# Patient Record
Sex: FEMALE | Race: WHITE | Employment: FULL TIME
[De-identification: names, ages, dates, MRNs, and addresses within clinical notes are randomized per-mention and may not be internally consistent; named-entity substitution may affect disease eponyms.]

---

## 2017-08-27 ENCOUNTER — HEALTH MAINTENANCE LETTER (OUTPATIENT)
Age: 37
End: 2017-08-27

## 2018-07-02 NOTE — PROGRESS NOTES
SUBJECTIVE:   Daniella Sexton is a 37 year old female who presents to clinic today for the following health issues:      Back Pain       Duration: Chronic for many years        Specific cause: Degenerative disk, bulging disks     Description:   Location of pain: low back bilateral  Character of pain: sharp  Pain radiation:radiates into the right buttocks and radiates into the left buttocks  New numbness or weakness in legs, not attributed to pain:  no     Intensity: Currently 7/10, At its worst 10/10    History:   Pain interferes with job: Sometimes   History of back problems: Patient states yes, she did have back surgery about 11 years ago   Any previous MRI or X-rays: Yes--at [facility] .  Date Last year   Sees a specialist for back pain:  No  Therapies tried without relief: None    Alleviating factors:   Improved by: cold and heat, lidocaine rubs, muscle relaxer's     Precipitating factors:  Worsened by: Bending and Standing    Functional and Psychosocial Screen (Ambrocio STarT Back):      Not performed today          Accompanying Signs & Symptoms:  Risk of Fracture:  None  Risk of Cauda Equina:  None  Risk of Infection:  None  Risk of Cancer:  None  Risk of Ankylosing Spondylitis:  Onset at age <35, male, AND morning back stiffness. no       History of microdiscectomy in 2005 and had x-ray done again last year showing DDD.  Previously received chiropractic care with improvement.    Problem list and histories reviewed & adjusted, as indicated.  Additional history: as documented    Patient Active Problem List   Diagnosis     Anxiety     Depressive disorder     Duodenal ulcer     History of alcohol abuse     History of methamphetamine abuse     Migraines     Screening for malignant neoplasm of cervix     Seasonal allergic rhinitis     Sleep disturbance     Tobacco abuse     Past Surgical History:   Procedure Laterality Date     BACK SURGERY  2005    L 3-4, L 4-5     LITHOTRIPSY  2016     RELEASE CARPAL TUNNEL  "Left      TONSILLECTOMY       TUBAL LIGATION         Social History   Substance Use Topics     Smoking status: Current Every Day Smoker     Packs/day: 0.50     Types: Cigarettes     Smokeless tobacco: Never Used      Comment: 5 cigarettes/day     Alcohol use Yes      Comment: 3-4 beers a night      Family History   Problem Relation Age of Onset     HEART DISEASE No family hx of      Diabetes No family hx of      Cancer No family hx of            Reviewed and updated as needed this visit by clinical staff       Reviewed and updated as needed this visit by Provider         ROS:  Constitutional, MS, neuro systems are negative, except as otherwise noted.    OBJECTIVE:     /88  Pulse 111  Temp 98.2  F (36.8  C) (Oral)  Resp 16  Ht 5' 6.53\" (1.69 m)  Wt 232 lb 6.4 oz (105.4 kg)  SpO2 97%  BMI 36.91 kg/m2  Body mass index is 36.91 kg/(m^2).  GENERAL: healthy, alert and no distress  Comprehensive back pain exam:  Tenderness of L4-S1, paralumbar muscles bilaterally, Range of motion not limited by pain, Lower extremity strength functional and equal on both sides, Lower extremity reflexes within normal limits bilaterally, Lower extremity sensation normal and equal on both sides and Straight leg raise negative bilaterally    Diagnostic Test Results:  No results found for this or any previous visit (from the past 24 hour(s)).    ASSESSMENT/PLAN:     1. Chronic bilateral low back pain without sciatica  Start Physical Therapy.  Continue ice/heat, stretching.   Flexeril at HS.  Patient requests IM of Toradol today. Avoid over the counter NSAIDs for the rest of today.   Would also like to try Toradol PO- do not mix with ibuprofen/advil.  Flexeril at HS- do not drive after taking.     - [facility] PT, HAND, AND CHIROPRACTIC REFERRAL  - cyclobenzaprine (FLEXERIL) 10 MG tablet; Take 1 tablet (10 mg) by mouth 3 times daily as needed for muscle spasms  Dispense: 42 tablet; Refill: 3  - ketorolac (TORADOL) 10 MG tablet; Take 1 " tablet (10 mg) by mouth every 6 hours as needed for moderate pain  Dispense: 20 tablet; Refill: 2  - ketorolac (TORADOL) 30 MG/ML injection; Inject 1 mL (30 mg) into the muscle once for 1 dose  Dispense: 1 mL; Refill: 0    2. Need for vaccination    - TDAP VACCINE (ADACEL) [90517.002]    3. Tobacco abuse  Set quit date, start Wellbutrin 2 weeks prior.    - buPROPion (WELLBUTRIN SR) 150 MG 12 hr tablet; Take 1 tablet once daily and increase to 1 tablet twice daily after 4 to 7 days  Dispense: 60 tablet; Refill: 2    4. CARDIOVASCULAR SCREENING; LDL GOAL LESS THAN 160  Return for fasting labs  - Lipid panel reflex to direct LDL Fasting; Future    Follow up 6 weeks as needed    JENAE [facility]

## 2018-07-05 ENCOUNTER — OFFICE VISIT (OUTPATIENT)
Dept: FAMILY MEDICINE | Facility: CLINIC | Age: 38
End: 2018-07-05
Payer: COMMERCIAL

## 2018-07-05 VITALS
HEIGHT: 67 IN | SYSTOLIC BLOOD PRESSURE: 122 MMHG | WEIGHT: 232.4 LBS | TEMPERATURE: 98.2 F | HEART RATE: 111 BPM | RESPIRATION RATE: 16 BRPM | OXYGEN SATURATION: 97 % | DIASTOLIC BLOOD PRESSURE: 88 MMHG | BODY MASS INDEX: 36.47 KG/M2

## 2018-07-05 DIAGNOSIS — Z23 NEED FOR VACCINATION: ICD-10-CM

## 2018-07-05 DIAGNOSIS — M54.50 CHRONIC BILATERAL LOW BACK PAIN WITHOUT SCIATICA: Primary | ICD-10-CM

## 2018-07-05 DIAGNOSIS — Z72.0 TOBACCO ABUSE: ICD-10-CM

## 2018-07-05 DIAGNOSIS — G89.29 CHRONIC BILATERAL LOW BACK PAIN WITHOUT SCIATICA: Primary | ICD-10-CM

## 2018-07-05 DIAGNOSIS — Z13.6 CARDIOVASCULAR SCREENING; LDL GOAL LESS THAN 160: ICD-10-CM

## 2018-07-05 PROCEDURE — 90715 TDAP VACCINE 7 YRS/> IM: CPT | Performed by: NURSE PRACTITIONER

## 2018-07-05 PROCEDURE — 99203 OFFICE O/P NEW LOW 30 MIN: CPT | Mod: 25 | Performed by: NURSE PRACTITIONER

## 2018-07-05 PROCEDURE — 90471 IMMUNIZATION ADMIN: CPT | Performed by: NURSE PRACTITIONER

## 2018-07-05 PROCEDURE — 96372 THER/PROPH/DIAG INJ SC/IM: CPT | Performed by: NURSE PRACTITIONER

## 2018-07-05 RX ORDER — BUPROPION HYDROCHLORIDE 150 MG/1
TABLET, EXTENDED RELEASE ORAL
Qty: 60 TABLET | Refills: 2 | Status: SHIPPED | OUTPATIENT
Start: 2018-07-05 | End: 2019-06-12

## 2018-07-05 RX ORDER — KETOROLAC TROMETHAMINE 10 MG/1
10 TABLET, FILM COATED ORAL EVERY 6 HOURS PRN
Qty: 20 TABLET | Refills: 2 | Status: SHIPPED | OUTPATIENT
Start: 2018-07-05 | End: 2019-11-07

## 2018-07-05 RX ORDER — RIZATRIPTAN BENZOATE 10 MG/1
10 TABLET ORAL
COMMUNITY
Start: 2014-08-26 | End: 2019-04-03

## 2018-07-05 RX ORDER — ACETAMINOPHEN 325 MG/1
325-650 TABLET ORAL
COMMUNITY
End: 2020-05-05

## 2018-07-05 RX ORDER — CYCLOBENZAPRINE HCL 10 MG
10 TABLET ORAL
COMMUNITY
Start: 2017-10-13 | End: 2018-07-05

## 2018-07-05 RX ORDER — KETOROLAC TROMETHAMINE 30 MG/ML
30 INJECTION, SOLUTION INTRAMUSCULAR; INTRAVENOUS ONCE
Qty: 1 ML | Refills: 0 | OUTPATIENT
Start: 2018-07-05 | End: 2018-07-05

## 2018-07-05 RX ORDER — ALBUTEROL SULFATE 90 UG/1
AEROSOL, METERED RESPIRATORY (INHALATION)
Refills: 1 | COMMUNITY
Start: 2018-02-09 | End: 2019-06-12

## 2018-07-05 RX ORDER — LORATADINE 10 MG/1
TABLET ORAL
COMMUNITY
Start: 2017-06-26 | End: 2019-01-02

## 2018-07-05 RX ORDER — FLUTICASONE PROPIONATE 50 MCG
2 SPRAY, SUSPENSION (ML) NASAL
COMMUNITY
End: 2019-11-07

## 2018-07-05 RX ORDER — CYCLOBENZAPRINE HCL 10 MG
10 TABLET ORAL 3 TIMES DAILY PRN
Qty: 42 TABLET | Refills: 3 | Status: SHIPPED | OUTPATIENT
Start: 2018-07-05 | End: 2019-01-02

## 2018-07-05 NOTE — MR AVS SNAPSHOT
After Visit Summary   7/5/2018    Daniella Sexton    MRN: [id number]           Patient Information     Date Of Birth          1980        Visit Information        Provider Department      7/5/2018 3:20 PM Renetta Morrow APRN CNP [facility]        Today's Diagnoses     Chronic bilateral low back pain without sciatica    -  1    Need for vaccination        Tobacco abuse        CARDIOVASCULAR SCREENING; LDL GOAL LESS THAN 160          Care Instructions    [facility]    If you have any questions regarding to your visit please contact your care team:       Team Red:   Clinic Hours Telephone Number   Dr. Tosha Morrow, NP   7am-7pm  Monday - Thursday   7am-5pm  Fridays  [phone]  (Appointment scheduling available 24/7)    Questions about your recent visit?   Team Line  [phone]   Urgent Care - [place] and [facility] - 11am-9pm Monday-Friday Saturday-Sunday- 9am-5pm   [place] - 5pm-9pm Monday-Friday Saturday-Sunday- 9am-5pm  [phone] - [place]  [phone] - [place]       What options do I have for a visit other than an office visit? We offer electronic visits (e-visits) and telephone visits, when medically appropriate.  Please check with your medical insurance to see if these types of visits are covered, as you will be responsible for any charges that are not paid by your insurance.      You can use [employer] (secure electronic communication) to access to your chart, send your primary care provider a message, or make an appointment. Ask a team member how to get started.     For a price quote for your services, please call our Consumer Price Line at [phone] or our Imaging Cost estimation line at [phone] (for imaging tests).              Follow-ups after your visit        Additional Services     [facility] PT, HAND, AND CHIROPRACTIC REFERRAL       === This order will print in the  [facility] Scheduling  Office ===    Physical therapy, hand therapy and chiropractic care are available through:    [facility] [facility]  [facility], [facility]    Call one easy number to schedule at any of the above locations:  [phone].    Your provider has referred you to Physical Therapy at [facility] or [facility]    Indication/Reason for Referral: Low Back Pain  Onset of Illness:  chronic  Therapy Orders:  Evaluate and Treat  Special Programs:  None  Special Request:  None    Ambrocio Albrecht      Additional Comments for the therapist or chiropractor:      Please be aware that coverage of these services is subject to the terms and limitations of your health insurance plan.  Call member services at your health plan with any benefit or coverage questions.      Please bring the following to your appointment:    >>   Your personal calendar for scheduling future appointments  >>   Comfortable clothing                  Future tests that were ordered for you today     Open Future Orders        Priority Expected Expires Ordered    Lipid panel reflex to direct LDL Fasting Routine 7/5/2018 7/5/2019 7/5/2018            Who to contact     If you have questions or need follow up information about today's clinic visit or your schedule please contact [facility] directly at [phone].  Normal or non-critical lab and imaging results will be communicated to you by MyChart, letter or phone within 4 business days after the clinic has received the results. If you do not hear from us within 7 days, please contact the clinic through MyChart or phone. If you have a critical or abnormal lab result, we will notify you by phone as soon as possible.  Submit refill requests through [employer] or call your pharmacy and they will forward the refill request to us. Please allow 3 business days for your refill to be completed.          Additional Information About Your Visit        MyChart Information   "   FigCard lets you send messages to your doctor, view your test results, renew your prescriptions, schedule appointments and more. To sign up, go to [email or web address] . Click on \"Log in\" on the left side of the screen, which will take you to the Welcome page. Then click on \"Sign up Now\" on the right side of the page.     You will be asked to enter the access code listed below, as well as some personal information. Please follow the directions to create your username and password.     Your access code is: [email or web address]  Expires: 10/3/2018  3:17 PM     Your access code will  in 90 days. If you need help or a new code, please call your [place] clinic or [phone].        Care EveryWhere ID     This is your Care EveryWhere ID. This could be used by other organizations to access your [place] medical records  [id number]        Your Vitals Were     Pulse Temperature Respirations Height Pulse Oximetry BMI (Body Mass Index)    111 98.2  F (36.8  C) (Oral) 16 5' 6.53\" (1.69 m) 97% 36.91 kg/m2       Blood Pressure from Last 3 Encounters:   18 122/88    Weight from Last 3 Encounters:   18 232 lb 6.4 oz (105.4 kg)              We Performed the Following     [facility] PT, HAND, AND CHIROPRACTIC REFERRAL     TDAP VACCINE (ADACEL) [51337.002]          Today's Medication Changes          These changes are accurate as of 18  4:13 PM.  If you have any questions, ask your nurse or doctor.               Start taking these medicines.        Dose/Directions    buPROPion 150 MG 12 hr tablet   Commonly known as:  WELLBUTRIN SR   Used for:  Tobacco abuse   Started by:  Renetta Morrow APRN CNP        Take 1 tablet once daily and increase to 1 tablet twice daily after 4 to 7 days   Quantity:  60 tablet   Refills:  2       * ketorolac 10 MG tablet   Commonly known as:  TORADOL   Used for:  Chronic bilateral low back pain without sciatica   Started by:  Renetta Morrow APRN CNP        Dose:  10 mg "   Take 1 tablet (10 mg) by mouth every 6 hours as needed for moderate pain   Quantity:  20 tablet   Refills:  2       * ketorolac 30 MG/ML injection   Commonly known as:  TORADOL   Used for:  Chronic bilateral low back pain without sciatica   Started by:  Renetta Morrow APRN CNP        Dose:  30 mg   Inject 1 mL (30 mg) into the muscle once for 1 dose   Quantity:  1 mL   Refills:  0       * Notice:  This list has 2 medication(s) that are the same as other medications prescribed for you. Read the directions carefully, and ask your doctor or other care provider to review them with you.      These medicines have changed or have updated prescriptions.        Dose/Directions    cyclobenzaprine 10 MG tablet   Commonly known as:  FLEXERIL   This may have changed:    - when to take this  - reasons to take this   Used for:  Chronic bilateral low back pain without sciatica   Changed by:  Renetta Morrow APRN CNP        Dose:  10 mg   Take 1 tablet (10 mg) by mouth 3 times daily as needed for muscle spasms   Quantity:  42 tablet   Refills:  3         Stop taking these medicines if you haven't already. Please contact your care team if you have questions.     diclofenac 1 % Gel topical gel   Commonly known as:  VOLTAREN   Stopped by:  Renetta Morrow APRN CNP           ROBAXIN PO   Stopped by:  Renetta Morrow APRN CNP                Where to get your medicines      These medications were sent to [facility] - [address]  [address], [place] [address]     Phone:  [phone]     buPROPion 150 MG 12 hr tablet    cyclobenzaprine 10 MG tablet    ketorolac 10 MG tablet         Some of these will need a paper prescription and others can be bought over the counter.  Ask your nurse if you have questions.     You don't need a prescription for these medications     ketorolac 30 MG/ML injection                Primary Care Provider Office Phone #  Fax #    Cindi Stanford [phone] [address]  [facility] [place] [address]        Equal Access to Services     ALEYDA SMART : Hadii aad ku hadbriannestevie Francisco, wabeckida desmondcoltonha, qatimta karoopada florencio, ottoniel baker johnnygermán gates laRenettajessica moeller. So [facility] [place] [phone].    ATENCIÓN: Si habla español, tiene a morgan disposición servicios gratuitos de asistencia lingüística. Llame al [phone].    We comply with applicable federal civil rights laws and [place] laws. We do not discriminate on the basis of race, color, national origin, age, disability, sex, sexual orientation, or gender identity.            Thank you!     Thank you for choosing [facility]  for your care. Our goal is always to provide you with excellent care. Hearing back from our patients is one way we can continue to improve our services. Please take a few minutes to complete the written survey that you may receive in the mail after your visit with us. Thank you!             Your Updated Medication List - Protect others around you: Learn how to safely use, store and throw away your medicines at www.disposemymeds.org.          This list is accurate as of 7/5/18  4:13 PM.  Always use your most recent med list.                   Brand Name Dispense Instructions for use Diagnosis    acetaminophen 325 MG tablet    TYLENOL     Take 325-650 mg by mouth        buPROPion 150 MG 12 hr tablet    WELLBUTRIN SR    60 tablet    Take 1 tablet once daily and increase to 1 tablet twice daily after 4 to 7 days    Tobacco abuse       cyclobenzaprine 10 MG tablet    FLEXERIL    42 tablet    Take 1 tablet (10 mg) by mouth 3 times daily as needed for muscle spasms    Chronic bilateral low back pain without sciatica       fluticasone 50 MCG/ACT spray    FLONASE     2 sprays        * ketorolac 10 MG tablet    TORADOL    20 tablet    Take 1 tablet (10 mg) by mouth every 6 hours as needed for moderate pain    Chronic bilateral low back pain  without sciatica       * ketorolac 30 MG/ML injection    TORADOL    1 mL    Inject 1 mL (30 mg) into the muscle once for 1 dose    Chronic bilateral low back pain without sciatica       loratadine 10 MG tablet    CLARITIN          rizatriptan 10 MG tablet    MAXALT     Take 10 mg by mouth        VENTOLIN  (90 Base) MCG/ACT Inhaler   Generic drug:  albuterol           * Notice:  This list has 2 medication(s) that are the same as other medications prescribed for you. Read the directions carefully, and ask your doctor or other care provider to review them with you.

## 2018-07-05 NOTE — NURSING NOTE
The following medication was given:       MEDICATION: Ketorolac Tromethamine 60MG/2ML (30 mg/mL) (Toradol)  ROUTE: IM  SITE: Ventrogluteal - Right  DOSE: 30 mg-1mL  LOT #: [id number]  :  [employer]  EXPIRATION DATE:  10/2019  NDC#: 63095-460-62     Give at: 4:15PM   Loree Odell

## 2018-07-05 NOTE — Clinical Note
Please abstract the following data from this visit with this patient into the appropriate field in Epic:  Pap smear done on this date: 4/14/15 (approximately), by this group: [facility], results were NIL, negative HPV.

## 2018-07-05 NOTE — PATIENT INSTRUCTIONS
[facility]    If you have any questions regarding to your visit please contact your care team:       Team Red:   Clinic Hours Telephone Number   Dr. Tosha Morrow, NP   7am-7pm  Monday - Thursday   7am-5pm  Fridays  [phone]  (Appointment scheduling available 24/7)    Questions about your recent visit?   Team Line  [phone]   Urgent Care - [place] and [facility] - 11am-9pm Monday-Friday Saturday-Sunday- 9am-5pm   [place] - 5pm-9pm Monday-Friday Saturday-Sunday- 9am-5pm  [phone] - [place]  [phone] - [place]       What options do I have for a visit other than an office visit? We offer electronic visits (e-visits) and telephone visits, when medically appropriate.  Please check with your medical insurance to see if these types of visits are covered, as you will be responsible for any charges that are not paid by your insurance.      You can use [employer] (secure electronic communication) to access to your chart, send your primary care provider a message, or make an appointment. Ask a team member how to get started.     For a price quote for your services, please call our Consumer Price Line at [phone] or our Imaging Cost estimation line at [phone] (for imaging tests).

## 2018-07-09 ENCOUNTER — HEALTH MAINTENANCE LETTER (OUTPATIENT)
Age: 38
End: 2018-07-09

## 2018-07-13 ENCOUNTER — THERAPY VISIT (OUTPATIENT)
Dept: PHYSICAL THERAPY | Facility: CLINIC | Age: 38
End: 2018-07-13
Attending: NURSE PRACTITIONER
Payer: COMMERCIAL

## 2018-07-13 DIAGNOSIS — G89.29 CHRONIC LOW BACK PAIN: Primary | ICD-10-CM

## 2018-07-13 DIAGNOSIS — M54.50 CHRONIC LOW BACK PAIN: Primary | ICD-10-CM

## 2018-07-13 PROCEDURE — 97110 THERAPEUTIC EXERCISES: CPT | Mod: GP | Performed by: PHYSICAL THERAPIST

## 2018-07-13 PROCEDURE — 97161 PT EVAL LOW COMPLEX 20 MIN: CPT | Mod: GP | Performed by: PHYSICAL THERAPIST

## 2018-07-13 PROCEDURE — 97012 MECHANICAL TRACTION THERAPY: CPT | Mod: GP | Performed by: PHYSICAL THERAPIST

## 2018-07-13 NOTE — PROGRESS NOTES
[facility] Initial Evaluation  Subjective:  Patient is a 37 year old female presenting with rehab back hpi.   Daniella Sexton is a 37 year old female with a lumbar condition.  Condition occurred with:  Repetition/overuse and degenerative joint disease.  Condition occurred: in the community.  This is a chronic condition  Patient reports onset of low back pain 1 year ago after experiencing a change in her job responsibilities. Patient generally sits throughout the day, but was asked to help work in a department that requires her to stand and lean forward for long periods of time over a table. Patient also reports having multiple level discectomies 12 years ago.    Patient reports pain:  Lumbar spine right and lumbar spine left.  Radiates to: occasional bilateral gluts.  Pain is described as aching, burning, sharp and shooting and is constant and reported as 5/10.  Associated symptoms:  Loss of strength and loss of motion/stiffness. Pain is worse during the day.  Symptoms are exacerbated by bending and standing and relieved by nothing.  Since onset symptoms are unchanged.  Special testing: none.      General health as reported by patient is good.                      Red flags:  None as reported by the patient.                        Objective:  Standing Alignment:    Cervical/Thoracic:  Forward head  Shoulder/UE:  Rounded shoulders                             Lumbar/SI Evaluation  ROM:    AROM Lumbar:   Flexion:            Hand to ankles, painful  Ext:                    Significant loss, painful   Side Bend:        Left:  Hand to knee, painful    Right:  Hand to knee, painful  Rotation:           Left:  WNL    Right:  WNL  Side Glide:        Left:     Right:           Lumbar Myotomes:  Lumbar myotomes: grossly 4+/5 bilaterally.                Lumbar Dermtomes:  normal                Neural Tension/Mobility:    Left side:  SLR positive.   Right side:   SLR positive.  Lumbar Palpation:    Tenderness  present at Left:    Piriformis; Gluteus Medius and Vertebral  Tenderness present at Right: Piriformis; Gluteus Medius and Vertebral    Lumbar Provocation:    Left positive with:  Mobility  Left negative with:  PROM hip  Right positive with: Mobility  Right negative with:  PROM hip  Spinal Segmental Conclusions:     Level: Hypo noted at L5, L4, L3, L2 and L1      SI joint/Sacrum:    unremarkable                                                         [facility] Lumbar Evaluation        Test Movements:  FIS: During: produces  After: worse  Mechanical Response: no effect  Repeat FIS: During: produces  After: worse  Mechanical Response: no effect  EIS: During: produces  After: no effect  Mechanical Response: no effect  Repeat EIS: During: decreases  After: better  Mechanical Response: IncROM                                                     ROS    Assessment/Plan:    Patient is a 37 year old female with lumbar complaints.    Patient has the following significant findings with corresponding treatment plan.                Diagnosis 1:  Low back pain  Pain -  hot/cold therapy, mechanical traction, manual therapy, self management, education, directional preference exercise and home program    Therapy Evaluation Codes:   1) History comprised of:   Personal factors that impact the plan of care:      Overall behavior pattern, Profession, Social history/culture and Time since onset of symptoms.    Comorbidity factors that impact the plan of care are:      None.     Medications impacting care: None.  2) Examination of Body Systems comprised of:   Body structures and functions that impact the plan of care:      Lumbar spine.   Activity limitations that impact the plan of care are:      Bathing, Bending, Dressing, Lifting, Standing, Walking and Working.  3) Clinical presentation characteristics are:   Stable/Uncomplicated.  4) Decision-Making    Low complexity using standardized patient assessment instrument and/or measureable  assessment of functional outcome.  Cumulative Therapy Evaluation is: Low complexity.    Previous and current functional limitations:  (See Goal Flow Sheet for this information)    Short term and Long term goals: (See Goal Flow Sheet for this information)     Communication ability:  Patient appears to be able to clearly communicate and understand verbal and written communication and follow directions correctly.  Treatment Explanation - The following has been discussed with the patient:   RX ordered/plan of care  Anticipated outcomes  Possible risks and side effects  This patient would benefit from PT intervention to resume normal activities.   Rehab potential is good.    Frequency:  1 X week, once daily  Duration:  for 6 weeks  Discharge Plan:  Achieve all LTG.  Independent in home treatment program.  Reach maximal therapeutic benefit.    Please refer to the daily flowsheet for treatment today, total treatment time and time spent performing 1:1 timed codes.

## 2018-07-16 NOTE — PROGRESS NOTES
[facility] Initial Evaluation  Subjective:  Patient is a 37 year old female presenting with rehab left ankle/foot hpi.                                      Pertinent medical history includes:  Depression, overweight, smoking and sleep disorder/apnea.  Medical allergies: no.  Other surgeries include:  None reported.  Current medications:  Anti-inflammatory and muscle relaxants.                                      Objective:  System    Physical Exam    General     ROS    Assessment/Plan:

## 2018-07-18 ENCOUNTER — THERAPY VISIT (OUTPATIENT)
Dept: PHYSICAL THERAPY | Facility: CLINIC | Age: 38
End: 2018-07-18
Payer: COMMERCIAL

## 2018-07-18 DIAGNOSIS — M54.50 CHRONIC LOW BACK PAIN: ICD-10-CM

## 2018-07-18 DIAGNOSIS — G89.29 CHRONIC LOW BACK PAIN: ICD-10-CM

## 2018-07-18 PROCEDURE — 97012 MECHANICAL TRACTION THERAPY: CPT | Mod: GP

## 2018-07-18 PROCEDURE — 97110 THERAPEUTIC EXERCISES: CPT | Mod: GP

## 2018-07-30 ENCOUNTER — THERAPY VISIT (OUTPATIENT)
Dept: PHYSICAL THERAPY | Facility: CLINIC | Age: 38
End: 2018-07-30
Payer: COMMERCIAL

## 2018-07-30 DIAGNOSIS — M54.50 CHRONIC LOW BACK PAIN: ICD-10-CM

## 2018-07-30 DIAGNOSIS — G89.29 CHRONIC LOW BACK PAIN: ICD-10-CM

## 2018-07-30 PROCEDURE — 97110 THERAPEUTIC EXERCISES: CPT | Mod: GP | Performed by: PHYSICAL THERAPIST

## 2018-07-30 PROCEDURE — 97012 MECHANICAL TRACTION THERAPY: CPT | Mod: GP | Performed by: PHYSICAL THERAPIST

## 2018-08-07 ENCOUNTER — THERAPY VISIT (OUTPATIENT)
Dept: PHYSICAL THERAPY | Facility: CLINIC | Age: 38
End: 2018-08-07
Payer: COMMERCIAL

## 2018-08-07 DIAGNOSIS — M54.50 CHRONIC LOW BACK PAIN: ICD-10-CM

## 2018-08-07 DIAGNOSIS — G89.29 CHRONIC LOW BACK PAIN: ICD-10-CM

## 2018-08-07 PROCEDURE — 97110 THERAPEUTIC EXERCISES: CPT | Mod: GP | Performed by: PHYSICAL THERAPIST

## 2018-08-07 PROCEDURE — 97012 MECHANICAL TRACTION THERAPY: CPT | Mod: GP | Performed by: PHYSICAL THERAPIST

## 2018-08-27 DIAGNOSIS — Z13.6 CARDIOVASCULAR SCREENING; LDL GOAL LESS THAN 160: ICD-10-CM

## 2018-08-27 PROBLEM — E78.00 PURE HYPERCHOLESTEROLEMIA: Status: ACTIVE | Noted: 2018-08-27

## 2018-08-27 PROBLEM — E78.2 MIXED HYPERLIPIDEMIA: Status: ACTIVE | Noted: 2018-08-27

## 2018-08-27 LAB
CHOLEST SERPL-MCNC: 221 MG/DL
HDLC SERPL-MCNC: 59 MG/DL
LDLC SERPL CALC-MCNC: 131 MG/DL
NONHDLC SERPL-MCNC: 162 MG/DL
TRIGL SERPL-MCNC: 156 MG/DL

## 2018-08-27 PROCEDURE — 36415 COLL VENOUS BLD VENIPUNCTURE: CPT | Performed by: NURSE PRACTITIONER

## 2018-08-27 PROCEDURE — 80061 LIPID PANEL: CPT | Performed by: NURSE PRACTITIONER

## 2018-08-27 NOTE — LETTER
[facility]  [address] [place] [address]    August 28, 2018    Daniella Sexton  [address] [id number]          Dear Daniella,    Your cholesterol is mildly elevated. Regular exercise and a diet low in simple carbs and saturated fats can improve this. We should recheck this in 1 year    Enclosed is a copy of your results.     Results for orders placed or performed in visit on 08/27/18   Lipid panel reflex to direct LDL Fasting   Result Value Ref Range    Cholesterol 221 (H) <200 mg/dL    Triglycerides 156 (H) <150 mg/dL    HDL Cholesterol 59 >49 mg/dL    LDL Cholesterol Calculated 131 (H) <100 mg/dL    Non HDL Cholesterol 162 (H) <130 mg/dL       If you have any questions or concerns, please call myself or my nurse at [phone].      Sincerely,        Renetta Morrow, JENAE CNP /carbajal

## 2018-08-30 ENCOUNTER — THERAPY VISIT (OUTPATIENT)
Dept: PHYSICAL THERAPY | Facility: CLINIC | Age: 38
End: 2018-08-30
Payer: COMMERCIAL

## 2018-08-30 DIAGNOSIS — G89.29 CHRONIC LOW BACK PAIN: ICD-10-CM

## 2018-08-30 DIAGNOSIS — M54.50 CHRONIC LOW BACK PAIN: ICD-10-CM

## 2018-08-30 PROCEDURE — 97012 MECHANICAL TRACTION THERAPY: CPT | Mod: GP

## 2018-11-16 ENCOUNTER — OFFICE VISIT (OUTPATIENT)
Dept: FAMILY MEDICINE | Facility: CLINIC | Age: 38
End: 2018-11-16
Payer: COMMERCIAL

## 2018-11-16 VITALS
RESPIRATION RATE: 18 BRPM | TEMPERATURE: 97.1 F | SYSTOLIC BLOOD PRESSURE: 124 MMHG | HEART RATE: 93 BPM | DIASTOLIC BLOOD PRESSURE: 72 MMHG | OXYGEN SATURATION: 97 %

## 2018-11-16 DIAGNOSIS — J01.90 ACUTE BACTERIAL SINUSITIS: Primary | ICD-10-CM

## 2018-11-16 DIAGNOSIS — B96.89 ACUTE BACTERIAL SINUSITIS: Primary | ICD-10-CM

## 2018-11-16 PROCEDURE — 99214 OFFICE O/P EST MOD 30 MIN: CPT | Performed by: FAMILY MEDICINE

## 2018-11-16 RX ORDER — CEFDINIR 300 MG/1
300 CAPSULE ORAL 2 TIMES DAILY
Qty: 20 CAPSULE | Refills: 0 | Status: CANCELLED | OUTPATIENT
Start: 2018-11-16

## 2018-11-16 RX ORDER — PREDNISONE 20 MG/1
20 TABLET ORAL DAILY
Qty: 5 TABLET | Refills: 0 | Status: SHIPPED | OUTPATIENT
Start: 2018-11-16 | End: 2018-11-16

## 2018-11-16 RX ORDER — CLINDAMYCIN HCL 300 MG
300 CAPSULE ORAL 4 TIMES DAILY
Qty: 40 CAPSULE | Refills: 0 | Status: SHIPPED | OUTPATIENT
Start: 2018-11-16 | End: 2018-11-16

## 2018-11-16 RX ORDER — LEVOFLOXACIN 500 MG/1
500 TABLET, FILM COATED ORAL DAILY
Qty: 10 TABLET | Refills: 0 | Status: SHIPPED | OUTPATIENT
Start: 2018-11-16 | End: 2018-11-16

## 2018-11-16 RX ORDER — LEVOFLOXACIN 500 MG/1
500 TABLET, FILM COATED ORAL DAILY
Qty: 10 TABLET | Refills: 0 | Status: SHIPPED | OUTPATIENT
Start: 2018-11-16 | End: 2019-01-02

## 2018-11-16 RX ORDER — PREDNISONE 20 MG/1
20 TABLET ORAL DAILY
Qty: 5 TABLET | Refills: 0 | Status: SHIPPED | OUTPATIENT
Start: 2018-11-16 | End: 2019-01-02

## 2018-11-16 RX ORDER — CLINDAMYCIN HCL 300 MG
300 CAPSULE ORAL 4 TIMES DAILY
Qty: 40 CAPSULE | Refills: 0 | Status: SHIPPED | OUTPATIENT
Start: 2018-11-16 | End: 2019-01-02

## 2018-11-16 NOTE — PROGRESS NOTES
SUBJECTIVE:   Daniella Sexton is a 38 year old female who presents to clinic today for the following health issues:    Chief Complaint   Patient presents with     Sinus Problem     Health Maintenance     PHQ9     Referral     ENT       ENT Symptoms             Symptoms: cc Present Absent Comment   Fever/Chills   x    Fatigue  x     Muscle Aches  x     Eye Irritation   x    Sneezing  x     Nasal Joey/Drg  x     Sinus Pressure/Pain  x     Loss of smell  x     Dental pain   x    Sore Throat  x     Swollen Glands  x     Ear Pain/Fullness  x     Cough  x     Wheeze  x     Chest Pain  x     Shortness of breath  x     Rash   x    Other         Symptom duration:  2 months   Symptom severity:  moderate/severe   Treatments tried:  Levaquin, Doxycycline (2) and methylprednisolone     Contacts:  unknown     Autumn presents with URI symptoms.  Patient has had a productive cough for 14 days, along with sore throat, nasal congestion, fatigue, ear pain/fullness, sinus pressure.  Patient denies shortness of breath, wheezing, muscle aches, headache, fever.  Patient has been taking OTC cold medications and is trying to stay hydrated.  She has been treated recently with doxy, then another 10 days of doxy w/ methylpred after symptoms didn't improve, then 5 days of levaquin.    Patient gets sinus infection very frequently.  Would like referral to ENT to assess this.    Problem list and histories reviewed & adjusted, as indicated.  Additional history: as documented    BP Readings from Last 3 Encounters:   11/16/18 124/72   07/05/18 122/88    Wt Readings from Last 3 Encounters:   07/05/18 232 lb 6.4 oz (105.4 kg)        Reviewed and updated as needed this visit by clinical staff  Tobacco  Allergies  Meds  Problems       Reviewed and updated as needed this visit by Provider  Allergies  Meds  Problems         ROS:  Constitutional, HEENT, cardiovascular, pulmonary, gi and gu systems are negative, except as otherwise  noted.    OBJECTIVE:     /72  Pulse 93  Temp 97.1  F (36.2  C) (Oral)  Resp 18  SpO2 97%  There is no height or weight on file to calculate BMI.  GENERAL: healthy, alert and no distress  EYES: Eyes grossly normal to inspection, PERRL and conjunctivae and sclerae normal  HENT: left maxillary sinus tenderns, ear canals and TM's normal, nose and mouth without ulcers or lesions  NECK: no adenopathy, no asymmetry, masses, or scars and thyroid normal to palpation  RESP: lungs clear to auscultation - no rales, rhonchi or wheezes  CV: regular rate and rhythm, normal S1 S2, no S3 or S4, no murmur, click or rub, no peripheral edema and peripheral pulses strong  SKIN: no suspicious lesions or rashes  PSYCH: mentation appears normal, affect normal/bright    ASSESSMENT/PLAN:     1. Acute bacterial sinusitis  Will refer to ENT, given abx, prednisone  - OTOLARYNGOLOGY REFERRAL  - clindamycin (CLEOCIN) 300 MG capsule; Take 1 capsule (300 mg) by mouth 4 times daily  Dispense: 40 capsule; Refill: 0  - predniSONE (DELTASONE) 20 MG tablet; Take 1 tablet (20 mg) by mouth daily  Dispense: 5 tablet; Refill: 0  - levofloxacin (LEVAQUIN) 500 MG tablet; Take 1 tablet (500 mg) by mouth daily  Dispense: 10 tablet; Refill: 0    Follow up if symptoms worsen or fail to improve.     Roni Mobley MD  [facility]

## 2018-11-16 NOTE — MR AVS SNAPSHOT
After Visit Summary   11/16/2018    Daniella Sexton    MRN: [id number]           Patient Information     Date Of Birth          1980        Visit Information        Provider Department      11/16/2018 3:20 PM Roni Baltazar MD [facility]        Today's Diagnoses     Acute bacterial sinusitis    -  1       Follow-ups after your visit        Additional Services     OTOLARYNGOLOGY REFERRAL       Your provider has referred you to: FM[facility]: [facility] [phone]   [email or web address]    Please be aware that coverage of these services is subject to the terms and limitations of your health insurance plan.  Call member services at your health plan with any benefit or coverage questions.      Please bring the following with you to your appointment:    (1) Any X-Rays, CTs or MRIs which have been performed.  Contact the facility where they were done to arrange for  prior to your scheduled appointment.   (2) List of current medications  (3) This referral request   (4) Any documents/labs given to you for this referral                  Who to contact     If you have questions or need follow up information about today's clinic visit or your schedule please contact [facility] directly at [phone].  Normal or non-critical lab and imaging results will be communicated to you by MyChart, letter or phone within 4 business days after the clinic has received the results. If you do not hear from us within 7 days, please contact the clinic through MyChart or phone. If you have a critical or abnormal lab result, we will notify you by phone as soon as possible.  Submit refill requests through Qu[employer]ey or call your pharmacy and they will forward the refill request to us. Please allow 3 business days for your refill to be completed.          Additional Information About Your Visit        Care EveryWhere ID     This is your Care  EveryWhere ID. This could be used by other organizations to access your [place] medical records  [id number]        Your Vitals Were     Pulse Temperature Respirations Pulse Oximetry          93 97.1  F (36.2  C) (Oral) 18 97%         Blood Pressure from Last 3 Encounters:   11/16/18 124/72   07/05/18 122/88    Weight from Last 3 Encounters:   07/05/18 232 lb 6.4 oz (105.4 kg)              We Performed the Following     OTOLARYNGOLOGY REFERRAL          Today's Medication Changes          These changes are accurate as of 11/16/18  4:19 PM.  If you have any questions, ask your nurse or doctor.               Start taking these medicines.        Dose/Directions    clindamycin 300 MG capsule   Commonly known as:  CLEOCIN   Used for:  Acute bacterial sinusitis   Started by:  Roni Baltazar MD        Dose:  300 mg   Take 1 capsule (300 mg) by mouth 4 times daily   Quantity:  40 capsule   Refills:  0       levofloxacin 500 MG tablet   Commonly known as:  LEVAQUIN   Used for:  Acute bacterial sinusitis   Started by:  Roni Baltazar MD        Dose:  500 mg   Take 1 tablet (500 mg) by mouth daily   Quantity:  10 tablet   Refills:  0       predniSONE 20 MG tablet   Commonly known as:  DELTASONE   Used for:  Acute bacterial sinusitis   Started by:  Roni Baltazar MD        Dose:  20 mg   Take 1 tablet (20 mg) by mouth daily   Quantity:  5 tablet   Refills:  0            Where to get your medicines      These medications were sent to [facility]  [address]  [address], [place] [address]     Phone:  [phone]     clindamycin 300 MG capsule    levofloxacin 500 MG tablet    predniSONE 20 MG tablet                Primary Care Provider Office Phone # Fax #    Renetta Morrow, JENAE VAUGHAN [phone] [address]  [place] [address]        Equal Access to Services     ALEYDA SMART  AH: Redd davisonbriannestevie Nolan, waaxda luqadaha, qaybta kaalmadelyn matias, ottoniel chase raghukarma delvallerogervince moeller. So [facility] [phone].    ATENCIÓN: Si habla josiah, tiene a morgan disposición servicios gratuitos de asistencia lingüística. Llame al [phone].    We comply with applicable federal civil rights laws and [place] laws. We do not discriminate on the basis of race, color, national origin, age, disability, sex, sexual orientation, or gender identity.            Thank you!     Thank you for choosing [facility]  for your care. Our goal is always to provide you with excellent care. Hearing back from our patients is one way we can continue to improve our services. Please take a few minutes to complete the written survey that you may receive in the mail after your visit with us. Thank you!             Your Updated Medication List - Protect others around you: Learn how to safely use, store and throw away your medicines at www.disposemymeds.org.          This list is accurate as of 11/16/18  4:19 PM.  Always use your most recent med list.                   Brand Name Dispense Instructions for use Diagnosis    acetaminophen 325 MG tablet    TYLENOL     Take 325-650 mg by mouth        buPROPion 150 MG 12 hr tablet    WELLBUTRIN SR    60 tablet    Take 1 tablet once daily and increase to 1 tablet twice daily after 4 to 7 days    Tobacco abuse       clindamycin 300 MG capsule    CLEOCIN    40 capsule    Take 1 capsule (300 mg) by mouth 4 times daily    Acute bacterial sinusitis       cyclobenzaprine 10 MG tablet    FLEXERIL    42 tablet    Take 1 tablet (10 mg) by mouth 3 times daily as needed for muscle spasms    Chronic bilateral low back pain without sciatica       fluticasone 50 MCG/ACT spray    FLONASE     2 sprays        ketorolac 10 MG tablet    TORADOL    20 tablet    Take 1 tablet (10 mg) by mouth every 6 hours as needed for moderate pain    Chronic bilateral low back pain without  sciatica       levofloxacin 500 MG tablet    LEVAQUIN    10 tablet    Take 1 tablet (500 mg) by mouth daily    Acute bacterial sinusitis       loratadine 10 MG tablet    CLARITIN          predniSONE 20 MG tablet    DELTASONE    5 tablet    Take 1 tablet (20 mg) by mouth daily    Acute bacterial sinusitis       rizatriptan 10 MG tablet    MAXALT     Take 10 mg by mouth        VENTOLIN  (90 Base) MCG/ACT inhaler   Generic drug:  albuterol

## 2018-11-27 ENCOUNTER — TELEPHONE (OUTPATIENT)
Dept: FAMILY MEDICINE | Facility: CLINIC | Age: 38
End: 2018-11-27

## 2018-11-27 NOTE — TELEPHONE ENCOUNTER
Reason for call:  Patient reporting a symptom    Symptom or request: Sinus infection, head pounding and ears hurt    Duration (how long have symptoms been present): 2 months    Have you been treated for this before? Yes    Additional comments: Patient finished antibiotic yesterday and she is asking for medication for the pain. Please call.    Phone Number patient can be reached at:  Home number on file [phone] (home)    Best Time:  anytime    Can we leave a detailed message on this number:  YES    Call taken on 11/27/2018 at 2:59 PM by Ana Riojas

## 2018-12-03 ENCOUNTER — OFFICE VISIT (OUTPATIENT)
Dept: OTOLARYNGOLOGY | Facility: CLINIC | Age: 38
End: 2018-12-03
Payer: COMMERCIAL

## 2018-12-03 ENCOUNTER — OFFICE VISIT (OUTPATIENT)
Dept: AUDIOLOGY | Facility: CLINIC | Age: 38
End: 2018-12-03
Payer: COMMERCIAL

## 2018-12-03 VITALS
DIASTOLIC BLOOD PRESSURE: 83 MMHG | HEART RATE: 101 BPM | BODY MASS INDEX: 38.59 KG/M2 | WEIGHT: 243 LBS | OXYGEN SATURATION: 100 % | SYSTOLIC BLOOD PRESSURE: 126 MMHG

## 2018-12-03 DIAGNOSIS — J01.41 ACUTE RECURRENT PANSINUSITIS: Primary | ICD-10-CM

## 2018-12-03 DIAGNOSIS — H69.93 DISORDER OF BOTH EUSTACHIAN TUBES: Primary | ICD-10-CM

## 2018-12-03 DIAGNOSIS — H93.8X3 EAR FULLNESS, BILATERAL: ICD-10-CM

## 2018-12-03 PROCEDURE — 99207 ZZC NO CHARGE LOS: CPT | Performed by: AUDIOLOGIST

## 2018-12-03 PROCEDURE — 92567 TYMPANOMETRY: CPT | Performed by: AUDIOLOGIST

## 2018-12-03 PROCEDURE — 92557 COMPREHENSIVE HEARING TEST: CPT | Performed by: AUDIOLOGIST

## 2018-12-03 PROCEDURE — 99244 OFF/OP CNSLTJ NEW/EST MOD 40: CPT | Performed by: OTOLARYNGOLOGY

## 2018-12-03 RX ORDER — CYCLOBENZAPRINE HCL 10 MG
10 TABLET ORAL 3 TIMES DAILY PRN
Qty: 60 TABLET | Refills: 1 | Status: SHIPPED | OUTPATIENT
Start: 2018-12-03 | End: 2019-04-03

## 2018-12-03 NOTE — MR AVS SNAPSHOT
After Visit Summary   12/3/2018    Daniella Sexton    MRN: [id number]           Patient Information     Date Of Birth          1980        Visit Information        Provider Department      12/3/2018 4:30 PM Maikol Migule MD [facility]        Today's Diagnoses     Acute recurrent pansinusitis    -  1    Ear fullness, bilateral           Follow-ups after your visit        Additional Services     AUDIOLOGY ADULT REFERRAL       Your provider has referred you to: FM[facility]: [facility] [phone]   [email or web address]    Treatment:  Evaluation & Treatment  Specialty Testing:  Audiogram w/Tymps and Reflexes    Please be aware that coverage of these services is subject to the terms and limitations of your health insurance plan.  Call member services at your health plan with any benefit or coverage questions.      Please bring the following to your appointment:  >>   Any x-rays, CTs or MRIs which have been performed.  Contact the facility where they were done to arrange for  prior to your scheduled appointment.   >>   List of current medications  >>   This referral request   >>   Any documents/labs given to you for this referral                  Future tests that were ordered for you today     Open Future Orders        Priority Expected Expires Ordered    CT Sinus w/o Contrast Routine  12/3/2019 12/3/2018            Who to contact     If you have questions or need follow up information about today's clinic visit or your schedule please contact [facility] directly at [phone].  Normal or non-critical lab and imaging results will be communicated to you by MyChart, letter or phone within 4 business days after the clinic has received the results. If you do not hear from us within 7 days, please contact the clinic through MyChart or phone. If you have a critical or abnormal lab result, we will notify you by phone as soon as  possible.  Submit refill requests through [employer] or call your pharmacy and they will forward the refill request to us. Please allow 3 business days for your refill to be completed.          Additional Information About Your Visit        Care EveryWhere ID     This is your Care EveryWhere ID. This could be used by other organizations to access your [place] medical records  [id number]        Your Vitals Were     Pulse Pulse Oximetry BMI (Body Mass Index)             101 100% 38.59 kg/m2          Blood Pressure from Last 3 Encounters:   12/03/18 126/83   11/16/18 124/72   07/05/18 122/88    Weight from Last 3 Encounters:   12/03/18 110.2 kg (243 lb)   07/05/18 105.4 kg (232 lb 6.4 oz)              We Performed the Following     AUDIOLOGY ADULT REFERRAL          Today's Medication Changes          These changes are accurate as of 12/3/18  4:57 PM.  If you have any questions, ask your nurse or doctor.               These medicines have changed or have updated prescriptions.        Dose/Directions    * cyclobenzaprine 10 MG tablet   Commonly known as:  FLEXERIL   This may have changed:  Another medication with the same name was added. Make sure you understand how and when to take each.   Used for:  Chronic bilateral low back pain without sciatica   Changed by:  Maikol Miguel MD        Dose:  10 mg   Take 1 tablet (10 mg) by mouth 3 times daily as needed for muscle spasms   Quantity:  42 tablet   Refills:  3       * cyclobenzaprine 10 MG tablet   Commonly known as:  FLEXERIL   This may have changed:  You were already taking a medication with the same name, and this prescription was added. Make sure you understand how and when to take each.   Used for:  Ear fullness, bilateral   Changed by:  Maikol Miguel MD        Dose:  10 mg   Take 1 tablet (10 mg) by mouth 3 times daily as needed for muscle spasms   Quantity:  60 tablet   Refills:  1       * Notice:  This list has 2 medication(s) that are the same as  other medications prescribed for you. Read the directions carefully, and ask your doctor or other care provider to review them with you.         Where to get your medicines      These medications were sent to [facility] [address] - [address] [facility] [address]     Phone:  [phone]     cyclobenzaprine 10 MG tablet                Primary Care Provider Office Phone # Fax #    Renetta Brielle Morrow, APRN [phone]       [address] [address]        Equal Access to Services     [facility]: Hadii aad ku hadasho Soomaali, waaxda luqadaha, qaybta kaalmada adeegyada, ottoniel stone haymedardon summer christie . So [facility] [phone].    ATENCIÓN: Si habla español, tiene a morgan disposición servicios gratuitos de asistencia lingüística. [facility] [phone].    We comply with applicable federal civil rights laws and [place] laws. We do not discriminate on the basis of race, color, national origin, age, disability, sex, sexual orientation, or gender identity.            Thank you!     Thank you for choosing [facility]  for your care. Our goal is always to provide you with excellent care. Hearing back from our patients is one way we can continue to improve our services. Please take a few minutes to complete the written survey that you may receive in the mail after your visit with us. Thank you!             Your Updated Medication List - Protect others around you: Learn how to safely use, store and throw away your medicines at www.disposemymeds.org.          This list is accurate as of 12/3/18  4:57 PM.  Always use your most recent med list.                   Brand Name Dispense Instructions for use Diagnosis    acetaminophen 325 MG tablet    TYLENOL     Take 325-650 mg by mouth        buPROPion 150 MG 12 hr tablet    WELLBUTRIN SR    60 tablet    Take 1 tablet once daily and increase to 1  tablet twice daily after 4 to 7 days    Tobacco abuse       clindamycin 300 MG capsule    CLEOCIN    40 capsule    Take 1 capsule (300 mg) by mouth 4 times daily    Acute bacterial sinusitis       * cyclobenzaprine 10 MG tablet    FLEXERIL    42 tablet    Take 1 tablet (10 mg) by mouth 3 times daily as needed for muscle spasms    Chronic bilateral low back pain without sciatica       * cyclobenzaprine 10 MG tablet    FLEXERIL    60 tablet    Take 1 tablet (10 mg) by mouth 3 times daily as needed for muscle spasms    Ear fullness, bilateral       fluticasone 50 MCG/ACT nasal spray    FLONASE     2 sprays        ketorolac 10 MG tablet    TORADOL    20 tablet    Take 1 tablet (10 mg) by mouth every 6 hours as needed for moderate pain    Chronic bilateral low back pain without sciatica       levofloxacin 500 MG tablet    LEVAQUIN    10 tablet    Take 1 tablet (500 mg) by mouth daily    Acute bacterial sinusitis       loratadine 10 MG tablet    CLARITIN          predniSONE 20 MG tablet    DELTASONE    5 tablet    Take 1 tablet (20 mg) by mouth daily    Acute bacterial sinusitis       rizatriptan 10 MG tablet    MAXALT     Take 10 mg by mouth        VENTOLIN  (90 Base) MCG/ACT inhaler   Generic drug:  albuterol           * Notice:  This list has 2 medication(s) that are the same as other medications prescribed for you. Read the directions carefully, and ask your doctor or other care provider to review them with you.

## 2018-12-03 NOTE — PROGRESS NOTES
History of Present Illness - Autumn NETTA Sexton is a 38 year old female here to see me for the first time for sinus and nasal issues at the consultation of Pedro Baltazar.    She has basically had a sinus infection and ear infection since September of this year. And she has been on three rounds of antibiotics, and still no resolution.  She still has facial pressure, post nasal drainage,and malaise.    She has a longstanding history of sinus disease, and her entire adult life has had at least 3-4 sinus infections per year.  She is on long term flonase, daily saline irrigations.  She has not been allergy tested since childhood.    Past Medical History -   Patient Active Problem List   Diagnosis     Anxiety     Depressive disorder     Duodenal ulcer     History of alcohol abuse     History of methamphetamine abuse     Migraines     Screening for malignant neoplasm of cervix     Seasonal allergic rhinitis     Sleep disturbance     Tobacco abuse     CARDIOVASCULAR SCREENING; LDL GOAL LESS THAN 160     Chronic low back pain     Pure hypercholesterolemia       Current Medications -   Current Outpatient Prescriptions:      acetaminophen (TYLENOL) 325 MG tablet, Take 325-650 mg by mouth, Disp: , Rfl:      buPROPion (WELLBUTRIN SR) 150 MG 12 hr tablet, Take 1 tablet once daily and increase to 1 tablet twice daily after 4 to 7 days (Patient not taking: Reported on 11/16/2018), Disp: 60 tablet, Rfl: 2     clindamycin (CLEOCIN) 300 MG capsule, Take 1 capsule (300 mg) by mouth 4 times daily, Disp: 40 capsule, Rfl: 0     cyclobenzaprine (FLEXERIL) 10 MG tablet, Take 1 tablet (10 mg) by mouth 3 times daily as needed for muscle spasms (Patient not taking: Reported on 11/16/2018), Disp: 42 tablet, Rfl: 3     fluticasone (FLONASE) 50 MCG/ACT spray, 2 sprays, Disp: , Rfl:      ketorolac (TORADOL) 10 MG tablet, Take 1 tablet (10 mg) by mouth every 6 hours as needed for moderate pain (Patient not taking: Reported on 11/16/2018), Disp: 20  tablet, Rfl: 2     levofloxacin (LEVAQUIN) 500 MG tablet, Take 1 tablet (500 mg) by mouth daily, Disp: 10 tablet, Rfl: 0     loratadine (CLARITIN) 10 MG tablet, , Disp: , Rfl:      predniSONE (DELTASONE) 20 MG tablet, Take 1 tablet (20 mg) by mouth daily, Disp: 5 tablet, Rfl: 0     rizatriptan (MAXALT) 10 MG tablet, Take 10 mg by mouth, Disp: , Rfl:      VENTOLIN  (90 Base) MCG/ACT Inhaler, , Disp: , Rfl: 1    Allergies -   Allergies   Allergen Reactions     Amoxicillin Anaphylaxis     Morphine Other (See Comments) and Nausea and Vomiting     Penicillins Unknown       Social History -   Social History     Social History     Marital status:      Spouse name: N/A     Number of children: N/A     Years of education: N/A     Social History Main Topics     Smoking status: Current Every Day Smoker     Packs/day: 0.50     Types: Cigarettes     Smokeless tobacco: Never Used      Comment: 5 cigarettes/day     Alcohol use Yes      Comment: 3-4 beers a night      Drug use: No     Sexual activity: Yes     Other Topics Concern     Not on file     Social History Narrative       Family History -   Family History   Problem Relation Age of Onset     Heart Disease No family hx of      Diabetes No family hx of      Cancer No family hx of        Review of Systems - As per HPI and PMHx, otherwise 10+ system review of the head and neck, and general constitution is negative.    Physical Exam  /83  Pulse 101  Wt 110.2 kg (243 lb)  SpO2 100%  BMI 38.59 kg/m2    General - The patient is well nourished and well developed, and appears to have good nutritional status.  Alert and oriented to person and place, answers questions and cooperates with examination appropriately.   Head and Face - Normocephalic and atraumatic, with no gross asymmetry noted of the contour of the facial features.  The facial nerve is intact, with strong symmetric movements.  Voice and Breathing - The patient was breathing comfortably without the  use of accessory muscles. There was no wheezing, stridor, or stertor.  The patients voice was clear and strong, and had appropriate pitch and quality.  Ears - The tympanic membranes are normal in appearance, bony landmarks are intact.  No retraction, perforation, or masses.  No fluid or purulence was seen in the external canal or the middle ear. No evidence of infection of the middle ear or external canal, cerumen was normal in appearance.  Eyes - Extraocular movements intact, and the pupils were reactive to light.  Sclera were not icteric or injected, conjunctiva were pink and moist.  Mouth - Examination of the oral cavity showed pink, healthy oral mucosa. No lesions or ulcerations noted.  The tongue was mobile and midline, and the dentition were in good condition.    Throat - The walls of the oropharynx were smooth, pink, moist, symmetric, and had no lesions or ulcerations.  The tonsillar pillars and soft palate were symmetric.  The uvula was midline on elevation.    Neck - Normal midline excursion of the laryngotracheal complex during swallowing.  Full range of motion on passive movement.  Palpation of the occipital, submental, submandibular, internal jugular chain, and supraclavicular nodes did not demonstrate any abnormal lymph nodes or masses.  The carotid pulse was palpable bilaterally.  Palpation of the thyroid was soft and smooth, with no nodules or goiter appreciated.  The trachea was mobile and midline.  Nose - External contour is symmetric, no gross deflection or scars.  Nasal mucosa is pink and moist with no abnormal mucus.  The septum was midline and non-obstructive, turbinates of normal size and position.  No polyps, masses, or purulence noted on examination.    Audiologic Studies - An audiogram and tympanogram were performed today as part of the evaluation and personally reviewed. The tympanogram shows a normal Type A curve, with normal canal volume and middle ear pressure.  There is no sign of  eustachian tube dysfunction or middle ear effusion.  The audiogram was also normal.  The sensorineural hearing was age-appropriate, with no evidence of conductive hearing loss or significant asymmetry.      A/P - Daniella Sexton is a 38 year old female  (J01.41) Acute recurrent pansinusitis  (primary encounter diagnosis)  (H93.8X3) Ear fullness, bilateral    This situation does not seem totally straightforward to me.  On the one hand, she reports sinus symptoms of headache, facial pressure, and ear ache.  But the audiology testing is totally normal and the nasal exam is fairly benign.     I think we need definitive imaging to see if there is sinus disease or issues with the nasal anatomy.  I will get a sinus CT scan.  If clear, I will refer to [facility].  If there is sinus mucosal disease we will manage it appropriately.    I did send in an Rx for Flexeril for her today.

## 2018-12-03 NOTE — MR AVS SNAPSHOT
After Visit Summary   12/3/2018    Daniella Sexton    MRN: [id number]           Patient Information     Date Of Birth          1980        Visit Information        Provider Department      12/3/2018 4:00 PM Ganga Ham AuD [facility]        Today's Diagnoses     Disorder of both eustachian tubes    -  1       Follow-ups after your visit        Your next 10 appointments already scheduled     Dec 03, 2018  4:30 PM CST   New Visit with Maikol Miguel MD   [facility] ([facility])    [address] [place] [id number] [phone]              Who to contact     If you have questions or need follow up information about today's clinic visit or your schedule please contact [facility] directly at [phone].  Normal or non-critical lab and imaging results will be communicated to you by MyChart, letter or phone within 4 business days after the clinic has received the results. If you do not hear from us within 7 days, please contact the clinic through MyChart or phone. If you have a critical or abnormal lab result, we will notify you by phone as soon as possible.  Submit refill requests through [email or web address] or call your pharmacy and they will forward the refill request to us. Please allow 3 business days for your refill to be completed.          Additional Information About Your Visit        Care EveryWhere ID     This is your Care EveryWhere ID. This could be used by other organizations to access your [facility] medical records  [id number]         Blood Pressure from Last 3 Encounters:   11/16/18 124/72   07/05/18 122/88    Weight from Last 3 Encounters:   07/05/18 232 lb 6.4 oz (105.4 kg)              We Performed the Following     AUDIOGRAM/TYMPANOGRAM - INTERFACE     COMPREHENSIVE HEARING TEST     TYMPANOMETRY        Primary Care Provider Office Phone # Fax #    JENAE Sibley [phone]        [address] [facility] [address]        Equal Access to Services     ROMMELSJ BING : Hadii vahid ku hadbriannestevie Soceferinoali, waaxda luqadaha, qaybta kaalmada summerramirezelizabeth, waxay idiin haymedardokarma delvallerogervince moeller. So [facility] [place] [phone].    ATENCIÓN: Si habla español, tiene a morgan disposición servicios gratuitos de asistencia lingüística. [facility] [phone].    We comply with applicable federal civil rights laws and [place] laws. We do not discriminate on the basis of race, color, national origin, age, disability, sex, sexual orientation, or gender identity.            Thank you!     Thank you for choosing [facility]  for your care. Our goal is always to provide you with excellent care. Hearing back from our patients is one way we can continue to improve our services. Please take a few minutes to complete the written survey that you may receive in the mail after your visit with us. Thank you!             Your Updated Medication List - Protect others around you: Learn how to safely use, store and throw away your medicines at www.disposemymeds.org.          This list is accurate as of 12/3/18  4:17 PM.  Always use your most recent med list.                   Brand Name Dispense Instructions for use Diagnosis    acetaminophen 325 MG tablet    TYLENOL     Take 325-650 mg by mouth        buPROPion 150 MG 12 hr tablet    WELLBUTRIN SR    60 tablet    Take 1 tablet once daily and increase to 1 tablet twice daily after 4 to 7 days    Tobacco abuse       clindamycin 300 MG capsule    CLEOCIN    40 capsule    Take 1 capsule (300 mg) by mouth 4 times daily    Acute bacterial sinusitis       cyclobenzaprine 10 MG tablet    FLEXERIL    42 tablet    Take 1 tablet (10 mg) by mouth 3 times daily as needed for muscle spasms    Chronic bilateral low back pain without sciatica       fluticasone 50 MCG/ACT nasal spray    FLONASE     2 sprays        ketorolac 10 MG tablet    TORADOL    20 tablet    Take 1 tablet (10 mg) by  mouth every 6 hours as needed for moderate pain    Chronic bilateral low back pain without sciatica       levofloxacin 500 MG tablet    LEVAQUIN    10 tablet    Take 1 tablet (500 mg) by mouth daily    Acute bacterial sinusitis       loratadine 10 MG tablet    CLARITIN          predniSONE 20 MG tablet    DELTASONE    5 tablet    Take 1 tablet (20 mg) by mouth daily    Acute bacterial sinusitis       rizatriptan 10 MG tablet    MAXALT     Take 10 mg by mouth        VENTOLIN  (90 Base) MCG/ACT inhaler   Generic drug:  albuterol

## 2018-12-03 NOTE — PROGRESS NOTES
AUDIOLOGY REPORT:    Patient was referred to Audiology from ENT by Dr. Miguel for a hearing examination. Patient reports a sinus infection for the past 2 months that 2 weeks ago moved into her ears. She has been experiencing significant bilateral otalgia and aural pressure. They were accompanied today by their self.    Testing:    Otoscopy:   Otoscopic exam indicates ears are clear of cerumen bilaterally     Tympanograms:    RIGHT: normal eardrum mobility     LEFT:   normal eardrum mobility    Thresholds:   Pure Tone Thresholds assessed using conventional audiometry with good  reliability from 250-8000 Hz bilaterally using insert earphones     RIGHT:  normal hearing sensitivity for all frequencies tested.     LEFT:    normal hearing sensitivity for all frequencies tested.     Speech Reception Threshold:    RIGHT: 20 dB HL    LEFT:   20 dB HL    Word Recognition Score:     RIGHT: 100% at 55 dB HL using NU-6 recorded word list.    LEFT:   100% at 55 dB HL using NU-6 recorded word list.    Discussed results with the patient.     Patient was returned to ENT for follow up.     Ganga Perez CCC-A  Licensed Audiologist  12/3/2018

## 2018-12-03 NOTE — LETTER
12/3/2018         RE: Daniella Sexton  [address]  [facility] [address]        Dear Colleague,    Thank you for referring your patient, Daniella Sexton, to the [facility]. Please see a copy of my visit note below.    History of Present Illness - Daniella Sexton is a 38 year old female here to see me for the first time for sinus and nasal issues at the consultation of Pedro Baltazar.    She has basically had a sinus infection and ear infection since September of this year. And she has been on three rounds of antibiotics, and still no resolution.  She still has facial pressure, post nasal drainage,and malaise.    She has a longstanding history of sinus disease, and her entire adult life has had at least 3-4 sinus infections per year.  She is on long term flonase, daily saline irrigations.  She has not been allergy tested since childhood.    Past Medical History -   Patient Active Problem List   Diagnosis     Anxiety     Depressive disorder     Duodenal ulcer     History of alcohol abuse     History of methamphetamine abuse     Migraines     Screening for malignant neoplasm of cervix     Seasonal allergic rhinitis     Sleep disturbance     Tobacco abuse     CARDIOVASCULAR SCREENING; LDL GOAL LESS THAN 160     Chronic low back pain     Pure hypercholesterolemia       Current Medications -   Current Outpatient Prescriptions:      acetaminophen (TYLENOL) 325 MG tablet, Take 325-650 mg by mouth, Disp: , Rfl:      buPROPion (WELLBUTRIN SR) 150 MG 12 hr tablet, Take 1 tablet once daily and increase to 1 tablet twice daily after 4 to 7 days (Patient not taking: Reported on 11/16/2018), Disp: 60 tablet, Rfl: 2     clindamycin (CLEOCIN) 300 MG capsule, Take 1 capsule (300 mg) by mouth 4 times daily, Disp: 40 capsule, Rfl: 0     cyclobenzaprine (FLEXERIL) 10 MG tablet, Take 1 tablet (10 mg) by mouth 3 times daily as needed for muscle spasms (Patient not taking: Reported on 11/16/2018), Disp: 42 tablet,  Rfl: 3     fluticasone (FLONASE) 50 MCG/ACT spray, 2 sprays, Disp: , Rfl:      ketorolac (TORADOL) 10 MG tablet, Take 1 tablet (10 mg) by mouth every 6 hours as needed for moderate pain (Patient not taking: Reported on 11/16/2018), Disp: 20 tablet, Rfl: 2     levofloxacin (LEVAQUIN) 500 MG tablet, Take 1 tablet (500 mg) by mouth daily, Disp: 10 tablet, Rfl: 0     loratadine (CLARITIN) 10 MG tablet, , Disp: , Rfl:      predniSONE (DELTASONE) 20 MG tablet, Take 1 tablet (20 mg) by mouth daily, Disp: 5 tablet, Rfl: 0     rizatriptan (MAXALT) 10 MG tablet, Take 10 mg by mouth, Disp: , Rfl:      VENTOLIN  (90 Base) MCG/ACT Inhaler, , Disp: , Rfl: 1    Allergies -   Allergies   Allergen Reactions     Amoxicillin Anaphylaxis     Morphine Other (See Comments) and Nausea and Vomiting     Penicillins Unknown       Social History -   Social History     Social History     Marital status:      Spouse name: N/A     Number of children: N/A     Years of education: N/A     Social History Main Topics     Smoking status: Current Every Day Smoker     Packs/day: 0.50     Types: Cigarettes     Smokeless tobacco: Never Used      Comment: 5 cigarettes/day     Alcohol use Yes      Comment: 3-4 beers a night      Drug use: No     Sexual activity: Yes     Other Topics Concern     Not on file     Social History Narrative       Family History -   Family History   Problem Relation Age of Onset     Heart Disease No family hx of      Diabetes No family hx of      Cancer No family hx of        Review of Systems - As per HPI and PMHx, otherwise 10+ system review of the head and neck, and general constitution is negative.    Physical Exam  /83  Pulse 101  Wt 110.2 kg (243 lb)  SpO2 100%  BMI 38.59 kg/m2    General - The patient is well nourished and well developed, and appears to have good nutritional status.  Alert and oriented to person and place, answers questions and cooperates with examination appropriately.   Head and  Face - Normocephalic and atraumatic, with no gross asymmetry noted of the contour of the facial features.  The facial nerve is intact, with strong symmetric movements.  Voice and Breathing - The patient was breathing comfortably without the use of accessory muscles. There was no wheezing, stridor, or stertor.  The patients voice was clear and strong, and had appropriate pitch and quality.  Ears - The tympanic membranes are normal in appearance, bony landmarks are intact.  No retraction, perforation, or masses.  No fluid or purulence was seen in the external canal or the middle ear. No evidence of infection of the middle ear or external canal, cerumen was normal in appearance.  Eyes - Extraocular movements intact, and the pupils were reactive to light.  Sclera were not icteric or injected, conjunctiva were pink and moist.  Mouth - Examination of the oral cavity showed pink, healthy oral mucosa. No lesions or ulcerations noted.  The tongue was mobile and midline, and the dentition were in good condition.    Throat - The walls of the oropharynx were smooth, pink, moist, symmetric, and had no lesions or ulcerations.  The tonsillar pillars and soft palate were symmetric.  The uvula was midline on elevation.    Neck - Normal midline excursion of the laryngotracheal complex during swallowing.  Full range of motion on passive movement.  Palpation of the occipital, submental, submandibular, internal jugular chain, and supraclavicular nodes did not demonstrate any abnormal lymph nodes or masses.  The carotid pulse was palpable bilaterally.  Palpation of the thyroid was soft and smooth, with no nodules or goiter appreciated.  The trachea was mobile and midline.  Nose - External contour is symmetric, no gross deflection or scars.  Nasal mucosa is pink and moist with no abnormal mucus.  The septum was midline and non-obstructive, turbinates of normal size and position.  No polyps, masses, or purulence noted on  examination.    Audiologic Studies - An audiogram and tympanogram were performed today as part of the evaluation and personally reviewed. The tympanogram shows a normal Type A curve, with normal canal volume and middle ear pressure.  There is no sign of eustachian tube dysfunction or middle ear effusion.  The audiogram was also normal.  The sensorineural hearing was age-appropriate, with no evidence of conductive hearing loss or significant asymmetry.      A/P - Daniella NETTA Sexton is a 38 year old female  (J01.41) Acute recurrent pansinusitis  (primary encounter diagnosis)  (H93.8X3) Ear fullness, bilateral    This situation does not seem totally straightforward to me.  On the one hand, she reports sinus symptoms of headache, facial pressure, and ear ache.  But the audiology testing is totally normal and the nasal exam is fairly benign.     I think we need definitive imaging to see if there is sinus disease or issues with the nasal anatomy.  I will get a sinus CT scan.  If clear, I will refer to [facility].  If there is sinus mucosal disease we will manage it appropriately.    I did send in an Rx for Flexeril for her today.      Again, thank you for allowing me to participate in the care of your patient.        Sincerely,        Maikol Miguel MD

## 2018-12-04 ENCOUNTER — RADIANT APPOINTMENT (OUTPATIENT)
Dept: CT IMAGING | Facility: CLINIC | Age: 38
End: 2018-12-04
Attending: OTOLARYNGOLOGY
Payer: COMMERCIAL

## 2018-12-04 DIAGNOSIS — H93.8X3 EAR FULLNESS, BILATERAL: ICD-10-CM

## 2018-12-04 DIAGNOSIS — J01.41 ACUTE RECURRENT PANSINUSITIS: ICD-10-CM

## 2018-12-04 PROCEDURE — 70486 CT MAXILLOFACIAL W/O DYE: CPT | Mod: TC

## 2018-12-10 DIAGNOSIS — J33.9 NASAL POLYPOSIS: ICD-10-CM

## 2018-12-10 DIAGNOSIS — J32.4 CHRONIC PANSINUSITIS: Primary | ICD-10-CM

## 2018-12-10 NOTE — PROGRESS NOTES
Called and spoke with patient.  Based on CT scan showing chronic infection and polyps, my recommendation is for functional endoscopic sinus surgery.  Orders placed

## 2018-12-11 ENCOUNTER — TELEPHONE (OUTPATIENT)
Dept: OTOLARYNGOLOGY | Facility: CLINIC | Age: 38
End: 2018-12-11

## 2018-12-17 NOTE — TELEPHONE ENCOUNTER
Type of surgery: bilateral image guided endoscopic sinus surgery  CPT 04061  Chronic pansinusitis [J32.4]  - Primary       Nasal polyposis [J33.9]         Location of surgery: [facility]  Date and time of surgery: 1-10-19  TBD  Surgeon: Dr Miguel  Pre-Op Appt Date: 1-2-19  Post-Op Appt Date: 1-17-19 & 1-31-19   Packet sent out: Yes  Pre-cert/Authorization completed:  No pre cert needed  Date: 12/17/2018

## 2018-12-17 NOTE — TELEPHONE ENCOUNTER
Type of surgery: bilateral image guided endoscopic sinus surgery  Location of surgery: [facility]  Date and time of surgery: 1-10-19  TBD  Surgeon: Dr Miguel  Pre-Op Appt Date: 1-2-19  Post-Op Appt Date: 1-17-19 & 1-31-19   Packet sent out: Yes  Pre-cert/Authorization completed:    Date:

## 2018-12-27 NOTE — PROGRESS NOTES
[facility]  [address] [place] [id number]  [phone]  Dept: [phone]    PRE-OP EVALUATION:  Today's date: 2019    Daniella Sexton (: 1980) presents for pre-operative evaluation assessment as requested by Dr. Miguel.  She requires evaluation and anesthesia risk assessment prior to undergoing surgery/procedure for treatment of Chronic pansinusitis and Nasal polyposis.    Fax number for surgical facility: [facility]  Primary Physician: Renetta Morrow  Type of Anesthesia Anticipated: to be determined    Patient has a Health Care Directive or Living Will:  NO    Preop Questions 2019   Who is doing your surgery? Dr Miguel   What are you having done? sinus surgery   Date of Surgery/Procedure: 1/10/19   1.  Do you have a history of Heart attack, stroke, stent, coronary bypass surgery, or other heart surgery? No   2.  Do you ever have any pain or discomfort in your chest? No   3.  Do you have a history of  Heart Failure? No   4.   Are you troubled by shortness of breath when:  walking on a level surface, or up a slight hill, or at night? No   5.  Do you currently have a cold, bronchitis or other respiratory infection? No   6.  Do you have a cough, shortness of breath, or wheezing? YES - mild cough with current sinusitis, trying to stop smoking also- restart Wellbutrin yesterday   7.  Do you sometimes get pains in the calves of your legs when you walk? No   8. Do you or anyone in your family have previous history of blood clots? No   9.  Do you or does anyone in your family have a serious bleeding problem such as prolonged bleeding following surgeries or cuts? No   10. Have you ever had problems with anemia or been told to take iron pills? No   11. Have you had any abnormal blood loss such as black, tarry or bloody stools, or abnormal vaginal bleeding? No   12. Have you ever had a blood transfusion? No   13. Have you or any of your relatives ever had problems with  anesthesia? No   14. Do you have sleep apnea, excessive snoring or daytime drowsiness? No   15. Do you have any prosthetic heart valves? No   16. Do you have prosthetic joints? No   17. Is there any chance that you may be pregnant? No         HPI:     HPI related to upcoming procedure: Has had recurrent sinusitis for years worsening in the last 3 months. Has had 5 course of antibiotics and 2 courses of steroids in the past 3 months. CT confirmed sinusitis and polyps.      See problem list for active medical problems.  Problems all longstanding and stable, except as noted/documented.  See ROS for pertinent symptoms related to these conditions.                                                                                                                                                          .    MEDICAL HISTORY:     Patient Active Problem List    Diagnosis Date Noted     Pure hypercholesterolemia 08/27/2018     Priority: Medium     Chronic low back pain 07/18/2018     Priority: Medium     CARDIOVASCULAR SCREENING; LDL GOAL LESS THAN 160 07/05/2018     Priority: Medium     Screening for malignant neoplasm of cervix 04/27/2015     Priority: Medium     Overview:   2015 NILM, neg HPV  Plan:  Cotesting 4/2020  Epic        Sleep disturbance 03/31/2015     Priority: Medium     Anxiety 04/18/2014     Priority: Medium     History of alcohol abuse 04/18/2014     Priority: Medium     History of methamphetamine abuse 04/18/2014     Priority: Medium     Migraines 04/18/2014     Priority: Medium     Seasonal allergic rhinitis 04/18/2014     Priority: Medium     Tobacco abuse 04/18/2014     Priority: Medium     Depressive disorder 07/06/2005     Priority: Medium     Overview:   Depressive disorder, not elsewhere classified ([facility])       Duodenal ulcer 03/01/1995     Priority: Medium      No past medical history on file.  Past Surgical History:   Procedure Laterality Date     BACK SURGERY  2005    L 3-4, L 4-5     LITHOTRIPSY  2016  "    RELEASE CARPAL TUNNEL Left      TONSILLECTOMY       TUBAL LIGATION       Current Outpatient Medications   Medication Sig Dispense Refill     acetaminophen (TYLENOL) 325 MG tablet Take 325-650 mg by mouth       buPROPion (WELLBUTRIN SR) 150 MG 12 hr tablet Take 1 tablet once daily and increase to 1 tablet twice daily after 4 to 7 days 60 tablet 2     cyclobenzaprine (FLEXERIL) 10 MG tablet Take 1 tablet (10 mg) by mouth 3 times daily as needed for muscle spasms 60 tablet 1     fluticasone (FLONASE) 50 MCG/ACT spray 2 sprays       ketorolac (TORADOL) 10 MG tablet Take 1 tablet (10 mg) by mouth every 6 hours as needed for moderate pain 20 tablet 2     predniSONE (DELTASONE) 10 MG tablet Take 10 mg by mouth 2 times daily for 5 days. 10 tablet 0     predniSONE (DELTASONE) 20 MG tablet Take 1 tablet (20 mg) by mouth daily 5 tablet 0     rizatriptan (MAXALT) 10 MG tablet Take 10 mg by mouth       VENTOLIN  (90 Base) MCG/ACT Inhaler   1     OTC products: tylenol, Ibuprofen    Allergies   Allergen Reactions     Amoxicillin Anaphylaxis     Morphine Other (See Comments) and Nausea and Vomiting     Penicillins Unknown      Latex Allergy: NO    Social History     Tobacco Use     Smoking status: Current Every Day Smoker     Packs/day: 0.50     Types: Cigarettes     Smokeless tobacco: Never Used     Tobacco comment: 5 cigarettes/day   Substance Use Topics     Alcohol use: Yes     Comment: 3-4 beers a night      History   Drug Use No       REVIEW OF SYSTEMS:   Constitutional, neuro, ENT, endocrine, pulmonary, cardiac, gastrointestinal, genitourinary, musculoskeletal, integument and psychiatric systems are negative, except as otherwise noted.    EXAM:   /70 (BP Location: Right arm, Patient Position: Chair, Cuff Size: Adult Large)   Pulse 102   Temp 98.2  F (36.8  C) (Oral)   Ht 1.69 m (5' 6.54\")   Wt 111.6 kg (246 lb)   SpO2 99%   BMI 39.07 kg/m      GENERAL APPEARANCE: healthy, alert and no distress     EYES: " EOMI, PERRL     HENT: ear canals and TM's normal, nose and mouth without ulcers or lesions and maxillary sinus tenderness bilateral     NECK: no adenopathy, no asymmetry, masses, or scars and thyroid normal to palpation     RESP: lungs clear to auscultation - no rales, rhonchi or wheezes     CV: regular rates and rhythm, normal S1 S2, no S3 or S4 and no murmur, click or rub     ABDOMEN:  soft, nontender, no HSM or masses and bowel sounds normal     MS: extremities normal- no gross deformities noted, no evidence of inflammation in joints, FROM in all extremities.     SKIN: no suspicious lesions or rashes     NEURO: Normal strength and tone, sensory exam grossly normal, mentation intact and speech normal     PSYCH: mentation appears normal. and affect normal/bright     LYMPHATICS: No cervical adenopathy    DIAGNOSTICS:   EKG: appears normal, NSR, normal axis, normal intervals, no acute ST/T changes c/w ischemia, no LVH by voltage criteria, there are no prior tracings available    No results for input(s): HGB, PLT, INR, NA, POTASSIUM, CR, A1C in the last 14809 hours.     IMPRESSION:   Reason for surgery/procedure: Chronic pansinusitis  Diagnosis/reason for consult: Evaluate perioperative risk    The proposed surgical procedure is considered INTERMEDIATE risk.    REVISED CARDIAC RISK INDEX  The patient has the following serious cardiovascular risks for perioperative complications such as (MI, PE, VFib and 3  AV Block):  No serious cardiac risks  INTERPRETATION: 0 risks: Class I (very low risk - 0.4% complication rate)    The patient has the following additional risks for perioperative complications:  Tobacco abuse and subsequent risk for poor healing      ICD-10-CM    1. Preop general physical exam Z01.818 EKG 12-lead complete w/read - Clinics   2. Chronic pansinusitis J32.4    3. Tobacco abuse Z72.0        RECOMMENDATIONS:         --Patient is to take all scheduled medications on the day of surgery EXCEPT for  modifications listed below.    Anticoagulant or Antiplatelet Medication Use  NSAIDS: avoid for 7 days before surgery    Smoking cessation encouraged- patient using Wellbutrin and nicotine patches        APPROVAL GIVEN to proceed with proposed procedure, without further diagnostic evaluation       Signed Electronically by: JENAE Sibley CNP    Copy of this evaluation report is provided to requesting physician.    [facility] Preop Guidelines    Revised Cardiac Risk Index

## 2018-12-27 NOTE — PATIENT INSTRUCTIONS
In the week before surgery, avoid Toradol or Ibuprofen. Ok for Tylenol. Flexeril is ok too.    Before Your Surgery      Call your surgeon if there is any change in your health. This includes signs of a cold or flu (such as a sore throat, runny nose, cough, rash or fever).    Do not smoke, drink alcohol or take over the counter medicine (unless your surgeon or primary care doctor tells you to) for the 24 hours before and after surgery.    If you take prescribed drugs: Follow your doctor s orders about which medicines to take and which to stop until after surgery.    Eating and drinking prior to surgery: follow the instructions from your surgeon    Take a shower or bath the night before surgery. Use the soap your surgeon gave you to gently clean your skin. If you do not have soap from your surgeon, use your regular soap. Do not shave or scrub the surgery site.  Wear clean pajamas and have clean sheets on your bed.

## 2018-12-28 ENCOUNTER — TELEPHONE (OUTPATIENT)
Dept: OTOLARYNGOLOGY | Facility: CLINIC | Age: 38
End: 2018-12-28

## 2018-12-28 DIAGNOSIS — J33.9 NASAL POLYPOSIS: ICD-10-CM

## 2018-12-28 DIAGNOSIS — J32.4 CHRONIC PANSINUSITIS: Primary | ICD-10-CM

## 2018-12-28 RX ORDER — PREDNISONE 10 MG/1
10 TABLET ORAL 2 TIMES DAILY
Qty: 10 TABLET | Refills: 0 | Status: SHIPPED | OUTPATIENT
Start: 2018-12-28 | End: 2019-04-03

## 2018-12-28 RX ORDER — AZITHROMYCIN 250 MG/1
TABLET, FILM COATED ORAL
Qty: 6 TABLET | Refills: 0 | Status: SHIPPED | OUTPATIENT
Start: 2018-12-28 | End: 2019-01-02

## 2018-12-28 NOTE — TELEPHONE ENCOUNTER
Informed patient that prescriptions for Azithromycin and prednisone were sent to [facility] pharmacy. Patient has no additional questions.    Horace Junior RN....12/28/2018 11:17 AM

## 2018-12-28 NOTE — TELEPHONE ENCOUNTER
Patient reports bilateral otalgia and aural fullness and facial pressure behind the eyes and in foread for past week. Ibuprofen and warm packs provide minimal relief. Using Bakari Med sinus rinse and Flonase daily. Took Sumatriptan this AM which provided some relief and was able to help her rest a bit. She is wondering if there is anything else she can be treated with in the meantime prior to her upcoming sinus surgery on 1/10/19. Routed to Dr. Miguel for consideration.    ( ok)    Horace Junior RN....12/28/2018 11:04 AM

## 2018-12-28 NOTE — TELEPHONE ENCOUNTER
Reason for call:  Patient reporting a symptom    Symptom or request: Migraine and both ears painful sinus surgery 01/10/19    Duration (how long have symptoms been present): a week straight  Migraine and both ears painful sinus surgery 01/10/19. Wondering what she can do. Please call    Have you been treated for this before? Yes    Additional comments: Has surgery 01/10/19     Phone Number patient can be reached at:  Home number on file [phone] (home)    Best Time:  ASAP    Can we leave a detailed message on this number:  YES    Call taken on 12/28/2018 at 10:22 AM by Leann Morales

## 2019-01-02 ENCOUNTER — OFFICE VISIT (OUTPATIENT)
Dept: FAMILY MEDICINE | Facility: CLINIC | Age: 39
End: 2019-01-02
Payer: COMMERCIAL

## 2019-01-02 VITALS
OXYGEN SATURATION: 99 % | TEMPERATURE: 98.2 F | SYSTOLIC BLOOD PRESSURE: 112 MMHG | HEIGHT: 67 IN | HEART RATE: 102 BPM | WEIGHT: 246 LBS | DIASTOLIC BLOOD PRESSURE: 70 MMHG | BODY MASS INDEX: 38.61 KG/M2

## 2019-01-02 DIAGNOSIS — J32.4 CHRONIC PANSINUSITIS: ICD-10-CM

## 2019-01-02 DIAGNOSIS — Z72.0 TOBACCO ABUSE: ICD-10-CM

## 2019-01-02 DIAGNOSIS — Z01.818 PREOP GENERAL PHYSICAL EXAM: Primary | ICD-10-CM

## 2019-01-02 PROBLEM — J45.20 MILD INTERMITTENT ASTHMA WITHOUT COMPLICATION: Status: ACTIVE | Noted: 2019-01-02

## 2019-01-02 PROCEDURE — 93000 ELECTROCARDIOGRAM COMPLETE: CPT | Performed by: NURSE PRACTITIONER

## 2019-01-02 PROCEDURE — 99214 OFFICE O/P EST MOD 30 MIN: CPT | Performed by: NURSE PRACTITIONER

## 2019-01-02 RX ORDER — NICOTINE 21 MG/24HR
1 PATCH, TRANSDERMAL 24 HOURS TRANSDERMAL EVERY 24 HOURS
COMMUNITY
End: 2019-11-07

## 2019-01-02 ASSESSMENT — ANXIETY QUESTIONNAIRES
7. FEELING AFRAID AS IF SOMETHING AWFUL MIGHT HAPPEN: NOT AT ALL
2. NOT BEING ABLE TO STOP OR CONTROL WORRYING: NOT AT ALL
GAD7 TOTAL SCORE: 2
6. BECOMING EASILY ANNOYED OR IRRITABLE: SEVERAL DAYS
IF YOU CHECKED OFF ANY PROBLEMS ON THIS QUESTIONNAIRE, HOW DIFFICULT HAVE THESE PROBLEMS MADE IT FOR YOU TO DO YOUR WORK, TAKE CARE OF THINGS AT HOME, OR GET ALONG WITH OTHER PEOPLE: NOT DIFFICULT AT ALL
3. WORRYING TOO MUCH ABOUT DIFFERENT THINGS: NOT AT ALL
5. BEING SO RESTLESS THAT IT IS HARD TO SIT STILL: NOT AT ALL
1. FEELING NERVOUS, ANXIOUS, OR ON EDGE: NOT AT ALL

## 2019-01-02 ASSESSMENT — MIFFLIN-ST. JEOR: SCORE: 1821.09

## 2019-01-02 ASSESSMENT — PATIENT HEALTH QUESTIONNAIRE - PHQ9
5. POOR APPETITE OR OVEREATING: SEVERAL DAYS
SUM OF ALL RESPONSES TO PHQ QUESTIONS 1-9: 5

## 2019-01-02 ASSESSMENT — PAIN SCALES - GENERAL: PAINLEVEL: EXTREME PAIN (9)

## 2019-01-02 NOTE — Clinical Note
Please fax pre-op notes and EKG from 01/02/19 to surgical facility listed in pre-op note.Renetta Morrow, CNP

## 2019-01-02 NOTE — LETTER
[facility]  [address] [place] [address]    January 3, 2019    Daniella Sexton  [address] [id number]          Dear Daniella,  Your results are normal.   Enclosed is a copy of your results.         If you have any questions or concerns, please call myself or my nurse at [phone].      Sincerely,        Renetta Morrow CNP/pb

## 2019-01-03 ASSESSMENT — ANXIETY QUESTIONNAIRES: GAD7 TOTAL SCORE: 2

## 2019-01-08 PROBLEM — M54.50 CHRONIC LOW BACK PAIN: Status: RESOLVED | Noted: 2018-07-18 | Resolved: 2019-01-08

## 2019-01-08 PROBLEM — G89.29 CHRONIC LOW BACK PAIN: Status: RESOLVED | Noted: 2018-07-18 | Resolved: 2019-01-08

## 2019-01-08 NOTE — PROGRESS NOTES
Subjective:  HPI                    Objective:  System    Physical Exam    General     ROS    Assessment/Plan:    DISCHARGE REPORT    Patient did not return to PT following todays session. See below for the most recent subjective and objective findings. Patient will be discharged from PT services at this time.    SUBJECTIVE  Subjective changes noted by patient:   Subjective: Pt states LBP has been better. Reports after coming in for traction that her back feels better for a couple weeks. Is still working on getting a home traction unit but insurance needs more information from PT and pt is requesting therapist to contact insurance.     Current pain level is  Current Pain level: 2/10.     Previous pain level was   Initial Pain level: 8/10.   Changes in function:    Adverse reaction to treatment or activity:     OBJECTIVE  Changes noted in objective findings:    Objective: traction only today     ASSESSMENT/PLAN  Updated problem list and treatment plan: Diagnosis 1:  Low back pain    STG/LTGs have been met or progress has been made towards goals:    Assessment of Progress: The patient has not returned to therapy. Current status is unknown.  Self Management Plans:  Patient has been instructed in a home treatment program.  Patient  has been instructed in self management of symptoms.    Recommendations:  This patient is to be discharged from therapy and continue their home treatment program.    Please refer to the daily flowsheet for treatment today, total treatment time and time spent performing 1:1 timed codes.

## 2019-01-09 ENCOUNTER — ANESTHESIA EVENT (OUTPATIENT)
Dept: SURGERY | Facility: AMBULATORY SURGERY CENTER | Age: 39
End: 2019-01-09

## 2019-01-10 ENCOUNTER — HOSPITAL ENCOUNTER (OUTPATIENT)
Facility: AMBULATORY SURGERY CENTER | Age: 39
Discharge: HOME | End: 2019-01-10
Attending: OTOLARYNGOLOGY | Admitting: OTOLARYNGOLOGY
Payer: COMMERCIAL

## 2019-01-10 ENCOUNTER — ANESTHESIA (OUTPATIENT)
Dept: SURGERY | Facility: AMBULATORY SURGERY CENTER | Age: 39
End: 2019-01-10
Payer: COMMERCIAL

## 2019-01-10 VITALS
SYSTOLIC BLOOD PRESSURE: 108 MMHG | TEMPERATURE: 97.8 F | OXYGEN SATURATION: 98 % | DIASTOLIC BLOOD PRESSURE: 77 MMHG | RESPIRATION RATE: 18 BRPM | HEART RATE: 100 BPM

## 2019-01-10 DIAGNOSIS — J32.4 CHRONIC PANSINUSITIS: Primary | ICD-10-CM

## 2019-01-10 LAB — BETA HCG QUAL IFA URINE: NEGATIVE

## 2019-01-10 PROCEDURE — 61782 SCAN PROC CRANIAL EXTRA: CPT

## 2019-01-10 PROCEDURE — G8916 PT W IV AB GIVEN ON TIME: HCPCS

## 2019-01-10 PROCEDURE — 31255 NSL/SINS NDSC W/TOT ETHMDCT: CPT | Mod: 50 | Performed by: OTOLARYNGOLOGY

## 2019-01-10 PROCEDURE — 31255 NSL/SINS NDSC W/TOT ETHMDCT: CPT | Mod: LT

## 2019-01-10 PROCEDURE — 31267 ENDOSCOPY MAXILLARY SINUS: CPT | Mod: RT

## 2019-01-10 PROCEDURE — 31267 ENDOSCOPY MAXILLARY SINUS: CPT | Mod: 50 | Performed by: OTOLARYNGOLOGY

## 2019-01-10 PROCEDURE — 30140 RESECT INFERIOR TURBINATE: CPT | Mod: RT

## 2019-01-10 PROCEDURE — G8907 PT DOC NO EVENTS ON DISCHARG: HCPCS

## 2019-01-10 PROCEDURE — 84703 CHORIONIC GONADOTROPIN ASSAY: CPT | Performed by: ANESTHESIOLOGY

## 2019-01-10 RX ORDER — LIDOCAINE HYDROCHLORIDE AND EPINEPHRINE BITARTRATE 20; .01 MG/ML; MG/ML
INJECTION, SOLUTION SUBCUTANEOUS PRN
Status: DISCONTINUED | OUTPATIENT
Start: 2019-01-10 | End: 2019-01-10 | Stop reason: HOSPADM

## 2019-01-10 RX ORDER — SODIUM CHLORIDE, SODIUM LACTATE, POTASSIUM CHLORIDE, CALCIUM CHLORIDE 600; 310; 30; 20 MG/100ML; MG/100ML; MG/100ML; MG/100ML
INJECTION, SOLUTION INTRAVENOUS CONTINUOUS
Status: DISCONTINUED | OUTPATIENT
Start: 2019-01-10 | End: 2019-01-11 | Stop reason: HOSPADM

## 2019-01-10 RX ORDER — ONDANSETRON 8 MG/1
8 TABLET, FILM COATED ORAL EVERY 8 HOURS PRN
Qty: 20 TABLET | Refills: 1 | Status: SHIPPED | OUTPATIENT
Start: 2019-01-10 | End: 2019-11-07

## 2019-01-10 RX ORDER — ACETAMINOPHEN 325 MG/1
975 TABLET ORAL ONCE
Status: COMPLETED | OUTPATIENT
Start: 2019-01-10 | End: 2019-01-10

## 2019-01-10 RX ORDER — ONDANSETRON 2 MG/ML
INJECTION INTRAMUSCULAR; INTRAVENOUS PRN
Status: DISCONTINUED | OUTPATIENT
Start: 2019-01-10 | End: 2019-01-10

## 2019-01-10 RX ORDER — ONDANSETRON 4 MG/1
4 TABLET, ORALLY DISINTEGRATING ORAL EVERY 30 MIN PRN
Status: DISCONTINUED | OUTPATIENT
Start: 2019-01-10 | End: 2019-01-11 | Stop reason: HOSPADM

## 2019-01-10 RX ORDER — OXYCODONE HYDROCHLORIDE 5 MG/1
5-10 TABLET ORAL EVERY 4 HOURS PRN
Status: DISCONTINUED | OUTPATIENT
Start: 2019-01-10 | End: 2019-01-11 | Stop reason: HOSPADM

## 2019-01-10 RX ORDER — ALBUTEROL SULFATE 0.83 MG/ML
2.5 SOLUTION RESPIRATORY (INHALATION) EVERY 4 HOURS PRN
Status: DISCONTINUED | OUTPATIENT
Start: 2019-01-10 | End: 2019-01-11 | Stop reason: HOSPADM

## 2019-01-10 RX ORDER — CIPROFLOXACIN 500 MG/1
500 TABLET, FILM COATED ORAL 2 TIMES DAILY
Qty: 14 TABLET | Refills: 0 | Status: SHIPPED | OUTPATIENT
Start: 2019-01-10 | End: 2019-04-03

## 2019-01-10 RX ORDER — ONDANSETRON 2 MG/ML
4 INJECTION INTRAMUSCULAR; INTRAVENOUS EVERY 30 MIN PRN
Status: DISCONTINUED | OUTPATIENT
Start: 2019-01-10 | End: 2019-01-11 | Stop reason: HOSPADM

## 2019-01-10 RX ORDER — FENTANYL CITRATE 50 UG/ML
25-50 INJECTION, SOLUTION INTRAMUSCULAR; INTRAVENOUS
Status: DISCONTINUED | OUTPATIENT
Start: 2019-01-10 | End: 2019-01-11 | Stop reason: HOSPADM

## 2019-01-10 RX ORDER — PROPOFOL 10 MG/ML
INJECTION, EMULSION INTRAVENOUS CONTINUOUS PRN
Status: DISCONTINUED | OUTPATIENT
Start: 2019-01-10 | End: 2019-01-10

## 2019-01-10 RX ORDER — DEXAMETHASONE SODIUM PHOSPHATE 4 MG/ML
4 INJECTION, SOLUTION INTRA-ARTICULAR; INTRALESIONAL; INTRAMUSCULAR; INTRAVENOUS; SOFT TISSUE EVERY 10 MIN PRN
Status: DISCONTINUED | OUTPATIENT
Start: 2019-01-10 | End: 2019-01-11 | Stop reason: HOSPADM

## 2019-01-10 RX ORDER — GABAPENTIN 300 MG/1
300 CAPSULE ORAL ONCE
Status: COMPLETED | OUTPATIENT
Start: 2019-01-10 | End: 2019-01-10

## 2019-01-10 RX ORDER — NALOXONE HYDROCHLORIDE 0.4 MG/ML
.1-.4 INJECTION, SOLUTION INTRAMUSCULAR; INTRAVENOUS; SUBCUTANEOUS
Status: DISCONTINUED | OUTPATIENT
Start: 2019-01-10 | End: 2019-01-11 | Stop reason: HOSPADM

## 2019-01-10 RX ORDER — LIDOCAINE HYDROCHLORIDE AND EPINEPHRINE 10; 10 MG/ML; UG/ML
INJECTION, SOLUTION INFILTRATION; PERINEURAL PRN
Status: DISCONTINUED | OUTPATIENT
Start: 2019-01-10 | End: 2019-01-10 | Stop reason: HOSPADM

## 2019-01-10 RX ORDER — CLINDAMYCIN PHOSPHATE 900 MG/50ML
900 INJECTION, SOLUTION INTRAVENOUS SEE ADMIN INSTRUCTIONS
Status: DISCONTINUED | OUTPATIENT
Start: 2019-01-10 | End: 2019-01-11 | Stop reason: HOSPADM

## 2019-01-10 RX ORDER — CLINDAMYCIN PHOSPHATE 900 MG/50ML
900 INJECTION, SOLUTION INTRAVENOUS
Status: COMPLETED | OUTPATIENT
Start: 2019-01-10 | End: 2019-01-10

## 2019-01-10 RX ORDER — KETOROLAC TROMETHAMINE 30 MG/ML
30 INJECTION, SOLUTION INTRAMUSCULAR; INTRAVENOUS EVERY 6 HOURS PRN
Status: DISCONTINUED | OUTPATIENT
Start: 2019-01-10 | End: 2019-01-11 | Stop reason: HOSPADM

## 2019-01-10 RX ORDER — DEXAMETHASONE SODIUM PHOSPHATE 4 MG/ML
10 INJECTION, SOLUTION INTRA-ARTICULAR; INTRALESIONAL; INTRAMUSCULAR; INTRAVENOUS; SOFT TISSUE ONCE
Status: COMPLETED | OUTPATIENT
Start: 2019-01-10 | End: 2019-01-10

## 2019-01-10 RX ORDER — PROPOFOL 10 MG/ML
INJECTION, EMULSION INTRAVENOUS PRN
Status: DISCONTINUED | OUTPATIENT
Start: 2019-01-10 | End: 2019-01-10

## 2019-01-10 RX ORDER — LIDOCAINE HYDROCHLORIDE 20 MG/ML
INJECTION, SOLUTION INFILTRATION; PERINEURAL PRN
Status: DISCONTINUED | OUTPATIENT
Start: 2019-01-10 | End: 2019-01-10

## 2019-01-10 RX ORDER — FENTANYL CITRATE 50 UG/ML
INJECTION, SOLUTION INTRAMUSCULAR; INTRAVENOUS PRN
Status: DISCONTINUED | OUTPATIENT
Start: 2019-01-10 | End: 2019-01-10

## 2019-01-10 RX ORDER — HYDROCODONE BITARTRATE AND ACETAMINOPHEN 5; 325 MG/1; MG/1
1 TABLET ORAL EVERY 4 HOURS PRN
Qty: 40 TABLET | Refills: 0 | Status: SHIPPED | OUTPATIENT
Start: 2019-01-10 | End: 2019-10-17

## 2019-01-10 RX ORDER — MEPERIDINE HYDROCHLORIDE 25 MG/ML
12.5 INJECTION INTRAMUSCULAR; INTRAVENOUS; SUBCUTANEOUS
Status: DISCONTINUED | OUTPATIENT
Start: 2019-01-10 | End: 2019-01-11 | Stop reason: HOSPADM

## 2019-01-10 RX ORDER — HYDROMORPHONE HYDROCHLORIDE 1 MG/ML
.3-.5 INJECTION, SOLUTION INTRAMUSCULAR; INTRAVENOUS; SUBCUTANEOUS EVERY 10 MIN PRN
Status: DISCONTINUED | OUTPATIENT
Start: 2019-01-10 | End: 2019-01-11 | Stop reason: HOSPADM

## 2019-01-10 RX ORDER — LIDOCAINE 40 MG/G
CREAM TOPICAL
Status: DISCONTINUED | OUTPATIENT
Start: 2019-01-10 | End: 2019-01-11 | Stop reason: HOSPADM

## 2019-01-10 RX ADMIN — LIDOCAINE HYDROCHLORIDE 60 MG: 20 INJECTION, SOLUTION INFILTRATION; PERINEURAL at 10:07

## 2019-01-10 RX ADMIN — DEXAMETHASONE SODIUM PHOSPHATE 10 MG: 4 INJECTION, SOLUTION INTRA-ARTICULAR; INTRALESIONAL; INTRAMUSCULAR; INTRAVENOUS; SOFT TISSUE at 10:12

## 2019-01-10 RX ADMIN — ONDANSETRON 4 MG: 2 INJECTION INTRAMUSCULAR; INTRAVENOUS at 10:50

## 2019-01-10 RX ADMIN — FENTANYL CITRATE 50 MCG: 50 INJECTION, SOLUTION INTRAMUSCULAR; INTRAVENOUS at 10:29

## 2019-01-10 RX ADMIN — FENTANYL CITRATE 50 MCG: 50 INJECTION, SOLUTION INTRAMUSCULAR; INTRAVENOUS at 10:07

## 2019-01-10 RX ADMIN — FENTANYL CITRATE 50 MCG: 50 INJECTION, SOLUTION INTRAMUSCULAR; INTRAVENOUS at 10:59

## 2019-01-10 RX ADMIN — LIDOCAINE HYDROCHLORIDE 100 MG: 20 INJECTION, SOLUTION INFILTRATION; PERINEURAL at 10:10

## 2019-01-10 RX ADMIN — SODIUM CHLORIDE, SODIUM LACTATE, POTASSIUM CHLORIDE, CALCIUM CHLORIDE: 600; 310; 30; 20 INJECTION, SOLUTION INTRAVENOUS at 09:21

## 2019-01-10 RX ADMIN — ACETAMINOPHEN 975 MG: 325 TABLET ORAL at 09:10

## 2019-01-10 RX ADMIN — GABAPENTIN 300 MG: 300 CAPSULE ORAL at 09:10

## 2019-01-10 RX ADMIN — PROPOFOL 200 MG: 10 INJECTION, EMULSION INTRAVENOUS at 10:07

## 2019-01-10 RX ADMIN — Medication 40 MG: at 10:07

## 2019-01-10 RX ADMIN — SODIUM CHLORIDE, SODIUM LACTATE, POTASSIUM CHLORIDE, CALCIUM CHLORIDE: 600; 310; 30; 20 INJECTION, SOLUTION INTRAVENOUS at 10:04

## 2019-01-10 RX ADMIN — PROPOFOL 175 MCG/KG/MIN: 10 INJECTION, EMULSION INTRAVENOUS at 10:07

## 2019-01-10 RX ADMIN — CLINDAMYCIN PHOSPHATE 900 MG: 900 INJECTION, SOLUTION INTRAVENOUS at 10:13

## 2019-01-10 ASSESSMENT — LIFESTYLE VARIABLES: TOBACCO_USE: 1

## 2019-01-10 NOTE — OP NOTE
PREOPERATIVE DIAGNOSES:   1. Chronic sinusitis.   2. Nasal obstruction.   POSTOPERATIVE DIAGNOSES:   1. Chronic sinusitis.   2. Nasal obstruction.   PROCEDURES PERFORMED:   1. Bilateral endoscopic maxillary antrostomy with tissue removal.   2. Bilateral endoscopic total ethmoidectomy.   3. Bilateral endoscopic submucous resection of inferior turbinates  SURGEON: Maikol Miguel MD   ASSISTANT: None  BLOOD LOSS: 30 mL.   COMPLICATIONS: None.   SPECIMENS: None.   ANESTHESIA: GETA.   INDICATIONS: Daniella Sexton  presented to me with a lifelong history of chronic nasal disease and chronic sinusitis.  Therefore, my recommendation was for the above-named procedures. Preoperatively, risks discussed included the risks of infection, bleeding, the risks of general anesthesia, possible recurrence of nasal disease, possible injury to the eyes, base of skull and tear duct system, and possible alteration of sense of smell, although the patient has not had a sense of smell for many years. The patient understood these risks and possible outcomes and wished to proceed.   I began by applying topical anesthetic in the form of 2 cottonoids on each side of the nose which had been soaked with a total of 4 mL of 4% liquid cocaine. In addition, I injected 0.25% Marcaine with 1:100,000 epinephrine into the base of the columella as well as the anterior insertion of the inferior turbinates bilaterally in preparation for later submucous resection.   I removed the cottonoids from the right side of the nose and entered the right nasal cavity.  I then entered with the spinal needle and 0 degree endoscope and injected the posterior lateral wall of the nasal cavity as well as the body of the right middle turbinate and the anterior insertion of the right middle turbinate.   After I did this field block, I continued back into the right middle meatus. I used the rotating backbiter and trimmed away at the uncinate process to expose the natural ostium of  the right maxillary sinus. I entered the right maxillary sinus with a 60 degree curved shaver blade and removed extensive amounts of polypoid mucosa and mucopurulence from the right maxillary sinus.   After this was done, I switched back to the 12 degree shaver and a 0 degree endoscope and took down the face of the ethmoid bulla. Immediately, copious amounts of thick yellow mucopurulent oozed out and I suctioned this away. I skeletonized the horizontal lamella and proceeded directly posteriorly through several layers of posterior ethmoid air cells. After reaching the face of the sphenoid sinus on the right side, I dissected upward and, using the image guidance system, confirmed that I had found the posterior extent of the roof of the ethmoid. I then was able to dissect in a posterior to anterior direction, removing polyps and bony septations along the way. Eventually I crossed the ethmoid infundibulum into the anterior ethmoid system. At this point, I used the rotating backbiter to trim away at the anterior insertion of the right middle turbinate to expose the agger nasi cell. I used the 12 degree shaver to remove several more anterior ethmoid air cells.     I moved on to the left nasal cavity. I used a spinal needle with local anesthetic to inject the posterior lateral wall as well as the full length of the left inferior turbinate. I used the rotating backbiter to trim away at the uncinate bone to expose the natural ostium of the left maxillary sinus. Once I identified it, I was able to use the sinus shaver to trim away at the remainder of the uncinate and open the left maxillary sinus. It was completely overgrown with polypoid mucosa and a large mucopyocele as seen on the CT scan.  This was trimmed away with a 60 degree shaver blade.    After this was completely cleaned out, I switched back to the 12 degree shaver and a 0 degree endoscope. I took down the face of the ethmoid bulla and skeletonized the horizontal  lamella and proceeded directly posteriorly through several layers of posterior ethmoid cells which were completely filled with polyps. Eventually I reached the posterior most left ethmoid cell which surprisingly was pneumatization. I identified and confirmed that I had located the roof of the posterior ethmoid system. I then dissected in a posterior to anterior direction with the 12 degree shaver, trimming away diseased mucosa and polyps as well as bony septations. I crossed the ethmoid infundibulum into the anterior ethmoid system and, using the rotating backbiter, trimmed away at the superiormost remnant of the uncinate bone to identify the agger nasi cell. Using a shaver, I trimmed away at the remainder of the floor of the agger nasi, and it was also completely filled with polypoid mucosa.  I proceeded to the final components of the operation, which was submucous resection of inferior turbinates. I switched to a 2 inferior turbinate blade and started on the left side. I made a stab incision at the anterior insertion of the left inferior turbinate and raised a submucoperiosteal tunnel along the medial surface of the left inferior turbinate bone. I then slowly withdrew the shaver blade as I ran the shaver to perform my submucous resection.   I then performed the same procedure on the right side, once again using the 2 mm turbinate blade to make a stab incision at the anterior insertion of the right inferior turbinate blade. I raised a submucoperiosteal tunnel along the entire medial surface of the right inferior turbinate bone and slowly withdrew the shaver as I ran the shaver function to perform submucous resection.   At this point, the entire procedure was now complete. I reinspected both sides of the nose and there was good hemostasis. I applied Joseline hemostatic powder to the roof of the ethmoid bilaterally. I then placed small pieces of Nasopore dressing in between the middle turbinates and the lateral walls  to prevent lateralization and scarring.  All instruments were accounted for and all counts were correct. The patient's bed was rotated 90 degrees back to the care of anesthesia. He was awakened, extubated and sent to the recovery room in good condition.

## 2019-01-10 NOTE — ANESTHESIA CARE TRANSFER NOTE
Patient: Daniella Sexton    Procedure(s):  BILATERAL IMAGE GUIDED ENDOSCOPIC SINUS SURGERY, BILATERAL TURBINATE REDUCTION    Diagnosis: BILATERAL CHRONIC SINUSITIS, NASAP POLYPS  Diagnosis Additional Information: No value filed.    Anesthesia Type:   General     Note:  Airway :Face Mask  Patient transferred to:PACU  Comments: Awake, comfortable, sats 99%< Report to RN.Handoff Report: Identifed the Patient, Identified the Reponsible Provider, Reviewed the pertinent medical history, Discussed the surgical course, Reviewed Intra-OP anesthesia mangement and issues during anesthesia, Set expectations for post-procedure period and Allowed opportunity for questions and acknowledgement of understanding      Vitals: (Last set prior to Anesthesia Care Transfer)    CRNA VITALS  1/10/2019 1029 - 1/10/2019 1103      1/10/2019             Pulse:  102    SpO2:  91 %                Electronically Signed By: JENAE Peres CRNA  January 10, 2019  11:03 AM

## 2019-01-10 NOTE — DISCHARGE INSTRUCTIONS
[facility]  Same-Day Surgery   Adult Discharge Orders & Instructions   For 24 hours after surgery  1. Get plenty of rest.  A responsible adult must stay with you for at least 24 hours after you leave the hospital.   2. Do not drive or use heavy equipment.  If you have weakness or tingling, don't drive or use heavy equipment until this feeling goes away.  3. Do not drink alcohol.  4. Avoid strenuous or risky activities.  Ask for help when climbing stairs.   5. You may feel lightheaded.  IF so, sit for a few minutes before standing.  Have someone help you get up.   6. If you have nausea (feel sick to your stomach): Drink only clear liquids such as apple juice, ginger ale, broth or 7-Up.  Rest may also help.  Be sure to drink enough fluids.  Move to a regular diet as you feel able.  7. You may have a slight fever. Call the doctor if your fever is over 100 F (37.7 C) (taken under the tongue) or lasts longer than 24 hours.  8. You may have a dry mouth, a sore throat, muscle aches or trouble sleeping.  These should go away after 24 hours.  9. Do not make important or legal decisions.   Call your doctor for any of the followin.  Signs of infection (fever, growing tenderness at the surgery site, a large amount of drainage or bleeding, severe pain, foul-smelling drainage, redness, swelling).    2. It has been over 8 to 10 hours since surgery and you are still not able to urinate (pass water).    3.  Headache for over 24 hours.    4.  Numbness, tingling or weakness the day after surgery (if you had spinal anesthesia).  To contact Dr Yoon call:  [phone]    Instructions for Sinus Surgery    Recovery - Everyone recovers differently from a general anesthetic.  Symptoms such as fatigue, nausea, light-headedness, and sometimes a low grade fever (up to 100 degrees) are not unusual.  As your body removes the anesthetic drugs from circulation, these symptoms will resolve.  Your nose will be sore  after surgery, and you may even have symptoms similar to a sinus infection with headache, congestion, and pressure.  These will resolve with healing.  For several days you may experience bloody drainage from the nose, please use the drip pad as necessary for this.  If there is persistent bleeding, please call the office during business hours or the on call ENT physician after hours.  There are no diet restrictions after sinus surgery, and you can resume your home medications.  Please blow your nose very gently for two weeks after surgery.  Limit your activity to no strenuous activities until I see you for the first follow-up visit.      Medications - You were sent home with narcotic pain medication.  If you can tolerate the discomfort during your recovery by using just plain Tylenol or ibuprofen (advil), please do so.  However, do not hesitate to use the stronger pain medication if needed.  If you were sent home with an antibiotic, it is primarily used to help the healing process.  If it causes loose bowel movements or other signs of intolerance, it is appropriate to discontinue it.  By far the most important measure you can take to speed recovery, and maximize the chances of long term success of sinus surgery is using the sinus rinses at least three time per day for the first month after surgery.   Please start these:     Tomorrow    Complications - Problems related to sinus surgery almost always are detected during the operation, and special instruction will be given in that situation.  However, unexpected things can happen, and are all related to the structures around the sinus cavities.  Symptoms that should alert you to a possible problem include: severe eye pain or eye swelling, persistent heavy bleeding from the nose, and high fevers with headache and neck pain.  Any of these symptoms should be called into my office or to the on call ENT if after hours.  The most common non-emergency complication of sinus  surgery is the formation of scar tissue which can re-block the sinuses.  This is addressed below.    Follow-up -  As you have noted, there are quite a few follow-up visits after sinus surgery.  This is done to aggressively manage the most common complication of this technique, which is scar tissue blocking the sinuses.  These visits will require the examination of your nose and possibly removal of crusts of dry mucous and blood, with possible removal of early scar tissue.  Please prepare for these visits by using your sinus rinses.    If there are any questions or issues with the above, or if there are other issues that concern you, always feel free to call the clinic and I am happy to speak with you as soon as I can.    Abdulaziz Miguel MD  Otolaryngology  [facility]  [phone] After hours, [facility] option

## 2019-01-11 NOTE — ANESTHESIA POSTPROCEDURE EVALUATION
Anesthesia POST Procedure Evaluation    Patient: Daniella Sexton   MRN:     [id number] Gender:   female   Age:    38 year old :      1980        Preoperative Diagnosis: BILATERAL CHRONIC SINUSITIS, NASAP POLYPS   Procedure(s):  BILATERAL IMAGE GUIDED ENDOSCOPIC SINUS SURGERY, BILATERAL TURBINATE REDUCTION   Postop Comments: No value filed.       Anesthesia Type:  General    Reportable Event: NO     PAIN: Uncomplicated   Sign Out status: Comfortable, Well controlled pain     PONV: No PONV   Sign Out status:  No Nausea or Vomiting     Neuro/Psych: Uneventful perioperative course   Sign Out Status: Preoperative baseline; Age appropriate mentation     Airway/Resp.: Uneventful perioperative course   Sign Out Status: Non labored breathing, age appropriate RR     CV: Uneventful perioperative course   Sign Out status: Appropriate BP and perfusion indices; Appropriate HR/Rhythm     Disposition:   Sign Out in:  PACU  Disposition:  Phase II; Home  Recovery Course: Uneventful  Follow-Up: Not required           Last Anesthesia Record Vitals:  CRNA VITALS  1/10/2019 1029 - 1/10/2019 1129      1/10/2019             Pulse:  102    SpO2:  91 %          Last PACU/Preop Vitals:  Vitals:    01/10/19 1100 01/10/19 1105 01/10/19 1115   BP: 127/70 118/80 108/77   Pulse:      Resp: 16 18 18   Temp: 36.3  C (97.4  F)  36.6  C (97.8  F)   SpO2: 100% 100% 98%         Electronically Signed By: Pawel Jones MD, 2019, 2:58 PM

## 2019-01-17 ENCOUNTER — OFFICE VISIT (OUTPATIENT)
Dept: OTOLARYNGOLOGY | Facility: CLINIC | Age: 39
End: 2019-01-17
Payer: COMMERCIAL

## 2019-01-17 VITALS
WEIGHT: 245 LBS | RESPIRATION RATE: 12 BRPM | SYSTOLIC BLOOD PRESSURE: 131 MMHG | BODY MASS INDEX: 39.37 KG/M2 | HEART RATE: 92 BPM | HEIGHT: 66 IN | DIASTOLIC BLOOD PRESSURE: 89 MMHG | OXYGEN SATURATION: 99 %

## 2019-01-17 DIAGNOSIS — J32.4 CHRONIC PANSINUSITIS: ICD-10-CM

## 2019-01-17 PROCEDURE — 99213 OFFICE O/P EST LOW 20 MIN: CPT | Performed by: OTOLARYNGOLOGY

## 2019-01-17 ASSESSMENT — MIFFLIN-ST. JEOR: SCORE: 1808.06

## 2019-01-17 NOTE — LETTER
"    1/17/2019         RE: Daniella Sexton  [address]  [place] [facility] [address]        Dear Colleague,    Thank you for referring your patient, Daniella Sexton, to the [facility]. Please see a copy of my visit note below.    HPI - Daniella Sexton is a 38 year old female who is here for their first postoperative visit, status post endoscopic sinus surgery without septoplasty on 1/10/2019.  They report the expected amount of congestion, facial pressure, and mild bloody drainage.  No changes in vision, no fevers or chills.  Nasal saline rinses and oral antibiotics have been tolerated.    Amazingly, she tells me that even post op day 1, her nasal airway and sense of smell have dramatically improved.    Physical Exam  /89   Pulse 92   Resp 12   Ht 1.676 m (5' 6\")   Wt 111.1 kg (245 lb)   SpO2 99%   BMI 39.54 kg/m       General - The patient is well nourished and well developed, and appears to have good nutritional status.  Alert and oriented to person and place, answers questions and cooperates with examination appropriately.   Head and Face - Normocephalic and atraumatic, with no gross asymmetry noted of the contour of the facial features.  The facial nerve is intact, with strong symmetric movements.  Eyes - Extraocular movements intact, and the pupils were reactive to light.  Sclera were not icteric or injected, conjunctiva were pink and moist.  Mouth - Examination of the oral cavity shows pink, healthy, moist mucosa.  No lesions or ulceration noted.  The dentition are in good repair.  The tongue is mobile and midline.  Nose - Nasal exam performed with a headlight and nasal speculum.  I suctioned out a small amount of residual nasopore and dark mucous.  The middle meatus was visible and open bilaterally, no purulence or signs of early synechiae formation.    A/P - Daniella Sexton is a 38 year old female is doing well from sinus surgery.  There are no signs of complications or infection.  " Patient instructed to continue saline rinses.  I will see them in 1 week for endoscopically assisted debridement of the sinuses.      Again, thank you for allowing me to participate in the care of your patient.        Sincerely,        Maikol Miguel MD

## 2019-01-17 NOTE — PROGRESS NOTES
"HPI - Autumn NETTA Sexton is a 38 year old female who is here for their first postoperative visit, status post endoscopic sinus surgery without septoplasty on 1/10/2019.  They report the expected amount of congestion, facial pressure, and mild bloody drainage.  No changes in vision, no fevers or chills.  Nasal saline rinses and oral antibiotics have been tolerated.    Amazingly, she tells me that even post op day 1, her nasal airway and sense of smell have dramatically improved.    Physical Exam  /89   Pulse 92   Resp 12   Ht 1.676 m (5' 6\")   Wt 111.1 kg (245 lb)   SpO2 99%   BMI 39.54 kg/m      General - The patient is well nourished and well developed, and appears to have good nutritional status.  Alert and oriented to person and place, answers questions and cooperates with examination appropriately.   Head and Face - Normocephalic and atraumatic, with no gross asymmetry noted of the contour of the facial features.  The facial nerve is intact, with strong symmetric movements.  Eyes - Extraocular movements intact, and the pupils were reactive to light.  Sclera were not icteric or injected, conjunctiva were pink and moist.  Mouth - Examination of the oral cavity shows pink, healthy, moist mucosa.  No lesions or ulceration noted.  The dentition are in good repair.  The tongue is mobile and midline.  Nose - Nasal exam performed with a headlight and nasal speculum.  I suctioned out a small amount of residual nasopore and dark mucous.  The middle meatus was visible and open bilaterally, no purulence or signs of early synechiae formation.    A/P - Autumn NETTA Sexton is a 38 year old female is doing well from sinus surgery.  There are no signs of complications or infection.  Patient instructed to continue saline rinses.  I will see them in 1 week for endoscopically assisted debridement of the sinuses.    "

## 2019-01-17 NOTE — PATIENT INSTRUCTIONS
Scheduling Information  To schedule your CT/MRI scan, please contact [facility] at [phone] OR [facility] at [phone]    To schedule your Surgery, please contact our Specialty Schedulers at [phone]      ENT Clinic Locations Clinic Hours Telephone Number     Tom Her  [address] [place] [address]   Monday:           1:00pm -- 5:00pm    Friday:              8:00am - 12:00pm   To schedule/reschedule an appointment with   Dr. Miguel,   please contact our   Specialty Scheduling Department at:     [phone]       Tom Saunders  [address] [place] [address] Tuesday:          8:00am -- 2:00pm         Urgent Care Locations Clinic Hours Telephone Numbers     Tom [facility]  [address] [place] [address]     Monday-Friday:     11:00am - 9:00pm    Saturday-Sunday:  9:00am - 5:00pm   [phone]     [facility]  [address] [place] [address]     Monday-Friday:      5:00pm - 9:00pm     Saturday-Sunday:  9:00am - 5:00pm   [phone]

## 2019-03-28 NOTE — PROGRESS NOTES
SUBJECTIVE:   Daniella Sexton is a 38 year old female who presents to clinic today for the following health issues:    Back Pain       Duration: since last summer- intermittent for several years prior        Specific cause: none    Description:   Location of pain: mid and lower back bilateral  Character of pain: sharp, dull ache, stabbing, gnawing, burning, fullness and cramping  Pain radiation:radiates into the hips  New numbness or weakness in legs, not attributed to pain:  no     Intensity: Currently 8/10    History:   Pain interferes with job: YES- but has permanent work restrictions  History of back problems: YES  Any previous MRI or X-rays: Yes- at [facility].    Sees a specialist for back pain:  No  Therapies tried without relief: none    Alleviating factors:   Improved by: Physical Therapy      Precipitating factors:  Worsened by: Standing and Sitting    Accompanying Signs & Symptoms:  Risk of Fracture:  None  Risk of Cauda Equina:  None  Risk of Infection:  None  Risk of Cancer:  None  Risk of Ankylosing Spondylitis:  Onset at age <35, male, AND morning back stiffness. no       Has intermittent numbness in the hands and lower legs for the last couple months.   Had previously been recommended to have back surgery but was just at her primary care clinic? Never saw back specialist?  Has had back injections 12 years ago but was in severe pain and started vomiting during the procedure- did not have sedation.  Had MRI at [facility] 2-3 years ago  Did Physical Therapy previously with improvement but pain now worsening again in the last month.    Has migraines for which she rarely takes Maxalt- last prescription lasted several years.     Asthma well controlled (see ACT) and would like albuterol refill.    Problem list and histories reviewed & adjusted, as indicated.  Additional history: as documented    Patient Active Problem List   Diagnosis     Anxiety     Depressive disorder     Duodenal ulcer      "History of alcohol abuse     History of methamphetamine abuse     Migraines     Screening for malignant neoplasm of cervix     Seasonal allergic rhinitis     Sleep disturbance     Tobacco abuse     CARDIOVASCULAR SCREENING; LDL GOAL LESS THAN 160     Pure hypercholesterolemia     Mild intermittent asthma without complication     Chronic pansinusitis     Past Surgical History:   Procedure Laterality Date     BACK SURGERY  2005    L 3-4, L 4-5     LITHOTRIPSY  2016     OPTICAL TRACKING SYSTEM ENDOSCOPIC SINUS SURGERY Bilateral 1/10/2019    Procedure: BILATERAL IMAGE GUIDED ENDOSCOPIC SINUS SURGERY, BILATERAL TURBINATE REDUCTION;  Surgeon: Maikol Miguel MD;  Location: [facility] OR     RELEASE CARPAL TUNNEL Left      TONSILLECTOMY       TUBAL LIGATION         Social History     Tobacco Use     Smoking status: Current Every Day Smoker     Packs/day: 0.50     Types: Cigarettes     Smokeless tobacco: Never Used     Tobacco comment: 5 cigarettes/day   Substance Use Topics     Alcohol use: Yes     Comment: 3-4 beers a night      Family History   Problem Relation Age of Onset     Heart Disease No family hx of      Diabetes No family hx of      Cancer No family hx of            Reviewed and updated as needed this visit by clinical staff       Reviewed and updated as needed this visit by Provider         ROS:  Constitutional, HEENT, cardiovascular, pulmonary, GI, , musculoskeletal, neuro, skin, endocrine and psych systems are negative, except as otherwise noted.    OBJECTIVE:     /82 (BP Location: Left arm, Patient Position: Chair, Cuff Size: Adult Large)   Pulse 120   Temp 98.2  F (36.8  C) (Oral)   Ht 1.676 m (5' 6\")   Wt 110.2 kg (243 lb)   SpO2 100%   BMI 39.22 kg/m    Body mass index is 39.22 kg/m .  GENERAL: healthy, alert and no distress  Comprehensive back pain exam:  Tenderness of L3-5, bilateral paralumbar muscles, Pain limits the following motions: flexion, Lower extremity strength functional and equal " on both sides, Lower extremity reflexes within normal limits bilaterally, Lower extremity sensation normal and equal on both sides and Straight leg positive on left, right, indicating possible ipsilateral radiculopathy    Diagnostic Test Results:  none     ASSESSMENT/PLAN:     1. Chronic bilateral low back pain with bilateral sciatica  Patient recalls Gabapentin being helpful before, so will restart this.  EMILY signed for most recent MRI records today- if unable to obtain, will order new MRI.  Patient willing to consider injection again if done with sedation.   - cyclobenzaprine (FLEXERIL) 10 MG tablet; Take 1 tablet (10 mg) by mouth 3 times daily as needed for muscle spasms  Dispense: 60 tablet; Refill: 1  - PAIN MANAGEMENT REFERRAL  - gabapentin (NEURONTIN) 300 MG capsule; Take 1 capsule (300 mg) by mouth At Bedtime for 3 days, THEN 1 capsule (300 mg) 2 times daily for 3 days, THEN 1 capsule (300 mg) 3 times daily for 3 days.  Dispense: 90 capsule; Refill: 5    2. Migraine without status migrainosus, not intractable, unspecified migraine type  Well controlled. Medications refilled  - rizatriptan (MAXALT) 10 MG tablet; Take 1 tablet (10 mg) by mouth at onset of headache for migraine  Dispense: 10 tablet; Refill: 2    3. Mild intermittent asthma without complication  Well controlled. Medications refilled.  - albuterol (PROAIR HFA/PROVENTIL HFA/VENTOLIN HFA) 108 (90 Base) MCG/ACT inhaler; Inhale 2 puffs into the lungs every 6 hours  Dispense: 8.5 g; Refill: 3    See Patient Instructions    JENAE Sibley [facility]

## 2019-04-03 ENCOUNTER — OFFICE VISIT (OUTPATIENT)
Dept: FAMILY MEDICINE | Facility: CLINIC | Age: 39
End: 2019-04-03
Payer: COMMERCIAL

## 2019-04-03 VITALS
HEART RATE: 120 BPM | BODY MASS INDEX: 39.05 KG/M2 | HEIGHT: 66 IN | DIASTOLIC BLOOD PRESSURE: 82 MMHG | OXYGEN SATURATION: 100 % | WEIGHT: 243 LBS | TEMPERATURE: 98.2 F | SYSTOLIC BLOOD PRESSURE: 110 MMHG

## 2019-04-03 DIAGNOSIS — M54.42 CHRONIC BILATERAL LOW BACK PAIN WITH BILATERAL SCIATICA: Primary | ICD-10-CM

## 2019-04-03 DIAGNOSIS — M54.41 CHRONIC BILATERAL LOW BACK PAIN WITH BILATERAL SCIATICA: Primary | ICD-10-CM

## 2019-04-03 DIAGNOSIS — G89.29 CHRONIC BILATERAL LOW BACK PAIN WITH BILATERAL SCIATICA: Primary | ICD-10-CM

## 2019-04-03 DIAGNOSIS — J45.20 MILD INTERMITTENT ASTHMA WITHOUT COMPLICATION: ICD-10-CM

## 2019-04-03 DIAGNOSIS — G43.909 MIGRAINE WITHOUT STATUS MIGRAINOSUS, NOT INTRACTABLE, UNSPECIFIED MIGRAINE TYPE: ICD-10-CM

## 2019-04-03 PROCEDURE — 99214 OFFICE O/P EST MOD 30 MIN: CPT | Performed by: NURSE PRACTITIONER

## 2019-04-03 RX ORDER — CYCLOBENZAPRINE HCL 10 MG
10 TABLET ORAL 3 TIMES DAILY PRN
Qty: 60 TABLET | Refills: 1 | Status: SHIPPED | OUTPATIENT
Start: 2019-04-03 | End: 2020-02-06

## 2019-04-03 RX ORDER — ALBUTEROL SULFATE 90 UG/1
2 AEROSOL, METERED RESPIRATORY (INHALATION) EVERY 6 HOURS
Qty: 8.5 G | Refills: 3 | Status: SHIPPED | OUTPATIENT
Start: 2019-04-03 | End: 2020-09-12

## 2019-04-03 RX ORDER — ALBUTEROL SULFATE 90 UG/1
AEROSOL, METERED RESPIRATORY (INHALATION)
Qty: 8.5 G | Refills: 1 | Status: CANCELLED | OUTPATIENT
Start: 2019-04-03

## 2019-04-03 RX ORDER — RIZATRIPTAN BENZOATE 10 MG/1
10 TABLET ORAL
Qty: 10 TABLET | Refills: 2 | Status: SHIPPED | OUTPATIENT
Start: 2019-04-03 | End: 2019-10-17

## 2019-04-03 RX ORDER — GABAPENTIN 300 MG/1
CAPSULE ORAL
Qty: 90 CAPSULE | Refills: 5 | Status: SHIPPED | OUTPATIENT
Start: 2019-04-03 | End: 2019-04-04

## 2019-04-03 ASSESSMENT — PATIENT HEALTH QUESTIONNAIRE - PHQ9: SUM OF ALL RESPONSES TO PHQ QUESTIONS 1-9: 8

## 2019-04-03 ASSESSMENT — PAIN SCALES - GENERAL: PAINLEVEL: EXTREME PAIN (8)

## 2019-04-03 ASSESSMENT — MIFFLIN-ST. JEOR: SCORE: 1798.99

## 2019-04-03 NOTE — TELEPHONE ENCOUNTER
Order received from JENAE Hammond CNP, for a Lumbar TBD Injection. On order it states - **Patient will require sedation       Please review and advise if able to provide sedation for scheduling.        Dori Prince    [facility]

## 2019-04-04 PROBLEM — G89.29 CHRONIC BILATERAL LOW BACK PAIN WITH BILATERAL SCIATICA: Status: ACTIVE | Noted: 2018-07-18

## 2019-04-04 PROBLEM — M54.41 CHRONIC BILATERAL LOW BACK PAIN WITH BILATERAL SCIATICA: Status: ACTIVE | Noted: 2018-07-18

## 2019-04-04 PROBLEM — M54.42 CHRONIC BILATERAL LOW BACK PAIN WITH BILATERAL SCIATICA: Status: ACTIVE | Noted: 2018-07-18

## 2019-04-04 RX ORDER — GABAPENTIN 300 MG/1
CAPSULE ORAL
Qty: 90 CAPSULE | Refills: 5 | Status: SHIPPED | OUTPATIENT
Start: 2019-04-04 | End: 2019-05-08 | Stop reason: DRUGHIGH

## 2019-04-04 RX ORDER — DIAZEPAM 5 MG
TABLET ORAL
Qty: 2 TABLET | Refills: 0 | Status: SHIPPED | OUTPATIENT
Start: 2019-04-04 | End: 2019-11-07

## 2019-04-04 ASSESSMENT — ASTHMA QUESTIONNAIRES: ACT_TOTALSCORE: 25

## 2019-04-04 NOTE — TELEPHONE ENCOUNTER
Patient notified of providers message as written.  Patient would [facility] done [facility] in [place] on [address]- please fax order to this location.   Ila Perkins RN

## 2019-04-04 NOTE — TELEPHONE ENCOUNTER
Left message on answering machine for patient to call back to the RN hotline at [phone].    Danna Payne RN  [facility]

## 2019-04-04 NOTE — TELEPHONE ENCOUNTER
Per primary care provider office visit yesterday:  Has had back injections 12 years ago but was in severe pain and started vomiting during the procedure- did not have sedation.  Had MRI at [facility] 2-3 years ago  ---  Pain is located in low back and legs  Oral sedation is an option.  -last lumbar MRI @ [facility] was in 2010.   -[facility]  [place] [facility] [place]    Called pt. She states that then the last MRI was in 2010.  Informed her that a message would be sent on to Renetta EMANUEL CNP to order new imaging and an oral sedative like valium.  She is okay w/ this plan and want the valium sent to [facility].    Routed to primary care provider to review.    Carli Vargas RN-BSN  [facility]

## 2019-04-08 NOTE — TELEPHONE ENCOUNTER
Called and confirmed with [facility] [phone] they have this and its ready for the patient to  at the .     Rita Godwin,

## 2019-04-26 NOTE — PATIENT INSTRUCTIONS
Start tracking calories in [facility]    Preventive Health Recommendations  Female Ages 26 - 39  Yearly exam:   See your health care provider every year in order to    Review health changes.     Discuss preventive care.      Review your medicines if you your doctor has prescribed any.    Until age 30: Get a Pap test every three years (more often if you have had an abnormal result).    After age 30: Talk to your doctor about whether you should have a Pap test every 3 years or have a Pap test with HPV screening every 5 years.   You do not need a Pap test if your uterus was removed (hysterectomy) and you have not had cancer.  You should be tested each year for STDs (sexually transmitted diseases), if you're at risk.   Talk to your provider about how often to have your cholesterol checked.  If you are at risk for diabetes, you should have a diabetes test (fasting glucose).  Shots: Get a flu shot each year. Get a tetanus shot every 10 years.   Nutrition:     Eat at least 5 servings of fruits and vegetables each day.    Eat whole-grain bread, whole-wheat pasta and brown rice instead of white grains and rice.    Get adequate Calcium and Vitamin D.     Lifestyle    Exercise at least 150 minutes a week (30 minutes a day, 5 days of the week). This will help you control your weight and prevent disease.    Limit alcohol to one drink per day.    No smoking.     Wear sunscreen to prevent skin cancer.    See your dentist every six months for an exam and cleaning.

## 2019-05-02 ENCOUNTER — TELEPHONE (OUTPATIENT)
Dept: FAMILY MEDICINE | Facility: CLINIC | Age: 39
End: 2019-05-02

## 2019-05-02 NOTE — TELEPHONE ENCOUNTER
See other note from today.    Danna Payne RN  [facility]

## 2019-05-02 NOTE — TELEPHONE ENCOUNTER
Spoke with Amy at [facility]. Imaging has not been approved for today at [facility] for her MRI. Ordering provider will need to call them at [phone] pt's ID [id number] and ask for a reconsideration. States this can only be done by a provider and not by RN. Please advise.    Danna Payne RN  [facility]

## 2019-05-02 NOTE — TELEPHONE ENCOUNTER
Reason for Call:  Other appointment    Detailed comments: Patient has MRI appointment this afternoon at 4 pm. Insurance needs to have more information to authorize the MRI. Please call [facility] at [phone]. Thank you    Phone Number Patient can be reached at: Home number on file [phone] (home)    Best Time: ASAP    Can we leave a detailed message on this number? YES    Call taken on 5/2/2019 at 11:16 AM by Leann Morales

## 2019-05-02 NOTE — TELEPHONE ENCOUNTER
Reason for call:  Other   Patient called regarding (reason for call): call back  Additional comments: Was denied for MRI and is has an option for peer to peer. [phone] reference number is [id number]    Phone number to reach patient:  Other phone number:  [phone]    Best Time:  any    Can we leave a detailed message on this number?  YES

## 2019-05-02 NOTE — TELEPHONE ENCOUNTER
Copied from other encounter from today:  Reason for call:  Other   Patient called regarding (reason for call): call back  Additional comments: Was denied for MRI and is has an option for peer to peer. [phone] reference number is [id number]     Phone number to reach patient:  Other phone number:  [phone]     Best Time:  any     Can we leave a detailed message on this number?  YES

## 2019-05-03 NOTE — TELEPHONE ENCOUNTER
Called and spoke with patient & notified of provider's information as written.   Verbalizes understanding & no further questions.     Yenny Phan RN

## 2019-05-03 NOTE — TELEPHONE ENCOUNTER
Peer review done- patient seeing me on 5/8/19 and will reassess pain then. If no improvement, MRI can likely be approved with that additional clinical information.  Please advise patient to hold off on scheduling MRI until after we discuss at her appt on Wednesday.    Renetta Morrow, CNP

## 2019-05-08 ENCOUNTER — OFFICE VISIT (OUTPATIENT)
Dept: FAMILY MEDICINE | Facility: CLINIC | Age: 39
End: 2019-05-08
Payer: COMMERCIAL

## 2019-05-08 VITALS
SYSTOLIC BLOOD PRESSURE: 120 MMHG | DIASTOLIC BLOOD PRESSURE: 86 MMHG | OXYGEN SATURATION: 97 % | HEART RATE: 107 BPM | BODY MASS INDEX: 39.05 KG/M2 | TEMPERATURE: 99.4 F | WEIGHT: 243 LBS | HEIGHT: 66 IN

## 2019-05-08 DIAGNOSIS — Z12.4 SCREENING FOR MALIGNANT NEOPLASM OF CERVIX: ICD-10-CM

## 2019-05-08 DIAGNOSIS — Z00.00 ROUTINE HISTORY AND PHYSICAL EXAMINATION OF ADULT: Primary | ICD-10-CM

## 2019-05-08 DIAGNOSIS — Z72.0 TOBACCO ABUSE: ICD-10-CM

## 2019-05-08 DIAGNOSIS — R68.89 HEAT INTOLERANCE: ICD-10-CM

## 2019-05-08 DIAGNOSIS — G89.29 CHRONIC BILATERAL LOW BACK PAIN WITH BILATERAL SCIATICA: ICD-10-CM

## 2019-05-08 DIAGNOSIS — M54.41 CHRONIC BILATERAL LOW BACK PAIN WITH BILATERAL SCIATICA: ICD-10-CM

## 2019-05-08 DIAGNOSIS — R35.0 URINARY FREQUENCY: ICD-10-CM

## 2019-05-08 DIAGNOSIS — M54.42 CHRONIC BILATERAL LOW BACK PAIN WITH BILATERAL SCIATICA: ICD-10-CM

## 2019-05-08 DIAGNOSIS — R31.29 MICROSCOPIC HEMATURIA: ICD-10-CM

## 2019-05-08 PROBLEM — E66.01 MORBID OBESITY (H): Status: ACTIVE | Noted: 2019-05-08

## 2019-05-08 LAB
ALBUMIN UR-MCNC: NEGATIVE MG/DL
APPEARANCE UR: CLEAR
BILIRUB UR QL STRIP: NEGATIVE
COLOR UR AUTO: YELLOW
GLUCOSE UR STRIP-MCNC: NEGATIVE MG/DL
HBA1C MFR BLD: 5.4 % (ref 0–5.6)
HGB UR QL STRIP: ABNORMAL
KETONES UR STRIP-MCNC: NEGATIVE MG/DL
LEUKOCYTE ESTERASE UR QL STRIP: NEGATIVE
NITRATE UR QL: NEGATIVE
NON-SQ EPI CELLS #/AREA URNS LPF: ABNORMAL /LPF
PH UR STRIP: 5.5 PH (ref 5–7)
RBC #/AREA URNS AUTO: ABNORMAL /HPF
SOURCE: ABNORMAL
SP GR UR STRIP: <=1.005 (ref 1–1.03)
UROBILINOGEN UR STRIP-ACNC: 0.2 EU/DL (ref 0.2–1)
WBC #/AREA URNS AUTO: ABNORMAL /HPF

## 2019-05-08 PROCEDURE — 83036 HEMOGLOBIN GLYCOSYLATED A1C: CPT | Performed by: NURSE PRACTITIONER

## 2019-05-08 PROCEDURE — 99213 OFFICE O/P EST LOW 20 MIN: CPT | Mod: 25 | Performed by: NURSE PRACTITIONER

## 2019-05-08 PROCEDURE — 84443 ASSAY THYROID STIM HORMONE: CPT | Performed by: NURSE PRACTITIONER

## 2019-05-08 PROCEDURE — 36415 COLL VENOUS BLD VENIPUNCTURE: CPT | Performed by: NURSE PRACTITIONER

## 2019-05-08 PROCEDURE — G0145 SCR C/V CYTO,THINLAYER,RESCR: HCPCS | Performed by: NURSE PRACTITIONER

## 2019-05-08 PROCEDURE — 99395 PREV VISIT EST AGE 18-39: CPT | Performed by: NURSE PRACTITIONER

## 2019-05-08 PROCEDURE — 81001 URINALYSIS AUTO W/SCOPE: CPT | Performed by: NURSE PRACTITIONER

## 2019-05-08 PROCEDURE — 87624 HPV HI-RISK TYP POOLED RSLT: CPT | Performed by: NURSE PRACTITIONER

## 2019-05-08 RX ORDER — GABAPENTIN 100 MG/1
CAPSULE ORAL
Qty: 90 CAPSULE | Refills: 11 | Status: SHIPPED | OUTPATIENT
Start: 2019-05-08 | End: 2019-10-17 | Stop reason: DRUGHIGH

## 2019-05-08 ASSESSMENT — ENCOUNTER SYMPTOMS
DIARRHEA: 0
FEVER: 0
PARESTHESIAS: 0
WEAKNESS: 1
JOINT SWELLING: 0
DYSURIA: 0
SHORTNESS OF BREATH: 0
NERVOUS/ANXIOUS: 1
HEMATOCHEZIA: 0
HEMATURIA: 0
EYE PAIN: 0
CHILLS: 0
ABDOMINAL PAIN: 0
DIZZINESS: 0
COUGH: 1
FREQUENCY: 1
BREAST MASS: 0
HEARTBURN: 0
PALPITATIONS: 0
HEADACHES: 0
CONSTIPATION: 0
ARTHRALGIAS: 1
MYALGIAS: 1
SORE THROAT: 0

## 2019-05-08 ASSESSMENT — MIFFLIN-ST. JEOR: SCORE: 1798.99

## 2019-05-08 ASSESSMENT — PATIENT HEALTH QUESTIONNAIRE - PHQ9: SUM OF ALL RESPONSES TO PHQ QUESTIONS 1-9: 7

## 2019-05-08 ASSESSMENT — PAIN SCALES - GENERAL: PAINLEVEL: MODERATE PAIN (4)

## 2019-05-08 NOTE — RESULT ENCOUNTER NOTE
Please call the patient with these results:    Autumn, the urine test shows a small amount of blood, probably due to your recent period. Please return in 1-2 weeks for a lab only appointment to leave a repeat urine same.  Diabetes screening test is normal.  If symptoms continue, follow up in clinic.    Renetta Morrow, CNP

## 2019-05-08 NOTE — LETTER
May 16, 2019    Daniella CHADWICK Darshan  [address] [facility] [address]    Dear ,  This letter is regarding your recent Pap smear (cervical cancer screening) and Human Papillomavirus (HPV) test.  We are happy to inform you that your Pap smear result is normal. Cervical cancer is closely linked with certain types of HPV. Your results showed no evidence of high-risk HPV.  Therefore we recommend you return in 5 years for your next pap smear and HPV test.  You will still need to return to the clinic every year for an annual exam and other preventive tests.  If you have additional questions regarding this result, please call our registered nurse, Danna at [phone].  Sincerely,    Renetta Morrow, JENAE CNP/rlm

## 2019-05-08 NOTE — PROGRESS NOTES
SUBJECTIVE:   CC: Daniella Sexton is an 38 year old woman who presents for preventive health visit.     Healthy Habits:     Getting at least 3 servings of Calcium per day:  NO    Bi-annual eye exam:  Yes    Dental care twice a year:  NO    Sleep apnea or symptoms of sleep apnea:  Daytime drowsiness    Diet:  Regular (no restrictions)    Frequency of exercise:  1 day/week    Duration of exercise:  Less than 15 minutes    Taking medications regularly:  Yes    Medication side effects:  None    PHQ-2 Total Score: 2    Additional concerns today:  No    Gabapentin has helped a little bit for back pain but having side effects- would like to try lower dose. Pain now 4/10. Has missed some days of work due to pain as pain is worse when more physically active.           Today's PHQ-2 Score:   PHQ-2 ( 1999 [employer]) 5/8/2019   Q1: Little interest or pleasure in doing things 1   Q2: Feeling down, depressed or hopeless 1   PHQ-2 Score 2   Q1: Little interest or pleasure in doing things Several days   Q2: Feeling down, depressed or hopeless Several days   PHQ-2 Score 2       Abuse: Current or Past(Physical, Sexual or Emotional)- No  Do you feel safe in your environment? Yes    Social History     Tobacco Use     Smoking status: Current Every Day Smoker     Packs/day: 0.50     Types: Cigarettes     Smokeless tobacco: Never Used     Tobacco comment: 5 cigarettes/day   Substance Use Topics     Alcohol use: Yes     Comment: 3-4 beers a night      If you drink alcohol do you typically have >3 drinks per day or >7 drinks per week? No    Alcohol Use 5/8/2019   Prescreen: >3 drinks/day or >7 drinks/week? Yes   AUDIT SCORE  13     Reviewed orders with patient.  Reviewed health maintenance and updated orders accordingly - Yes  Labs reviewed in EPIC    Mammogram not appropriate for this patient based on age.    Pertinent mammograms are reviewed under the imaging tab.  History of abnormal Pap smear: NO - age 30-65 PAP every 5 years with  "negative HPV co-testing recommended     Reviewed and updated as needed this visit by clinical staff  Tobacco  Allergies  Meds         Reviewed and updated as needed this visit by Provider            Review of Systems   Constitutional: Negative for chills and fever.   HENT: Negative for congestion, ear pain, hearing loss and sore throat.    Eyes: Negative for pain and visual disturbance.   Respiratory: Positive for cough. Negative for shortness of breath.    Cardiovascular: Negative for chest pain, palpitations and peripheral edema.   Gastrointestinal: Negative for abdominal pain, constipation, diarrhea, heartburn and hematochezia.   Endocrine: Positive for heat intolerance.   Breasts:  Negative for tenderness, breast mass and discharge.   Genitourinary: Positive for frequency and urgency. Negative for dysuria, genital sores, hematuria, pelvic pain, vaginal bleeding and vaginal discharge.   Musculoskeletal: Positive for arthralgias and myalgias. Negative for joint swelling.   Skin: Negative for rash.   Neurological: Positive for weakness. Negative for dizziness, headaches and paresthesias.   Psychiatric/Behavioral: Positive for mood changes. The patient is nervous/anxious.         OBJECTIVE:   /86 (BP Location: Left arm, Patient Position: Chair, Cuff Size: Adult Large)   Pulse 107   Temp 99.4  F (37.4  C) (Oral)   Ht 1.676 m (5' 6\")   Wt 110.2 kg (243 lb)   LMP 05/04/2019   SpO2 97%   BMI 39.22 kg/m    Physical Exam  GENERAL: healthy, alert and no distress  EYES: Eyes grossly normal to inspection, PERRL and conjunctivae and sclerae normal  HENT: ear canals and TM's normal, nose and mouth without ulcers or lesions  NECK: no adenopathy, no asymmetry, masses, or scars and thyroid normal to palpation  RESP: lungs clear to auscultation - no rales, rhonchi or wheezes  CV: regular rate and rhythm, normal S1 S2, no S3 or S4, no murmur, click or rub, no peripheral edema and peripheral pulses strong  ABDOMEN: " soft, nontender, no hepatosplenomegaly, no masses and bowel sounds normal   (female): normal female external genitalia, normal urethral meatus, vaginal mucosa pink, moist, well rugated, and normal cervix/adnexa/uterus without masses or discharge  MS: no gross musculoskeletal defects noted, no edema  SKIN: no suspicious lesions or rashes  NEURO: Normal strength and tone, mentation intact and speech normal  PSYCH: mentation appears normal, affect normal/bright    Diagnostic Test Results:  Results for orders placed or performed in visit on 05/08/19   UA reflex to Microscopic and Culture   Result Value Ref Range    Color Urine Yellow     Appearance Urine Clear     Glucose Urine Negative NEG^Negative mg/dL    Bilirubin Urine Negative NEG^Negative    Ketones Urine Negative NEG^Negative mg/dL    Specific Gravity Urine <=1.005 1.003 - 1.035    Blood Urine Trace (A) NEG^Negative    pH Urine 5.5 5.0 - 7.0 pH    Protein Albumin Urine Negative NEG^Negative mg/dL    Urobilinogen Urine 0.2 0.2 - 1.0 EU/dL    Nitrite Urine Negative NEG^Negative    Leukocyte Esterase Urine Negative NEG^Negative    Source Midstream Urine    TSH with free T4 reflex   Result Value Ref Range    TSH 1.87 0.40 - 4.00 mU/L   Hemoglobin A1c   Result Value Ref Range    Hemoglobin A1C 5.4 0 - 5.6 %   Urine Microscopic   Result Value Ref Range    WBC Urine 0 - 5 OTO5^0 - 5 /HPF    RBC Urine 2-5 (A) OTO2^O - 2 /HPF    Squamous Epithelial /LPF Urine Few FEW^Few /LPF       ASSESSMENT/PLAN:   1. Routine history and physical examination of adult    - Urine Microscopic    2. Chronic bilateral low back pain with bilateral sciatica  Decrease Gabapentin dose and will slowly titrate up to three times per day  Follow-up in 1 month and if pain not improving will try to get MRI covered  FMLA forms filled out for work  - gabapentin (NEURONTIN) 100 MG capsule; Take 1 capsule (100 mg) by mouth At Bedtime for 7 days, THEN 1 capsule (100 mg) 2 times daily for 7 days, THEN 1  "capsule (100 mg) 3 times daily.  Dispense: 90 capsule; Refill: 11    3. BMI 39.0-39.9,adult  Diet/exercise counseling performed  - NUTRITION REFERRAL    4. Urinary frequency    - UA reflex to Microscopic and Culture  - Hemoglobin A1c    5. Screening for malignant neoplasm of cervix    - Pap imaged thin layer screen with HPV - recommended age 30 - 65 years (select HPV order below)  - HPV High Risk Types DNA Cervical    6. Tobacco abuse  Patient not ready to quit    7. Heat intolerance  Will screen for thyroid disease, likely due to obesity  - TSH with free T4 reflex    8. Microscopic hematuria    - UA reflex to Microscopic and Culture; Future    COUNSELING:  Reviewed preventive health counseling, as reflected in patient instructions       Regular exercise       Healthy diet/nutrition    BP Readings from Last 1 Encounters:   05/08/19 120/86     Estimated body mass index is 39.22 kg/m  as calculated from the following:    Height as of this encounter: 1.676 m (5' 6\").    Weight as of this encounter: 110.2 kg (243 lb).      Weight management plan: Patient referred to endocrine and/or weight management specialty     reports that she has been smoking cigarettes.  She has been smoking about 0.50 packs per day. She has never used smokeless tobacco.  Tobacco Cessation Action Plan: Self help information given to patient    Counseling Resources:  ATP IV Guidelines  Pooled Cohorts Equation Calculator  Breast Cancer Risk Calculator  FRAX Risk Assessment  ICSI Preventive Guidelines  Dietary Guidelines for Americans, 2010  USDA's MyPlate  ASA Prophylaxis  Lung CA Screening    JENAE Sibley CNP  [facility]  "

## 2019-05-08 NOTE — LETTER
[facility]  [address] [place] [address]    May 9, 2019    Daniella Sexton  [address] [address]          Dear Daniella,  Normal thyroid test.   Enclosed is a copy of your results.     Results for orders placed or performed in visit on 05/08/19   UA reflex to Microscopic and Culture   Result Value Ref Range    Color Urine Yellow     Appearance Urine Clear     Glucose Urine Negative NEG^Negative mg/dL    Bilirubin Urine Negative NEG^Negative    Ketones Urine Negative NEG^Negative mg/dL    Specific Gravity Urine <=1.005 1.003 - 1.035    Blood Urine Trace (A) NEG^Negative    pH Urine 5.5 5.0 - 7.0 pH    Protein Albumin Urine Negative NEG^Negative mg/dL    Urobilinogen Urine 0.2 0.2 - 1.0 EU/dL    Nitrite Urine Negative NEG^Negative    Leukocyte Esterase Urine Negative NEG^Negative    Source Midstream Urine    TSH with free T4 reflex   Result Value Ref Range    TSH 1.87 0.40 - 4.00 mU/L   Hemoglobin A1c   Result Value Ref Range    Hemoglobin A1C 5.4 0 - 5.6 %   Urine Microscopic   Result Value Ref Range    WBC Urine 0 - 5 OTO5^0 - 5 /HPF    RBC Urine 2-5 (A) OTO2^O - 2 /HPF    Squamous Epithelial /LPF Urine Few FEW^Few /LPF       If you have any questions or concerns, please call myself or my nurse at [phone].      Sincerely,        Renetta Morrow CNP/pb

## 2019-05-08 NOTE — LETTER
Certification of Health Care Provider  Family Medical Leave Act (FMLA)      Employer's name and contact:     Employee's job title: Regular work schedule:     Employee's essential job functions:     Patient's name: Daniella Sexton    Provider's name and business address:  Renetta Morrow  [address]  [place] [address]  Phone: [phone]      PART A:  MEDICAL FACTS  1)  Approximate date condition commenced:  4/3/19    Probable duration of condition:  6 months     Pravin below as applicable:  Was the patient admitted for an overnight stay in a hospital, hospice, or residential medical care facility?  no.    Date(s) you treated the patient for condition: 4/3/19, 05/08/19    Will the patient need to have treatment visits at least twice per year due to the                     condition? yes    Was medication, other than over-the-counter medication prescribed?  yes    Was the patient referred to other health care provider(s) for evaluation or treatment     (e.g., physical therapist)?  no.       2)  Is the medical condition pregnancy?  no.      3)  Is the employee unable to perform any of his/her job functions due to the     condition: yes:  Identify the job functions the employee is unable to perform: prolonged standing      4)  Describe other relevant medical facts, if any, related to the condition which the employee seeks leave (such as medical facts may include symptoms, diagnosis, or any regimen of continuing treatment such as the use of specialized equipment):     Chronic low back pain      PART B: AMOUNT OF LEAVE NEEDED  5)  Will the employee be incapacitated for a single continuous period of time due to his/her medical condition, including any time for treatment and recovery?  no.    6)  Will the employee need to attend follow-up treatment appointments or work part-time or on a reduced schedule because of the employee's medical condition?  no.    7) Will the condition  cause episodic flare ups periodically preventing the employee from performing his/her job functions? yes:  Is it medically necessary for the patient to be absent from work during the flare-ups?  yes:  Explain: flare-ups of back pain causing her to be unable to stand for long periods of time    Based upon the patient's medical history and your knowledge of the medical condition, estimate the frequency of flare-ups and the duration of related incapacity that the patient may have over the next six months (e.g., 1 episode every 3 months lasting 1-2 days):    Frequency: 3 X a month, once daily  Duration: 1 day(s)       ADDITIONAL INFORMATION: IDENTIFY QUESTION NUMBER WITH YOUR ADDITIONAL ANSWER       ===========================================    IF EMPLOYEE'S FAMILY MEMBER IS THE PATIENT, PLEASE COMPLETE SECTION BELOW:  N/A      13) Does the patient/family member need the employee to provide basic medical or personal needs, or for safety or transportation?      14)  If no, would the employee's presence to provide psychological comfort be beneficial to the patient or assist in the patient's recovery?        ================================================================    TO BE COMPLETED BY THE HEALTH CARE PROVIDER:    Signature:  Renetta Morrow ________________________________________  Date:   5/8/2019

## 2019-05-09 LAB — TSH SERPL DL<=0.005 MIU/L-ACNC: 1.87 MU/L (ref 0.4–4)

## 2019-05-09 ASSESSMENT — ASTHMA QUESTIONNAIRES: ACT_TOTALSCORE: 25

## 2019-05-13 LAB
COPATH REPORT: NORMAL
PAP: NORMAL

## 2019-05-14 LAB
FINAL DIAGNOSIS: NORMAL
HPV HR 12 DNA CVX QL NAA+PROBE: NEGATIVE
HPV16 DNA SPEC QL NAA+PROBE: NEGATIVE
HPV18 DNA SPEC QL NAA+PROBE: NEGATIVE
SPECIMEN DESCRIPTION: NORMAL
SPECIMEN SOURCE CVX/VAG CYTO: NORMAL

## 2019-06-12 ENCOUNTER — OFFICE VISIT (OUTPATIENT)
Dept: FAMILY MEDICINE | Facility: CLINIC | Age: 39
End: 2019-06-12
Payer: COMMERCIAL

## 2019-06-12 ENCOUNTER — ANCILLARY PROCEDURE (OUTPATIENT)
Dept: GENERAL RADIOLOGY | Facility: CLINIC | Age: 39
End: 2019-06-12
Attending: NURSE PRACTITIONER
Payer: COMMERCIAL

## 2019-06-12 VITALS
WEIGHT: 245 LBS | SYSTOLIC BLOOD PRESSURE: 114 MMHG | OXYGEN SATURATION: 100 % | DIASTOLIC BLOOD PRESSURE: 84 MMHG | HEART RATE: 106 BPM | BODY MASS INDEX: 39.37 KG/M2 | TEMPERATURE: 98.5 F | HEIGHT: 66 IN

## 2019-06-12 DIAGNOSIS — M54.42 CHRONIC BILATERAL LOW BACK PAIN WITH BILATERAL SCIATICA: Primary | ICD-10-CM

## 2019-06-12 DIAGNOSIS — M54.41 CHRONIC BILATERAL LOW BACK PAIN WITH BILATERAL SCIATICA: Primary | ICD-10-CM

## 2019-06-12 DIAGNOSIS — R20.2 NUMBNESS AND TINGLING IN BOTH HANDS: ICD-10-CM

## 2019-06-12 DIAGNOSIS — R20.0 NUMBNESS AND TINGLING IN BOTH HANDS: ICD-10-CM

## 2019-06-12 DIAGNOSIS — R31.29 MICROSCOPIC HEMATURIA: ICD-10-CM

## 2019-06-12 DIAGNOSIS — G89.29 CHRONIC BILATERAL LOW BACK PAIN WITH BILATERAL SCIATICA: Primary | ICD-10-CM

## 2019-06-12 LAB

## 2019-06-12 PROCEDURE — 99214 OFFICE O/P EST MOD 30 MIN: CPT | Performed by: NURSE PRACTITIONER

## 2019-06-12 PROCEDURE — 81001 URINALYSIS AUTO W/SCOPE: CPT | Performed by: NURSE PRACTITIONER

## 2019-06-12 PROCEDURE — 72040 X-RAY EXAM NECK SPINE 2-3 VW: CPT

## 2019-06-12 ASSESSMENT — PAIN SCALES - GENERAL: PAINLEVEL: MODERATE PAIN (4)

## 2019-06-12 ASSESSMENT — MIFFLIN-ST. JEOR: SCORE: 1808.06

## 2019-06-12 NOTE — PROGRESS NOTES
Subjective     Daniella Sexton is a 38 year old female who presents to clinic today for the following health issues:    HPI     Chief Complaint   Patient presents with     Recheck Low Back Pain     Referral     MRI and Pain Clinic     Patient presents for follow up of chronic bilateral low back pain. Has had some improvement in pain on the Gabapentin-now up to 300 mg at HS once daily.  Still has bad days and hoping for MRI so she could have an injection.  Now reports some numbness in the arms/legs, mostly in the middle of the night. S/p left carpal tunnel release and elected not to have right wrist done. Does have some neck pain.        Patient Active Problem List   Diagnosis     Anxiety     Depressive disorder     Duodenal ulcer     History of alcohol abuse     History of methamphetamine abuse     Migraines     Seasonal allergic rhinitis     Sleep disturbance     Tobacco abuse     CARDIOVASCULAR SCREENING; LDL GOAL LESS THAN 160     Chronic bilateral low back pain with bilateral sciatica     Pure hypercholesterolemia     Mild intermittent asthma without complication     Chronic pansinusitis     Obesity (BMI 35.0-39.9) with comorbidity (H)     Past Surgical History:   Procedure Laterality Date     BACK SURGERY  2005    L 3-4, L 4-5     LITHOTRIPSY  2016     OPTICAL TRACKING SYSTEM ENDOSCOPIC SINUS SURGERY Bilateral 1/10/2019    Procedure: BILATERAL IMAGE GUIDED ENDOSCOPIC SINUS SURGERY, BILATERAL TURBINATE REDUCTION;  Surgeon: Maikol Miguel MD;  Location: [facility] OR     RELEASE CARPAL TUNNEL Left      TONSILLECTOMY       TUBAL LIGATION         Social History     Tobacco Use     Smoking status: Current Every Day Smoker     Packs/day: 0.50     Types: Cigarettes     Smokeless tobacco: Never Used     Tobacco comment: 5 cigarettes/day   Substance Use Topics     Alcohol use: Yes     Comment: 3-4 beers a night      Family History   Problem Relation Age of Onset     Heart Disease No family hx of      Diabetes No family hx  "of      Cancer No family hx of            Reviewed and updated as needed this visit by Provider         Review of Systems   ROS COMP: Constitutional, HEENT, cardiovascular, pulmonary, gi and gu, MS, neuro systems are negative, except as otherwise noted.      Objective    /84 (BP Location: Right arm, Patient Position: Chair, Cuff Size: Adult Large)   Pulse 106   Temp 98.5  F (36.9  C) (Oral)   Ht 1.676 m (5' 6\")   Wt 111.1 kg (245 lb)   SpO2 100%   BMI 39.54 kg/m    Body mass index is 39.54 kg/m .  Physical Exam   GENERAL: healthy, alert and no distress  MS: no gross musculoskeletal defects noted, no edema  SKIN: no suspicious lesions or rashes  NEURO: Normal strength and tone, sensory exam grossly normal and positive Tinel's left wrist  PSYCH: mentation appears normal, affect normal/bright    Diagnostic Test Results:  Labs reviewed in Epic  Results for orders placed or performed in visit on 06/12/19   UA reflex to Microscopic and Culture   Result Value Ref Range    Color Urine Yellow     Appearance Urine Clear     Glucose Urine Negative NEG^Negative mg/dL    Bilirubin Urine Negative NEG^Negative    Ketones Urine Negative NEG^Negative mg/dL    Specific Gravity Urine 1.015 1.003 - 1.035    Blood Urine Trace (A) NEG^Negative    pH Urine 6.0 5.0 - 7.0 pH    Protein Albumin Urine Negative NEG^Negative mg/dL    Urobilinogen Urine 0.2 0.2 - 1.0 EU/dL    Nitrite Urine Negative NEG^Negative    Leukocyte Esterase Urine Negative NEG^Negative    Source Midstream Urine    Urine Microscopic   Result Value Ref Range    WBC Urine 0 - 5 OTO5^0 - 5 /HPF    RBC Urine O - 2 OTO2^O - 2 /HPF    Squamous Epithelial /LPF Urine Few FEW^Few /LPF    Bacteria Urine Few (A) NEG^Negative /HPF           Assessment & Plan     1. Chronic bilateral low back pain with bilateral sciatica  Minimal improvement on Gabapentin. Will try again for MRI (was not done previously due to insurance denial) in hopes of lumbar injection after.  - MR " Lumbar Spine w/o Contrast; Future  - Urine Microscopic    2. Microscopic hematuria  Repeat UA today  - UA reflex to Microscopic and Culture    3. Numbness and tingling in both hands  Suspect due to CTS vs cervical radiculopathy.  Start conservative care for CTS- wrist braces, tylenol, ice. Follow up with ortho for possible steroid injections.  - ORTHO  REFERRAL  - XR Cervical Spine 2/3 Views; Future         Return in about 6 months (around 12/12/2019) for Recheck of today's visit.    JENAE Sibley CNP  [facility]

## 2019-06-12 NOTE — LETTER
[facility]  [address] [place] [address]    June 13, 2019    Daniella Sexton  [address]  [place] [address]          Dear Daniella,  Your x-ray results are normal.  Enclosed is a copy of your results.     Results for orders placed or performed in visit on 06/12/19   XR Cervical Spine 2/3 Views    Narrative    CERVICAL SPINE 2-3 VIEWS 6/12/2019 4:14 PM     HISTORY: Numbness and tingling in both hands; Numbness and tingling in  both hands    COMPARISON: None.    FINDINGS: There is normal osseous alignment.  No fractures are  identified.      Impression    IMPRESSION: Unremarkable, no significant degenerative changes.    MEY VILLA MD       If you have any questions or concerns, please call myself or my nurse at [phone].      Sincerely,      Renetta Morrow, ALEXUS /pb

## 2019-06-12 NOTE — LETTER
[facility]  [address] [place] [address]    June 13, 2019    Daniella Sexton  [address]  [place] [address]    Dear Daniella,    Your results are normal.     Enclosed is a copy of your results.     Results for orders placed or performed in visit on 06/12/19   UA reflex to Microscopic and Culture   Result Value Ref Range    Color Urine Yellow     Appearance Urine Clear     Glucose Urine Negative NEG^Negative mg/dL    Bilirubin Urine Negative NEG^Negative    Ketones Urine Negative NEG^Negative mg/dL    Specific Gravity Urine 1.015 1.003 - 1.035    Blood Urine Trace (A) NEG^Negative    pH Urine 6.0 5.0 - 7.0 pH    Protein Albumin Urine Negative NEG^Negative mg/dL    Urobilinogen Urine 0.2 0.2 - 1.0 EU/dL    Nitrite Urine Negative NEG^Negative    Leukocyte Esterase Urine Negative NEG^Negative    Source Midstream Urine    Urine Microscopic   Result Value Ref Range    WBC Urine 0 - 5 OTO5^0 - 5 /HPF    RBC Urine O - 2 OTO2^O - 2 /HPF    Squamous Epithelial /LPF Urine Few FEW^Few /LPF    Bacteria Urine Few (A) NEG^Negative /HPF       If you have any questions or concerns, please call myself or my nurse at [phone].      Sincerely,        Renetta Morrow CNP/ha

## 2019-06-20 ENCOUNTER — ANCILLARY PROCEDURE (OUTPATIENT)
Dept: MRI IMAGING | Facility: CLINIC | Age: 39
End: 2019-06-20
Attending: NURSE PRACTITIONER
Payer: COMMERCIAL

## 2019-06-20 DIAGNOSIS — M54.42 CHRONIC BILATERAL LOW BACK PAIN WITH BILATERAL SCIATICA: ICD-10-CM

## 2019-06-20 DIAGNOSIS — M54.41 CHRONIC BILATERAL LOW BACK PAIN WITH BILATERAL SCIATICA: ICD-10-CM

## 2019-06-20 DIAGNOSIS — G89.29 CHRONIC BILATERAL LOW BACK PAIN WITH BILATERAL SCIATICA: ICD-10-CM

## 2019-06-20 PROCEDURE — 72148 MRI LUMBAR SPINE W/O DYE: CPT | Mod: TC

## 2019-06-21 NOTE — RESULT ENCOUNTER NOTE
Please call the patient with these results:    Daniella, your lumbar MRI shows a bulging disc, arthritis, and some narrowing around the spinal cord at the L4-5 level, which is the same level as your prior back surgery. I do think a lumbar injection would be beneficial for you. The order was placed for your injection on 4/3/19 and you can call [facility] at [phone] to schedule this injection now.    Renetta Morrow, CNP

## 2019-07-24 ENCOUNTER — RADIOLOGY INJECTION OFFICE VISIT (OUTPATIENT)
Dept: PALLIATIVE MEDICINE | Facility: CLINIC | Age: 39
End: 2019-07-24
Payer: COMMERCIAL

## 2019-07-24 ENCOUNTER — ANCILLARY PROCEDURE (OUTPATIENT)
Dept: RADIOLOGY | Facility: CLINIC | Age: 39
End: 2019-07-24
Attending: PSYCHIATRY & NEUROLOGY
Payer: COMMERCIAL

## 2019-07-24 VITALS — HEART RATE: 83 BPM | DIASTOLIC BLOOD PRESSURE: 72 MMHG | OXYGEN SATURATION: 99 % | SYSTOLIC BLOOD PRESSURE: 117 MMHG

## 2019-07-24 DIAGNOSIS — M54.16 LUMBAR RADICULOPATHY: ICD-10-CM

## 2019-07-24 DIAGNOSIS — M47.819 FACET ARTHROPATHY: Primary | ICD-10-CM

## 2019-07-24 DIAGNOSIS — M47.816 FACET ARTHROPATHY, LUMBAR: ICD-10-CM

## 2019-07-24 PROCEDURE — 64494 INJ PARAVERT F JNT L/S 2 LEV: CPT | Mod: 50 | Performed by: PSYCHIATRY & NEUROLOGY

## 2019-07-24 PROCEDURE — 64493 INJ PARAVERT F JNT L/S 1 LEV: CPT | Mod: 50 | Performed by: PSYCHIATRY & NEUROLOGY

## 2019-07-24 ASSESSMENT — PAIN SCALES - GENERAL: PAINLEVEL: MILD PAIN (3)

## 2019-07-24 NOTE — NURSING NOTE
Pre-procedure Intake    Have you been fasting? NA    If yes, for how long? N/A    Are you taking a prescribed blood thinner such as coumadin, Plavix, Xarelto?    No    If yes, when did you take your last dose? N/A    Do you take aspirin?  No    If cervical procedure, have you held aspirin for 6 days?   NA    Do you have any allergies to contrast dye, iodine, steroid and/or numbing medications?  NO    Are you currently taking antibiotics or have an active infection?  NO    Have you had a fever/elevated temperature within the past week? NO    Are you currently taking oral steroids? NO    Do you have a ? Yes       Are you pregnant or breastfeeding?  NO    Are the vital signs normal?  Yes    Honey Cook MA on 7/24/2019 at 9:32 AM

## 2019-07-24 NOTE — NURSING NOTE
Discharge Information    IV Discontiued Time:  NA    Amount of Fluid Infused:  NA    Discharge Criteria = When patient returns to baseline or as per MD order    Consciousness:  Pt is fully awake    Circulation:  BP +/- 20% of pre-procedure level    Respiration:  Patient is able to breathe deeply    O2 Sat:  Patient is able to maintain O2 Sat >92% on room air    Activity:  Moves 4 extremities on command    Ambulation:  Patient is able to stand and walk or stand and pivot into wheelchair    Dressing:  Clean/dry or No Dressing    Notes:   Discharge instructions and AVS given to patient    Patient meets criteria for discharge?  YES    Admitted to PCU?  No    Responsible adult present to accompany patient home?  Yes    Signature/Title:    jing reynolds RN Care Coordinator  [facility]

## 2019-07-24 NOTE — PROGRESS NOTES
Pre procedure Diagnosis: facet arthropathy    Post procedure Diagnosis: Same  Procedure performed: bilateral L4/5 and L5/S1 facet joint injections  Anesthesia: none  Complications: none   Operators: Brigette Sparrow MD     Indications:   Daniella Sexton is a 38 year old female was sent by Brielle Morrow NP for lumbar injection.  They have a history of low back pain, and pain throughout the entirity of both legs.  She describes that there is not a focal path of pain, but just all of her legs.  Her back pain is worst.  Exam shows tenderness to palpation all areas of low back, including lumbar paraspinals, bilateral SI joints, bilateral trochanteric bursas, pain with extension, extension/rotation and they have tried conservative treatment including meds, PT.    MRI was done on 6/20/19 which showed   IMPRESSION: Postoperative changes at the L4-L5 level with previous  right-sided keyhole laminectomy and microdiscectomy. Residual or  recurrent circumferential disc bulge and endplate osteophytic spurring  at this level with bilateral facet hypertrophy causes moderate left  and mild right neural foraminal narrowing.    Options/alternatives, benefits and risks were discussed with the patient including bleeding, infection, no pain relief, tissue trauma, exposure to radiation, reaction to medications, spinal cord injury, dural puncture, weakness, numbness and headache.  Questions were answered to her satisfaction and she agrees to proceed. Voluntary informed consent was obtained and signed.     Vitals were reviewed: Yes  Allergies were reviewed:  Yes   Medications were reviewed:  Yes   Pre-procedure pain score: 4/10    Procedure:  After getting informed consent, patient was brought into the procedure suite and was placed in a prone position on the procedure table.   A Pause for the Cause was performed.  Patient was prepped and draped in sterile fashion.     Under AP fluoroscopic guidance the L4/5 and L5/S1 facet joints on the  right, then left side were identified, and the C-arm was rotated obliquely to the affected side to open the joint space. A total of 3 ml of 1% lidocaine mixed 10:1 with bicarbonate was injected at the needle entry point and needle tract. Then a 22 gauge 5 inch quincke type spinal needle was inserted and advanced under fluoroscopic guidance targeting the superior articular pillar of each joint. Once the needle made a contact with SAP, it was rotated and was then advanced into the joint.    AP fluoroscopic views were obtained to confirm the needle placement. Then, contrast was injected after negative aspiration for heme and CSF in each joint, confirming appropriate placement.  A total of 1.5ml of Omnipaque was used, and 8.5ml was wasted.     The injection was then accomplished using a solution containing 3ml of 0.5% bupivacaine mixed with 10mg of dexamethasone, divided between the 4 joints. The needles were removed.       Hemostasis was achieved, the area was cleaned, and bandaids were placed when appropriate.  The patient tolerated the procedure well, and was taken to the recovery room.    Images were saved to PACS.    Post-procedure pain score: 5/10  Follow-up includes:   -f/u phone call in one week  -f/u with referring provider    Brigette Sparrow MD  [facility]

## 2019-07-24 NOTE — PATIENT INSTRUCTIONS
[facility]   Procedure Discharge Instructions    Today you saw:      Dr. Zenia Sparrow      You had an:  lumbar  facet joint injection       Medications used:  Lidocaine   Bupivacaine   Dexamethasone   Omnipaque            Be cautious when walking. Numbness and/or weakness in the lower extremities may occur for up to 6-8 hours after the procedure due to effect of the local anesthetic    Do not drive for 6 hours. The effect of the local anesthetic could slow your reflexes.     You may resume your regular activities after 24 hours    Avoid strenuous activity for the first 24 hours    You may shower, however avoid swimming, tub baths or hot tubs for 24 hours following your procedure    You may have a mild to moderate increase in pain for several days following the injection.    It may take up to 14 days for the steroid medication to start working although you may feel the effect as early as a few days after the procedure.       You may use ice packs for 10-15 minutes, 3 to 4 times a day at the injection site for comfort    Do not use heat to painful areas for 6 to 8 hours. This will give the local anesthetic time to wear off and prevent you from accidentally burning your skin.     Unless you have been directed to avoid the use of anti-inflammatory medications (NSAIDS), you may use medications such as ibuprofen, Aleve or Tylenol for pain control if needed.     If you were fasting, you may resume your normal diet and medications after the procedure    If you have diabetes, check your blood sugar more frequently than usual as your blood sugar may be higher than normal for 10-14 days following a steroid injection. Contact your doctor who manages your diabetes if your blood sugar is higher than usual    Possible side effects of steroids that you may experience include flushing, elevated blood pressure, increased appetite, mild headaches and restlessness.  All of these symptoms will get better with  time.    If you experience any of the following, call the Pain Clinic during work hours at [phone] or the Provider Line after hours at [phone]:  -Fever over 100 degree F  -Swelling, bleeding, redness, drainage, warmth at the injection site  -Progressive weakness or numbness in your legs or arms  -Loss of bowel or bladder function  -Unusual headache that is not relieved by Tylenol or other pain reliever  -Unusual new onset of pain that is not improving

## 2019-08-02 ENCOUNTER — TELEPHONE (OUTPATIENT)
Dept: PALLIATIVE MEDICINE | Facility: CLINIC | Age: 39
End: 2019-08-02

## 2019-08-20 ENCOUNTER — TELEPHONE (OUTPATIENT)
Dept: FAMILY MEDICINE | Facility: CLINIC | Age: 39
End: 2019-08-20

## 2019-08-20 DIAGNOSIS — M47.819 FACET ARTHROPATHY: Primary | ICD-10-CM

## 2019-08-20 NOTE — TELEPHONE ENCOUNTER
Reason for call:  Other   Patient called regarding (reason for call): call back  Additional comments: Patient is calling because she needs another referral to the pain clinic for an injection. Please call back      Phone number to reach patient:  Cell number on file:    Telephone Information:   Mobile [phone]       Best Time:  any    Can we leave a detailed message on this number?  YES

## 2019-08-21 NOTE — TELEPHONE ENCOUNTER
Called patient left message that new referral was put in and gave number to call.  Bess Gibson,

## 2019-09-04 ENCOUNTER — RADIOLOGY INJECTION OFFICE VISIT (OUTPATIENT)
Dept: PALLIATIVE MEDICINE | Facility: CLINIC | Age: 39
End: 2019-09-04
Payer: COMMERCIAL

## 2019-09-04 ENCOUNTER — ANCILLARY PROCEDURE (OUTPATIENT)
Dept: RADIOLOGY | Facility: CLINIC | Age: 39
End: 2019-09-04
Attending: PSYCHIATRY & NEUROLOGY
Payer: COMMERCIAL

## 2019-09-04 VITALS — SYSTOLIC BLOOD PRESSURE: 124 MMHG | OXYGEN SATURATION: 98 % | HEART RATE: 94 BPM | DIASTOLIC BLOOD PRESSURE: 84 MMHG

## 2019-09-04 DIAGNOSIS — M47.816 FACET ARTHROPATHY, LUMBAR: Primary | ICD-10-CM

## 2019-09-04 DIAGNOSIS — M54.16 LUMBAR RADICULOPATHY: ICD-10-CM

## 2019-09-04 PROCEDURE — 64494 INJ PARAVERT F JNT L/S 2 LEV: CPT | Mod: 50 | Performed by: PSYCHIATRY & NEUROLOGY

## 2019-09-04 PROCEDURE — 64493 INJ PARAVERT F JNT L/S 1 LEV: CPT | Mod: 50 | Performed by: PSYCHIATRY & NEUROLOGY

## 2019-09-04 ASSESSMENT — PAIN SCALES - GENERAL: PAINLEVEL: MILD PAIN (3)

## 2019-09-04 NOTE — PROGRESS NOTES
Pre procedure Diagnosis: facet arthropathy    Post procedure Diagnosis: Same  Procedure performed: bilateral L4/5 and L5/S1 facet joint injections  Anesthesia: none  Complications: none   Operators: Brigette Sparrow MD     Indications:   Daniella Sexton is a 39 year old female was sent by Brielle Morrow NP for lumbar injection.  They have a history of low back pain, and pain throughout the entirity of both legs.  She describes that there is not a focal path of pain, but just all of her legs.  Her back pain is worst.   She had facet joint injections and had 100% pain relief for 10 days.  She would like to repeat.   Exam shows pain with extension, extension/rotation and they have tried conservative treatment including meds, PT.    MRI was done on 6/20/19 which showed   IMPRESSION: Postoperative changes at the L4-L5 level with previous  right-sided keyhole laminectomy and microdiscectomy. Residual or  recurrent circumferential disc bulge and endplate osteophytic spurring  at this level with bilateral facet hypertrophy causes moderate left  and mild right neural foraminal narrowing.    Options/alternatives, benefits and risks were discussed with the patient including bleeding, infection, no pain relief, tissue trauma, exposure to radiation, reaction to medications, spinal cord injury, dural puncture, weakness, numbness and headache.  Questions were answered to her satisfaction and she agrees to proceed. Voluntary informed consent was obtained and signed.     Vitals were reviewed: Yes  Allergies were reviewed:  Yes   Medications were reviewed:  Yes   Pre-procedure pain score: 4/10    Procedure:  After getting informed consent, patient was brought into the procedure suite and was placed in a prone position on the procedure table.   A Pause for the Cause was performed.  Patient was prepped and draped in sterile fashion.     Under AP fluoroscopic guidance the L4/5 and L5/S1 facet joints on the right, then left side were  identified, and the C-arm was rotated obliquely to the affected side to open the joint space. A total of 3 ml of 1% lidocaine mixed 10:1 with bicarbonate was injected at the needle entry point and needle tract. Then a 22 gauge 5 inch quincke type spinal needle was inserted and advanced under fluoroscopic guidance targeting the superior articular pillar of each joint. Once the needle made a contact with SAP, it was rotated and was then advanced into the joint.    AP fluoroscopic views were obtained to confirm the needle placement. Then, contrast was injected after negative aspiration for heme and CSF in each joint, confirming appropriate placement.  A total of 1ml of Omnipaque was used, and 9ml was wasted.     The injection was then accomplished using a solution containing 3ml of 0.5% bupivacaine mixed with 10mg of dexamethasone, divided between the 4 joints. The needles were removed.     Hemostasis was achieved, the area was cleaned, and bandaids were placed when appropriate.  The patient tolerated the procedure well, and was taken to the recovery room.    Images were saved to PACS.    Post-procedure pain score: 5/10  Follow-up includes:   -f/u phone call in one week  -f/u with referring provider  -if helpful and lasts a few months, then recommend repeat facet joint injections, up to 4/year  -if not lasting long, or if she has had >4 in a year, then would recommend medial branch block and radiofrequency ablation - we discussed this process today as well.     Brigette Sparrow MD  [facility]

## 2019-09-04 NOTE — NURSING NOTE
Pre-procedure Intake    Have you been fasting? NA    If yes, for how long? NA    Are you taking a prescribed blood thinner such as coumadin, Plavix, Xarelto?    No    If yes, when did you take your last dose? NA    Do you take aspirin?  No    If cervical procedure, have you held aspirin for 6 days?   NA    Do you have any allergies to contrast dye, iodine, steroid and/or numbing medications?  NO    Are you currently taking antibiotics or have an active infection?  NO    Have you had a fever/elevated temperature within the past week? NO    Are you currently taking oral steroids? NO    Do you have a ? Yes       Are you pregnant or breastfeeding?  NO    Are the vital signs normal?  Yes      Smita Duff CMA ([facility])

## 2019-09-04 NOTE — PATIENT INSTRUCTIONS
[facility]   Procedure Discharge Instructions    Today you saw:  Dr. Zenia Sparrow      You had an:  Epidural steroid injection   -lumbar    Medications used:  Lidocaine   Bupivacaine   Dexamethasone  Omnipaque        Be cautious when walking. Numbness and/or weakness in the lower extremities may occur for up to 6-8 hours after the procedure due to effect of the local anesthetic    Do not drive for 6 hours. The effect of the local anesthetic could slow your reflexes.     You may resume your regular activities after 24 hours    Avoid strenuous activity for the first 24 hours    You may shower, however avoid swimming, tub baths or hot tubs for 24 hours following your procedure    You may have a mild to moderate increase in pain for several days following the injection.    It may take up to 14 days for the steroid medication to start working although you may feel the effect as early as a few days after the procedure.       You may use ice packs for 10-15 minutes, 3 to 4 times a day at the injection site for comfort    Do not use heat to painful areas for 6 to 8 hours. This will give the local anesthetic time to wear off and prevent you from accidentally burning your skin.     Unless you have been directed to avoid the use of anti-inflammatory medications (NSAIDS), you may use medications such as ibuprofen, Aleve or Tylenol for pain control if needed.     If you were fasting, you may resume your normal diet and medications after the procedure    If you have diabetes, check your blood sugar more frequently than usual as your blood sugar may be higher than normal for 10-14 days following a steroid injection. Contact your doctor who manages your diabetes if your blood sugar is higher than usual    Possible side effects of steroids that you may experience include flushing, elevated blood pressure, increased appetite, mild headaches and restlessness.  All of these symptoms will get better with  time.    If you experience any of the following, call the Pain Clinic during work hours at [phone] or the Provider Line after hours at [phone]:  -Fever over 100 degree F  -Swelling, bleeding, redness, drainage, warmth at the injection site  -Progressive weakness or numbness in your legs or arms  -Loss of bowel or bladder function  -Unusual headache that is not relieved by Tylenol or other pain reliever  -Unusual new onset of pain that is not improving

## 2019-10-11 NOTE — PROGRESS NOTES
Subjective     Daniella Sexton is a 39 year old female who presents to clinic today with daughter for the following health issues:    HPI   ENT Symptoms             Symptoms: cc Present Absent Comment   Fever/Chills  x  chills   Fatigue  x     Muscle Aches  x     Eye Irritation  x     Sneezing  x     Nasal Joey/Drg  x     Sinus Pressure/Pain  x     Loss of smell  x     Dental pain  x     Sore Throat  x     Swollen Glands   x    Ear Pain/Fullness  x     Cough  x     Wheeze  x     Chest Pain   x    Shortness of breath  x  slightly   Rash   x    Other   x      Symptom duration:  2 weeks   Symptom severity:  moderate   Treatments tried:  OTC cold and flu and sinus medication OTC   Contacts:  Daughter        Back Pain       Duration: 1 yrs         Specific cause: none    Description:   Location of pain: low back both  Character of pain: constant pain  Pain radiation:radiates into the  legs  New numbness or weakness in legs, not attributed to pain:  no     Intensity: Currently 7/10, At its worst 10/10    History:   Pain interferes with job: YES  History of back problems: chronic bilateral lower back pain  Any previous MRI or X-rays: Yes- at [facility].  Date 06/20/2019 for MRI and X-ray 06/12/2019  Sees a specialist for back pain:  Yes, through [facility],   Therapies tried without relief: none    Alleviating factors:   Improved by: none      Precipitating factors:  Worsened by: Standing        Accompanying Signs & Symptoms:  Risk of Fracture:  None  Risk of Cauda Equina:  None  Risk of Infection:  None  Risk of Cancer:  None  Risk of Ankylosing Spondylitis:  Onset at age <35, male, AND morning back stiffness. no     Patient notes that she is under the care of the pain specialist but needs a new referral.    Review of Systems   ROS COMP: Constitutional, HEENT, cardiovascular, pulmonary, gi and gu systems are negative, except as otherwise noted.      Objective    /70   Pulse 85   Temp 98  F (36.7  C) (Oral)   " Resp 20   Ht 1.676 m (5' 6\")   Wt 110.2 kg (243 lb)   SpO2 98%   BMI 39.22 kg/m    Body mass index is 39.22 kg/m .  Physical Exam   GENERAL: healthy, alert and no distress  HENT: normal cephalic/atraumatic, ear canals and TM's normal, nasal mucosa edematous , oropharynx clear and oral mucous membranes moist  NECK: no adenopathy, no asymmetry, masses, or scars and thyroid normal to palpation  RESP: lungs clear to auscultation - no rales, rhonchi or wheezes  MS: no gross musculoskeletal defects noted, no edema  SKIN: no suspicious lesions or rashes    Diagnostic Test Results:  Labs reviewed in Epic        Assessment & Plan     1. Chronic bilateral low back pain with bilateral sciatica  - gabapentin (NEURONTIN) 300 MG capsule; Take 1 capsule (300 mg) by mouth 3 times daily Take one tablet in the am, one in the afternoon and two tablets at bedtime.  Dispense: 45 capsule; Refill: 1  - PAIN MANAGEMENT REFERRAL    2. Chronic pansinusitis  - sulfamethoxazole-trimethoprim (BACTRIM DS/SEPTRA DS) 800-160 MG tablet; Take 1 tablet by mouth 2 times daily  Dispense: 20 tablet; Refill: 0  - HYDROcodone-acetaminophen (NORCO) 5-325 MG tablet; Take 1 tablet by mouth every 6 hours as needed for breakthrough pain  Dispense: 20 tablet; Refill: 0    3. Migraine without status migrainosus, not intractable, unspecified migraine type  - rizatriptan (MAXALT) 10 MG tablet; Take 1 tablet (10 mg) by mouth at onset of headache for migraine  Dispense: 10 tablet; Refill: 2    4. Need for prophylactic vaccination and inoculation against influenza  - INFLUENZA VACCINE IM > 6 MONTHS VALENT IIV4 [64423]  - Vaccine Administration, Initial [55670]     Use medication as directed.  Follow up per Pain Mgt.  Follow up if symptoms should persist, change or worsen.  Patient amenable to this follow up plan.       Adilene Jean PA-C  [facility]"

## 2019-10-17 ENCOUNTER — OFFICE VISIT (OUTPATIENT)
Dept: FAMILY MEDICINE | Facility: CLINIC | Age: 39
End: 2019-10-17
Payer: COMMERCIAL

## 2019-10-17 VITALS
RESPIRATION RATE: 20 BRPM | OXYGEN SATURATION: 98 % | WEIGHT: 243 LBS | HEIGHT: 66 IN | DIASTOLIC BLOOD PRESSURE: 70 MMHG | HEART RATE: 85 BPM | TEMPERATURE: 98 F | SYSTOLIC BLOOD PRESSURE: 126 MMHG | BODY MASS INDEX: 39.05 KG/M2

## 2019-10-17 DIAGNOSIS — G43.909 MIGRAINE WITHOUT STATUS MIGRAINOSUS, NOT INTRACTABLE, UNSPECIFIED MIGRAINE TYPE: ICD-10-CM

## 2019-10-17 DIAGNOSIS — J32.4 CHRONIC PANSINUSITIS: ICD-10-CM

## 2019-10-17 DIAGNOSIS — Z23 NEED FOR PROPHYLACTIC VACCINATION AND INOCULATION AGAINST INFLUENZA: ICD-10-CM

## 2019-10-17 DIAGNOSIS — G89.29 CHRONIC BILATERAL LOW BACK PAIN WITH BILATERAL SCIATICA: Primary | ICD-10-CM

## 2019-10-17 DIAGNOSIS — M54.41 CHRONIC BILATERAL LOW BACK PAIN WITH BILATERAL SCIATICA: Primary | ICD-10-CM

## 2019-10-17 DIAGNOSIS — M54.42 CHRONIC BILATERAL LOW BACK PAIN WITH BILATERAL SCIATICA: Primary | ICD-10-CM

## 2019-10-17 PROCEDURE — 90471 IMMUNIZATION ADMIN: CPT | Performed by: PHYSICIAN ASSISTANT

## 2019-10-17 PROCEDURE — 90686 IIV4 VACC NO PRSV 0.5 ML IM: CPT | Performed by: PHYSICIAN ASSISTANT

## 2019-10-17 PROCEDURE — 99214 OFFICE O/P EST MOD 30 MIN: CPT | Mod: 25 | Performed by: PHYSICIAN ASSISTANT

## 2019-10-17 RX ORDER — RIZATRIPTAN BENZOATE 10 MG/1
10 TABLET ORAL
Qty: 10 TABLET | Refills: 2 | Status: SHIPPED | OUTPATIENT
Start: 2019-10-17 | End: 2020-09-12

## 2019-10-17 RX ORDER — HYDROCODONE BITARTRATE AND ACETAMINOPHEN 5; 325 MG/1; MG/1
1 TABLET ORAL EVERY 6 HOURS PRN
Qty: 20 TABLET | Refills: 0 | Status: SHIPPED | OUTPATIENT
Start: 2019-10-17 | End: 2019-11-07

## 2019-10-17 RX ORDER — SULFAMETHOXAZOLE/TRIMETHOPRIM 800-160 MG
1 TABLET ORAL 2 TIMES DAILY
Qty: 20 TABLET | Refills: 0 | Status: SHIPPED | OUTPATIENT
Start: 2019-10-17 | End: 2019-11-07

## 2019-10-17 RX ORDER — HYDROCODONE BITARTRATE AND ACETAMINOPHEN 5; 325 MG/1; MG/1
1 TABLET ORAL EVERY 4 HOURS PRN
Qty: 40 TABLET | Refills: 0 | Status: CANCELLED | OUTPATIENT
Start: 2019-10-17

## 2019-10-17 RX ORDER — GABAPENTIN 300 MG/1
300 CAPSULE ORAL 3 TIMES DAILY
Qty: 45 CAPSULE | Refills: 1 | Status: SHIPPED | OUTPATIENT
Start: 2019-10-17 | End: 2019-11-07

## 2019-10-17 RX ORDER — GABAPENTIN 100 MG/1
CAPSULE ORAL
Qty: 90 CAPSULE | Refills: 11 | Status: CANCELLED | OUTPATIENT
Start: 2019-10-17 | End: 2020-10-30

## 2019-10-17 ASSESSMENT — ANXIETY QUESTIONNAIRES
2. NOT BEING ABLE TO STOP OR CONTROL WORRYING: SEVERAL DAYS
GAD7 TOTAL SCORE: 6
6. BECOMING EASILY ANNOYED OR IRRITABLE: SEVERAL DAYS
1. FEELING NERVOUS, ANXIOUS, OR ON EDGE: SEVERAL DAYS
5. BEING SO RESTLESS THAT IT IS HARD TO SIT STILL: NOT AT ALL
IF YOU CHECKED OFF ANY PROBLEMS ON THIS QUESTIONNAIRE, HOW DIFFICULT HAVE THESE PROBLEMS MADE IT FOR YOU TO DO YOUR WORK, TAKE CARE OF THINGS AT HOME, OR GET ALONG WITH OTHER PEOPLE: SOMEWHAT DIFFICULT
7. FEELING AFRAID AS IF SOMETHING AWFUL MIGHT HAPPEN: SEVERAL DAYS
3. WORRYING TOO MUCH ABOUT DIFFERENT THINGS: SEVERAL DAYS

## 2019-10-17 ASSESSMENT — PATIENT HEALTH QUESTIONNAIRE - PHQ9
5. POOR APPETITE OR OVEREATING: SEVERAL DAYS
SUM OF ALL RESPONSES TO PHQ QUESTIONS 1-9: 7

## 2019-10-17 ASSESSMENT — MIFFLIN-ST. JEOR: SCORE: 1793.99

## 2019-10-18 ASSESSMENT — ASTHMA QUESTIONNAIRES: ACT_TOTALSCORE: 20

## 2019-10-18 ASSESSMENT — ANXIETY QUESTIONNAIRES: GAD7 TOTAL SCORE: 6

## 2019-11-07 ENCOUNTER — HEALTH MAINTENANCE LETTER (OUTPATIENT)
Age: 39
End: 2019-11-07

## 2019-11-07 ENCOUNTER — OFFICE VISIT (OUTPATIENT)
Dept: FAMILY MEDICINE | Facility: CLINIC | Age: 39
End: 2019-11-07
Payer: COMMERCIAL

## 2019-11-07 VITALS
OXYGEN SATURATION: 99 % | WEIGHT: 249 LBS | BODY MASS INDEX: 40.02 KG/M2 | SYSTOLIC BLOOD PRESSURE: 116 MMHG | DIASTOLIC BLOOD PRESSURE: 80 MMHG | TEMPERATURE: 98.3 F | HEIGHT: 66 IN | HEART RATE: 100 BPM

## 2019-11-07 DIAGNOSIS — G89.29 CHRONIC BILATERAL LOW BACK PAIN WITH BILATERAL SCIATICA: ICD-10-CM

## 2019-11-07 DIAGNOSIS — M54.41 CHRONIC BILATERAL LOW BACK PAIN WITH BILATERAL SCIATICA: ICD-10-CM

## 2019-11-07 DIAGNOSIS — M54.42 CHRONIC BILATERAL LOW BACK PAIN WITH BILATERAL SCIATICA: ICD-10-CM

## 2019-11-07 PROCEDURE — 99213 OFFICE O/P EST LOW 20 MIN: CPT | Performed by: NURSE PRACTITIONER

## 2019-11-07 RX ORDER — HYDROCODONE BITARTRATE AND ACETAMINOPHEN 5; 325 MG/1; MG/1
1 TABLET ORAL EVERY 6 HOURS PRN
Qty: 30 TABLET | Refills: 0 | Status: SHIPPED | OUTPATIENT
Start: 2019-11-07 | End: 2020-02-06

## 2019-11-07 RX ORDER — GABAPENTIN 300 MG/1
300 CAPSULE ORAL 3 TIMES DAILY
Qty: 150 CAPSULE | Refills: 1 | Status: SHIPPED | OUTPATIENT
Start: 2019-11-07 | End: 2019-12-11

## 2019-11-07 ASSESSMENT — MIFFLIN-ST. JEOR: SCORE: 1821.21

## 2019-11-07 ASSESSMENT — PATIENT HEALTH QUESTIONNAIRE - PHQ9: SUM OF ALL RESPONSES TO PHQ QUESTIONS 1-9: 7

## 2019-11-07 NOTE — LETTER
Certification of Health Care Provider  Family Medical Leave Act (FMLA)      Employer's name and contact:     Employee's job title: Regular work schedule:     Employee's essential job functions:     Patient's name: Daniella Sexton    Provider's name and business address:  Renetta Morrow  [address]  [place] [address]  Phone: [phone]      PART A:  MEDICAL FACTS  1)  Approximate date condition commenced:  4/3/19    Probable duration of condition:  6 months     Pravin below as applicable:  Was the patient admitted for an overnight stay in a hospital, hospice, or residential medical care facility?  no.    Date(s) you treated the patient for condition: 4/3/19, 05/08/19, 11/07/19    Will the patient need to have treatment visits at least twice per year due to the                     condition? yes    Was medication, other than over-the-counter medication prescribed?  yes    Was the patient referred to other health care provider(s) for evaluation or treatment     (e.g., physical therapist)?  no.       2)  Is the medical condition pregnancy?  no.      3)  Is the employee unable to perform any of his/her job functions due to the     condition: yes:  Identify the job functions the employee is unable to perform: prolonged standing      4)  Describe other relevant medical facts, if any, related to the condition which the employee seeks leave (such as medical facts may include symptoms, diagnosis, or any regimen of continuing treatment such as the use of specialized equipment):     Chronic low back pain      PART B: AMOUNT OF LEAVE NEEDED  5)  Will the employee be incapacitated for a single continuous period of time due to his/her medical condition, including any time for treatment and recovery?  no.    6)  Will the employee need to attend follow-up treatment appointments or work part-time or on a reduced schedule because of the employee's medical condition?  no.     Patient must  work with restrictions:  -No standing for more than 45 minutes continuously  -No lifting/pushing/pulling more than 20 lbs    7) Will the condition cause episodic flare ups periodically preventing the employee from performing his/her job functions? yes:  Is it medically necessary for the patient to be absent from work during the flare-ups?  yes:  Explain: flare-ups of back pain causing her to be unable to stand for long periods of time    Based upon the patient's medical history and your knowledge of the medical condition, estimate the frequency of flare-ups and the duration of related incapacity that the patient may have over the next six months (e.g., 1 episode every 3 months lasting 1-2 days):    Frequency: 3 X a month, once daily  Duration: 1 day(s)       ADDITIONAL INFORMATION: IDENTIFY QUESTION NUMBER WITH YOUR ADDITIONAL ANSWER       ===========================================    IF EMPLOYEE'S FAMILY MEMBER IS THE PATIENT, PLEASE COMPLETE SECTION BELOW:  N/A      13) Does the patient/family member need the employee to provide basic medical or personal needs, or for safety or transportation?      14)  If no, would the employee's presence to provide psychological comfort be beneficial to the patient or assist in the patient's recovery?        ================================================================    TO BE COMPLETED BY THE HEALTH CARE PROVIDER:    Signature:  Renetta Morrow ________________________________________  Date:   11/7/2019

## 2019-11-07 NOTE — PROGRESS NOTES
"Subjective     Daniella Sexton is a 39 year old female who presents to clinic today for the following health issues:    HPI     Chief Complaint   Patient presents with     Recheck Low Back Pain     FMLA paperwork     Patient presents for follow up of chronic low back pain. Has tried two injections without improvement. Will see pain management for comprehensive care in 1 month.   Has tried Voltaren, topical Lidocaine, Gabapentin, Flexeril, Norco, Tylenol, Ibuprofen, Toradol.     Reviewed and updated as needed this visit by Provider         Review of Systems   ROS COMP: Constitutional, HEENT, cardiovascular, pulmonary, gi and gu systems are negative, except as otherwise noted.      Objective    /80 (BP Location: Left arm, Patient Position: Chair, Cuff Size: Adult Large)   Pulse 100   Temp 98.3  F (36.8  C) (Oral)   Ht 1.676 m (5' 6\")   Wt 112.9 kg (249 lb)   BMI 40.19 kg/m    Body mass index is 40.19 kg/m .  Physical Exam   GENERAL: healthy, alert and no distress  PSYCH: mentation appears normal, affect normal/bright    Diagnostic Test Results:  Labs reviewed in Epic  No results found for this or any previous visit (from the past 24 hour(s)).        Assessment & Plan     1. Chronic bilateral low back pain with bilateral sciatica  Will continue to titrate up the Gabapentin as this has been helpful. May be limited due to sedating side effects.  Restart Voltaren as this helped previously.  Patient requests Norco until she sees pain management. Discussed she is poor candidate for ongoing narcotics due to history of meth abuse and risk for opioid misuse/abuse. Ok for 1 time Norco prescription but this will not be refilled. Patient agreeable.  FMLA filled out.  - gabapentin (NEURONTIN) 300 MG capsule; Take 1 capsule (300 mg) by mouth 3 times daily Take two tablets in the am, one in the afternoon and two tablets at bedtime.  Dispense: 150 capsule; Refill: 1  - diclofenac (VOLTAREN) 1 % topical gel; Place 4 g onto " the skin 4 times daily  Dispense: 100 g; Refill: 11  - HYDROcodone-acetaminophen (NORCO) 5-325 MG tablet; Take 1 tablet by mouth every 6 hours as needed for breakthrough pain  Dispense: 30 tablet; Refill: 0         See Patient Instructions    Return in about 6 months (around 5/7/2020) for Physical.    JENAE Sibley [facility]

## 2019-11-08 ENCOUNTER — TELEPHONE (OUTPATIENT)
Dept: FAMILY MEDICINE | Facility: CLINIC | Age: 39
End: 2019-11-08

## 2019-11-08 ASSESSMENT — ASTHMA QUESTIONNAIRES: ACT_TOTALSCORE: 25

## 2019-11-08 NOTE — TELEPHONE ENCOUNTER
Called Dori in HR with [employer] back, she is wanting to clarify work restrictions. Can Daniella only stand 45 minutes per day total? Or can she take breaks in between and if so for how long?  Also at 6/12/19 visit it was noted in her AVS that patient had numbness/tingling in her hands.  Is this still an issue?  They want to make sure they are not potentially putting Daniella at risk for another work related injury or exacerbating an issue.  Dori was informed that Renetta was not in clinic today and that she'd be returning Monday 11/11.  She can be reached at [phone] or her secure fax is [phone].  Stacey Pardo MA

## 2019-11-08 NOTE — TELEPHONE ENCOUNTER
Reason for call:  Form   Our goal is to have forms completed within 72 hours, however some forms may require a visit or additional information.     Who is the form from? Patient  (if other please explain)  Where did the form come from? Patient or family brought in     What clinic location was the form placed at? [facility]  Where was the form placed? Given to physician  What number is listed as a contact on the form?     Phone call message - patient request for a letter, form or note:     Date needed: as soon as possible  Please call the patient when completed  Has the patient signed a consent form for release of information? YES    Additional comments: call, needs more info on [facility]     Type of letter, form or note: [facility]    Phone number to reach patient:  Other phone number:  [phone]    Best Time:   Any     Can we leave a detailed message on this number?  YES

## 2019-11-11 NOTE — TELEPHONE ENCOUNTER
She may only stand for 45 minutes continuously- she may take a 15 minute break and then stand again.    I have not discussed the numbness/tingling with her since the 6/12/19 visit, so I am unsure if she is still having difficulty with this. The patient did inform me she is seeing another provider for a work comp claim re her right upper extremity, so maybe this provider has further details.    Renetta Morrow, CNP

## 2019-12-10 ENCOUNTER — OFFICE VISIT (OUTPATIENT)
Dept: PALLIATIVE MEDICINE | Facility: CLINIC | Age: 39
End: 2019-12-10
Payer: COMMERCIAL

## 2019-12-10 VITALS
DIASTOLIC BLOOD PRESSURE: 85 MMHG | SYSTOLIC BLOOD PRESSURE: 123 MMHG | HEART RATE: 112 BPM | BODY MASS INDEX: 41.16 KG/M2 | WEIGHT: 255 LBS

## 2019-12-10 DIAGNOSIS — Z79.891 ENCOUNTER FOR LONG-TERM OPIATE ANALGESIC USE: ICD-10-CM

## 2019-12-10 DIAGNOSIS — M54.41 CHRONIC BILATERAL LOW BACK PAIN WITH BILATERAL SCIATICA: ICD-10-CM

## 2019-12-10 DIAGNOSIS — G89.29 CHRONIC BILATERAL LOW BACK PAIN WITH BILATERAL SCIATICA: ICD-10-CM

## 2019-12-10 DIAGNOSIS — M54.16 LUMBAR RADICULOPATHY: ICD-10-CM

## 2019-12-10 DIAGNOSIS — M54.42 CHRONIC BILATERAL LOW BACK PAIN WITH BILATERAL SCIATICA: ICD-10-CM

## 2019-12-10 DIAGNOSIS — M79.18 MYOFASCIAL MUSCLE PAIN: Primary | ICD-10-CM

## 2019-12-10 DIAGNOSIS — M47.816 SPONDYLOSIS OF LUMBAR REGION WITHOUT MYELOPATHY OR RADICULOPATHY: ICD-10-CM

## 2019-12-10 PROCEDURE — 99204 OFFICE O/P NEW MOD 45 MIN: CPT | Performed by: PAIN MEDICINE

## 2019-12-10 PROCEDURE — 80307 DRUG TEST PRSMV CHEM ANLYZR: CPT | Mod: 90 | Performed by: PAIN MEDICINE

## 2019-12-10 PROCEDURE — 99000 SPECIMEN HANDLING OFFICE-LAB: CPT | Performed by: PAIN MEDICINE

## 2019-12-10 RX ORDER — AMITRIPTYLINE HYDROCHLORIDE 10 MG/1
10 TABLET ORAL AT BEDTIME
Qty: 30 TABLET | Refills: 1 | Status: SHIPPED | OUTPATIENT
Start: 2019-12-10 | End: 2020-02-06

## 2019-12-10 RX ORDER — OMEGA-3 FATTY ACIDS/FISH OIL 300-1000MG
800 CAPSULE ORAL EVERY 4 HOURS PRN
COMMUNITY
End: 2020-05-05

## 2019-12-10 ASSESSMENT — PAIN SCALES - GENERAL: PAINLEVEL: WORST PAIN (10)

## 2019-12-10 NOTE — PATIENT INSTRUCTIONS
- Further procedures recommended:    - Reasonable to consider lumbar MEDIAL BRANCH BLOCK/FA  - Medication Management: Will make one change at a time.    - consider medical cannabis    - consider changing flexeril to another agent    - consider changing voltaren gel to oral form    - would increase gabapentin to 900mg three times a day    - will start elavil 10mg at night. Will titrate as tolerated    - reasonable to continue 0.5-1 tablet of norco at day as needed for severe pain  - Physical Therapy: ordered pain PHYSICAL THERAPY    - Clinical Health Psychologist to address issues of relaxation, behavioral change, coping style, and other factors important to improvement: consider in the future   - Diagnostic Studies: no  - Urine toxicology screen today: today    - Follow up:    - 1-2 months   - please keep a diet and activity journal for 3 days and bring to next appointment.              ----------------------------------------------------------------  Clinic Number:  [phone]     Call with any questions about your care and for scheduling assistance.     Calls are returned Monday through Friday between 8 AM and 4:30 PM. We usually get back to you within 2 business days depending on the issue/request.    If we are prescribing your medications:    For opioid medication refills, call the clinic or send a [employer] message 7 days in advance.  Please include:    Name of requested medication    Name of the pharmacy.    For non-opioid medications, call your pharmacy directly to request a refill. Please allow 3-4 days to be processed.     Per [place] State Law:    All controlled substance prescriptions must be filled within 30 days of being written.      For those controlled substances allowing refills, pickup must occur within 30 days of last fill.      We believe regular attendance is key to your success in our program!      Any time you are unable to keep your appointment we ask that you call us at least 24 hours in advance  to cancel.This will allow us to offer the appointment time to another patient.     Multiple missed appointments may lead to dismissal from the clinic.

## 2019-12-10 NOTE — PROGRESS NOTES
[facility] Consultation    Date of visit: 12/10/2019    Reason for consultation:    Primary Care Provider is Renetta Morrow.  Pain medications are being prescribed by pcp.    Please see the [facility] health questionnaire which the patient completed and reviewed with me in detail.    Chief Complaint:    Chief Complaint   Patient presents with     Pain     MME prescribed prior to seeing patient:  Current MME:    Pain history:  Daniella Sexton is a 39 year old female who first started having problems with pain chronic hx of prior lumbar surgery. The pt reports 2004 hurt herself on the job. At that itme she was only having axial LBP. She reports benefit with surgery.  The pt reports 4/19 she started having bilateral LBP radiating to her LE. She denies any specific inciting event. Although she was doing more work on the floor. The pt reports numbness and tingling in her feet. She denies any buring. The pain is constant in her back. The pain is sharp shooting. The pain a deep ache. The pain is squeezing in nature. The pain is worse with flexion. The pain is worse with prolonged standing. Pain is worse when going from a seated to standing position. The pt notes some benefit with sitting. Some benefit with leaning back. The pt reports some benefit with gabapentin. The pt currently takes 900, 600, and 900. The pt takes flexeril BID. The pt takes norco approx once a day to help her sleep. The pt denies any progressive weaknes. She denies any recent falls. She denies any incontinene.    The pain sig affects her quality of life      She does smoke approx 5 cigs a day.  Pain rating: intensity  Averages 9/10 on a 0-10 scale.      Current treatments include:  norco   Flexeril bid   Gabapentin 900, 600, 900  -Cannabis  Previous medication treatments included:  Robaxin-no benefit      Other treatments have included:  Daniella Sexton has been seen at a pain clinic in the past.    PT:  last PHYSICAL THERAPY  8/18  Chiropractic: benefit   Acupuncture: n  TENs Unit: benefit  Injections:   Facet 7/19 benefit 1 week no pain  Bilateral L4/5 /L5-S1 on 9/4 minimal relief   Epidural w/o imaging prior to surgery in 2004 no benefit  Past Medical History:  History reviewed. No pertinent past medical history.  Past Surgical History:  Past Surgical History:   Procedure Laterality Date     BACK SURGERY  2005    L 3-4, L 4-5     LITHOTRIPSY  2016     OPTICAL TRACKING SYSTEM ENDOSCOPIC SINUS SURGERY Bilateral 1/10/2019    Procedure: BILATERAL IMAGE GUIDED ENDOSCOPIC SINUS SURGERY, BILATERAL TURBINATE REDUCTION;  Surgeon: Maikol Miguel MD;  Location: [facility] OR     RELEASE CARPAL TUNNEL Left      TONSILLECTOMY       TUBAL LIGATION       Medications:  Current Outpatient Medications   Medication Sig Dispense Refill     acetaminophen (TYLENOL) 325 MG tablet Take 325-650 mg by mouth       albuterol (PROAIR HFA/PROVENTIL HFA/VENTOLIN HFA) 108 (90 Base) MCG/ACT inhaler Inhale 2 puffs into the lungs every 6 hours 8.5 g 3     amitriptyline (ELAVIL) 10 MG tablet Take 1 tablet (10 mg) by mouth At Bedtime 30 tablet 1     cyclobenzaprine (FLEXERIL) 10 MG tablet Take 1 tablet (10 mg) by mouth 3 times daily as needed for muscle spasms 60 tablet 1     diclofenac (VOLTAREN) 1 % topical gel Place 4 g onto the skin 4 times daily 100 g 11     gabapentin (NEURONTIN) 300 MG capsule Take 3 capsules (900 mg) by mouth 3 times daily 270 capsule 1     HYDROcodone-acetaminophen (NORCO) 5-325 MG tablet Take 1 tablet by mouth every 6 hours as needed for breakthrough pain 30 tablet 0     ibuprofen (ADVIL/MOTRIN) 200 MG capsule Take 800 mg by mouth every 4 hours as needed for fever       rizatriptan (MAXALT) 10 MG tablet Take 1 tablet (10 mg) by mouth at onset of headache for migraine 10 tablet 2     Allergies:     Allergies   Allergen Reactions     Amoxicillin Anaphylaxis     Morphine Other (See Comments) and Nausea and Vomiting      Penicillins Unknown     Social History:  Home situation: [employer]  Occupation/Schooling: y  Tobacco use: y  Alcohol use: n  Drug use: n  History of chemical dependency treatment: n    Family history:  Family History   Problem Relation Age of Onset     Heart Disease No family hx of      Diabetes No family hx of      Cancer No family hx of      Family history of headaches: n    Review of Systems:  Skin: negative  Eyes: negative  Ears/Nose/Throat: negative  Respiratory: No shortness of breath, dyspnea on exertion, cough, or hemoptysis  Cardiovascular: negative  Gastrointestinal: negative  Genitourinary: negative  Musculoskeletal: negative  Neurologic: negative  Psychiatric: negative  Hematologic/Lymphatic/Immunologic: negative  Endocrine: negative    Physical Exam:  Vitals:    12/10/19 1402   BP: 123/85   Pulse: 112   Weight: 115.7 kg (255 lb)     Exam: Obese  Constitutional: healthy, alert and no distress  Head: normocephalic. Atraumatic.   Eyes: no redness or jaundice noted   ENT: oropharnx normal.  MMM.  Neck supple.    Cardiovascular: Negative JVD  Respiratory: Speaking in full sentences no accessory muscles use   gastrointestinal: soft, non-tender  Skin: no suspicious lesions or rashes  Psychiatric: mentation appears normal and affect normal/bright    Musculoskeletal exam:  Gait/Station/Posture: Antalgic  Cervical spine: ROMwnl       Lumbar spine:     ROM: decreased secondary to pain   Myofascial tenderness: Diffuse   Forrest's: Positive bilaterally reproduces significant portion of her symptoms               Straight leg exam: Positive on the left negative on the right   MARCELLE/FADIR: neg              SI: mildm   Greater trochanteric bursa: mil  Neurologic exam:  CN:  Cranial nerves 2-12 are normal  Motor:  5/5 UE and LE strength  Reflexes:        Achilles:  +2    Sensory:  (upper and lower extremities):   Light touch: normal    Allodynia: absent    Dysethesia: absent    Hyperalgesia: absent     Diagnostic tests:  MRI  reviewed with patient       T12-L1: No significant disc herniation. No spinal canal or neural  foraminal narrowing.      L1-L2: No significant disc herniation. No spinal canal or neural  foraminal narrowing.      L2-L3: No significant disc herniation. No spinal canal or neural  foraminal narrowing.      L3-L4: No significant disc herniation. No spinal canal or neural  foraminal narrowing.      L4-L5: Sequela of previous right-sided keyhole laminectomy and  discectomy. There is a circumferential disc bulge and endplate  osteophytic spurring along with bilateral facet hypertrophy resulting  in moderate left and mild right neural foraminal narrowing. No  significant central spinal canal narrowing following laminectomy.      L5-S1: No significant disc herniation. No spinal canal or neural  foraminal narrowing. Bilateral facet hypertrophy.     Paraspinous soft tissues: Normal.                                                                       IMPRESSION: Postoperative changes at the L4-L5 level with previous  right-sided keyhole laminectomy and microdiscectomy. Residual or  recurrent circumferential disc bulge and endplate osteophytic spurring  at this level with bilateral facet hypertrophy causes moderate left  and mild right neural foraminal narrowing.        D.I.R.E Score: Patient Selection for Chronic Opioid Analgesia  2        For each factor, rate the patient's score from 1 - 3 based on the explanations on the right.       Diagnosis             2         1 = Benign chronic condition with minimal objective findings or no definite medical diagnosis.  Examples:  fibromyalgia, migraine, headaches, non-specific back pain.  2 = Slowly progressive condition concordant with moderate pain, or fixed condition with moderate objective findings.  Examples: failed back surgery syndrome, back pain with moderate degenerative changes, neuropathic pain.  3 = Advanced condition concordant with severe pain with objective findings.   Examples: severe ischemic vascular disease, advanced neuropathy, severe spinal stenosis.    Intractability             2         1 = Few therapies have been tried and the patient takes a passive role in his/her pain management process.   2 = Most costomary treatments have been tried but the patient is not fully engaged in the pain management process, or barriers prevent (insurance, transportation, medical illness)  3 = Patient fully engaged in a spectrum of appropriate treatments but with inadequate response.    Risk   (Risk = Total of P+C+R+S below)       Psychological             2         1 = Serious personality dysfunction or mental illness interfering with care.  Examples: personality disorder, severe affective disorder, significant personality issues.  2 = Personality or mental health interferes moderately.  Example: depression or anxiety disorder.  3 = Good communication with the clinic.  No significant personality dysfunction or mental illness.       Chemical      Health             2         1 = Active or very recent use of illicit drugs, excessive alcohol, or prescription drug abuse.  2 = Chemical coper (uses medications to cope with stress) or history of chemical dependency in remission.  3 = No CD history.  Not drug-focused or chemically reliant       Reliability             2         1 = History of numerous problems: medication misuse, missed appointments, rarely follows through.  2 = Occasional difficulties with compliance, but generally reliable.  3 = Highly reliable patient with medications, appointments and treatment.       Social      Support             2         1= Life in chaos.  Little family support and few close relationships.  Loss of most normal life roles.  2 = Reduction in some relationships and life roles.  3 = Supportive family/close relationships.  Involved in work or school and no social isolation.    Efficacy score             2         1 = Poor function or minimal pain relief  despite moderate to high doses.  2 = Moderate benefit with function improved in a number of ways (or insufficient info - hasn't tried opioid yet or very low doses or too short a trial.  3 = Good improvement in pain and function and quality of life with stable doses over time.                                    14    Total score = D + I + R + E    Score 7-13: Not a suitable candidate for long-term opioid analgesia  Score 14-21: May be a good candidate      Assessment/Plan:  Daniella Sexton is a 39 year old female who presents with the complaints of axial low back pain with occasional radiation to her left anterior leg..   Daniella was seen today for pain.    Diagnoses and all orders for this visit:    Myofascial muscle pain  -     PAIN PT EVAL AND TREAT  -     amitriptyline (ELAVIL) 10 MG tablet; Take 1 tablet (10 mg) by mouth At Bedtime    Lumbar radiculopathy  -     amitriptyline (ELAVIL) 10 MG tablet; Take 1 tablet (10 mg) by mouth At Bedtime    Spondylosis of lumbar region without myelopathy or radiculopathy  -     PAIN PT EVAL AND TREAT  -     amitriptyline (ELAVIL) 10 MG tablet; Take 1 tablet (10 mg) by mouth At Bedtime    Encounter for long-term opiate analgesic use  -     Drug Screen Comprehensive , Urine with Reported Meds (Pain Care Package); Future    Chronic bilateral low back pain with bilateral sciatica  -     gabapentin (NEURONTIN) 300 MG capsule; Take 3 capsules (900 mg) by mouth 3 times daily         - Further procedures recommended:    - Reasonable to consider lumbar MEDIAL BRANCH BLOCK/FA  - Medication Management: Will make one change at a time.    - consider changing flexeril to another agent    - consider changing voltaren gel to oral form    - would increase gabapentin to 900mg three times a day    - will start elavil 10mg at night. Will titrate as tolerated    - reasonable to continue 0.5-1 tablet of norco at day as needed for severe pain  - Physical Therapy: ordered pain PHYSICAL THERAPY    -  Clinical Health Psychologist to address issues of relaxation, behavioral change, coping style, and other factors important to improvement: consider in the future   - Diagnostic Studies: no  - Urine toxicology screen today: today    - Follow up:    1-2 months              Total time spent was 60 minutes, and more than 50% of face to face time was spent counseling and/or coordination of care regarding principles of multidisciplinary care and medication management bilateral low back pain with occasional radiation to her lower extremity    Colby Moss MD  [facility]  This note was created with voice recognition software, and while reviewed for accuracy, typos may remain.

## 2019-12-11 ENCOUNTER — TELEPHONE (OUTPATIENT)
Dept: PALLIATIVE MEDICINE | Facility: CLINIC | Age: 39
End: 2019-12-11

## 2019-12-11 RX ORDER — GABAPENTIN 300 MG/1
900 CAPSULE ORAL 3 TIMES DAILY
Qty: 270 CAPSULE | Refills: 1 | Status: SHIPPED | OUTPATIENT
Start: 2019-12-11 | End: 2020-02-28

## 2019-12-13 ENCOUNTER — OFFICE VISIT (OUTPATIENT)
Dept: PALLIATIVE MEDICINE | Facility: CLINIC | Age: 39
End: 2019-12-13
Payer: COMMERCIAL

## 2019-12-13 DIAGNOSIS — M47.816 SPONDYLOSIS OF LUMBAR REGION WITHOUT MYELOPATHY OR RADICULOPATHY: Primary | ICD-10-CM

## 2019-12-13 DIAGNOSIS — M79.18 MYOFASCIAL MUSCLE PAIN: ICD-10-CM

## 2019-12-13 PROCEDURE — 97112 NEUROMUSCULAR REEDUCATION: CPT | Mod: GP | Performed by: PHYSICAL THERAPIST

## 2019-12-13 PROCEDURE — 97161 PT EVAL LOW COMPLEX 20 MIN: CPT | Mod: GP | Performed by: PHYSICAL THERAPIST

## 2019-12-13 NOTE — PROGRESS NOTES
"PHYSICAL THERAPY INITIAL EVALUATION and PLAN OF CARE    Patient Name:  Daniella Sexton  : 1980  MRN:[id number]    SUBJECTIVE:  PRESENTATION AND ETIOLOGY  Chief Complaint:   LBP limiting the ability to perform activities of daily living.      Onset / Etiology: 2004 lifting injury, pain/sxs have been getting progressively worse more recently    Pattern Since Onset: Worsened    Pertinent Medical  / Surgical History: Epic Snapshot Reviewed:  Contributing factors to the severity / complexity of the patient's chief complaint include: see providers notes    Pain:  Frequency: Constant or Activity Dependent     LEVEL OF FUNCTION AT START OF CARE  Current Aggrevating Activities / Functional Limitations:  Sit 25', stand 25', walk 25'    Patient's selected goals for physical therapy: tolerate work duties better, tolerate daily routine better, play with kids, grandkid    CURRENT / PREVIOUS INTERVENTION(S):     Relieving Activities / Self Care / Exercise: ice, heat, \"pain pill\", some stretching, lie down, change positions    Previous / Current therapies for current chief complaint: traditional PT    DEMOGRAPHICS  Employment Status: \"production\"     Social Support: family is understanding but does not live close, does have 16 year old daughter    ===============================================================  OBJECTIVE:  POSTURE:  Observation: No core engagement observed to support posture.  Holds breath during transitional movements.    GAIT, LOCOMOTION, and BALANCE:  Gait and Locomotion: Antalgic: with guarded trunk, decr arm swing, trunk rotation, toe off B    RANGE OF MOTION:   Active: lumbar flex 25%, ext 10%    MUSCLE PERFORMANCE:   Strength: able to toe walk, heel walk, SLS x 2-3\" but shaky sara on L.  Tolerates 10% squat due to LBP.      ===============================================================  Today's Treatment:  Initial evaluation  Neuromuscular Reeducation:   Posture  and Kinesthetic Body Awareness - ed " on neutral spine  Ed on chronic pain PT POC, walking program  ===============================================================  ASSESSMENT:  Physical Therapy Diagnosis: Musculoskeletal Patterns    Patient requires PT intervention for the following impairments: Limited knowledge of condition and / or self care - inability to control symptoms, Impaired gait / weight bearing tolerance, Impaired posture / muscle imbalance, Impaired static and / or dynamic balance, Joint hypomobility, Muscle strength and endurance limitations, Pain and Deconditioning    Anticipated Goals and Expected Outcomes:EDUCATION AND SELF CARE  Independent and safe with home exercise / self care program in 6 week(s).  Good working knowledge of posture principles / joint protection in 6 week(s).  FUNCTIONAL MOBILITY  Ambulation without increased pain or symptoms for community distances on all surfaces in 12 week(s).  WORK  All required job duties without increased pain or symptoms in 12 week(s).  ADL'S  Standing tolerance 45 minutes without increased pain or symptoms in 12 week(s).  Homemaking / Yardwork without increased pain or symptoms in 12 week(s).    Rehab potential for achieving goals: fair.    ===============================================================  PLAN:   Patient will benefit from skilled physical therapy consisting of: neuromuscular reeducation of: movement, balance, coordination, kinesthetic sense, posture and proprioception for sitting and / or standing activities, education in self care / home management training to include instruction in: joint protection principles and symptom control techniques, therapeutic activities to achieve improved functional performance in: daily actvities and work activities and therapeutic exercise to develop: strength and endurance, joint mobility and joint stability    Assessment will be ongoing with changes in treatment as indicated.  Benefits/risks/alternatives to treatment have been reviewed and  the patient has been instructed to contact this office if there are any questions or concerns.  This plan of care has been discussed with the patient and the patient is in agreement.     Frequency / Duration:  Patient will be seen for a total of 8-12 visits; at a frequency of every 1-3 weeks.    Total Visit Time: 45  minutes            Fred Stinson          PT                                Date:  12/13/2019    =====================================================     PRESENT: NA    MULTIDISCIPLINARY PATIENT / FAMILY EDUCATION RECORD  Department:  Physical Therapy    Readiness to Learn: Ability to understand verbal instructions,Ability to understand written instructions,Knowledge of educational needs / treatment plan  Specific Barriers to Learning: None  Referrals: None  Learning Needs: Rehabilitation techniques to improve functional independence  Who: Patient  How: Demonstration,Verbal instructions,Written instructions  Response: Appropriate verbal response,Asked questions,Demonstrated ability,Verbalized recall / understanding

## 2019-12-18 LAB — PAIN DRUG SCR UR W RPTD MEDS: NORMAL

## 2019-12-27 ENCOUNTER — OFFICE VISIT (OUTPATIENT)
Dept: PALLIATIVE MEDICINE | Facility: CLINIC | Age: 39
End: 2019-12-27
Payer: COMMERCIAL

## 2019-12-27 DIAGNOSIS — M79.18 MYOFASCIAL MUSCLE PAIN: ICD-10-CM

## 2019-12-27 DIAGNOSIS — M47.816 SPONDYLOSIS OF LUMBAR REGION WITHOUT MYELOPATHY OR RADICULOPATHY: Primary | ICD-10-CM

## 2019-12-27 PROCEDURE — 97110 THERAPEUTIC EXERCISES: CPT | Mod: GP | Performed by: PHYSICAL THERAPIST

## 2019-12-27 PROCEDURE — 97112 NEUROMUSCULAR REEDUCATION: CPT | Mod: GP | Performed by: PHYSICAL THERAPIST

## 2019-12-27 NOTE — PROGRESS NOTES
"Patient Name: Daniella Sexton      YOB: 1980     Medical Record Number: [id number]  Diagnosis:    Spondylosis of lumbar region without myelopathy or radiculopathy  Myofascial muscle pain  Visit Info Length of Visit: 40  Arrived: On Time   Exercise/Activity Education Instruction:  Postural Training/Anatomy  Pacing/Energy Conservation  Home Program  Self Care  Activity:  Therapeutic Exercise 25':  Aerobic Conditioning (*see \"Strength/Functional Actitivities Record for details of exercises performed)  UBE x 3'  Dumont walk with postural correction  Stretching:  Upper Ext  bilateral, Lower Ext  Bilateral  Added stretches for KTC, thor ext  Postural Training:  standing, sitting    Neuro re-ed 15':  Ed on neutral spine in all positions  Diaphragm breathing ed in supine  Ed on sequence of 'breathe, stabilize, move' concept  Ed on HR and relation to pain   Ed on log roll, supine to sit technique   Notes: Tolerated session well and likes the chronic pain PT approach.  Progressing slowly toward goals.   Demonstrates/Verbalizes Technique: 2, 3 (1= poor technique-difficulty performing exercises,significant cues required; 5= good technique-performs exercises without cues)  Body Awareness: 2, 3 (1=low awareness; 5=high awareness)  Posture/Stability: 2, 3 (1= poor posture, stability; 5= good posture, stability)   Motivational Level:   Ask questions, Eager to learn, Cooperative and Distracted, in pain Participation:  Full   Patient Satisfaction:  satisfied   Response to Teaching:   cooperative and needs reinforcement  Factors that affect learning: None   Plan of Care  Cont PT to support reactivation and integration of self regulation pain management skills.   Therapist: Fred Stinson, PT                  Date: 12/27/2019                                                                                               "

## 2020-01-03 ENCOUNTER — OFFICE VISIT (OUTPATIENT)
Dept: PALLIATIVE MEDICINE | Facility: CLINIC | Age: 40
End: 2020-01-03
Payer: COMMERCIAL

## 2020-01-03 DIAGNOSIS — M79.18 MYOFASCIAL MUSCLE PAIN: ICD-10-CM

## 2020-01-03 DIAGNOSIS — M47.816 SPONDYLOSIS OF LUMBAR REGION WITHOUT MYELOPATHY OR RADICULOPATHY: Primary | ICD-10-CM

## 2020-01-03 PROCEDURE — 99207 ZZC NO CHARGE LOS: CPT | Mod: GP | Performed by: PHYSICAL THERAPIST

## 2020-01-03 PROCEDURE — 97112 NEUROMUSCULAR REEDUCATION: CPT | Mod: GP | Performed by: PHYSICAL THERAPIST

## 2020-01-03 PROCEDURE — 97110 THERAPEUTIC EXERCISES: CPT | Mod: GP | Performed by: PHYSICAL THERAPIST

## 2020-01-08 NOTE — PROGRESS NOTES
"Progress Notes        Patient Name: Daniella Sextno      YOB: 1980     Medical Record Number: [id number]  Diagnosis:    Spondylosis of lumbar region without myelopathy or radiculopathy  Myofascial muscle pain       Visit Info Length of Visit: 45  Arrived: On Time   Exercise/Activity Education Instruction:  Postural Training/Anatomy  Pacing/Energy Conservation  Home Program  Self Care  Activity:  Therapeutic Exercise 30':  Aerobic Conditioning (*see \"Strength/Functional Actitivities Record for details of exercises performed)  Bike x 4'  Dumont walk with mindful walking cues  Stretching:  Upper Ext  bilateral, Lower Ext  Bilateral  Added stretch for standing calf  Postural Training:  static, dynamic  Added lateral wt shifting (attempted lat stepping but too painful)     Neuro re-ed 15':  Ed on neutral spine in all positions  Diaphragm breathing ed in supine  Ed on sequence of 'breathe, stabilize, move' concept  Ed on HR and relation to pain - pt able to check own HR  Reviewed log roll, supine to sit technique   Notes: Tolerated session well again.  Progressing slowly toward goals.   Demonstrates/Verbalizes Technique:  3 (1= poor technique-difficulty performing exercises,significant cues required; 5= good technique-performs exercises without cues)  Body Awareness:  3 (1=low awareness; 5=high awareness)  Posture/Stability:  3 (1= poor posture, stability; 5= good posture, stability)   Motivational Level:   Ask questions, Eager to learn, Cooperative and Distracted, in pain Participation:  Full   Patient Satisfaction:  satisfied    Response to Teaching:   cooperative and needs reinforcement  Factors that affect learning: None   Plan of Care  Rec to continue PT to support reactivation and integration of self regulation pain management skills.   Therapist: Fred Stinson, PT                  Date:  1/3/2020             "

## 2020-01-15 ENCOUNTER — OFFICE VISIT (OUTPATIENT)
Dept: PALLIATIVE MEDICINE | Facility: CLINIC | Age: 40
End: 2020-01-15
Payer: COMMERCIAL

## 2020-01-15 DIAGNOSIS — M47.816 SPONDYLOSIS OF LUMBAR REGION WITHOUT MYELOPATHY OR RADICULOPATHY: Primary | ICD-10-CM

## 2020-01-15 DIAGNOSIS — M79.18 MYOFASCIAL MUSCLE PAIN: ICD-10-CM

## 2020-01-15 PROCEDURE — 97530 THERAPEUTIC ACTIVITIES: CPT | Mod: GP | Performed by: PHYSICAL THERAPIST

## 2020-01-15 PROCEDURE — 97112 NEUROMUSCULAR REEDUCATION: CPT | Mod: GP | Performed by: PHYSICAL THERAPIST

## 2020-01-15 PROCEDURE — 97110 THERAPEUTIC EXERCISES: CPT | Mod: GP | Performed by: PHYSICAL THERAPIST

## 2020-01-21 NOTE — PROGRESS NOTES
"Patient Name: Daniella Sexton      YOB: 1980     Medical Record Number: [id number]  Diagnosis:    Spondylosis of lumbar region without myelopathy or radiculopathy  Myofascial muscle pain          Visit Info Length of Visit: 45  Arrived: On Time   Exercise/Activity Education Instruction:  Postural Training/Anatomy  Pacing/Energy Conservation  Home Program  Self Care  Activity:  Therapeutic Exercise 15':  Aerobic Conditioning (*see \"Strength/Functional Actitivities Record for details of exercises performed)  Declined bike  UBE x 4'  Dumont walk with mindful walking emphasis  Stretching:  Upper Ext  bilateral, Lower Ext  Bilateral  Added seated HS   Postural Training:   dynamic emphasis  Performed lateral wt shifting and ed for ok to do lat stepping if/when more tolerated     Neuro re-ed 15':  Neutral spine 'find' in prep for movement  Diaphragm breathing ed in supine  Emphasis on 'breathe, stabilize, move' concept  Ed on HR and relation to pain - pt able to check own HR  Supine decompression with focus on breathing    Therapeutic Activities 10':  Ed on body mechanics with emphasis on sit to/from stand with cues for placement of hands, feet, trunk, etc  Added half speed sit to/from stands, partial sit to/from stands  Log roll, supine to sit technique  Issued h/o #2 on body mechanics   Notes:  Pt is engaged in PT HEP and progressing slowly toward goals.   Demonstrates/Verbalizes Technique:  4 (1= poor technique-difficulty performing exercises,significant cues required; 5= good technique-performs exercises without cues)  Body Awareness:  4 (1=low awareness; 5=high awareness)  Posture/Stability:  3, 4 (1= poor posture, stability; 5= good posture, stability)   Motivational Level:   Ask questions, Eager to learn, Cooperative and Distracted, in pain Participation:  Full   Patient Satisfaction:  satisfied    Response to Teaching:   cooperative and needs reinforcement  Factors that affect learning: None   Plan of " Murmur noted at a sick visit in Oct 2014.  Not noted at 4 year old WCE last year.  Of note, child seen for WCEs at 2, 4, 6, 10 months, then only at 21 months and 4 years old.  No murmur noted on recent visit in September of this year.  Would not require any antibiotics prior to dental work.  If mom has concerns, can schedule a WCE prior to dental work, child is due for WCE anyway.   Care  Recommend to continue PT to support reactivation and integration of self regulation pain management skills.   Therapist: Fred Stinson, PT                  Date:  1/15/2020

## 2020-02-06 ENCOUNTER — OFFICE VISIT (OUTPATIENT)
Dept: PALLIATIVE MEDICINE | Facility: CLINIC | Age: 40
End: 2020-02-06
Payer: COMMERCIAL

## 2020-02-06 VITALS
BODY MASS INDEX: 41.64 KG/M2 | WEIGHT: 258 LBS | SYSTOLIC BLOOD PRESSURE: 157 MMHG | HEART RATE: 114 BPM | DIASTOLIC BLOOD PRESSURE: 85 MMHG

## 2020-02-06 DIAGNOSIS — M54.41 CHRONIC BILATERAL LOW BACK PAIN WITH BILATERAL SCIATICA: ICD-10-CM

## 2020-02-06 DIAGNOSIS — M54.16 LUMBAR RADICULOPATHY: ICD-10-CM

## 2020-02-06 DIAGNOSIS — M79.18 MYOFASCIAL MUSCLE PAIN: ICD-10-CM

## 2020-02-06 DIAGNOSIS — G89.29 CHRONIC BILATERAL LOW BACK PAIN WITH BILATERAL SCIATICA: ICD-10-CM

## 2020-02-06 DIAGNOSIS — M47.816 SPONDYLOSIS OF LUMBAR REGION WITHOUT MYELOPATHY OR RADICULOPATHY: ICD-10-CM

## 2020-02-06 DIAGNOSIS — M54.42 CHRONIC BILATERAL LOW BACK PAIN WITH BILATERAL SCIATICA: ICD-10-CM

## 2020-02-06 PROCEDURE — 99214 OFFICE O/P EST MOD 30 MIN: CPT | Performed by: PAIN MEDICINE

## 2020-02-06 RX ORDER — HYDROCODONE BITARTRATE AND ACETAMINOPHEN 5; 325 MG/1; MG/1
.5-1 TABLET ORAL 2 TIMES DAILY PRN
Qty: 30 TABLET | Refills: 0 | Status: SHIPPED | OUTPATIENT
Start: 2020-02-06 | End: 2020-04-09

## 2020-02-06 RX ORDER — CYCLOBENZAPRINE HCL 10 MG
10 TABLET ORAL 3 TIMES DAILY PRN
Qty: 60 TABLET | Refills: 1 | Status: SHIPPED | OUTPATIENT
Start: 2020-02-06 | End: 2020-05-13

## 2020-02-06 RX ORDER — KETOROLAC TROMETHAMINE 30 MG/ML
30 INJECTION, SOLUTION INTRAMUSCULAR; INTRAVENOUS ONCE
Status: COMPLETED | OUTPATIENT
Start: 2020-02-06 | End: 2020-02-06

## 2020-02-06 RX ADMIN — KETOROLAC TROMETHAMINE 30 MG: 30 INJECTION, SOLUTION INTRAMUSCULAR; INTRAVENOUS at 11:53

## 2020-02-06 ASSESSMENT — PAIN SCALES - GENERAL: PAINLEVEL: WORST PAIN (10)

## 2020-02-06 NOTE — PROGRESS NOTES
[facility] follow-up  Chief Complaint:    Chief Complaint   Patient presents with     Pain     MME prescribed prior to seeing patient: 5 current MME:  Recommendations:  - Further procedures recommended:    - Reasonable to consider lumbar MEDIAL BRANCH BLOCK/FA  - Medication Management: Will make one change at a time.    - consider changing flexeril to another agent    - consider changing voltaren gel to oral form    - would increase gabapentin to 900mg three times a day    - will start elavil 10mg at night. Will titrate as tolerated    - reasonable to continue 0.5-1 tablet of norco at day as needed for severe pain  - Physical Therapy: ordered pain PHYSICAL THERAPY    - Clinical Health Psychologist to address issues of relaxation, behavioral change, coping style, and other factors important to improvement: consider in the future   - Diagnostic Studies: no  - Urine toxicology screen today: today    - Follow up:    1-2 months      Interval:   Patient has been working with pain PT with benefit.  The pt notes benefit in her sleep since starting elavil. The pt reports she has missed a couple of days of work 2/2 to the pain.  The patient continues to have bilateral low back pain.  Most significant pain is her axial low back pain.  She does have occasional radiating symptoms.  She denies any recent falls.  She denies any overt progressive weakness.  She denies any incontinence.  She does note occasional numbness and tingling in her feet.  The pain continues to be essentially constant.  The pain varies from a deep aching pain to a squeezing pain in nature.  The pain continues to be worse with lifting.  The pain is worse with both extension and flexion.  She notes some benefit with her current medical regimen.    THE 4 A's OF OPIOID MAINTENANCE ANALGESIA    Analgesia: y    Activity: y    Adverse effects: n    Adherence to Rx protocol: y    [place] Board of Pharmacy Data Base Reviewed:    YES;     Pain  history:  Daniella Sexton is a 39 year old female who first started having problems with pain chronic hx of prior lumbar surgery. The pt reports 2004 hurt herself on the job. At that itme she was only having axial LBP. She reports benefit with surgery.  The pt reports 4/19 she started having bilateral LBP radiating to her LE. She denies any specific inciting event. Although she was doing more work on the floor. The pt reports numbness and tingling in her feet. She denies any buring. The pain is constant in her back. The pain is sharp shooting. The pain a deep ache. The pain is squeezing in nature. The pain is worse with flexion. The pain is worse with prolonged standing. Pain is worse when going from a seated to standing position. The pt notes some benefit with sitting. Some benefit with leaning back. The pt reports some benefit with gabapentin. The pt currently takes 900, 600, and 900. The pt takes flexeril BID. The pt takes norco approx once a day to help her sleep. The pt denies any progressive weaknes. She denies any recent falls. She denies any incontinene.    The pain sig affects her quality of life      She does smoke approx 5 cigs a day.  Pain rating: intensity  Averages 9/10 on a 0-10 scale.      Current treatments include:  norco   Flexeril bid   Gabapentin 900, 600, 900  -Cannabis  Elavil  Previous medication treatments included:  Robaxin-no benefit      Other treatments have included:  Daniella Sexton has been seen at a pain clinic in the past.    PT: last PHYSICAL THERAPY  8/18  Chiropractic: benefit   Acupuncture: n  TENs Unit: benefit  Injections:   Facet 7/19 benefit 1 week no pain  Bilateral L4/5 /L5-S1 on 9/4 minimal relief   Epidural w/o imaging prior to surgery in 2004 no benefit  Past Medical History:  No past medical history on file.  Past Surgical History:  Past Surgical History:   Procedure Laterality Date     BACK SURGERY  2005    L 3-4, L 4-5     LITHOTRIPSY  2016     [facility]  ENDOSCOPIC SINUS SURGERY Bilateral 1/10/2019    Procedure: BILATERAL IMAGE GUIDED ENDOSCOPIC SINUS SURGERY, BILATERAL TURBINATE REDUCTION;  Surgeon: Maikol Miguel MD;  Location: [facility] OR     RELEASE CARPAL TUNNEL Left      TONSILLECTOMY       TUBAL LIGATION       Medications:  Current Outpatient Medications   Medication Sig Dispense Refill     acetaminophen (TYLENOL) 325 MG tablet Take 325-650 mg by mouth       albuterol (PROAIR HFA/PROVENTIL HFA/VENTOLIN HFA) 108 (90 Base) MCG/ACT inhaler Inhale 2 puffs into the lungs every 6 hours 8.5 g 3     amitriptyline (ELAVIL) 25 MG tablet Take 1 tablet (25 mg) by mouth At Bedtime 30 tablet 1     cyclobenzaprine (FLEXERIL) 10 MG tablet Take 1 tablet (10 mg) by mouth 3 times daily as needed for muscle spasms 60 tablet 1     diclofenac (VOLTAREN) 1 % topical gel Place 4 g onto the skin 4 times daily 100 g 11     gabapentin (NEURONTIN) 300 MG capsule Take 3 capsules (900 mg) by mouth 3 times daily 270 capsule 1     HYDROcodone-acetaminophen (NORCO) 5-325 MG tablet Take 0.5-1 tablets by mouth 2 times daily as needed for breakthrough pain Min of 30day supply 30 tablet 0     ibuprofen (ADVIL/MOTRIN) 200 MG capsule Take 800 mg by mouth every 4 hours as needed for fever       rizatriptan (MAXALT) 10 MG tablet Take 1 tablet (10 mg) by mouth at onset of headache for migraine 10 tablet 2     Allergies:     Allergies   Allergen Reactions     Amoxicillin Anaphylaxis     Morphine Other (See Comments) and Nausea and Vomiting     Penicillins Unknown     Social History:  Home situation: fam  Occupation/Schooling: y  Tobacco use: y  Alcohol use: n  Drug use: n  History of chemical dependency treatment: n    Family history:  Family History   Problem Relation Age of Onset     Heart Disease No family hx of      Diabetes No family hx of      Cancer No family hx of      Family history of headaches: n    Review of Systems:  Skin: negative  Eyes: negative  Ears/Nose/Throat:  negative  Respiratory: No shortness of breath, dyspnea on exertion, cough, or hemoptysis  Cardiovascular: negative  Gastrointestinal: negative  Genitourinary: negative  Musculoskeletal: negative  Neurologic: negative  Psychiatric: negative  Hematologic/Lymphatic/Immunologic: negative  Endocrine: negative    Physical Exam:  Vitals:    02/06/20 1132   BP: (!) 157/85   Pulse: 114   Weight: 117 kg (258 lb)     Exam: Obese  Constitutional: healthy, alert and no distress  Head: normocephalic. Atraumatic.   Eyes: no redness or jaundice noted   ENT: oropharnx normal.  MMM.  Neck supple.    Cardiovascular: Negative JVD  Respiratory: Speaking in full sentences no accessory muscles use   gastrointestinal: soft, non-tender  Skin: no suspicious lesions or rashes  Psychiatric: mentation appears normal and affect normal/bright    Musculoskeletal exam:  Gait/Station/Posture: Antalgic  Cervical spine: ROMwnl       Lumbar spine:     ROM: decreased secondary to pain   Myofascial tenderness: Diffuse   Forrest's: Positive bilaterally reproduces significant portion of her symptoms               Straight leg exam: Positive on the left negative on the right   MARCELLE/FADIR: neg              SI: mildm   Greater trochanteric bursa: mil  Neurologic exam:  CN:  Cranial nerves 2-12 are normal  Motor:  5/5 UE and LE strength  Reflexes:        Achilles:  +2    Sensory:  (upper and lower extremities):   Light touch: normal    Allodynia: absent    Dysethesia: absent    Hyperalgesia: absent     Diagnostic tests:  MRI reviewed with patient       T12-L1: No significant disc herniation. No spinal canal or neural  foraminal narrowing.      L1-L2: No significant disc herniation. No spinal canal or neural  foraminal narrowing.      L2-L3: No significant disc herniation. No spinal canal or neural  foraminal narrowing.      L3-L4: No significant disc herniation. No spinal canal or neural  foraminal narrowing.      L4-L5: Sequela of previous right-sided keyhole  laminectomy and  discectomy. There is a circumferential disc bulge and endplate  osteophytic spurring along with bilateral facet hypertrophy resulting  in moderate left and mild right neural foraminal narrowing. No  significant central spinal canal narrowing following laminectomy.      L5-S1: No significant disc herniation. No spinal canal or neural  foraminal narrowing. Bilateral facet hypertrophy.     Paraspinous soft tissues: Normal.                                                                       IMPRESSION: Postoperative changes at the L4-L5 level with previous  right-sided keyhole laminectomy and microdiscectomy. Residual or  recurrent circumferential disc bulge and endplate osteophytic spurring  at this level with bilateral facet hypertrophy causes moderate left  and mild right neural foraminal narrowing.        D.I.R.E Score: Patient Selection for Chronic Opioid Analgesia  2        For each factor, rate the patient's score from 1 - 3 based on the explanations on the right.       Diagnosis             2         1 = Benign chronic condition with minimal objective findings or no definite medical diagnosis.  Examples:  fibromyalgia, migraine, headaches, non-specific back pain.  2 = Slowly progressive condition concordant with moderate pain, or fixed condition with moderate objective findings.  Examples: failed back surgery syndrome, back pain with moderate degenerative changes, neuropathic pain.  3 = Advanced condition concordant with severe pain with objective findings.  Examples: severe ischemic vascular disease, advanced neuropathy, severe spinal stenosis.    Intractability             2         1 = Few therapies have been tried and the patient takes a passive role in his/her pain management process.   2 = Most costomary treatments have been tried but the patient is not fully engaged in the pain management process, or barriers prevent (insurance, transportation, medical illness)  3 = Patient fully  engaged in a spectrum of appropriate treatments but with inadequate response.    Risk   (Risk = Total of P+C+R+S below)       Psychological             2         1 = Serious personality dysfunction or mental illness interfering with care.  Examples: personality disorder, severe affective disorder, significant personality issues.  2 = Personality or mental health interferes moderately.  Example: depression or anxiety disorder.  3 = Good communication with the clinic.  No significant personality dysfunction or mental illness.       Chemical      Health             2         1 = Active or very recent use of illicit drugs, excessive alcohol, or prescription drug abuse.  2 = Chemical coper (uses medications to cope with stress) or history of chemical dependency in remission.  3 = No CD history.  Not drug-focused or chemically reliant       Reliability             2         1 = History of numerous problems: medication misuse, missed appointments, rarely follows through.  2 = Occasional difficulties with compliance, but generally reliable.  3 = Highly reliable patient with medications, appointments and treatment.       Social      Support             2         1= Life in chaos.  Little family support and few close relationships.  Loss of most normal life roles.  2 = Reduction in some relationships and life roles.  3 = Supportive family/close relationships.  Involved in work or school and no social isolation.    Efficacy score             2         1 = Poor function or minimal pain relief despite moderate to high doses.  2 = Moderate benefit with function improved in a number of ways (or insufficient info - hasn't tried opioid yet or very low doses or too short a trial.  3 = Good improvement in pain and function and quality of life with stable doses over time.                                    14    Total score = D + I + R + E    Score 7-13: Not a suitable candidate for long-term opioid analgesia  Score 14-21: May be a  good candidate      Assessment/Plan:  Daniella Sexton is a 39 year old female who presents with the complaints of axial low back pain with occasional radiation to her left anterior leg..   Daniella was seen today for pain.    Diagnoses and all orders for this visit:    Chronic bilateral low back pain with bilateral sciatica  -     HYDROcodone-acetaminophen (NORCO) 5-325 MG tablet; Take 0.5-1 tablets by mouth 2 times daily as needed for breakthrough pain Min of 30day supply  -     cyclobenzaprine (FLEXERIL) 10 MG tablet; Take 1 tablet (10 mg) by mouth 3 times daily as needed for muscle spasms    Myofascial muscle pain  -     amitriptyline (ELAVIL) 25 MG tablet; Take 1 tablet (25 mg) by mouth At Bedtime    Lumbar radiculopathy  -     amitriptyline (ELAVIL) 25 MG tablet; Take 1 tablet (25 mg) by mouth At Bedtime    Spondylosis of lumbar region without myelopathy or radiculopathy  -     amitriptyline (ELAVIL) 25 MG tablet; Take 1 tablet (25 mg) by mouth At Bedtime         - Further procedures recommended:    - Reasonable to consider lumbar MEDIAL BRANCH BLOCK/FA  - Medication Management: Will make one change at a time.    - consider changing flexeril to another agent    - consider changing voltaren gel to oral form    - continue gabapentin to 900mg three times a day    - Increase elavil 25mg at night. Will titrate as tolerated    - reasonable to continue 0.5-1 tablet of norco at day as needed for severe pain  - Physical Therapy: continue  pain PHYSICAL THERAPY    - Clinical Health Psychologist to address issues of relaxation, behavioral change, coping style, and other factors important to improvement: consider in the future   - Follow up:    -3 months   - discussed the importance of smoking cessation and its association with pain                Total time spent was 30 minutes, and more than 50% of face to face time was spent counseling and/or coordination of care regarding principles of multidisciplinary care and  medication management bilateral low back pain with occasional radiation to her lower extremity    Colby Moss MD  [facility]  This note was created with voice recognition software, and while reviewed for accuracy, typos may remain.

## 2020-02-06 NOTE — PATIENT INSTRUCTIONS
- Further procedures recommended:    - Reasonable to consider lumbar MEDIAL BRANCH BLOCK/FA  - Medication Management: Will make one change at a time.    - consider changing flexeril to another agent    - consider changing voltaren gel to oral form    - continue gabapentin to 900mg three times a day    - Increase elavil 25mg at night. Will titrate as tolerated    - reasonable to continue 0.5-1 tablet of norco at day as needed for severe pain  - Physical Therapy: continue  pain PHYSICAL THERAPY    - Clinical Health Psychologist to address issues of relaxation, behavioral change, coping style, and other factors important to improvement: consider in the future   - Follow up:    -3 months   - discussed the importance of smoking cessation and its association with pain

## 2020-02-06 NOTE — LETTER
Tom Villaseñor    Tel: [phone]  2020    Daniella Sexton  [address]  [facility] [place] [address]  [phone] (home)     : 1980          To Whom it May Concern:    Daniella Sexton was seen in our clinic today, 2020. I expect her condition to gradually improve.  We continue to work on optimizing her regimen.  Patient has continued to actively participate in her treatment.  Of note it's important to understand that she may have occasional flares preventing her from working.  But long-term she should be able to return to work.        Please contact me for questions or concerns.    Sincerely,      Colby Moss MD

## 2020-02-06 NOTE — NURSING NOTE
----------------------------------------------------------------  [facility] Number:  [phone]     Call with any questions about your care and for scheduling assistance.     Calls are returned Monday through Friday between 8 AM and 4:30 PM. We usually get back to you within 2 business days depending on the issue/request.    If we are prescribing your medications:    For opioid medication refills, call the clinic or send a True Style message 7 days in advance.  Please include:    Name of requested medication    Name of the pharmacy.    For non-opioid medications, call your pharmacy directly to request a refill. Please allow 3-4 days to be processed.     Per [place] State Law:    All controlled substance prescriptions must be filled within 30 days of being written.      For those controlled substances allowing refills, pickup must occur within 30 days of last fill.      We believe regular attendance is key to your success in our program!      Any time you are unable to keep your appointment we ask that you call us at least 24 hours in advance to cancel.This will allow us to offer the appointment time to another patient.     Multiple missed appointments may lead to dismissal from the clinic.

## 2020-02-07 ENCOUNTER — TELEPHONE (OUTPATIENT)
Dept: PALLIATIVE MEDICINE | Facility: CLINIC | Age: 40
End: 2020-02-07

## 2020-02-07 NOTE — TELEPHONE ENCOUNTER
Prior Authorization Specialty Medication Request    Medication/Dose: HYDROcodone-acetaminophen (NORCO) 5-325 MG tablet  ICD code (if different than what is on RX):  Chronic bilateral low back pain with bilateral sciatica [M54.42, M54.41, G89.29]   Previously Tried and Failed:..    Important Lab Values: ..  Rationale:..    Insurance Name: [facility]  Insurance ID: [id number]  Insurance Phone Number: [phone]

## 2020-02-11 NOTE — TELEPHONE ENCOUNTER
Central Prior Authorization Team   Phone: [phone]      PA Initiation    Medication: HYDROcodone-acetaminophen (NORCO) 5-325 MG tablet-Initiated  Insurance Company: [employer] [place] - Phone [phone] Fax [phone]  Pharmacy Filling the Rx: [id number] - [place], [place] - [address] [place] [facility] [address]  Filling Pharmacy Phone: [phone]  Filling Pharmacy Fax:    Start Date: 2/11/2020

## 2020-02-14 NOTE — TELEPHONE ENCOUNTER
Prior Authorization Approval    Authorization Effective Date: 2/12/2020  Authorization Expiration Date: 2/11/2021  Medication: HYDROcodone-acetaminophen (NORCO) 5-325 MG tablet-APPROVED  Approved Dose/Quantity:   Reference #:     Insurance Company: [employer] - Phone [phone] Fax [phone]  Expected CoPay:       CoPay Card Available:      Foundation Assistance Needed:    Which Pharmacy is filling the prescription (Not needed for infusion/clinic administered): [id number] [address] [place] [address] AVE [address] AT [facility] [address] [facility]  Pharmacy Notified: Yes  Patient Notified: No    Pharmacy will notify patient when medication is ready.

## 2020-02-14 NOTE — TELEPHONE ENCOUNTER
Called and spoke with Cady at [facility] to check on the status of PA.  It was approved.

## 2020-02-14 NOTE — TELEPHONE ENCOUNTER
Prior Authorization Approval     Authorization Effective Date: 2/12/2020  Authorization Expiration Date: 2/11/2021  Medication: HYDROcodone-acetaminophen (NORCO) 5-325 MG tablet-APPROVED    Routed to  for FYI.    Niesha Johnson MA

## 2020-02-19 ENCOUNTER — TELEPHONE (OUTPATIENT)
Dept: PALLIATIVE MEDICINE | Facility: CLINIC | Age: 40
End: 2020-02-19

## 2020-02-19 DIAGNOSIS — M47.816 SPONDYLOSIS OF LUMBAR REGION WITHOUT MYELOPATHY OR RADICULOPATHY: Primary | ICD-10-CM

## 2020-02-19 NOTE — TELEPHONE ENCOUNTER
Patient is calling to request to start the MBB/RFA process, please review and place appropriate order.          Renee ESCOBAR    [facility]

## 2020-02-20 ENCOUNTER — TELEPHONE (OUTPATIENT)
Dept: PALLIATIVE MEDICINE | Facility: CLINIC | Age: 40
End: 2020-02-20

## 2020-02-20 NOTE — TELEPHONE ENCOUNTER
Pre-screening questions for MBB Injections:    Injection to be done at which interventional clinic site? [facility] [place]     Instruct patient to arrive as directed prior to the scheduled appointment time:    [place] and [place]: 30 minutes before      Procedure ordered by Dr. Moss    Procedure ordered? Lumbar Medial Branch Block    What insurance would patient like us to bill for this procedure? BC      Worker's comp- Any injection DO NOT SCHEDULE and route to Renee Reddy.      [employer] insurance - If scheduling an SI joint injection DO NOT SCHEDULE and route to Renee Reddy.       MBBs must be scheduled with elapsed time interval of at least 2 weeks and not more than 6 months between the First MBB and the Second MBB for insurance purposes       [employer] - Any injection besides hip/shoulder/knee joint DO NOT SCHEDULE and route to Renee Reddy. She will obtain PA and call pt back to schedule procedure or notify pt of denial.       [employer]- PA required for all MBB's      **BCBS- ALL need to be routed to Renee for review if a PA is needed**    Any chance of pregnancy? NO   If YES, do NOT schedule and route to RN pool    Is an  needed? No     Patient has a drive home? (mandatory) Yes     Is patient taking any blood thinners (plavix, coumadin, jantoven, warfarin, heparin, pradaxa or dabigatran )? No    If hold needed, do NOT schedule, route to RN pool     Is patient taking any aspirin products? No     If more than 325mg/day, OK to schedule; Instruct pt to decrease to less than 325 mg for 7 days AND route to RN pool    For CERVICAL procedures, hold all aspirin products for 6 days.     Tell pt that if aspirin product is not held for 6 days, the procedure WILL BE cancelled.      Does the patient have a bleeding or clotting disorder? No    If YES, okay to schedule AND route to RN nurse pool    **For any patients with platelet count <100, must be forwarded to provider**    Is  patient diabetic? No If YES, have them bring their glucometer.    Does patient have an active infection or treated for one within the past week? Yes - Bronchitits    Is patient currently taking any antibiotics?  Yes - Zithromax x3 days    For patients on chronic, preventative, or prophylactic antibiotics, procedures may be scheduled.     For patients on antibiotics for active or recent infection:antibiotic course must have been completed for 4 days    Is patient currently taking any steroid medications? (i.e. Prednisone, Medrol)  Yes - Prednisone x5 days    For patients on steroid medications, course must have been completed for 4 days    Is patient actively being treated for cancer or immunocompromised? No   If YES, do NOT schedule and route to RN    Are you able to get on and off an exam table with minimal or no assistance? Yes   If NO, do NOT schedule and route to RN    Are you able to roll over and lay on your stomach with minimal or no assistance? Yes   If NO, do NOT schedule and route to RN    Any allergies to contrast dye, iodine, shellfish, or numbing and steroid medications? No  (If so, inform nursing and note in scheduling comments.)    Allergies: Amoxicillin; Morphine; and Penicillins     Has the patient had a flu shot or any other vaccinations within 7 days before or after the procedure.  No     Does patient have an MRI/CT?  Not Applicable  Check Procedure Scheduling Grid to see if required.      Was the MRI done within the last 3 years?  Yes    If yes, where was the MRI done i.e.[facility], [facility], [facility], [facility] etc?       If no, do not schedule and route to nursing    If MRI was not done at [facility], [facility] or [facility] do NOT schedule and route to nursing.      If pt has an imaging disc, the injection MAY be scheduled but pt has to bring disc to appt.     If they show up without the disc the injection cannot be done      Medial Branch Block Pre-Procedure Instructions    It is okay  to take long acting pain medications (if you are on them) the day of the procedure but try not to take any short acting medications unless absolutely necessary.    YES: ok   Long acting meds would include: Gabapentin (Neurontin), MS Contin, Oxycontin        Short acting meds would include:  Percocet, Oxycodone, Vicodin, Ibuprofen     The day of the procedure, you should try to do things that provoke your pain, since the injection is being done to see if it will relieve your pain . YES: ok     If your pain level is a 4 out of 10 or less on the day of the procedu re, please call [phone] to reschedule.  YES: ok     Reminders:      If you are started on any steroids or antibiotics between now and your appointment, you must contact us because it may affect our ability to perform your procedure ok      Instructed pt to arrive 30 minutes early for IV start if required. (Check Procedure Scheduling Grid) INot Applicable       If this is for a cervical MBB aspirin needs to be held for 6 days.  Not Applicable       Do not schedule procedures requiring IV placement in the first appointment of the day or first appointment after lunch. Do NOT schedule at 0745, 0815 or 1245.  ok      For patients 85 or older we recommend having an adult stay w/ them for the remainder of the day.      Does the patient have any questions? no

## 2020-02-20 NOTE — TELEPHONE ENCOUNTER
Patient called back to schedule Bilateral LMBB, patient became upset that she had to go through the pre-screening questions stating someone told her she could come in today. Patient starting swearing at the writer that she already went through the pre-screening questions.      Phone was disconnected.       Gabrielle Hutchinson    [facility]

## 2020-02-20 NOTE — TELEPHONE ENCOUNTER
Called [facility] and spoke to Kim, there is no PA required for MBB's    Reference number [id number]        Renee ESCOBAR    [facility]

## 2020-02-20 NOTE — TELEPHONE ENCOUNTER
Pt will call back to schedule Bilateral LMBB.      Kris CHADWICK    [facility] [place]

## 2020-02-28 DIAGNOSIS — M54.41 CHRONIC BILATERAL LOW BACK PAIN WITH BILATERAL SCIATICA: ICD-10-CM

## 2020-02-28 DIAGNOSIS — M54.42 CHRONIC BILATERAL LOW BACK PAIN WITH BILATERAL SCIATICA: ICD-10-CM

## 2020-02-28 DIAGNOSIS — G89.29 CHRONIC BILATERAL LOW BACK PAIN WITH BILATERAL SCIATICA: ICD-10-CM

## 2020-02-28 RX ORDER — GABAPENTIN 300 MG/1
900 CAPSULE ORAL 3 TIMES DAILY
Qty: 270 CAPSULE | Refills: 1 | Status: SHIPPED | OUTPATIENT
Start: 2020-02-28 | End: 2020-05-06

## 2020-02-28 NOTE — TELEPHONE ENCOUNTER
Received fax request from [facility] pharmacy requesting refill(s) for gabapentin (NEURONTIN) 300 MG capsule    Last refilled on 01/19/20    Pt last seen on 02/06/20  Next appt scheduled for : no office visit scheduled, has procedure on 03/13/20    Will facilitate refill.

## 2020-03-17 ENCOUNTER — TELEPHONE (OUTPATIENT)
Dept: FAMILY MEDICINE | Facility: CLINIC | Age: 40
End: 2020-03-17

## 2020-03-17 ENCOUNTER — VIRTUAL VISIT (OUTPATIENT)
Dept: FAMILY MEDICINE | Facility: OTHER | Age: 40
End: 2020-03-17

## 2020-03-17 NOTE — TELEPHONE ENCOUNTER
Reason for call:  Other   Patient called regarding (reason for call): appointment  Additional comments: Patient was instructed via [facility] to come in for an office visit. Patient would like to go to   [place] and see anyone available. Please call to follow up. Thanks!    Phone number to reach patient:  Cell number on file:    Telephone Information:   Mobile [phone]       Best Time:  anytime    Can we leave a detailed message on this number?  YES    Travel screening: Negative

## 2020-03-17 NOTE — TELEPHONE ENCOUNTER
Called patient. She understood. No visitors with her and she will ask for a mask when she arrives. Travel screening done.   Next 5 appointments (look out 90 days)    Mar 18, 2020  9:40 AM CDT  Office Visit with JENAE Sibley CNP  [facility] ([facility] [address] [address]  [phone]       Rita Godwin,

## 2020-03-17 NOTE — PROGRESS NOTES
"Date: 2020 10:22:05  Clinician: Jesus Angel  Clinician NPI: [id number]  Patient: IRMA WEST  Patient : 1980  Patient Address[place] [facility] [place] [address]  Patient Phone: [phone]  Visit Protocol: URI  Patient Summary:  IRMA is a 39 year old ( : 1980 ) female who initiated a Visit for COVID-19 (Coronavirus) evaluation and screening. When asked the question \"Please sign me up to receive news, health information and promotions. \", IRMA responded \"No\".    IRMA states her symptoms started suddenly 1 month or more ago.   Her symptoms consist of wheezing, a sore throat, a cough, chills, ear pain, malaise, a headache, facial pain or pressure, and myalgia. IRMA also feels feverish.   Symptom details     Cough: IRMA coughs almost every minute and her cough is not more bothersome at night. Phlegm does not come into her throat when she coughs. She does not believe her cough is caused by post-nasal drip.     Sore throat: IRMA reports having mild throat pain (1-3 on a 10 point pain scale), does not have exudate on her tonsils, and can swallow liquids. She is not sure if the lymph nodes in her neck are enlarged. A rash has not appeared on the skin since the sore throat started.     Temperature: Her current temperature is 98.6 degrees Fahrenheit.     Wheezing: IRMA has been diagnosed with asthma. The wheezing does not interfere with her normal daily activities.    Facial pain or pressure: She has not had the facial pain or pressure for 12 weeks or more. The facial pain or pressure does not feel worse when bending or leaning forward.     Headache: IRMA has not had the headache for 12 weeks or more. She states the headache is mild (1-3 on a 10 point pain scale).      IRMA denies having nasal congestion, teeth pain, and rhinitis. She also denies having recent facial or sinus surgery in the past 60 days and double sickening (worsening symptoms after initial improvement).  "  Precipitating events  Within the past week, IRMA has not been exposed to someone with strep throat. She has not recently been exposed to someone with influenza. IRMA has been in close contact with the following high risk individuals: people with asthma, heart disease or diabetes.   Pertinent COVID-19 (Coronavirus) information  IRMA has not traveled internationally or to the areas where COVID-19 (Coronavirus) is widespread in the last 14 days before the start of her symptoms.   IRMA has not had a close contact with a laboratory-confirmed COVID-19 patient within 14 days of symptom onset. She also has not had a close contact with a suspected COVID-19 patient within 14 days of symptom onset.   IRMA is not a healthcare worker and does not work in a healthcare facility.   Triage Point(s) temporarily suspended for COVID-19 (Coronavirus) screening  IRMA reported the following symptoms which were previously protocol referral points. These protocol referral points have temporarily been removed for purposes of COVID-19 (Coronavirus) screening.   Difficulty breathing even when resting and can only speak in phrase(s)   Pertinent medical history  IRMA had 1 sinus infection within the past year.   IRMA has taken an antibiotic medication in the past month. Antibiotic details as reported by the patient (free text): clindamycin 150 mg, had tooth pulled 3/12/2020   IRMA does not get yeast infections when she takes antibiotics.   IRMA needs a return to work/school note.   Weight: 255 lbs   IRMA smokes or uses smokeless tobacco.   She denies pregnancy and denies breastfeeding. She has menstruated in the past month.   Weight: 255 lbs  A synchronous phone visit was initiated by the provider for the following reason: difficulty breathing even at rest. only able to speak in phrases. History of asthma.    MEDICATIONS: clindamycin HCl oral, gabapentin enacarbil oral, amitriptyline-chlordiazepoxide oral, ALLERGIES:  amoxicillin, Penicillins, Morpholine Analogues  Clinician Response:  Dear IRMA,  I am sorry you are not feeling well. To determine the most appropriate care for you, I would like you to be seen in person to further discuss your health history and symptoms.  You will not be charged for this Visit. Thank you for trusting us with your care.  COVID-19 (Coronavirus) General Information  With the increase in the number of COVID-19 (Coronavirus) cases, we understand you may have some questions. Below is some helpful information on COVID-19 (Coronavirus).  How can I protect myself and others from the COVID-19 (Coronavirus)?  Because there is currently no vaccine to prevent infection, the best way to protect yourself is to avoid being exposed to this virus. Put distance between yourself and other people if COVID-19 (Coronavirus) is spreading in your community. The virus is thought to spread mainly from person-to-person.     Between people who are in close contact with one another (within about 6 about) for prolonged period (10 minutes or longer).    Through respiratory droplets produced when an infected person coughs or sneezes.     The CDC recommends the following additional steps to protect yourself and others:     Wash your hands often with soap and water for at least 20 seconds, especially after blowing your nose, coughing, or sneezing; going to the bathroom; and before eating or preparing food.  Use an alcohol-based hand  that contains at least 60 percent alcohol if soap and water are not available.        Avoid touching your eyes, nose and mouth with unwashed hands.    Avoid close contact with people who are sick.    Stay home when you are sick.    Cover your cough or sneeze with a tissue, then throw the tissue in the trash.    Clean and disinfect frequently touched objects and surfaces.     You can help stop COVID-19 (Coronavirus) by knowing the signs and symptoms:     Fever    Cough    Shortness of breath      Contact your healthcare provider if   Develop symptoms   AND   Have been in close contact with a person known to have COVID-19 (Coronavirus) or live in or have recently traveled from an area with ongoing spread of COVID-19 (Coronavirus). Call ahead before you go to a doctor's office or emergency room. Tell them about your recent travel and your symptoms.   For the most up to date information, visit the CDC's website.  Steps to help prevent the spread of COVID-19 (Coronavirus) if you are sick  If you are sick with COVID-19 (Coronavirus) or suspect you are infected with the virus that causes COVID-19 (Coronavirus), follow the steps below to help prevent the disease from spreading&nbsp;to people in your home and community.     Stay home except to get medical care. Home isolation may be started in consultation with your healthcare clinician.    Separate yourself from other people and animals in your home.    Call ahead before visiting your doctor if you have a medical appointment.    Wear a facemask when you are around other people.    Cover your cough and sneezes.    Clean your hands often.    Avoid sharing personal household items.    Clean and disinfect frequently touched objects and surfaces everyday.    You will need to have someone drop off medications or household supplies (if needed) at your house without coming inside or in contact with you or others living in your house.    Monitor your symptoms and seek prompt medical care if your illness is worsening (e.g. Difficulty breathing).    Discontinue home isolation only in consultation with your healthcare provider.     For more detailed and up to date information on what to do if you are sick, visit this link: What to Do If You Are Sick With Coronavirus Disease 2019 (COVID-19).  Do I need to be tested for COVID-19 (Coronavirus)?     At this time, the limited number of tests available are controlled by the state and local health departments and are being  "reserved for more seriously ill patients, those with known exposure to confirmed patients, and those with recent travel (within 14 days) to countries with high rates of COVID-19 (Coronavirus).    Decisions on which patients receive testing will be based on the local spread of COVID-19 (Coronavirus) as well as the symptoms. Your healthcare provider will make the final decision on whether you should be tested.    In the meantime, if you have concerns that you may have been exposed, it is reasonable to practice \"social distancing.\"&nbsp; If you are ill with a cold or flu-like illness, please monitor your symptoms and reach out to your healthcare provider if your symptoms worsen.    For more up to date information, visit this link: COVID-19 (Coronavirus) Frequently Asked Questions and Answers.      Diagnosis: Refer for additional evaluation  Diagnosis ICD: R69  Triage Notes: I reviewed the patient's history, verified their identity, and explained the Visit process.    Patient states has been having shortness of breath and wheezing for the last 2 months. Was diagnosed with bronchitis 2 months ago and was treated with Zithromax and steroids. Patient was then seen 2 weeks ago and was given another round of steroids. Still seems to not be improving. Does have history of asthma. Recommended to be seen in clinic for further evaluation and treatment.  Synchronous Triage: phone, status: completed, duration: 121 seconds  "

## 2020-03-17 NOTE — TELEPHONE ENCOUNTER
Per [facility] notes:   Patient states has been having shortness of breath and wheezing for the last 2 months. Was diagnosed with bronchitis 2 months ago and was treated with Zithromax and steroids. Patient was then seen 2 weeks ago and was given another round of steroids. Still seems to not be improving. Does have history of asthma. Recommended to be seen in clinic for further evaluation and treatment.    Please call and schedule OV.     Yenny Negron RN

## 2020-03-18 ENCOUNTER — OFFICE VISIT (OUTPATIENT)
Dept: FAMILY MEDICINE | Facility: CLINIC | Age: 40
End: 2020-03-18
Payer: COMMERCIAL

## 2020-03-18 ENCOUNTER — ANCILLARY PROCEDURE (OUTPATIENT)
Dept: GENERAL RADIOLOGY | Facility: CLINIC | Age: 40
End: 2020-03-18
Attending: NURSE PRACTITIONER
Payer: COMMERCIAL

## 2020-03-18 VITALS
OXYGEN SATURATION: 98 % | DIASTOLIC BLOOD PRESSURE: 82 MMHG | BODY MASS INDEX: 42.59 KG/M2 | TEMPERATURE: 98.3 F | WEIGHT: 265 LBS | HEIGHT: 66 IN | HEART RATE: 106 BPM | SYSTOLIC BLOOD PRESSURE: 110 MMHG

## 2020-03-18 DIAGNOSIS — J20.9 ACUTE BRONCHITIS WITH SYMPTOMS > 10 DAYS: ICD-10-CM

## 2020-03-18 DIAGNOSIS — J45.31 MILD PERSISTENT ASTHMA WITH ACUTE EXACERBATION: Primary | ICD-10-CM

## 2020-03-18 DIAGNOSIS — R05.9 COUGH: ICD-10-CM

## 2020-03-18 DIAGNOSIS — R91.8 PULMONARY NODULES: ICD-10-CM

## 2020-03-18 LAB
BASOPHILS # BLD AUTO: 0 10E9/L (ref 0–0.2)
BASOPHILS NFR BLD AUTO: 0.3 %
DIFFERENTIAL METHOD BLD: ABNORMAL
EOSINOPHIL # BLD AUTO: 0.3 10E9/L (ref 0–0.7)
EOSINOPHIL NFR BLD AUTO: 2.1 %
ERYTHROCYTE [DISTWIDTH] IN BLOOD BY AUTOMATED COUNT: 14.2 % (ref 10–15)
HCT VFR BLD AUTO: 41.6 % (ref 35–47)
HGB BLD-MCNC: 13.4 G/DL (ref 11.7–15.7)
LYMPHOCYTES # BLD AUTO: 3 10E9/L (ref 0.8–5.3)
LYMPHOCYTES NFR BLD AUTO: 25.9 %
MCH RBC QN AUTO: 30.5 PG (ref 26.5–33)
MCHC RBC AUTO-ENTMCNC: 32.2 G/DL (ref 31.5–36.5)
MCV RBC AUTO: 95 FL (ref 78–100)
MONOCYTES # BLD AUTO: 1.1 10E9/L (ref 0–1.3)
MONOCYTES NFR BLD AUTO: 8.9 %
NEUTROPHILS # BLD AUTO: 7.4 10E9/L (ref 1.6–8.3)
NEUTROPHILS NFR BLD AUTO: 62.8 %
PLATELET # BLD AUTO: 239 10E9/L (ref 150–450)
RBC # BLD AUTO: 4.4 10E12/L (ref 3.8–5.2)
WBC # BLD AUTO: 11.8 10E9/L (ref 4–11)

## 2020-03-18 PROCEDURE — 99214 OFFICE O/P EST MOD 30 MIN: CPT | Performed by: NURSE PRACTITIONER

## 2020-03-18 PROCEDURE — 36415 COLL VENOUS BLD VENIPUNCTURE: CPT | Performed by: NURSE PRACTITIONER

## 2020-03-18 PROCEDURE — 85025 COMPLETE CBC W/AUTO DIFF WBC: CPT | Performed by: NURSE PRACTITIONER

## 2020-03-18 PROCEDURE — 71046 X-RAY EXAM CHEST 2 VIEWS: CPT

## 2020-03-18 RX ORDER — CLINDAMYCIN HCL 150 MG
CAPSULE ORAL
COMMUNITY
Start: 2020-03-12 | End: 2020-04-15

## 2020-03-18 RX ORDER — PREDNISONE 20 MG/1
TABLET ORAL
Qty: 20 TABLET | Refills: 0 | Status: SHIPPED | OUTPATIENT
Start: 2020-03-18 | End: 2020-04-15

## 2020-03-18 RX ORDER — SULFAMETHOXAZOLE/TRIMETHOPRIM 800-160 MG
1 TABLET ORAL 2 TIMES DAILY
Qty: 20 TABLET | Refills: 0 | Status: SHIPPED | OUTPATIENT
Start: 2020-03-18 | End: 2020-03-28

## 2020-03-18 ASSESSMENT — MIFFLIN-ST. JEOR: SCORE: 1893.78

## 2020-03-18 ASSESSMENT — PATIENT HEALTH QUESTIONNAIRE - PHQ9: SUM OF ALL RESPONSES TO PHQ QUESTIONS 1-9: 4

## 2020-03-18 NOTE — LETTER
March 18, 2020      Daniella Sexton  [address]  [place] [address]        To Whom It May Concern:    Daniella Sexton  was seen on 03/18/20.  Please excuse her for 7 days due to illness.        Sincerely,        JENAE Sibley CNP

## 2020-03-18 NOTE — PROGRESS NOTES
"Subjective     Daniella Sexton is a 39 year old female who presents to clinic today for the following health issues:    HPI     RESPIRATORY SYMPTOMS      Duration: 2 months    Description  cough, wheezing, fever, myalgias and feels feverish    Severity: moderate    Accompanying signs and symptoms: None    History (predisposing factors):  asthma and tobacco abuse    Precipitating or alleviating factors: None    Therapies tried and outcome:  Is on second round of antibiotics which have not helped    Cough initially mucousy, now dry. Tactile fevers but taking tylenol and ibuprofen daily due to illness and tooth pain. Sleeping propped up at nighttime. Has had mild ankle swelling, up 7 lbs since February which she attributes to steroid/eating more.    Treated with Azithromycin and steroids in January- did not improve much. Symptoms worsened when steroids were completed. Was prescribed another 2 weeks of steroids, which she completed 3/11/2020. Currently treated with Clindamycin for recent tooth extraction.       Reviewed and updated as needed this visit by Provider         Review of Systems   ROS COMP: Constitutional, HEENT, cardiovascular, pulmonary, GI, , musculoskeletal, neuro, skin, endocrine and psych systems are negative, except as otherwise noted.      Objective    /82 (BP Location: Left arm, Patient Position: Sitting, Cuff Size: Adult Large)   Pulse 106   Temp 98.3  F (36.8  C) (Tympanic)   Ht 1.676 m (5' 6\")   Wt 120.2 kg (265 lb)   SpO2 98%   BMI 42.77 kg/m    Body mass index is 42.77 kg/m .  Physical Exam   GENERAL: healthy, alert and no distress  EYES: Eyes grossly normal to inspection, PERRL and conjunctivae and sclerae normal  HENT: normal cephalic/atraumatic, ear canals and TM's normal, nose without ulcers or lesions, first right lower molar surgically absent and gums tender, oropharynx clear, oral mucous membranes moist and sinuses: not tender  NECK: no adenopathy, no asymmetry, masses, or " scars and thyroid normal to palpation  RESP: lungs clear to auscultation - no rales, rhonchi or wheezes  CV: regular rate and rhythm, normal S1 S2, no S3 or S4, no murmur, click or rub, no peripheral edema and peripheral pulses strong    Diagnostic Test Results:  Labs reviewed in Epic  Results for orders placed or performed in visit on 03/18/20 (from the past 24 hour(s))   CBC with platelets and differential   Result Value Ref Range    WBC 11.8 (H) 4.0 - 11.0 10e9/L    RBC Count 4.40 3.8 - 5.2 10e12/L    Hemoglobin 13.4 11.7 - 15.7 g/dL    Hematocrit 41.6 35.0 - 47.0 %    MCV 95 78 - 100 fl    MCH 30.5 26.5 - 33.0 pg    MCHC 32.2 31.5 - 36.5 g/dL    RDW 14.2 10.0 - 15.0 %    Platelet Count 239 150 - 450 10e9/L    % Neutrophils 62.8 %    % Lymphocytes 25.9 %    % Monocytes 8.9 %    % Eosinophils 2.1 %    % Basophils 0.3 %    Absolute Neutrophil 7.4 1.6 - 8.3 10e9/L    Absolute Lymphocytes 3.0 0.8 - 5.3 10e9/L    Absolute Monocytes 1.1 0.0 - 1.3 10e9/L    Absolute Eosinophils 0.3 0.0 - 0.7 10e9/L    Absolute Basophils 0.0 0.0 - 0.2 10e9/L    Diff Method Automated Method      CXR - abnormal        Assessment & Plan     1. Mild persistent asthma with acute exacerbation  Start higher dose Prednisone taper, as has been responsive to Prednisone thus far. Will also start ICS- discussed common side effects.    - beclomethasone HFA (QVAR REDIHALER) 80 MCG/ACT inhaler; Inhale 1 puff into the lungs 2 times daily  Dispense: 10.6 g; Refill: 2  - predniSONE (DELTASONE) 20 MG tablet; Take 3 tabs by mouth daily x 3 days, then 2 tabs daily x 3 days, then 1 tab daily x 3 days, then 1/2 tab daily x 3 days.  Dispense: 20 tablet; Refill: 0    2. Acute bronchitis with symptoms > 10 days  Start Bactrim for bronchitis, ok to continue Clindamycin per dentist. Recommend contacting dentist due to continued dental pain from recent tooth extraction.   Low risk for COVID-19 given duration of symptoms, but recommend self-quaratine for 7 days at  minimum due to ongoing respiratory symptoms.   - CBC with platelets and differential  - XR Chest 2 Views; Future  - sulfamethoxazole-trimethoprim (BACTRIM DS) 800-160 MG tablet; Take 1 tablet by mouth 2 times daily for 10 days  Dispense: 20 tablet; Refill: 0    3. Pulmonary nodules  Needs CT for follow up of abnormal CXR- need to r/o cancer given smoking history  - CT Chest w/o Contrast; Future         See Patient Instructions    Return in about 1 week (around 3/25/2020) for chest CT.    JENAE Sibley [facility]

## 2020-03-18 NOTE — PATIENT INSTRUCTIONS
Instructions for Patients  Your symptoms could be due to COVID-19, but it also could be due to a number of other respiratory illnesses.  We are not doing testing at this time for COVID-19 unless symptoms are severe enough to require hospitalization.  It is recommended that you stay home to prevent the spread of your illness.  To do this follow these instructions:    Isolate yourself for at least 14 days after the start of your symptoms and 24 hrs or more without fever.    Isolate yourself at home.     Do Not allow any visitors    Do Not go to work or school    Do Not go to Hindu[employer],  centers, shopping, or other public places.    Do Not shake hands.    Avoid close contact with others (hugging, kissing).    Protect Others:    Cover your mouth and nose with a mask, disposable tissue or wash cloth to avoid spreading germs to others.    Wash your hands and face frequently with soap and water.    Fever Medicines:    For fever relief, take acetaminophen or ibuprofen.    Treat fevers above 101  F (38.3  C) to lower fevers and make you more comfortable.     Acetaminophen (e.g., Tylenol): Take 650 mg (two 325 mg pills) by mouth every 4-6 hours as needed of regular strength Tylenol or 1,000 mg (two 500 mg pills) every 8 hours as needed of Extra Strength Tylenol.     Ibuprofen (e.g., Motrin, Advil): Take 400 mg (two 200 mg pills) by mouth every 6 hours as needed.     Acetaminophen is thought to be safer than ibuprofen or naproxen for people over 65 years old. Acetaminophen is in many OTC and prescription medicines. It might be in more than one medicine that you are taking. You need to be careful and not take an overdose. Before taking any medicine, read all the instructions on the package.    Caution - NSAIDs (e.g., ibuprofen, naproxen): Do not take nonsteroidal anti-inflammatory drugs (NSAIDs) if you have stomach problems, kidney disease, heart failure, or other contraindications to using this type of medicine. Do not  take NSAID medicines for over 7 days without consulting your PCP. Do not take NSAID medicines if you are pregnant. Do not take NSAID medicines if you are also taking blood thinners.     Call Back If: Breathing difficulty develops or you become worse.    Thank you for limiting contact with others, wearing a simple mask to cover your cough, practice good hand hygiene habits and accessing our virtual services where possible to limit the spread of this virus.    For more information about COVID19 and options for caring for yourself at home, please visit the CDC website at https://www.cdc.gov/coronavirus/2019-ncov/about/steps-when-sick.html  For more options for care at [facility], please visit our website at [email or web address]

## 2020-03-19 ASSESSMENT — ASTHMA QUESTIONNAIRES: ACT_TOTALSCORE: 11

## 2020-04-02 ENCOUNTER — MYC MEDICAL ADVICE (OUTPATIENT)
Dept: FAMILY MEDICINE | Facility: CLINIC | Age: 40
End: 2020-04-02

## 2020-04-02 NOTE — TELEPHONE ENCOUNTER
Pt also sent [employer] message:  Marlene Godwin routed conversation to Fz Rn Triage Pool 24 minutes ago (12:39 PM)       Daniella Sexton Laura Lorraine, JENAE CNP 50 minutes ago (12:13 PM)         i also have really bad diarrhea     Renetta,  Please see [employer] message below and advise.    Danna Payne RN  [facility]

## 2020-04-02 NOTE — LETTER
April 2, 2020      Daniella Sexton  [address]  [facility] [address]        To Whom It May Concern:    Daniella Sexton  was seen on 3/18/20.  Please excuse her through 04/02/20 due to illness. Patient will likely need extension of this leave pending further medical evaluation.        Sincerely,        JENAE Sibley CNP

## 2020-04-03 ENCOUNTER — MYC MEDICAL ADVICE (OUTPATIENT)
Dept: FAMILY MEDICINE | Facility: CLINIC | Age: 40
End: 2020-04-03

## 2020-04-03 DIAGNOSIS — R91.8 PULMONARY NODULES: Primary | ICD-10-CM

## 2020-04-03 NOTE — TELEPHONE ENCOUNTER
Please see [employer] message - CT scan expires on 5/2/2020 so new order will need to be placed.   Order lucas'd up if appropriate.     Yenny Negron RN

## 2020-04-07 ENCOUNTER — E-VISIT (OUTPATIENT)
Dept: FAMILY MEDICINE | Facility: CLINIC | Age: 40
End: 2020-04-07
Payer: COMMERCIAL

## 2020-04-07 ENCOUNTER — MYC REFILL (OUTPATIENT)
Dept: PALLIATIVE MEDICINE | Facility: CLINIC | Age: 40
End: 2020-04-07

## 2020-04-07 DIAGNOSIS — M54.41 CHRONIC BILATERAL LOW BACK PAIN WITH BILATERAL SCIATICA: ICD-10-CM

## 2020-04-07 DIAGNOSIS — G89.29 CHRONIC BILATERAL LOW BACK PAIN WITH BILATERAL SCIATICA: ICD-10-CM

## 2020-04-07 DIAGNOSIS — Z72.0 TOBACCO ABUSE: Primary | ICD-10-CM

## 2020-04-07 DIAGNOSIS — M54.42 CHRONIC BILATERAL LOW BACK PAIN WITH BILATERAL SCIATICA: ICD-10-CM

## 2020-04-07 PROCEDURE — 99421 OL DIG E/M SVC 5-10 MIN: CPT | Performed by: NURSE PRACTITIONER

## 2020-04-07 RX ORDER — HYDROCODONE BITARTRATE AND ACETAMINOPHEN 5; 325 MG/1; MG/1
.5-1 TABLET ORAL 2 TIMES DAILY PRN
Qty: 30 TABLET | Refills: 0 | Status: CANCELLED | OUTPATIENT
Start: 2020-04-07

## 2020-04-07 NOTE — LETTER
[facility]  04/10/20    Patient: Daniella Sexton  YOB: 1980  Medical Record Number: [id number]                                                                  Opioid / Opioid Plus Controlled Substance Agreement    I understand that my care provider has prescribed an opioid (narcotic) controlled substance to help manage my condition(s). I am taking this medicine to help me function or work. I know this is strong medicine, and that it can cause serious side effects. Opioid medicine can be sedating, addicting and may cause a dependency on the drug. They can affect my ability to drive or think, and cause depression. They need to be taken exactly as prescribed. Combining opioids with certain medicines or chemicals (such as cocaine, sedatives and tranquilizers, sleeping pills, meth) can be dangerous or even fatal. Also, if I stop opioids suddenly, I may have severe withdrawal symptoms. Last, I understand that opioids do not work for all types of pain nor for all patients. If not helpful, I may be asked to stop them.      The risks, benefits, and side effects of these medicine(s) were explained to me. I agree that:    1. I will take part in other treatments as advised by my care team. This may be psychiatry or counseling, physical therapy, behavioral therapy, group treatment or a referral to a pain clinic. I will reduce or stop my medicine when my care team tells me to do so.  2. I will take my medicines as prescribed. I will not change the dose or schedule unless my care team tells me to. There will be no refills if I  run out early.   I may be contactedwithout warning and asked to complete a urine drug test or pill count at any time.   3. I will keep all my appointments, and understand this is part of the monitoring of opioids. My care team may require an office visit for EVERY opioid/controlled substance refill. If I miss appointments or don t follow instructions, my care team  may stop my medicine.  4. I will not ask other providers to prescribe controlled substances, and I will not accept controlled substances from other people. If I need another prescribed controlled substance for a new reason, I will tell my care team within 1 business day.  5. I will use one pharmacy to fill all of my controlled substance prescriptions, and it is up to me to make sure that I do not run out of my medicines on weekends or holidays. If my care team is willing to refill my opioid prescription without a visit, I must request refills only during office hours, refills may take up to 3 days to process, and it may take up to 5 to 7 days for my medicine to be mailed and ready at my pharmacy. Prescriptions will not be mailed anywhere except my pharmacy.        [id number]  Rev 12/18         Registration to scan to EHR                             Page 1 of 2               Controlled Substance Agreement Opioid        [facility]  04/10/20  Patient: Daniella Sexton  YOB: 1980  Medical Record Number: [id number]                                                                  6. I am responsible for my prescriptions. If the medicine/prescription is lost or stolen, it will not be replaced. I also agree not to share controlled substance medicines with anyone.  7. I agree to not use ANY illegal or recreational drugs. This includes marijuana, cocaine, bath salts or other drugs. I agree not to use alcohol unless my care team says I may.          I agree to give urine samples whenever asked. If I don t give a urine sample, the care team may stop my medicine.    8. If I enroll in the [place] Medical Marijuana program, I will tell my care team. I will also sign an agreement to share my medical records with my care team.   9. I will bring in my list of medicines (or my medicine bottles) each time I come to the clinic.   10. I will tell my care team right away if I become pregnant or have a  new medical problem treated outside of my regular clinic.  11. I understand that this medicine can affect my thinking and judgment. It may be unsafe for me to drive, use machinery and do dangerous tasks. I will not do any of these things until I know how the medicine affects me. If my dose changes, I will wait to see how it affects me. I will contact my care team if I have concerns about medicine side effects.    I understand that if I do not follow any of the conditions above, my prescriptions or treatment may be stopped.      I agree that my provider, clinic care team, and pharmacy may work with any city, state or federal law enforcement agency that investigates the misuse, sale, or other diversion of my controlled medicine. I will allow my provider to discuss my care with or share a copy of this agreement with any other treating provider, pharmacy or emergency room where I receive care. I agree to give up (waive) any right of privacy or confidentiality with respect to these consents.     I have read this agreement and have asked questions about anything I did not understand.      ________________________________________________________________________  Patient signature - Date/Time -  Daniella Sexton                                      ________________________________________________________________________  Witness signature                                                            ________________________________________________________________________  Provider signature - Colby Moss MD      [id number]  Rev 12/18         Registration to scan to EHR                         Page 2 of 2                   Controlled Substance Agreement Opioid           Page 1 of 2  Opioid Pain Medicines (also known as Narcotics)  What You Need to Know    What are opioids?   Opioids are pain medicines that must be prescribed by a doctor.  They are also known as narcotics.    Examples are:     morphine (MS Contin,  Liyah)    oxycodone (Oxycontin)    oxycodone and acetaminophen (Percocet)    hydrocodone and acetaminophen (Vicodin, Norco)     fentanyl patch (Duragesic)     hydromorphone (Dilaudid)     methadone     What do opioids do well?   Opioids are best for short-term pain after a surgery or injury. They also work well for cancer pain. Unlike other pain medicines, they do not cause liver or kidney failure or ulcers. They may help some people with long-lasting (chronic) pain.     What do opioids NOT do well?   Opioids never get rid of pain entirely, and they do not work well for most patients with chronic pain. Opioids do not reduce swelling, one of the causes of pain. They also don t work well for nerve pain.                           For informational purposes only.  Not to replace the advice of your care provider.  Copyright 201 [facility]. All right reserved. [employer] [id number] 02/18.      Page 2 of 2    Risks and side effects   Talk to your doctor before you start or decide to keep taking one of these medicines. Side effects include:    Lowering your breathing rate enough to cause death    Overdose, including death, especially if taking higher than prescribed doses    Long-term opioid use    Worse depression symptoms; less pleasure in things you usually enjoy    Feeling tired or sluggish    Slower thoughts or cloudy thinking    Being more sensitive to pain over time; pain is harder to control    Trouble sleeping or restless sleep    Changes in hormone levels (for example, less testosterone)    Changes in sex drive or ability to have sex    Constipation    Unsafe driving    Itching and sweating    Feeling dizzy    Nausea, vomiting and dry mouth    What else should I know about opioids?  When someone takes opioids for too long or too often, they become dependent. This means that if you stop or reduce the medicine too quickly, you will have withdrawal symptoms.    Dependence is not the same as addiction.  Addiction is when people keep using a substance that harms their body, their mind or their relations with others. If you have a history of drug or alcohol abuse, taking opioids can cause a relapse.    Over time, opioids don t work as well. Most people will need higher and higher doses. The higher the dose, the more serious the side effects. We don t know the long-term effects of opioids.      Prescribed opioids aren't the best way to manage chronic pain    Other ways to manage pain include:      Ibuprofen or acetaminophen.  You should always try this first.      Treat health problems that may be causing pain.      acupuncture or massage, deep breathing, meditation, visual imagery, aromatherapy.      Use heat or ice at the pain site      Physical therapy and exercise      Stop smoking      See a counselor or therapist                                                  People who have used opioids for a long time may have a lower quality of life, worse depression, higher levels of pain and more visits to doctors.    Never share your opioids with others. Be sure to store opioids in a secure place, locked if possible.Young children can easily swallow them and overdose.     You can overdose on opioids.  Signs of overdose include decrease or loss of consciousness, slowed breathing, trouble waking and blue lips.  If someone is worried about overdose, they should call 911.    If you are at risk for overdose, you may get naloxone (Narcan, a medicine that reverses the effects of opioids.  If you overdose, a friend or family member can give you Narcan while waiting for the ambulance.  They need to know the signs of overdose and how to give Narcan.    While you're taking opioids:    Don't use alcohol or street drugs. Taking them together can cause death.    Don't take any of these medicines unless your doctor says its okay.  Taking these with opioids can cause death.    Benzodiazepines (such as lorazepam         or  diazepam)    Muscle relaxers (such as cyclobenzaprine)    sleeping pills    other opioids    Safe disposal of opioids  Find your area drug take-back program, your pharmacy mail-back program, buy a special disposal bag (such as Deterra) from your pharmacy or flush them down the toilet.  Use the guidelines at:  www.fda.gov/drugs/resourcesforyou

## 2020-04-08 RX ORDER — BUPROPION HYDROCHLORIDE 150 MG/1
TABLET, FILM COATED, EXTENDED RELEASE ORAL
Qty: 63 TABLET | Refills: 5 | Status: SHIPPED | OUTPATIENT
Start: 2020-04-08 | End: 2020-04-15

## 2020-04-08 NOTE — TELEPHONE ENCOUNTER
Received call from patient requesting refill(s) of HYDROcodone-acetaminophen (NORCO) 5-325 MG tablet      Last dispensed from pharmacy on 2/14/2020    Pt last seen by prescribing provider on 2/6/2020  Next appt scheduled for none     checked in the past 6 months? Yes If no, print current report and give to RN    Last urine drug screen date 12/10/2019  Current opioid agreement on file? No Date of opioid agreement:    E-prescribe to     [id number] - [place], [place] - [address] AT [facility] [address] [facility] [address]  Phone: [phone] Fax: [phone]    Will route to nursing pool for review and preparation of prescription(s).

## 2020-04-08 NOTE — TELEPHONE ENCOUNTER
Milet message from patient on 4/7 at 1455:      Daniella NETTA Sexton would like a refill of the following medications:         HYDROcodone-acetaminophen (NORCO) 5-325 MG tablet [Colby Moss MD]     Preferred pharmacy: [id number] [address] AT [facility] [address] [facility]   -----------------    Will route to MA pool for assistance with gathering opioid refill information.    LEORA GaviriaN, RN-BC  Patient Care Supervisor/Care Coordinator  [facility] [place]

## 2020-04-09 ENCOUNTER — MYC MEDICAL ADVICE (OUTPATIENT)
Dept: FAMILY MEDICINE | Facility: CLINIC | Age: 40
End: 2020-04-09

## 2020-04-09 NOTE — LETTER
Certification of Health Care Provider  Family Medical Leave Act (FMLA)      Employer's name and contact:     Employee's job title: Regular work schedule:     Employee's essential job functions:     Patient's name: Daniella Sexton    Provider's name and business address:  Renetta Morrow  [address]  [place] [address]  Phone: [phone]      PART A:  MEDICAL FACTS  1)  Approximate date condition commenced:  4/3/19    Probable duration of condition:  6 months     Pravin below as applicable:  Was the patient admitted for an overnight stay in a hospital, hospice, or residential medical care facility?  no.    Date(s) you treated the patient for condition: 4/3/19, 05/08/19, 11/07/19    Will the patient need to have treatment visits at least twice per year due to the                     condition? yes    Was medication, other than over-the-counter medication prescribed?  yes    Was the patient referred to other health care provider(s) for evaluation or treatment     (e.g., physical therapist)?  yes:  State the nature of such treatments and expected duration of treatment: Pain Clinic for ongoing Pain Management       2)  Is the medical condition pregnancy?  no.      3)  Is the employee unable to perform any of his/her job functions due to the     condition: yes:  Identify the job functions the employee is unable to perform: prolonged standing      4)  Describe other relevant medical facts, if any, related to the condition which the employee seeks leave (such as medical facts may include symptoms, diagnosis, or any regimen of continuing treatment such as the use of specialized equipment):     Chronic low back pain      PART B: AMOUNT OF LEAVE NEEDED  5)  Will the employee be incapacitated for a single continuous period of time due to his/her medical condition, including any time for treatment and recovery?  no.    6)  Will the employee need to attend follow-up treatment  appointments or work part-time or on a reduced schedule because of the employee's medical condition?  no.     Patient must work with restrictions:  -No standing for more than 45 minutes continuously  -No lifting/pushing/pulling more than 20 lbs    7) Will the condition cause episodic flare ups periodically preventing the employee from performing his/her job functions? yes:  Is it medically necessary for the patient to be absent from work during the flare-ups?  yes:  Explain: flare-ups of back pain causing her to be unable to stand for long periods of time    Based upon the patient's medical history and your knowledge of the medical condition, estimate the frequency of flare-ups and the duration of related incapacity that the patient may have over the next six months (e.g., 1 episode every 3 months lasting 1-2 days):    Frequency: 3 X a month, once daily  Duration: 1 day(s)       ADDITIONAL INFORMATION: IDENTIFY QUESTION NUMBER WITH YOUR ADDITIONAL ANSWER       ===========================================    IF EMPLOYEE'S FAMILY MEMBER IS THE PATIENT, PLEASE COMPLETE SECTION BELOW:  N/A      13) Does the patient/family member need the employee to provide basic medical or personal needs, or for safety or transportation?      14)  If no, would the employee's presence to provide psychological comfort be beneficial to the patient or assist in the patient's recovery?        ================================================================    TO BE COMPLETED BY THE HEALTH CARE PROVIDER:    Signature:  Renetta Morrow ________________________________________  Date:   04/09/20

## 2020-04-09 NOTE — TELEPHONE ENCOUNTER
Chart review: Patient last seen 02/06/20.   Last OV note:  reasonable to continue 0.5-1 tablet of norco at day as needed for severe pain  - Follow up:               1-2 months    Unable to locate OA, medication previously written by PCP.    Routing to provider to review- ? opioid agreement via telephone with nursing-patient to return/mail signed copy.  Patient also due to be seen- nursing to advise accordingly.     Script prepped per below.     Norco 5-325, #30, Refill:NO  Sig:Min of 30day supply. Ok to dispense/start on 04/09/20  Last picked up 02/14/20  Due: Anytime      Gail LAU, RN Care Coordinator  [facility]  Pain Management     06-Oct-2019 06:05

## 2020-04-09 NOTE — TELEPHONE ENCOUNTER
Started the forms for the provider. Placed them at the providers desk to advise.      Rita Godwin,

## 2020-04-10 RX ORDER — HYDROCODONE BITARTRATE AND ACETAMINOPHEN 5; 325 MG/1; MG/1
.5-1 TABLET ORAL 2 TIMES DAILY PRN
Qty: 30 TABLET | Refills: 0 | Status: SHIPPED | OUTPATIENT
Start: 2020-04-10 | End: 2020-06-09

## 2020-04-10 NOTE — TELEPHONE ENCOUNTER
Provider is out of office. Sent the mychart to her. Will have her advise. Will review if not responded by 4/14/20 will send to a covering provider. Rita Godwin,

## 2020-04-10 NOTE — TELEPHONE ENCOUNTER
Please obtain opioid agreement via telephone with letter mailed to patient.  Will send prescription thank you for identifying this.

## 2020-04-10 NOTE — TELEPHONE ENCOUNTER
Writer reviewed OA over the phone with Pt.      Letter sent to Pt and Pt to send signed copy back.    Dany Shukla RN  Care Coordinator   [facility] [place]

## 2020-04-15 ENCOUNTER — E-VISIT (OUTPATIENT)
Dept: FAMILY MEDICINE | Facility: CLINIC | Age: 40
End: 2020-04-15
Payer: COMMERCIAL

## 2020-04-15 ENCOUNTER — MYC MEDICAL ADVICE (OUTPATIENT)
Dept: FAMILY MEDICINE | Facility: CLINIC | Age: 40
End: 2020-04-15

## 2020-04-15 DIAGNOSIS — L50.9 HIVES: Primary | ICD-10-CM

## 2020-04-15 DIAGNOSIS — Z72.0 TOBACCO ABUSE: ICD-10-CM

## 2020-04-15 PROCEDURE — 99421 OL DIG E/M SVC 5-10 MIN: CPT | Performed by: NURSE PRACTITIONER

## 2020-04-15 RX ORDER — TRIAMCINOLONE ACETONIDE 1 MG/G
CREAM TOPICAL 3 TIMES DAILY PRN
Qty: 80 G | Refills: 0 | Status: SHIPPED | OUTPATIENT
Start: 2020-04-15 | End: 2020-09-30

## 2020-04-15 RX ORDER — VARENICLINE TARTRATE 0.5 MG/1
0.5 TABLET, FILM COATED ORAL 2 TIMES DAILY
Qty: 60 TABLET | Refills: 5 | Status: SHIPPED | OUTPATIENT
Start: 2020-04-15 | End: 2020-09-30

## 2020-04-19 ENCOUNTER — MYC MEDICAL ADVICE (OUTPATIENT)
Dept: FAMILY MEDICINE | Facility: CLINIC | Age: 40
End: 2020-04-19

## 2020-04-20 NOTE — TELEPHONE ENCOUNTER
Please see [employer] messages.   Patient also had E-visit for same symptoms on 4/15/2020.    Yenny Negron RN

## 2020-05-05 ENCOUNTER — MYC MEDICAL ADVICE (OUTPATIENT)
Dept: FAMILY MEDICINE | Facility: CLINIC | Age: 40
End: 2020-05-05

## 2020-05-05 ENCOUNTER — OFFICE VISIT (OUTPATIENT)
Dept: INTERNAL MEDICINE | Facility: CLINIC | Age: 40
End: 2020-05-05
Payer: COMMERCIAL

## 2020-05-05 ENCOUNTER — VIRTUAL VISIT (OUTPATIENT)
Dept: FAMILY MEDICINE | Facility: CLINIC | Age: 40
End: 2020-05-05
Payer: COMMERCIAL

## 2020-05-05 VITALS
BODY MASS INDEX: 43.1 KG/M2 | RESPIRATION RATE: 16 BRPM | HEIGHT: 66 IN | OXYGEN SATURATION: 96 % | WEIGHT: 268.2 LBS | SYSTOLIC BLOOD PRESSURE: 137 MMHG | DIASTOLIC BLOOD PRESSURE: 87 MMHG | HEART RATE: 115 BPM

## 2020-05-05 DIAGNOSIS — M54.50 ACUTE EXACERBATION OF CHRONIC LOW BACK PAIN: Primary | ICD-10-CM

## 2020-05-05 DIAGNOSIS — M54.50 CHRONIC BILATERAL LOW BACK PAIN WITHOUT SCIATICA: ICD-10-CM

## 2020-05-05 DIAGNOSIS — G89.29 CHRONIC BILATERAL LOW BACK PAIN WITHOUT SCIATICA: ICD-10-CM

## 2020-05-05 DIAGNOSIS — G89.29 ACUTE EXACERBATION OF CHRONIC LOW BACK PAIN: Primary | ICD-10-CM

## 2020-05-05 PROCEDURE — 96372 THER/PROPH/DIAG INJ SC/IM: CPT | Performed by: INTERNAL MEDICINE

## 2020-05-05 PROCEDURE — 99213 OFFICE O/P EST LOW 20 MIN: CPT | Mod: 25 | Performed by: INTERNAL MEDICINE

## 2020-05-05 PROCEDURE — 99207 ZZC NON-BILLABLE SERV PER CHARTING: CPT | Performed by: NURSE PRACTITIONER

## 2020-05-05 RX ORDER — KETOROLAC TROMETHAMINE 30 MG/ML
30 INJECTION, SOLUTION INTRAMUSCULAR; INTRAVENOUS ONCE
Status: COMPLETED | OUTPATIENT
Start: 2020-05-05 | End: 2020-05-05

## 2020-05-05 RX ORDER — KETOROLAC TROMETHAMINE 30 MG/ML
30 INJECTION, SOLUTION INTRAMUSCULAR; INTRAVENOUS ONCE
Qty: 1 ML | Refills: 0 | Status: CANCELLED | OUTPATIENT
Start: 2020-05-05 | End: 2020-05-05

## 2020-05-05 RX ADMIN — KETOROLAC TROMETHAMINE 30 MG: 30 INJECTION, SOLUTION INTRAMUSCULAR; INTRAVENOUS at 14:58

## 2020-05-05 ASSESSMENT — MIFFLIN-ST. JEOR: SCORE: 1908.3

## 2020-05-05 NOTE — PROGRESS NOTES
Daniella Sexton is a 39 year old female who presents to clinic today for the following health issues:   Patient is coming in today for back pain she had a telephone visit and recommended to come in for an face to face visit for an evaluation of the back and a possible toradol shot.   HPI       Chief Complaint   Patient presents with     Back Pain   Patient just stated that she wants to come in for a Toradol shot for her back pain. Stated she been off work for a week now and needs to go back tomorrow if patient can come in for a Toradol shot        Back Pain     Duration: 6 days        Specific cause: having ongoing back pain    Description:   Location of pain: low back both but the left side is the worse  Character of pain: sharp, throbbing and constant pain  Pain radiation:radiates into the left hip  New numbness or weakness in legs, not attributed to pain:  no     Intensity: Currently 9/10, At its worst 10/10    History:   Pain interferes with job: YES  History of back problems: yes.  pinch nerve in lower back  Any previous MRI or X-rays: Yes- at [place].  Date 6/12/2019: X-ray, 06/20/2019: MRI, 07/24/2019: x-ray, 09/24/2019: x-ray  Sees a specialist for back pain:  Yes- Dr. Moss @ [facility] but states that she has been unable to get in for an appointment recently due to the COVID-19 pandemic.  Therapies tried without relief: pain cream, acetaminophen (Tylenol), NSAIDs, steroid injections, and opioids  Has had similar back pain in the past that a Toradol injection has helped. Her last Toradol injection was in 2/2020.    Alleviating factors:   Improved by: none      Precipitating factors:  Worsened by: Standing and Sitting       Accompanying Signs & Symptoms:  Risk of Fracture:  None  Risk of Cauda Equina:  None  Risk of Infection:  None  Risk of Cancer:  None  Risk of Ankylosing Spondylitis:  Onset at age <35, male, AND morning back stiffness. no             Patient Active Problem List    Diagnosis     Anxiety     Depressive disorder     Duodenal ulcer     History of alcohol abuse     History of methamphetamine abuse (H)     Migraines     Seasonal allergic rhinitis     Sleep disturbance     Tobacco abuse     CARDIOVASCULAR SCREENING; LDL GOAL LESS THAN 160     Chronic bilateral low back pain with bilateral sciatica     Pure hypercholesterolemia     Mild intermittent asthma without complication     Chronic pansinusitis     Obesity (BMI 35.0-39.9) with comorbidity (H)     Past Surgical History           Past Surgical History:   Procedure Laterality Date     BACK SURGERY   2005     L 3-4, L 4-5     LITHOTRIPSY   2016     OPTICAL TRACKING SYSTEM ENDOSCOPIC SINUS SURGERY Bilateral 1/10/2019     Procedure: BILATERAL IMAGE GUIDED ENDOSCOPIC SINUS SURGERY, BILATERAL TURBINATE REDUCTION;  Surgeon: Makiol Miguel MD;  Location: [facility]     RELEASE CARPAL TUNNEL Left       TONSILLECTOMY         TUBAL LIGATION              Social History            Tobacco Use     Smoking status: Current Every Day Smoker       Packs/day: 0.50       Types: Cigarettes     Smokeless tobacco: Never Used     Tobacco comment: 5 cigarettes/day   Substance Use Topics     Alcohol use: Yes       Comment: 3-4 beers a night      Family History         Family History   Problem Relation Age of Onset     Heart Disease No family hx of       Diabetes No family hx of       Cancer No family hx of               Current Outpatient Prescriptions          Current Outpatient Medications   Medication Sig Dispense Refill     albuterol (PROAIR HFA/PROVENTIL HFA/VENTOLIN HFA) 108 (90 Base) MCG/ACT inhaler Inhale 2 puffs into the lungs every 6 hours 8.5 g 3     amitriptyline (ELAVIL) 25 MG tablet Take 1 tablet (25 mg) by mouth At Bedtime 30 tablet 1     beclomethasone HFA (QVAR REDIHALER) 80 MCG/ACT inhaler Inhale 1 puff into the lungs 2 times daily 10.6 g 2     cyclobenzaprine (FLEXERIL) 10 MG tablet Take 1 tablet (10 mg) by mouth 3 times daily as  "needed for muscle spasms 60 tablet 1     diclofenac (VOLTAREN) 1 % topical gel Place 4 g onto the skin 4 times daily 100 g 11     gabapentin (NEURONTIN) 300 MG capsule Take 3 capsules (900 mg) by mouth 3 times daily 270 capsule 1     HYDROcodone-acetaminophen (NORCO) 5-325 MG tablet Take 0.5-1 tablets by mouth 2 times daily as needed for breakthrough pain Min of 30day supply. Ok to dispense/start on 04/09/20 30 tablet 0     ibuprofen (ADVIL/MOTRIN) 200 MG capsule Take 800 mg by mouth every 4 hours as needed for fever         nicotine (NICORETTE) 2 MG gum Place 1 each (2 mg) inside cheek as needed for smoking cessation 50 each 11     rizatriptan (MAXALT) 10 MG tablet Take 1 tablet (10 mg) by mouth at onset of headache for migraine 10 tablet 2     triamcinolone (KENALOG) 0.1 % external cream Apply topically 3 times daily as needed (itching) 80 g 0     varenicline (CHANTIX PAULA) 0.5 MG X 11 & 1 MG X 42 tablet Take 0.5 mg tab daily for 3 days, THEN 0.5 mg tab twice daily for 4 days, THEN 1 mg twice daily. 53 tablet 0     varenicline (CHANTIX) 0.5 MG tablet Take 1 tablet (0.5 mg) by mouth 2 times daily 60 tablet 5             Allergies   Allergen Reactions     Amoxicillin Anaphylaxis     Morphine Other (See Comments) and Nausea and Vomiting     Penicillins Unknown        Reviewed and updated as needed this visit by Provider  Tobacco  Allergies  Meds  Problems  Med Hx  Surg Hx  Fam Hx          Review of Systems    ROS: 4 point ROS neg other than the symptoms noted above in the HPI.          Objective    /87 (BP Location: Left arm, Cuff Size: Adult Large)   Pulse 115   Resp 16   Ht 1.676 m (5' 6\")   Wt 121.7 kg (268 lb 3.2 oz)   SpO2 96%   BMI 43.29 kg/m     GENERAL APPEARANCE: healthy, alert and mild distress  RESP: lungs clear to auscultation - no rales, rhonchi or wheezes  CV: regular rates and rhythm and normal S1 S2, no S3 or S4  PSYCH: mentation appears normal. and affect normal/bright  No pain to " palpation of lumbar spine     Diagnostic Test Results:  Labs reviewed in Epic  none            Assessment/Plan:  1. Chronic bilateral low back pain without sciatica  Ketorolac given for acute relief.   - ketorolac (TORADOL) injection 30 mg

## 2020-05-05 NOTE — NURSING NOTE
The following medication was given:     MEDICATION: Ketorolac Tromethamine 60MG/2ML (30 mg/mL) (Toradol)  ROUTE: IM  SITE: Deltoid - Right  DOSE: 30 mg per mL  LOT #: [id number]  :  [employer]  EXPIRATION DATE:  09/21  NDC#: 98236-973-33

## 2020-05-05 NOTE — PROGRESS NOTES
"Daniella Sexton is a 39 year old female who is being evaluated via a billable telephone visit.      The patient has been notified of following:     \"This telephone visit will be conducted via a call between you and your physician/provider. We have found that certain health care needs can be provided without the need for a physical exam.  This service lets us provide the care you need with a short phone conversation.  If a prescription is necessary we can send it directly to your pharmacy.  If lab work is needed we can place an order for that and you can then stop by our lab to have the test done at a later time.    Telephone visits are billed at different rates depending on your insurance coverage. During this emergency period, for some insurers they may be billed the same as an in-person visit.  Please reach out to your insurance provider with any questions.    If during the course of the call the physician/provider feels a telephone visit is not appropriate, you will not be charged for this service.\"    Patient has given verbal consent for Telephone visit?  Yes    What phone number would you like to be contacted at? [phone]    How would you like to obtain your AVS? MyChart    Subjective     Daniella Sexton is a 39 year old female who presents to clinic today for the following health issues:    HPI   Chief Complaint   Patient presents with     Back Pain   Patient just stated that she wants to come in for a Toradol shot for her back pain. Stated she been off work for a week now and needs to go back tomorrow if patient can come in for a Toradol shot      Back Pain       Duration: 6 days        Specific cause: having ongoing back pain    Description:   Location of pain: low back both but the left side is the worse  Character of pain: sharp, throbbing and constant pain  Pain radiation:radiates into the left hip  New numbness or weakness in legs, not attributed to pain:  no     Intensity: Currently 9/10, At its worst " 10/10    History:   Pain interferes with job: YES  History of back problems: yes.  pinch nerve in lower back  Any previous MRI or X-rays: Yes- at [place].  Date 6/12/2019: X-ray, 06/20/2019: MRI, 07/24/2019: x-ray, 09/24/2019: x-ray  Sees a specialist for back pain:  Yes- Dr. Moss @ [facility] clinic but states that she has been unable to get in for an appointment recently due to the COVID-19 pandemic.  Therapies tried without relief: pain cream, acetaminophen (Tylenol), NSAIDs, steroid injections, and opioids  Has had similar back pain in the past that a Toradol injection has helped. Her last Toradol injection was in 2/2020.    Alleviating factors:   Improved by: none      Precipitating factors:  Worsened by: Standing and Sitting      Accompanying Signs & Symptoms:  Risk of Fracture:  None  Risk of Cauda Equina:  None  Risk of Infection:  None  Risk of Cancer:  None  Risk of Ankylosing Spondylitis:  Onset at age <35, male, AND morning back stiffness. no       Patient Active Problem List   Diagnosis     Anxiety     Depressive disorder     Duodenal ulcer     History of alcohol abuse     History of methamphetamine abuse (H)     Migraines     Seasonal allergic rhinitis     Sleep disturbance     Tobacco abuse     CARDIOVASCULAR SCREENING; LDL GOAL LESS THAN 160     Chronic bilateral low back pain with bilateral sciatica     Pure hypercholesterolemia     Mild intermittent asthma without complication     Chronic pansinusitis     Obesity (BMI 35.0-39.9) with comorbidity (H)     Past Surgical History:   Procedure Laterality Date     BACK SURGERY  2005    L 3-4, L 4-5     LITHOTRIPSY  2016     OPTICAL TRACKING SYSTEM ENDOSCOPIC SINUS SURGERY Bilateral 1/10/2019    Procedure: BILATERAL IMAGE GUIDED ENDOSCOPIC SINUS SURGERY, BILATERAL TURBINATE REDUCTION;  Surgeon: Maikol Miguel MD;  Location: [facility]     RELEASE CARPAL TUNNEL Left      TONSILLECTOMY       TUBAL LIGATION         Social History     Tobacco  Use     Smoking status: Current Every Day Smoker     Packs/day: 0.50     Types: Cigarettes     Smokeless tobacco: Never Used     Tobacco comment: 5 cigarettes/day   Substance Use Topics     Alcohol use: Yes     Comment: 3-4 beers a night      Family History   Problem Relation Age of Onset     Heart Disease No family hx of      Diabetes No family hx of      Cancer No family hx of          Current Outpatient Medications   Medication Sig Dispense Refill     albuterol (PROAIR HFA/PROVENTIL HFA/VENTOLIN HFA) 108 (90 Base) MCG/ACT inhaler Inhale 2 puffs into the lungs every 6 hours 8.5 g 3     amitriptyline (ELAVIL) 25 MG tablet Take 1 tablet (25 mg) by mouth At Bedtime 30 tablet 1     beclomethasone HFA (QVAR REDIHALER) 80 MCG/ACT inhaler Inhale 1 puff into the lungs 2 times daily 10.6 g 2     cyclobenzaprine (FLEXERIL) 10 MG tablet Take 1 tablet (10 mg) by mouth 3 times daily as needed for muscle spasms 60 tablet 1     diclofenac (VOLTAREN) 1 % topical gel Place 4 g onto the skin 4 times daily 100 g 11     gabapentin (NEURONTIN) 300 MG capsule Take 3 capsules (900 mg) by mouth 3 times daily 270 capsule 1     HYDROcodone-acetaminophen (NORCO) 5-325 MG tablet Take 0.5-1 tablets by mouth 2 times daily as needed for breakthrough pain Min of 30day supply. Ok to dispense/start on 04/09/20 30 tablet 0     ibuprofen (ADVIL/MOTRIN) 200 MG capsule Take 800 mg by mouth every 4 hours as needed for fever       nicotine (NICORETTE) 2 MG gum Place 1 each (2 mg) inside cheek as needed for smoking cessation 50 each 11     rizatriptan (MAXALT) 10 MG tablet Take 1 tablet (10 mg) by mouth at onset of headache for migraine 10 tablet 2     triamcinolone (KENALOG) 0.1 % external cream Apply topically 3 times daily as needed (itching) 80 g 0     varenicline (CHANTIX PAULA) 0.5 MG X 11 & 1 MG X 42 tablet Take 0.5 mg tab daily for 3 days, THEN 0.5 mg tab twice daily for 4 days, THEN 1 mg twice daily. 53 tablet 0     varenicline (CHANTIX) 0.5 MG  tablet Take 1 tablet (0.5 mg) by mouth 2 times daily 60 tablet 5     Allergies   Allergen Reactions     Amoxicillin Anaphylaxis     Morphine Other (See Comments) and Nausea and Vomiting     Penicillins Unknown       Reviewed and updated as needed this visit by Provider  Tobacco  Allergies  Meds  Problems  Med Hx  Surg Hx  Fam Hx         Review of Systems   ROS COMP: Constitutional, HEENT, cardiovascular, pulmonary, gi and gu systems are negative, except as otherwise noted.       Objective   Reported vitals:  There were no vitals taken for this visit.   healthy, alert and no distress  PSYCH: Alert and oriented times 3; coherent speech, normal   rate and volume, able to articulate logical thoughts, able   to abstract reason, no tangential thoughts, no hallucinations   or delusions  Her affect is normal  RESP: No cough, no audible wheezing, able to talk in full sentences  Remainder of exam unable to be completed due to telephone visits    Diagnostic Test Results:  Labs reviewed in Epic  none         Assessment/Plan:  1. Acute exacerbation of chronic low back pain  Return for Needs face to face in clinic visit for evaluation of back and possible toradol injection. .    Phone call duration:  5 minutes    JENAE Archuleta CNP

## 2020-05-06 ENCOUNTER — MYC MEDICAL ADVICE (OUTPATIENT)
Dept: PALLIATIVE MEDICINE | Facility: CLINIC | Age: 40
End: 2020-05-06

## 2020-05-06 ENCOUNTER — TELEPHONE (OUTPATIENT)
Dept: PALLIATIVE MEDICINE | Facility: CLINIC | Age: 40
End: 2020-05-06

## 2020-05-06 DIAGNOSIS — M47.816 SPONDYLOSIS OF LUMBAR REGION WITHOUT MYELOPATHY OR RADICULOPATHY: Primary | ICD-10-CM

## 2020-05-06 DIAGNOSIS — M54.42 CHRONIC BILATERAL LOW BACK PAIN WITH BILATERAL SCIATICA: ICD-10-CM

## 2020-05-06 DIAGNOSIS — M54.41 CHRONIC BILATERAL LOW BACK PAIN WITH BILATERAL SCIATICA: ICD-10-CM

## 2020-05-06 DIAGNOSIS — G89.29 CHRONIC BILATERAL LOW BACK PAIN WITH BILATERAL SCIATICA: ICD-10-CM

## 2020-05-06 RX ORDER — GABAPENTIN 300 MG/1
900 CAPSULE ORAL 3 TIMES DAILY
Qty: 270 CAPSULE | Refills: 1 | Status: SHIPPED | OUTPATIENT
Start: 2020-05-06 | End: 2020-10-27

## 2020-05-06 NOTE — TELEPHONE ENCOUNTER
Received fax request from [facility] pharmacy requesting refill(s) for gabapentin (NEURONTIN) 300 MG capsule    Last refilled on 04/08/20    Pt last seen on 02/06/20  Next appt scheduled for : none    Will facilitate refill.

## 2020-05-06 NOTE — TELEPHONE ENCOUNTER
Reason for call:  Other   Patient called regarding (reason for call): call back  Additional comments: Pt would like a call back. States she is in excruciating pain and would like to have her RFA done.    Phone number to reach patient:  Home number on file [phone] (home)    Best Time:  anytime    Can we leave a detailed message on this number?  YES    Travel screening: Not Applicable     Dori SINGLETARY    [facility]

## 2020-05-06 NOTE — TELEPHONE ENCOUNTER
Dori Arriola    Coordinator    Signed    Creation Time:  5/6/2020  9:34 AM          Reason for call:  Other   Patient called regarding (reason for call): call back  Additional comments: Pt would like a call back. States she is in excruciating pain and would like to have her RFA done.     Phone number to reach patient:  Home number on file [phone] (home)   Best Time:  anytime     Can we leave a detailed message on this number?  YES   Travel screening: Not Applicable      Dori SINGLETARY    [facility]

## 2020-05-06 NOTE — TELEPHONE ENCOUNTER
Nidia sent to pt:  From: Carli Vargas RN      Created: 5/6/2020 3:42 PM      Hi Daniella,  We will be gradually opening procedure over the upcoming weeks. This is an evolving plan and is currently being determined by leadership.  At this time we are not scheduling non-essential injections at this time but will be soon.  I will send this on to our schedulers to add to their queue and to give you a call when able.     Touch base w/ us again next week sometime for status.     When you last saw Dr. Moss in clinic he had some med options.  Medication Management: Will make one change at a time.               - consider changing flexeril to another agent               - consider changing voltaren gel to oral form               - Increase elavil 25mg at night. Will titrate as tolerated          _____________________________________________________________________       Would you like to try any of the above?  Are you still taking elavil 25mg at bedtime?  Do you want to increase this?     What pharmacy do you want to use?     Take care,     Carli Vargas RN-BSN  [facility]

## 2020-05-07 ENCOUNTER — MYC MEDICAL ADVICE (OUTPATIENT)
Dept: FAMILY MEDICINE | Facility: CLINIC | Age: 40
End: 2020-05-07

## 2020-05-07 NOTE — TELEPHONE ENCOUNTER
Called and spoke to patient. Scheduled appointment with JENAE Hammond CNP on Wednesday, Varun 15,2020@ 8:00 am for video visit.     Kirsten Jeter CMA

## 2020-05-07 NOTE — TELEPHONE ENCOUNTER
Nidia message from patient on 5/6 at 1616:    yes im still taking amitriptyline 25 mg at bed,, i would like to change my voltaren gel to an oral form please, since it really dont work no more, i use [facility] in [place] in the mean time what should i do about the back pain i can barley get out of bed? my FMLA paper work states i would be missing only 3 days a month, im going to need something in writing for my work about missing due to back pain, updated FMLA papers, i do have short term disability threw work but i also need a note to receive any of that after the first 7 days  ---------------  Message sent to pt:    Hafsa Brewer,     I am sorry to hear that you are struggling so much with your pain and, as Carli said, we will reach out to you as soon as we are able to start scheduling RFAs again. I will let Dr. Moss know that you are interested in changing to an oral form of voltaren.  You are due for another visit this month so please call the main clinic number at [phone] to schedule a telephone or video visit. It is important to stay connected even though we are not able to see you in person. In regard to FMLA paperwork and work notes, you will need to continue to get that from your primary care provider.     Take care,     Francine Sanderson, LEORAN, RN-BC  Patient Care Supervisor  [facility] [place]

## 2020-05-12 NOTE — PROGRESS NOTES
"Daniella Sexton is a 39 year old female who is being evaluated via a billable video visit.      The patient has been notified of following:     \"This video visit will be conducted via a call between you and your physician/provider. We have found that certain health care needs can be provided without the need for an in-person physical exam.  This service lets us provide the care you need with a video conversation.  If a prescription is necessary we can send it directly to your pharmacy.  If lab work is needed we can place an order for that and you can then stop by our lab to have the test done at a later time.    Video visits are billed at different rates depending on your insurance coverage.  Please reach out to your insurance provider with any questions.    If during the course of the call the physician/provider feels a video visit is not appropriate, you will not be charged for this service.\"    Patient has given verbal consent for Video visit? Yes    How would you like to obtain your AVS? Epi    Patient would like the video invitation sent by: Text to cell phone: [phone]    Will anyone else be joining your video visit? No      Subjective     Daniella Sexton is a 39 year old female who presents today via video visit for the following health issues:      HPI  Chronic/Recurring Back Pain Follow Up      Where is your back pain located? (Select all that apply) low back bilateral but left isworst    How would you describe your back pain?  burning, sharp and stabbing    Where does your back pain spread? the left buttock, the left  thigh and the left  knee    Since your last clinic visit for back pain, how has your pain changed? gradually worsening    Does your back pain interfere with your job? YES    Since your last visit, have you tried any new treatment? No      How many servings of fruits and vegetables do you eat daily?  2-3    On average, how many sweetened beverages do you drink each day (Examples: soda, " "juice, sweet tea, etc.  Do NOT count diet or artificially sweetened beverages)?   0    How many days per week do you exercise enough to make your heart beat faster? 3 or less    How many minutes a day do you exercise enough to make your heart beat faster? 9 or less    How many days per week do you miss taking your medication? 0        Video Start Time: 8:17 AM    -Had a shot of toradol and didn't improve pain at all  -Pain started 2- 2.5 weeks ago. Radiates into the left hip. Leg muscles twitching. No numbness/tingling. Pain radiating down both legs.   -Out of work x2 weeks as she can hardly get out of bed  -Using ice/heat, lidocaine patches    -Cough is getting better.   -Still getting occasional hives despite stopping Wellbutrin. Has not started Chantix yet.      Reviewed and updated as needed this visit by Provider         Review of Systems   Constitutional, HEENT, cardiovascular, pulmonary, GI, , musculoskeletal, neuro, skin, endocrine and psych systems are negative, except as otherwise noted.      Objective    There were no vitals taken for this visit.  Estimated body mass index is 43.29 kg/m  as calculated from the following:    Height as of 5/5/20: 1.676 m (5' 6\").    Weight as of 5/5/20: 121.7 kg (268 lb 3.2 oz).  Physical Exam     GENERAL: Healthy, alert and no distress  EYES: Eyes grossly normal to inspection.  No discharge or erythema, or obvious scleral/conjunctival abnormalities.  RESP: No audible wheeze, cough, or visible cyanosis.  No visible retractions or increased work of breathing.    MS: No gross musculoskeletal defects noted including low back area.  No visible edema.  SKIN: Visible skin clear. No significant rash, abnormal pigmentation or lesions.  NEURO: Cranial nerves grossly intact.  Mentation and speech appropriate for age.  PSYCH: Mentation appears normal, affect normal/bright, judgement and insight intact, normal speech and appearance well-groomed.      Diagnostic Test Results:  Labs " reviewed in Epic        Assessment & Plan     1. Chronic bilateral low back pain with bilateral sciatica  Start Medrol dosepak for flare of back pain/sciatica. Continue muscle relaxer, lidocaine patches. Recommend gentle activity such as walking short distances several times/day. Patient will contact pain clinic to see when she can be seen there again given current pandemic/limited appts.  FMLA forms modified for up to 4 weeks out of work- patient may return sooner if feeling well.  - cyclobenzaprine (FLEXERIL) 10 MG tablet; Take 1 tablet (10 mg) by mouth 3 times daily as needed for muscle spasms  Dispense: 60 tablet; Refill: 1  - methylPREDNISolone (MEDROL DOSEPAK) 4 MG tablet therapy pack; Follow Package Directions  Dispense: 21 tablet; Refill: 0  - order for DME; Equipment being ordered: Low back brace  Dispense: 1 each; Refill: 0  - Lidocaine (LIDOCARE) 4 % Patch; Place 1 patch onto the skin every 24 hours To prevent lidocaine toxicity, patient should be patch free for 12 hrs daily.  Dispense: 15 patch; Refill: 3     Tobacco Cessation:   reports that she has been smoking cigarettes. She has been smoking about 0.50 packs per day. She has never used smokeless tobacco.  Tobacco Cessation Action Plan: Pharmacotherapies : Chantix              See Patient Instructions    Return in about 2 weeks (around 5/27/2020) for as needed if not improving.    JENAE Sibley CNP  [facility]      Video-Visit Details    Type of service:  Video Visit    Video End Time:8:33 AM    Originating Location (pt. Location): Home    Distant Location (provider location):  [facility]     Platform used for Video Visit: Yesy    Return in about 2 weeks (around 5/27/2020) for as needed if not improving.       JENAE Sibley CNP

## 2020-05-13 ENCOUNTER — VIRTUAL VISIT (OUTPATIENT)
Dept: FAMILY MEDICINE | Facility: CLINIC | Age: 40
End: 2020-05-13
Payer: COMMERCIAL

## 2020-05-13 ENCOUNTER — MYC MEDICAL ADVICE (OUTPATIENT)
Dept: FAMILY MEDICINE | Facility: CLINIC | Age: 40
End: 2020-05-13

## 2020-05-13 DIAGNOSIS — G89.29 CHRONIC BILATERAL LOW BACK PAIN WITH BILATERAL SCIATICA: ICD-10-CM

## 2020-05-13 DIAGNOSIS — M54.41 CHRONIC BILATERAL LOW BACK PAIN WITH BILATERAL SCIATICA: ICD-10-CM

## 2020-05-13 DIAGNOSIS — M54.42 CHRONIC BILATERAL LOW BACK PAIN WITH BILATERAL SCIATICA: ICD-10-CM

## 2020-05-13 PROCEDURE — 99213 OFFICE O/P EST LOW 20 MIN: CPT | Mod: 95 | Performed by: NURSE PRACTITIONER

## 2020-05-13 RX ORDER — CYCLOBENZAPRINE HCL 10 MG
10 TABLET ORAL 3 TIMES DAILY PRN
Qty: 60 TABLET | Refills: 1 | Status: SHIPPED | OUTPATIENT
Start: 2020-05-13 | End: 2020-09-10 | Stop reason: ALTCHOICE

## 2020-05-13 RX ORDER — METHYLPREDNISOLONE 4 MG
TABLET, DOSE PACK ORAL
Qty: 21 TABLET | Refills: 0 | Status: SHIPPED | OUTPATIENT
Start: 2020-05-13 | End: 2020-09-30

## 2020-05-13 RX ORDER — LIDOCAINE 4 G/G
1 PATCH TOPICAL EVERY 24 HOURS
Qty: 15 PATCH | Refills: 3 | Status: SHIPPED | OUTPATIENT
Start: 2020-05-13 | End: 2020-09-30

## 2020-05-13 NOTE — LETTER
May 13, 2020      Daniella Sexton  [address]  [place] [address]        To Whom It May Concern:    Daniella Sexton  was seen on 05/13/20.  Please excuse her from 4/29/2020 until 5/31/2020 due to injury.        Sincerely,        JENAE Silbey CNP

## 2020-05-13 NOTE — Clinical Note
"I did new [facility] paperwork- could not do it under the \"communications\" tab because I copied an old one, so I'm not sure if patient is able to view. If not, please fax this for her to her employer. She did not have a fax number during our appt.  Also, please fax DME to medical supply store of patient's preference"

## 2020-05-13 NOTE — LETTER
Certification of Health Care Provider  Family Medical Leave Act (FMLA)      Employer's name and contact:     Employee's job title: Regular work schedule:     Employee's essential job functions:     Patient's name: Daniella Sexton    Provider's name and business address:  Renetta Morrow  [address]  [place] [address]  Phone: [phone]      PART A:  MEDICAL FACTS  1)  Approximate date condition commenced:  4/3/19    Probable duration of condition:  6 months     Pravin below as applicable:  Was the patient admitted for an overnight stay in a hospital, hospice, or residential medical care facility?  no.    Date(s) you treated the patient for condition: 4/3/19, 05/08/19, 11/07/19, 05/13/20    Will the patient need to have treatment visits at least twice per year due to the                     condition? yes    Was medication, other than over-the-counter medication prescribed?  yes    Was the patient referred to other health care provider(s) for evaluation or treatment     (e.g., physical therapist)?  yes:  State the nature of such treatments and expected duration of treatment: Pain Clinic for ongoing Pain Management       2)  Is the medical condition pregnancy?  no.      3)  Is the employee unable to perform any of his/her job functions due to the     condition: yes:  Identify the job functions the employee is unable to perform: prolonged standing      4)  Describe other relevant medical facts, if any, related to the condition which the employee seeks leave (such as medical facts may include symptoms, diagnosis, or any regimen of continuing treatment such as the use of specialized equipment):     Chronic low back pain      PART B: AMOUNT OF LEAVE NEEDED  5)  Will the employee be incapacitated for a single continuous period of time due to his/her medical condition, including any time for treatment and recovery?  yes:  Estimate the beginning and ending dates for the period  of incapacity: 4/29/2020- 5/31/2020    6)  Will the employee need to attend follow-up treatment appointments or work part-time or on a reduced schedule because of the employee's medical condition?  no.     Patient must work with restrictions:  -No standing for more than 45 minutes continuously  -No lifting/pushing/pulling more than 20 lbs    7) Will the condition cause episodic flare ups periodically preventing the employee from performing his/her job functions? yes:  Is it medically necessary for the patient to be absent from work during the flare-ups?  yes:  Explain: flare-ups of back pain causing her to be unable to stand for long periods of time    Based upon the patient's medical history and your knowledge of the medical condition, estimate the frequency of flare-ups and the duration of related incapacity that the patient may have over the next six months (e.g., 1 episode every 3 months lasting 1-2 days):    Frequency: 3 X a month, once daily  Duration: 1 day(s)       ADDITIONAL INFORMATION: IDENTIFY QUESTION NUMBER WITH YOUR ADDITIONAL ANSWER       ===========================================    IF EMPLOYEE'S FAMILY MEMBER IS THE PATIENT, PLEASE COMPLETE SECTION BELOW:  N/A      13) Does the patient/family member need the employee to provide basic medical or personal needs, or for safety or transportation?      14)  If no, would the employee's presence to provide psychological comfort be beneficial to the patient or assist in the patient's recovery?        ================================================================    TO BE COMPLETED BY THE HEALTH CARE PROVIDER:    Signature:  Renetta Morrow ________________________________________  Date:   05/13/20

## 2020-05-14 RX ORDER — DICLOFENAC SODIUM 75 MG/1
75 TABLET, DELAYED RELEASE ORAL 2 TIMES DAILY
Qty: 60 TABLET | Refills: 0 | Status: SHIPPED | OUTPATIENT
Start: 2020-05-14 | End: 2020-06-22

## 2020-05-14 NOTE — TELEPHONE ENCOUNTER
Dr. Moss's message was sent to pt via [employer].    Carli Vargas RN-BSN  [facility]

## 2020-05-14 NOTE — TELEPHONE ENCOUNTER
Order placed for diclofenac 75 mg twice daily.  Please instruct patient to take with food.  Please instruct patient did not take any other NSAIDs with this medication.

## 2020-05-19 ENCOUNTER — VIRTUAL VISIT (OUTPATIENT)
Dept: PALLIATIVE MEDICINE | Facility: CLINIC | Age: 40
End: 2020-05-19
Payer: COMMERCIAL

## 2020-05-19 DIAGNOSIS — M54.16 LUMBAR RADICULOPATHY: ICD-10-CM

## 2020-05-19 DIAGNOSIS — M79.18 MYOFASCIAL MUSCLE PAIN: ICD-10-CM

## 2020-05-19 DIAGNOSIS — M47.816 SPONDYLOSIS OF LUMBAR REGION WITHOUT MYELOPATHY OR RADICULOPATHY: ICD-10-CM

## 2020-05-19 PROCEDURE — 99214 OFFICE O/P EST MOD 30 MIN: CPT | Mod: 95 | Performed by: PAIN MEDICINE

## 2020-05-19 RX ORDER — AMITRIPTYLINE HYDROCHLORIDE 50 MG/1
50 TABLET ORAL AT BEDTIME
Qty: 30 TABLET | Refills: 1 | Status: SHIPPED | OUTPATIENT
Start: 2020-05-19 | End: 2020-08-12

## 2020-05-19 ASSESSMENT — PAIN SCALES - GENERAL: PAINLEVEL: MODERATE PAIN (5)

## 2020-05-19 NOTE — PROGRESS NOTES
[facility] follow-up  Chief Complaint:    Chief Complaint   Patient presents with     Pain     Video visit due to COVID-19     MME prescribed prior to seeing patient: 5 current MME:  Recommendations:    - Further procedures recommended:    - Reasonable to consider lumbar MEDIAL BRANCH BLOCK/FA  - Medication Management: Will make one change at a time.    - consider changing flexeril to another agent    - consider changing voltaren gel to oral form    - continue gabapentin to 900mg three times a day    - Increase elavil 25mg at night. Will titrate as tolerated    - reasonable to continue 0.5-1 tablet of norco at day as needed for severe pain  - Physical Therapy: continue  pain PHYSICAL THERAPY    - Clinical Health Psychologist to address issues of relaxation, behavioral change, coping style, and other factors important to improvement: consider in the future   - Follow up:    -3 months   - discussed the importance of smoking cessation and its association with pain              Interval: Patient recently evaluated by her PCP.  Patient placed on a Medrol Dosepak and continued Flexeril, lidocaine patches.  Additionally patient was switched to oral diclofenac.   The pt reports her pain sudenly got worse. SHe denies any specific inciting event.     The pt reports her main issue is her Left LBP radiating to her post/lat hip/leg.   On occasion the pain is so severe that she feels like her legs could give out.   The pt notes benefit since starting the medrol dose back. She feels like she can move a little more. She denies any recent falls.  She denies any overt progressive weakness.  She denies any incontinence.  The pain varies from a deep aching pain to a squeezing pain in nature.  The pain continues to be worse with lifting.  The pain is worse with both extension and flexion.  She notes some benefit with her current medical regimen.    In general she denies numbness and tingling       THE 4 A's OF  OPIOID MAINTENANCE ANALGESIA    Analgesia: y    Activity: y    Adverse effects: n    Adherence to Rx protocol: y    [place] Board of Pharmacy Data Base Reviewed:    YES;     Pain history:  Daniella Sexton is a 39 year old female who first started having problems with pain chronic hx of prior lumbar surgery. The pt reports 2004 hurt herself on the job. At that itme she was only having axial LBP. She reports benefit with surgery.  The pt reports 4/19 she started having bilateral LBP radiating to her LE. She denies any specific inciting event. Although she was doing more work on the floor. The pt reports numbness and tingling in her feet. She denies any buring. The pain is constant in her back. The pain is sharp shooting. The pain a deep ache. The pain is squeezing in nature. The pain is worse with flexion. The pain is worse with prolonged standing. Pain is worse when going from a seated to standing position. The pt notes some benefit with sitting. Some benefit with leaning back. The pt reports some benefit with gabapentin. The pt currently takes 900, 600, and 900. The pt takes flexeril BID. The pt takes norco approx once a day to help her sleep. The pt denies any progressive weaknes. She denies any recent falls. She denies any incontinene.    The pain sig affects her quality of life      She does smoke approx 5 cigs a day.  Pain rating: intensity  Averages 9/10 on a 0-10 scale.      Current treatments include:  norco   Flexeril bid   Gabapentin 900, 600, 900  -Cannabis  Elavil  Previous medication treatments included:  Robaxin-no benefit      Other treatments have included:  Daniella Sexton has been seen at a pain clinic in the past.    PT: last PHYSICAL THERAPY  8/18  Chiropractic: benefit   Acupuncture: n  TENs Unit: benefit  Injections:   Facet 7/19 benefit 1 week no pain  Bilateral L4/5 /L5-S1 on 9/4 minimal relief   Epidural w/o imaging prior to surgery in 2004 no benefit  Past Medical History:  No past medical  history on file.  Past Surgical History:  Past Surgical History:   Procedure Laterality Date     BACK SURGERY  2005    L 3-4, L 4-5     LITHOTRIPSY  2016     OPTICAL TRACKING SYSTEM ENDOSCOPIC SINUS SURGERY Bilateral 1/10/2019    Procedure: BILATERAL IMAGE GUIDED ENDOSCOPIC SINUS SURGERY, BILATERAL TURBINATE REDUCTION;  Surgeon: Maikol Miguel MD;  Location: [facility] OR     RELEASE CARPAL TUNNEL Left      TONSILLECTOMY       TUBAL LIGATION       Medications:  Current Outpatient Medications   Medication Sig Dispense Refill     albuterol (PROAIR HFA/PROVENTIL HFA/VENTOLIN HFA) 108 (90 Base) MCG/ACT inhaler Inhale 2 puffs into the lungs every 6 hours 8.5 g 3     amitriptyline (ELAVIL) 50 MG tablet Take 1 tablet (50 mg) by mouth At Bedtime 30 tablet 1     beclomethasone HFA (QVAR REDIHALER) 80 MCG/ACT inhaler Inhale 1 puff into the lungs 2 times daily 10.6 g 2     cyclobenzaprine (FLEXERIL) 10 MG tablet Take 1 tablet (10 mg) by mouth 3 times daily as needed for muscle spasms 60 tablet 1     diclofenac (VOLTAREN) 75 MG EC tablet Take 1 tablet (75 mg) by mouth 2 times daily 60 tablet 0     gabapentin (NEURONTIN) 300 MG capsule Take 3 capsules (900 mg) by mouth 3 times daily 270 capsule 1     HYDROcodone-acetaminophen (NORCO) 5-325 MG tablet Take 0.5-1 tablets by mouth 2 times daily as needed for breakthrough pain Min of 30day supply. Ok to dispense/start on 04/09/20 30 tablet 0     Lidocaine (LIDOCARE) 4 % Patch Place 1 patch onto the skin every 24 hours To prevent lidocaine toxicity, patient should be patch free for 12 hrs daily. 15 patch 3     methylPREDNISolone (MEDROL DOSEPAK) 4 MG tablet therapy pack Follow Package Directions 21 tablet 0     rizatriptan (MAXALT) 10 MG tablet Take 1 tablet (10 mg) by mouth at onset of headache for migraine 10 tablet 2     triamcinolone (KENALOG) 0.1 % external cream Apply topically 3 times daily as needed (itching) 80 g 0     diclofenac (VOLTAREN) 1 % topical gel Place 4 g onto the  skin 4 times daily (Patient not taking: Reported on 5/19/2020) 100 g 11     nicotine (NICORETTE) 2 MG gum Place 1 each (2 mg) inside cheek as needed for smoking cessation (Patient not taking: Reported on 5/12/2020) 50 each 11     order for DME Equipment being ordered: Low back brace 1 each 0     varenicline (CHANTIX PAULA) 0.5 MG X 11 & 1 MG X 42 tablet Take 0.5 mg tab daily for 3 days, THEN 0.5 mg tab twice daily for 4 days, THEN 1 mg twice daily. (Patient not taking: Reported on 5/5/2020) 53 tablet 0     varenicline (CHANTIX) 0.5 MG tablet Take 1 tablet (0.5 mg) by mouth 2 times daily (Patient not taking: Reported on 5/5/2020) 60 tablet 5     Allergies:     Allergies   Allergen Reactions     Amoxicillin Anaphylaxis     Morphine Other (See Comments) and Nausea and Vomiting     Penicillins Unknown     Social History:  Home situation: [facility]  Occupation/Schooling: y  Tobacco use: y  Alcohol use: n  Drug use: n  History of chemical dependency treatment: n    Family history:  Family History   Problem Relation Age of Onset     Heart Disease No family hx of      Diabetes No family hx of      Cancer No family hx of      Family history of headaches: n    Review of Systems:  Skin: negative  Eyes: negative  Ears/Nose/Throat: negative  Respiratory: No shortness of breath, dyspnea on exertion, cough, or hemoptysis  Cardiovascular: negative  Gastrointestinal: negative  Genitourinary: negative  Musculoskeletal: negative  Neurologic: negative  Psychiatric: negative  Hematologic/Lymphatic/Immunologic: negative  Endocrine: negative    Physical Exam:  There were no vitals filed for this visit.  Exam: Obese  Constitutional: healthy, alert and no distress  Respiratory: Speaking in full sentences no accessory muscles use     Psychiatric: mentation appears normal and affect normal/bright    Musculoskeletal exam:  Gait/Station/Posture: Antalgic         Lumbar spine:     ROM: decreased secondary to pain                 Slump: Positive on the  left negative on the right   MARCELLE/FADIR: neg               Neurologic exam:    Motor:  5/5 UE and LE strength      Diagnostic tests:  MRI reviewed with patient       T12-L1: No significant disc herniation. No spinal canal or neural  foraminal narrowing.      L1-L2: No significant disc herniation. No spinal canal or neural  foraminal narrowing.      L2-L3: No significant disc herniation. No spinal canal or neural  foraminal narrowing.      L3-L4: No significant disc herniation. No spinal canal or neural  foraminal narrowing.      L4-L5: Sequela of previous right-sided keyhole laminectomy and  discectomy. There is a circumferential disc bulge and endplate  osteophytic spurring along with bilateral facet hypertrophy resulting  in moderate left and mild right neural foraminal narrowing. No  significant central spinal canal narrowing following laminectomy.      L5-S1: No significant disc herniation. No spinal canal or neural  foraminal narrowing. Bilateral facet hypertrophy.     Paraspinous soft tissues: Normal.                                                                       IMPRESSION: Postoperative changes at the L4-L5 level with previous  right-sided keyhole laminectomy and microdiscectomy. Residual or  recurrent circumferential disc bulge and endplate osteophytic spurring  at this level with bilateral facet hypertrophy causes moderate left  and mild right neural foraminal narrowing.        D.I.R.E Score: Patient Selection for Chronic Opioid Analgesia  2        For each factor, rate the patient's score from 1 - 3 based on the explanations on the right.       Diagnosis             2         1 = Benign chronic condition with minimal objective findings or no definite medical diagnosis.  Examples:  fibromyalgia, migraine, headaches, non-specific back pain.  2 = Slowly progressive condition concordant with moderate pain, or fixed condition with moderate objective findings.  Examples: failed back surgery syndrome,  back pain with moderate degenerative changes, neuropathic pain.  3 = Advanced condition concordant with severe pain with objective findings.  Examples: severe ischemic vascular disease, advanced neuropathy, severe spinal stenosis.    Intractability             2         1 = Few therapies have been tried and the patient takes a passive role in his/her pain management process.   2 = Most costomary treatments have been tried but the patient is not fully engaged in the pain management process, or barriers prevent (insurance, transportation, medical illness)  3 = Patient fully engaged in a spectrum of appropriate treatments but with inadequate response.    Risk   (Risk = Total of P+C+R+S below)       Psychological             2         1 = Serious personality dysfunction or mental illness interfering with care.  Examples: personality disorder, severe affective disorder, significant personality issues.  2 = Personality or mental health interferes moderately.  Example: depression or anxiety disorder.  3 = Good communication with the clinic.  No significant personality dysfunction or mental illness.       Chemical      Health             2         1 = Active or very recent use of illicit drugs, excessive alcohol, or prescription drug abuse.  2 = Chemical coper (uses medications to cope with stress) or history of chemical dependency in remission.  3 = No CD history.  Not drug-focused or chemically reliant       Reliability             2         1 = History of numerous problems: medication misuse, missed appointments, rarely follows through.  2 = Occasional difficulties with compliance, but generally reliable.  3 = Highly reliable patient with medications, appointments and treatment.       Social      Support             2         1= Life in chaos.  Little family support and few close relationships.  Loss of most normal life roles.  2 = Reduction in some relationships and life roles.  3 = Supportive family/close  relationships.  Involved in work or school and no social isolation.    Efficacy score             2         1 = Poor function or minimal pain relief despite moderate to high doses.  2 = Moderate benefit with function improved in a number of ways (or insufficient info - hasn't tried opioid yet or very low doses or too short a trial.  3 = Good improvement in pain and function and quality of life with stable doses over time.                                    14    Total score = D + I + R + E    Score 7-13: Not a suitable candidate for long-term opioid analgesia  Score 14-21: May be a good candidate      Assessment/Plan:  Daniella Sexton is a 39 year old female who presents with the complaints of axial low back pain with occasional radiation to her left anterior leg..   Daniella was seen today for pain.    Diagnoses and all orders for this visit:    Myofascial muscle pain  -     amitriptyline (ELAVIL) 50 MG tablet; Take 1 tablet (50 mg) by mouth At Bedtime    Lumbar radiculopathy  -     amitriptyline (ELAVIL) 50 MG tablet; Take 1 tablet (50 mg) by mouth At Bedtime    Spondylosis of lumbar region without myelopathy or radiculopathy  -     amitriptyline (ELAVIL) 50 MG tablet; Take 1 tablet (50 mg) by mouth At Bedtime         - Further procedures recommended:    - Reasonable to consider lumbar MEDIAL BRANCH BLOCK/FA  - Medication Management: Will make one change at a time.    - consider changing flexeril to another agent    - continue voltaren PO   - continue gabapentin to 900mg three times a day    - Increase elavil 50mg at night. Will titrate as tolerated    - reasonable to continue 0.5-1 tablet of norco at day as needed for severe pain  - Physical Therapy: continue  pain PHYSICAL THERAPY    - Clinical Health Psychologist to address issues of relaxation, behavioral change, coping style, and other factors important to improvement: consider in the future   - Follow up:    -3 months   - discussed the importance of smoking  cessation and its association with pain                Total time spent was 30 minutes    Colby Moss MD  [facility]  This note was created with voice recognition software, and while reviewed for accuracy, typos may remain.

## 2020-05-19 NOTE — PROGRESS NOTES
"Daniella Sexton is a 39 year old female who is being evaluated via a billable video visit.      The patient has been notified of following:     \"This video visit will be conducted via a call between you and your physician/provider. We have found that certain health care needs can be provided without the need for an in-person physical exam.  This service lets us provide the care you need with a video conversation.  If a prescription is necessary we can send it directly to your pharmacy.  If lab work is needed we can place an order for that and you can then stop by our lab to have the test done at a later time.    Video visits are billed at different rates depending on your insurance coverage.  Please reach out to your insurance provider with any questions.    If during the course of the call the physician/provider feels a video visit is not appropriate, you will not be charged for this service.\"    Patient has given verbal consent for Video visit? Yes    How would you like to obtain your AVS? MyChart    Patient would like the video invitation sent by: Text to cell phone: [phone]    Will anyone else be joining your video visit? No        Video-Visit Details    Type of service:  Video Visit    Video Start Time:330Video End Time: 4 PM    Originating Location (pt. Location): Home    Distant Location (provider location):  [facility]     Platform used for Video Visit: Chika Moss MD        "

## 2020-05-20 ENCOUNTER — TELEPHONE (OUTPATIENT)
Dept: FAMILY MEDICINE | Facility: CLINIC | Age: 40
End: 2020-05-20

## 2020-05-20 NOTE — TELEPHONE ENCOUNTER
Reason for Call:  Other Forms    Detailed comments: Marie from [employer] calling, states they received a disability out of work form, but the forms did not include date of accident and the conditions of accident/disability. Please call back to clarify.    Phone: [phone]    Fax: [phone]    Best Time: any    Can we leave a detailed message on this number? YES    Call taken on 5/20/2020 at 1:22 PM by Ganga Jeter

## 2020-05-20 NOTE — TELEPHONE ENCOUNTER
Called [facility] back left message to call the TC line as message taken does not make sense calling her to clarify if message was right.  Bess Gibson,

## 2020-05-21 NOTE — TELEPHONE ENCOUNTER
"Marie called in needing additional information on what injury or a flare up of a chronic medical condition.    Patient reported to be having back pain at work and is wondering if this is why patient is off work. They are thinking it is a illness that flared up from a chronic illness.    They need to clarify if an injury happed or if this is just back pain as an illness that flared up of a chronic illness.    They need the statement from a medical staff member. This information can be left on a voicemail.    Need a provider to advise.    [employer] for Marie [phone]    Renetta took the patient off work \"4/29/2020 until 5/31/2020 due to injury\". See letter and FMLA letter in letters.     Rita Godwin,   "

## 2020-06-08 ENCOUNTER — MYC MEDICAL ADVICE (OUTPATIENT)
Dept: PALLIATIVE MEDICINE | Facility: CLINIC | Age: 40
End: 2020-06-08

## 2020-06-08 ENCOUNTER — MYC MEDICAL ADVICE (OUTPATIENT)
Dept: FAMILY MEDICINE | Facility: CLINIC | Age: 40
End: 2020-06-08

## 2020-06-08 DIAGNOSIS — M79.18 MYOFASCIAL MUSCLE PAIN: Primary | ICD-10-CM

## 2020-06-08 DIAGNOSIS — G89.29 CHRONIC BILATERAL LOW BACK PAIN WITH BILATERAL SCIATICA: Primary | ICD-10-CM

## 2020-06-08 DIAGNOSIS — M54.42 CHRONIC BILATERAL LOW BACK PAIN WITH BILATERAL SCIATICA: Primary | ICD-10-CM

## 2020-06-08 DIAGNOSIS — M54.41 CHRONIC BILATERAL LOW BACK PAIN WITH BILATERAL SCIATICA: Primary | ICD-10-CM

## 2020-06-08 NOTE — TELEPHONE ENCOUNTER
The prescription has been confirmed faxed to the DME provider listed below. Rita Godwin,     Equipment being ordered: Low back brace

## 2020-06-08 NOTE — TELEPHONE ENCOUNTER
Please fax 5/13/2020DME order over to [facility] and notify patient.     Yenny Negron RN

## 2020-06-08 NOTE — TELEPHONE ENCOUNTER
[facility]  [address] [place] [id number]  Phone: [phone]  Fax: [phone]    DME script needs to be signed. Please re-print order. Thank you, Rita Godwin,

## 2020-06-09 ENCOUNTER — MYC MEDICAL ADVICE (OUTPATIENT)
Dept: PALLIATIVE MEDICINE | Facility: CLINIC | Age: 40
End: 2020-06-09

## 2020-06-09 DIAGNOSIS — M54.41 CHRONIC BILATERAL LOW BACK PAIN WITH BILATERAL SCIATICA: ICD-10-CM

## 2020-06-09 DIAGNOSIS — M54.42 CHRONIC BILATERAL LOW BACK PAIN WITH BILATERAL SCIATICA: ICD-10-CM

## 2020-06-09 DIAGNOSIS — G89.29 CHRONIC BILATERAL LOW BACK PAIN WITH BILATERAL SCIATICA: ICD-10-CM

## 2020-06-09 RX ORDER — HYDROCODONE BITARTRATE AND ACETAMINOPHEN 5; 325 MG/1; MG/1
.5-1 TABLET ORAL 2 TIMES DAILY PRN
Qty: 30 TABLET | Refills: 0 | Status: SHIPPED | OUTPATIENT
Start: 2020-06-09 | End: 2020-08-12

## 2020-06-09 NOTE — TELEPHONE ENCOUNTER
Routed to the nursing pool and to the MA pool to process refill(s).    Gloria.    Carli Vargas, RN-BSN  [facility]

## 2020-06-09 NOTE — TELEPHONE ENCOUNTER
Medication refill information reviewed.     Last due:  Ok to dispense/start on 04/09/20     Due date:  anytime    Weaning instructions:  N/A    Prescriptions prepped for review.     Carli Vargas RN-BSN  [facility]

## 2020-06-09 NOTE — TELEPHONE ENCOUNTER
Per patient myChart message:  From: Dina Sexton      Created: 6/8/2020 5:18 AM      my back has been spasing since thursday wondering what else i should try, chani been back to work since last tuesday after 3 days of working i cant get my back to settle down, are we able to do the next procedure yet?  thanks  dina ngoc      __________________  Pt contacted primary care provider for a toradol shot.  This was approved but not scheduled yet.    Per Dr. Moss's 5/19/20 AVS:  - Further procedures recommended:               - Reasonable to consider lumbar MEDIAL BRANCH BLOCK/FA  - Medication Management: Will make one change at a time.               - consider changing flexeril to another agent -Pt has tried and failed robaxin.              - continue voltaren PO              - continue gabapentin to 900mg three times a day               - Increase elavil 50mg at night. Will titrate as tolerated               - reasonable to continue 0.5-1 tablet of norco at day as needed for severe pain  - Physical Therapy: continue  pain PHYSICAL THERAPY    - Clinical Health Psychologist to address issues of relaxation, behavioral change, coping style, and other factors important to improvement: consider in the future   - Follow up:               -3 months              - discussed the importance of smoking cessation and its association with pain      Mychart sent to pt:    From: Carli Vargas RN      Created: 6/9/2020 1:35 PM      Hi Dina,  Be sure to call primary care provider's office to get scheduled for the toradol shot.     Do you want to try a different muscle relaxer?  If so what pharmacy do you want to use?     We are scheduling injections now.  Do you want Dr. Moss to place that order?  The medial branch blocks to radiofrequency ablation is a 3 step process.     Carli Vargas, RN-BSN  [facility]

## 2020-06-09 NOTE — TELEPHONE ENCOUNTER
Per patient myChart message:  From: Daniella Sexton      Created: 6/9/2020 1:52 PM      yes lets try a different muscle relaxer, and i dont think i can have any more injections, also i need to have my vicodin refilled, please send it to [facility] in [place] off [address]        See the other encounter for the vicodin refill.    Dr. Moss pt would like to try another muscle relaxer. She has tried and failed flexeril and robaxin.    Carli Vargas, RN-BSN  [facility]

## 2020-06-09 NOTE — TELEPHONE ENCOUNTER
Received call from patient requesting refill(s) of HYDROcodone-acetaminophen (NORCO) 5-325 MG tablet    Last dispensed from pharmacy on 04/10/20    Pt last seen by prescribing provider on 05/19/20-virtual visit  Next appt scheduled for : none     checked in the past 6 months? Yes If no, print current report and give to RN    Last urine drug screen date 12/10/19  Current opioid agreement on file (completed within the last year) No Date of opioid agreement: Printed on 04/10/20, but nothing in media tab    Processing (pick one and delete the others):      E-prescribe to pharmacy-[id number] - [place], [place] - [address] [facility] [address]       Will route to nursing pool for review and preparation of prescription(s).

## 2020-06-09 NOTE — TELEPHONE ENCOUNTER
Please advise if OV with provider needed for if injection can be MA only.   CAM order needed, if MA appt only.

## 2020-06-10 ENCOUNTER — ALLIED HEALTH/NURSE VISIT (OUTPATIENT)
Dept: NURSING | Facility: CLINIC | Age: 40
End: 2020-06-10
Payer: COMMERCIAL

## 2020-06-10 DIAGNOSIS — M54.9 BACK PAIN: Primary | ICD-10-CM

## 2020-06-10 PROCEDURE — 96372 THER/PROPH/DIAG INJ SC/IM: CPT

## 2020-06-10 RX ORDER — KETOROLAC TROMETHAMINE 30 MG/ML
60 INJECTION, SOLUTION INTRAMUSCULAR; INTRAVENOUS ONCE
Status: COMPLETED | OUTPATIENT
Start: 2020-06-10 | End: 2020-06-10

## 2020-06-10 RX ADMIN — KETOROLAC TROMETHAMINE 60 MG: 30 INJECTION, SOLUTION INTRAMUSCULAR; INTRAVENOUS at 16:00

## 2020-06-10 RX ADMIN — KETOROLAC TROMETHAMINE 60 MG: 30 INJECTION, SOLUTION INTRAMUSCULAR; INTRAVENOUS at 16:01

## 2020-06-11 RX ORDER — TIZANIDINE 2 MG/1
2 TABLET ORAL 2 TIMES DAILY PRN
Qty: 60 TABLET | Refills: 1 | Status: SHIPPED | OUTPATIENT
Start: 2020-06-11 | End: 2020-09-10

## 2020-06-11 NOTE — TELEPHONE ENCOUNTER
Next option I would consider is Zanaflex.  Would start at 2 mg twice daily as needed and titrate as tolerated.  Additionally at last appointment we did discuss the concept of the lumbar medial branch block/RFA

## 2020-06-11 NOTE — TELEPHONE ENCOUNTER
This was done yesterday and given to patient while she was here with her daughter.  Renetta Morrow, CNP

## 2020-06-11 NOTE — TELEPHONE ENCOUNTER
FMLA forms is in letters. Sent a message to Odal[employer] to advise if the FMLA letter/ Form can be updated. Awaiting to hear back. Rita Godwin,

## 2020-06-12 ENCOUNTER — TELEPHONE (OUTPATIENT)
Dept: FAMILY MEDICINE | Facility: CLINIC | Age: 40
End: 2020-06-12

## 2020-06-12 NOTE — TELEPHONE ENCOUNTER
Spoke to patient regarding FMLA paperwork.  Her employer said that the current form they received wasn't worded correctly.    #7 on the form states frequency is 6 x a month with a duration of one day.  Patient states she has already missed 6 days since last Thursday due to her back, so the duration would not be correct.      The FMLA form going forward should work okay for her, however, she needs a note for her employer so she is covered for the days she has missed.    She is requesting a letter stating that she missed work June 5th thru June 12th due to ongoing back issues and would like this faxed to [phone].    Informed patient we will have Renetta review and write letter if able. Constance Mccarty,

## 2020-06-12 NOTE — LETTER
June 12, 2020      Daniella Sexton  [address]  [place] [address]        To Whom It May Concern:    Daniella Sexton  was seen on 5/13/20.  Please excuse her  until 6/5/20-6/12/20 due to injury.        Sincerely,        JENAE Sibley CNP

## 2020-06-12 NOTE — TELEPHONE ENCOUNTER
Reason for call:  Other   Patient called regarding (reason for call): call back  Additional comments: Patient is calling with some issues about the paperwork she filled out (1 day to 6 days)p. Please call back to discuss.  Fax: [phone]    Phone number to reach patient:  Home number on file [phone] (home)    Best Time:  any    Can we leave a detailed message on this number?  YES    Travel screening: Negative

## 2020-06-22 DIAGNOSIS — M47.816 SPONDYLOSIS OF LUMBAR REGION WITHOUT MYELOPATHY OR RADICULOPATHY: ICD-10-CM

## 2020-06-22 RX ORDER — DICLOFENAC SODIUM 75 MG/1
75 TABLET, DELAYED RELEASE ORAL 2 TIMES DAILY
Qty: 60 TABLET | Refills: 0 | Status: SHIPPED | OUTPATIENT
Start: 2020-06-22 | End: 2020-08-12

## 2020-06-22 NOTE — TELEPHONE ENCOUNTER
Pending Prescriptions:                       Disp   Refills    diclofenac (VOLTAREN) 75 MG EC tablet     60 tab*0            Sig: Take 1 tablet (75 mg) by mouth 2 times daily    Last refill 05/14/2020  Last office visit 5/19/2020  Next appointment No future appointment     Niesha Johnson MA

## 2020-07-15 ENCOUNTER — TRANSFERRED RECORDS (OUTPATIENT)
Dept: HEALTH INFORMATION MANAGEMENT | Facility: CLINIC | Age: 40
End: 2020-07-15

## 2020-08-03 ENCOUNTER — TRANSFERRED RECORDS (OUTPATIENT)
Dept: HEALTH INFORMATION MANAGEMENT | Facility: CLINIC | Age: 40
End: 2020-08-03

## 2020-08-11 ENCOUNTER — MYC REFILL (OUTPATIENT)
Dept: PALLIATIVE MEDICINE | Facility: CLINIC | Age: 40
End: 2020-08-11

## 2020-08-11 DIAGNOSIS — M47.816 SPONDYLOSIS OF LUMBAR REGION WITHOUT MYELOPATHY OR RADICULOPATHY: ICD-10-CM

## 2020-08-11 DIAGNOSIS — M54.16 LUMBAR RADICULOPATHY: ICD-10-CM

## 2020-08-11 DIAGNOSIS — G89.29 CHRONIC BILATERAL LOW BACK PAIN WITH BILATERAL SCIATICA: ICD-10-CM

## 2020-08-11 DIAGNOSIS — M54.41 CHRONIC BILATERAL LOW BACK PAIN WITH BILATERAL SCIATICA: ICD-10-CM

## 2020-08-11 DIAGNOSIS — M79.18 MYOFASCIAL MUSCLE PAIN: ICD-10-CM

## 2020-08-11 DIAGNOSIS — M54.42 CHRONIC BILATERAL LOW BACK PAIN WITH BILATERAL SCIATICA: ICD-10-CM

## 2020-08-11 RX ORDER — DICLOFENAC SODIUM 75 MG/1
75 TABLET, DELAYED RELEASE ORAL 2 TIMES DAILY
Qty: 60 TABLET | Refills: 0 | Status: CANCELLED | OUTPATIENT
Start: 2020-08-11

## 2020-08-11 RX ORDER — HYDROCODONE BITARTRATE AND ACETAMINOPHEN 5; 325 MG/1; MG/1
.5-1 TABLET ORAL 2 TIMES DAILY PRN
Qty: 30 TABLET | Refills: 0 | Status: CANCELLED | OUTPATIENT
Start: 2020-08-11

## 2020-08-11 RX ORDER — AMITRIPTYLINE HYDROCHLORIDE 50 MG/1
50 TABLET ORAL AT BEDTIME
Qty: 30 TABLET | Refills: 1 | Status: CANCELLED | OUTPATIENT
Start: 2020-08-11

## 2020-08-12 RX ORDER — HYDROCODONE BITARTRATE AND ACETAMINOPHEN 5; 325 MG/1; MG/1
.5-1 TABLET ORAL 2 TIMES DAILY PRN
Qty: 30 TABLET | Refills: 0 | Status: SHIPPED | OUTPATIENT
Start: 2020-08-12 | End: 2020-09-14

## 2020-08-12 RX ORDER — AMITRIPTYLINE HYDROCHLORIDE 50 MG/1
50 TABLET ORAL AT BEDTIME
Qty: 30 TABLET | Refills: 1 | Status: SHIPPED | OUTPATIENT
Start: 2020-08-12 | End: 2020-12-04

## 2020-08-12 RX ORDER — DICLOFENAC SODIUM 75 MG/1
75 TABLET, DELAYED RELEASE ORAL 2 TIMES DAILY
Qty: 60 TABLET | Refills: 0 | Status: SHIPPED | OUTPATIENT
Start: 2020-08-12 | End: 2020-09-14

## 2020-08-12 NOTE — TELEPHONE ENCOUNTER
Received [employer] message from patient requesting refill(s) for HYDROcodone-acetaminophen (NORCO) 5-325 MG tablet      Last dispensed from pharmacy on 6/9/2020    Pt last seen by prescribing provider on 5/19/2020  Next appt scheduled for none     checked in the past 6 months? YES If no, print current report and give to RN    Last urine drug screen date 12/10/2019  Current opioid agreement on file? Yes Date of opioid agreement: 4/1/2020    E-prescribe to:    [id number] - [place], [place] - [address] AT [facility] [address]    Will route to nursing pool for review and preparation of prescription(s).

## 2020-08-12 NOTE — TELEPHONE ENCOUNTER
[facility] from patient requesting refill(s) for amitriptyline (ELAVIL) 50 MG tablet     Last refilled on 6/20/2020    Pt last seen on 5/19/2020  Next appt scheduled for none    E-prescribe to:    [facility] #[id number] - [place], [place] - [address] [place] [facility] [address]     Will facilitate refill.

## 2020-08-12 NOTE — TELEPHONE ENCOUNTER
Received [email or web address] message from patient requesting refill(s) for diclofenac (VOLTAREN) 75 MG EC tablet     Last refilled on 6/22/2020    Pt last seen on 5/19/2020  Next appt scheduled for none    E-prescribe to:       [facility] #[id number] - [place], [place] - [address] AT [facility] [address] [facility]       Will facilitate refill.

## 2020-08-12 NOTE — TELEPHONE ENCOUNTER
Medication refill information reviewed.     Due date for HYDROcodone-acetaminophen (NORCO) 5-325 MG tablet is anytime     Prescriptions prepped for review.     Will route to provider.     Dany Shukla, RN  Care Coordinator   [facility]

## 2020-08-20 ENCOUNTER — VIRTUAL VISIT (OUTPATIENT)
Dept: FAMILY MEDICINE | Facility: OTHER | Age: 40
End: 2020-08-20

## 2020-08-20 NOTE — PROGRESS NOTES
"Date: 2020 16:46:00  Clinician: Pawel Posada  Clinician NPI: [id number]  Patient: IRMA WEST  Patient : 1980  Patient Address[place] [facility] [place] [address]  Patient Phone: [phone]  Visit Protocol: URI  Patient Summary:  IRMA is a 40 year old ( : 1980 ) female who initiated a Visit for COVID-19 (Coronavirus) evaluation and screening. When asked the question \"Please sign me up to receive news, health information and promotions. \", IRMA responded \"No\".    IRMA states her symptoms started suddenly 5-6 days ago. After her symptoms started, they improved and then got worse again.   Her symptoms consist of a headache, malaise, wheezing, diarrhea, a cough, nausea, and myalgia. She is experiencing mild difficulty breathing with activities but can speak normally in full sentences.   Symptom details     Cough: IRMA coughs a few times an hour and her cough is more bothersome at night. Phlegm does not come into her throat when she coughs. She does not believe her cough is caused by post-nasal drip.     Wheezing: IRMA has been diagnosed with asthma. Additional wheezing details as reported by the patient (free text): i hear it when im laying down to rest       Headache: She states the headache is mild (1-3 on a 10 point pain scale).      IRMA denies having ear pain, chills, fever, sore throat, teeth pain, ageusia, nasal congestion, vomiting, rhinitis, anosmia, and facial pain or pressure. She also denies having a sinus infection within the past year, having recent facial or sinus surgery in the past 60 days, and taking antibiotic medication in the past month.   Precipitating events  She has not recently been exposed to someone with influenza. IRMA has been in close contact with the following high risk individuals: people with asthma, heart disease or diabetes, children under the age of 5, and adults 65 or older.   Pertinent COVID-19 (Coronavirus) information  In the past 14 " days, IRMA has not worked in a congregate living setting.   She does not work or volunteer as healthcare worker or a  and does not work or volunteer in a healthcare facility.   IRMA also has not lived in a congregate living setting in the past 14 days. She does not live with a healthcare worker.   IRMA has not had a close contact with a laboratory-confirmed COVID-19 patient within 14 days of symptom onset.   Since December 2019, IRMA and has not had upper respiratory infection or influenza-like illness. Has not been diagnosed with lab-confirmed COVID-19 test   Pertinent medical history  IRMA does not get yeast infections when she takes antibiotics.   IRMA does not need a return to work/school note.   Weight: 265 lbs   IRMA smokes or uses smokeless tobacco.   She denies pregnancy and denies breastfeeding. She has menstruated in the past month.   Additional information as reported by the patient (free text): chani been sick since 8//15/2020 i have diarreha, major stomch pains and nauesa   Weight: 265 lbs    MEDICATIONS: diclofenac-misoprostol oral, amitriptyline-chlordiazepoxide oral, gabapentin enacarbil oral, ALLERGIES: Morpholine Analogues, Penicillins, amoxicillin  Clinician Response:  Dear IRMA,   Your symptoms show that you may have coronavirus (COVID-19). This illness can cause fever, cough and trouble breathing. Many people get a mild case and get better on their own. Some people can get very sick.  What should I do?  We would like to test you for this virus.   1. Please call [phone] to schedule your visit. Explain that you were referred by [facility] to have a COVID-19 test. Be ready to share your [facility] visit ID number.  The following will serve as your written order for this COVID Test, ordered by me, for the indication of suspected COVID [Z20.828]: The test will be ordered in wiseri, our electronic health record, after you are scheduled. It will show as ordered and authorized by  "Hay Winters MD.  Order: COVID-19 (Coronavirus) PCR for SYMPTOMATIC testing from [facility].      2. When it's time for your COVID test:  Stay at least 6 feet away from others. (If someone will drive you to your test, stay in the backseat, as far away from the  as you can.)   Cover your mouth and nose with a mask, tissue or washcloth.  Go straight to the testing site. Don't make any stops on the way there or back.      3.Starting now: Stay home and away from others (self-isolate) until:   You've had no fever---and no medicine that reduces fever---for one full day (24 hours). And...   Your other symptoms have gotten better. For example, your cough or breathing has improved. And...   At least 10 days have passed since your symptoms started.       During this time, don't leave the house except for testing or medical care.   Stay in your own room, even for meals. Use your own bathroom if you can.   Stay away from others in your home. No hugging, kissing or shaking hands. No visitors.  Don't go to work, school or anywhere else.    Clean \"high touch\" surfaces often (doorknobs, counters, handles, etc.). Use a household cleaning spray or wipes. You'll find a full list of  on the EPA website: www.epa.gov/pesticide-registration/list-n-disinfectants-use-against-sars-cov-2.   Cover your mouth and nose with a mask, tissue or washcloth to avoid spreading germs.  Wash your hands and face often. Use soap and water.  Caregivers in these groups are at risk for severe illness due to COVID-19:  o People 65 years and older  o People who live in a nursing home or long-term care facility  o People with chronic disease (lung, heart, cancer, diabetes, kidney, liver, immunologic)  o People who have a weakened immune system, including those who:   Are in cancer treatment  Take medicine that weakens the immune system, such as corticosteroids  Had a bone marrow or organ transplant  Have an immune deficiency  Have poorly controlled HIV " or AIDS  Are obese (body mass index of 40 or higher)  Smoke regularly   o Caregivers should wear gloves while washing dishes, handling laundry and cleaning bedrooms and bathrooms.  o Use caution when washing and drying laundry: Don't shake dirty laundry, and use the warmest water setting that you can.  o For more tips, go to www.cdc.gov/coronavirus/2019-ncov/downloads/10Things.pdf.    4.Sign up for [employer]. We know it's scary to hear that you might have COVID-19. We want to track your symptoms to make sure you're okay over the next 2 weeks. Please look for an email from [employer]---this is a free, online program that we'll use to keep in touch. To sign up, follow the link in the email. Learn more at [email or web address]  How can I take care of myself?   Get lots of rest. Drink extra fluids (unless a doctor has told you not to).   Take Tylenol (acetaminophen) for fever or pain. If you have liver or kidney problems, ask your family doctor if it's okay to take Tylenol.   Adults can take either:    650 mg (two 325 mg pills) every 4 to 6 hours, or...   1,000 mg (two 500 mg pills) every 8 hours as needed.    Note: Don't take more than 3,000 mg in one day. Acetaminophen is found in many medicines (both prescribed and over-the-counter medicines). Read all labels to be sure you don't take too much.   For children, check the Tylenol bottle for the right dose. The dose is based on the child's age or weight.    If you have other health problems (like cancer, heart failure, an organ transplant or severe kidney disease): Call your specialty clinic if you don't feel better in the next 2 days.       Know when to call 911. Emergency warning signs include:    Trouble breathing or shortness of breath Pain or pressure in the chest that doesn't go away Feeling confused like you haven't felt before, or not being able to wake up Bluish-colored lips or face.  Where can I get more information?   [facility] [place] --  About COVID-19: [email or web address]   CDC -- What to Do If You're Sick: www.cdc.gov/coronavirus/2019-ncov/about/steps-when-sick.html   CDC -- Ending Home Isolation: www.cdc.gov/coronavirus/2019-ncov/hcp/disposition-in-home-patients.html   [facility] -- Caring for Someone: www.cdc.gov/coronavirus/2019-ncov/if-you-are-sick/care-for-someone.html   [facility] -- Interim Guidance for Hospital Discharge to Home: www.[facility].[place]./diseases/coronavirus/hcp/hospdischarge.pdf   [facility] clinical trials (COVID-19 research studies): clinicalaffairs.[facility]-clinical-trials    Below are the COVID-19 hotlines at the [place] Department of Health ([facility]). Interpreters are available.    For health questions: Call 141-253-6802 or 1-744.892.7754 (7 a.m. to 7 p.m.) For questions about schools and childcare: Call 762-228-8076 or 1-886.803.2786 (7 a.m. to 7 p.m.)    Diagnosis: Other malaise  Diagnosis ICD: R53.81

## 2020-08-24 DIAGNOSIS — Z20.822 SUSPECTED 2019 NOVEL CORONAVIRUS INFECTION: Primary | ICD-10-CM

## 2020-08-24 DIAGNOSIS — Z20.822 SUSPECTED 2019 NOVEL CORONAVIRUS INFECTION: ICD-10-CM

## 2020-08-24 PROCEDURE — U0003 INFECTIOUS AGENT DETECTION BY NUCLEIC ACID (DNA OR RNA); SEVERE ACUTE RESPIRATORY SYNDROME CORONAVIRUS 2 (SARS-COV-2) (CORONAVIRUS DISEASE [COVID-19]), AMPLIFIED PROBE TECHNIQUE, MAKING USE OF HIGH THROUGHPUT TECHNOLOGIES AS DESCRIBED BY CMS-2020-01-R: HCPCS | Performed by: FAMILY MEDICINE

## 2020-08-25 LAB
SARS-COV-2 RNA SPEC QL NAA+PROBE: NOT DETECTED
SPECIMEN SOURCE: NORMAL

## 2020-09-04 ENCOUNTER — MYC MEDICAL ADVICE (OUTPATIENT)
Dept: PALLIATIVE MEDICINE | Facility: CLINIC | Age: 40
End: 2020-09-04

## 2020-09-08 ENCOUNTER — TELEPHONE (OUTPATIENT)
Dept: PALLIATIVE MEDICINE | Facility: CLINIC | Age: 40
End: 2020-09-08

## 2020-09-08 NOTE — TELEPHONE ENCOUNTER
Screening questions for MBB Injections:    Injection to be done at which interventional clinic site? [facility]         Instruct patient to arrive as directed prior to the scheduled appointment time:    [place] [facility] [place]: 30 minutes before      Procedure ordered by Dr. Moss    Procedure ordered? Lumbar Medial Branch Block    What insurance would patient like us to bill for this procedure? BCBS [place]       Worker's comp- Any injection DO NOT SCHEDULE and route to Renee Reddy.      [employer] insurance - If scheduling an SI joint injection DO NOT SCHEDULE and route to Renee Reddy.       MBBs must be scheduled with elapsed time interval of at least 2 weeks and not more than 6 months between the First MBB and the Second MBB for insurance purposes       [employer] - Any injection besides hip/shoulder/knee joint DO NOT SCHEDULE and route to Renee Reddy. She will obtain PA and call pt back to schedule procedure or notify pt of denial.       [employer]- PA required for all MBB's      **BCBS- ALL need to be routed to Renee for review if a PA is needed**    Any chance of pregnancy? NO   If YES, do NOT schedule and route to RN pool    Is an  needed? No     Patient has a drive home? (mandatory) Yes     Is patient taking any blood thinners (plavix, coumadin, jantoven, warfarin, heparin, pradaxa or dabigatran )? No    If hold needed, do NOT schedule, route to RN pool     Is patient taking any aspirin products? No     If more than 325mg/day, OK to schedule; Instruct pt to decrease to less than 325 mg for 7 days AND route to RN pool    For CERVICAL procedures, hold all aspirin products for 6 days.     Tell pt that if aspirin product is not held for 6 days, the procedure WILL BE cancelled.      Does the patient have a bleeding or clotting disorder? No    If YES, okay to schedule AND route to RN nurse pool    **For any patients with platelet count <100, must be forwarded to  provider**    Is patient diabetic? NO If YES, have them bring their glucometer.    Does patient have an active infection or treated for one within the past week? No    Is patient currently taking any antibiotics?  No    For patients on chronic, preventative, or prophylactic antibiotics, procedures may be scheduled.     For patients on antibiotics for active or recent infection:antibiotic course must have been completed for 4 days    Is patient currently taking any steroid medications? (i.e. Prednisone, Medrol)  No     For patients on steroid medications, course must have been completed for 4 days    Is patient actively being treated for cancer or immunocompromised? No   If YES, do NOT schedule and route to RN    Are you able to get on and off an exam table with minimal or no assistance? Yes   If NO, do NOT schedule and route to RN    Are you able to roll over and lay on your stomach with minimal or no assistance? Yes   If NO, do NOT schedule and route to RN    Any allergies to contrast dye, iodine, shellfish, or numbing and steroid medications? No  (If so, inform nursing and note in scheduling comments.)    Allergies: Amoxicillin; Morphine; and Penicillins     Has the patient had a flu shot or any other vaccinations within 7 days before or after the procedure.  Yes- 9/9     Does patient have an MRI/CT?  YES: 2019  Check Procedure Scheduling Grid to see if required.      Was the MRI done within the last 3 years?  Yes    If yes, where was the MRI done i.e.[facility], [facility], [facility], [facility] etc? [facility]      If no, do not schedule and route to nursing    If MRI was not done at [facility], [facility] or [facility] do NOT schedule and route to nursing.      If pt has an imaging disc, the injection MAY be scheduled but pt has to bring disc to appt.     If they show up without the disc the injection cannot be done      Medial Branch Block Pre-Procedure Instructions    It is okay to take long acting pain medications  (if you are on them) the day of the procedure but try not to take any short acting medications unless absolutely necessary.    YES: Informed   Long acting meds would include: Gabapentin (Neurontin), MS Contin, Oxycontin        Short acting meds would include:  Percocet, Oxycodone, Vicodin, Ibuprofen     The day of the procedure, you should try to do things that provoke your pain, since the injection is being done to see if it will relieve your pain . YES: Informed     If your pain level is a 4 out of 10 or less on the day of the procedu re, please call [phone] to reschedule.  YES: Informed     Reminders:      If you are started on any steroids or antibiotics between now and your appointment, you must contact us because it may affect our ability to perform your procedure INFORMED      Instructed pt to arrive 30 minutes early for IV start if required. (Check Procedure Scheduling Grid) INot Applicable       If this is for a cervical MBB aspirin needs to be held for 6 days.  Not Applicable       Do not schedule procedures requiring IV placement in the first appointment of the day or first appointment after lunch. Do NOT schedule at 0745, 0815 or 1245.        For patients 85 or older we recommend having an adult stay w/ them for the remainder of the day.      Does the patient have any questions? [facility]    [facility]

## 2020-09-10 ENCOUNTER — VIRTUAL VISIT (OUTPATIENT)
Dept: PALLIATIVE MEDICINE | Facility: CLINIC | Age: 40
End: 2020-09-10
Payer: COMMERCIAL

## 2020-09-10 DIAGNOSIS — M47.816 SPONDYLOSIS OF LUMBAR REGION WITHOUT MYELOPATHY OR RADICULOPATHY: ICD-10-CM

## 2020-09-10 DIAGNOSIS — M79.18 MYOFASCIAL MUSCLE PAIN: ICD-10-CM

## 2020-09-10 DIAGNOSIS — M54.16 LUMBAR RADICULOPATHY: Primary | ICD-10-CM

## 2020-09-10 PROCEDURE — 99214 OFFICE O/P EST MOD 30 MIN: CPT | Mod: 95 | Performed by: PAIN MEDICINE

## 2020-09-10 ASSESSMENT — PAIN SCALES - GENERAL: PAINLEVEL: SEVERE PAIN (7)

## 2020-09-10 NOTE — PROGRESS NOTES
"Daniella Sexton is a 40 year old female who is being evaluated via a billable video visit.      The patient has been notified of following:     \"This video visit will be conducted via a call between you and your physician/provider. We have found that certain health care needs can be provided without the need for an in-person physical exam.  This service lets us provide the care you need with a video conversation.  If a prescription is necessary we can send it directly to your pharmacy.  If lab work is needed we can place an order for that and you can then stop by our lab to have the test done at a later time.    Video visits are billed at different rates depending on your insurance coverage.  Please reach out to your insurance provider with any questions.    If during the course of the call the physician/provider feels a video visit is not appropriate, you will not be charged for this service.\"    Patient has given verbal consent for Video visit? Yes  How would you like to obtain your AVS? MyChart  If you are dropped from the video visit, the video invite should be resent to: Text to cell phone: [phone]  Will anyone else be joining your video visit? No    Smita Duff CMA ([facility])      Video-Visit Details    Type of service:  Video Visit    Video Start Time: 4  Video End Time: 420 PM    Originating Location (pt. Location): Home    Distant Location (provider location):  [facility]     Platform used for Video Visit: [employer] Local    Colby Moss MD    [facility] follow-up  Chief Complaint:    Chief Complaint   Patient presents with     Pain     Video visit due to COVID-19     MME prescribed prior to seeing patient: 5 current MME:  Recommendations:    - Further procedures recommended:    - Reasonable to consider lumbar MEDIAL BRANCH BLOCK/FA  - Medication Management: Will make one change at a time.    - consider changing flexeril to another agent    - consider changing voltaren " gel to oral form    - continue gabapentin to 900mg three times a day    - Increase elavil 25mg at night. Will titrate as tolerated    - reasonable to continue 0.5-1 tablet of norco at day as needed for severe pain  - Physical Therapy: continue  pain PHYSICAL THERAPY    - Clinical Health Psychologist to address issues of relaxation, behavioral change, coping style, and other factors important to improvement: consider in the future   - Follow up:    -3 months   - discussed the importance of smoking cessation and its association with pain              Interval:  Currently the pain is BIlat axial LBP. She does note occasional radiation. When this occurs she feels like her muscles spasm almost like being hooked up to a tens untiiIn the past L>R, but currently the same. The pain is worse with standing. The pain is worse with activity. The pain is worse with work. The pt notes some benefit with heat. The pt notes some benefit with ice. The pt notes some benefit on current medical regimen. She denies any recent falls.  She denies any overt progressive weakness.  She denies any incontinence.    In general she denies numbness and tingling       THE 4 A's OF OPIOID MAINTENANCE ANALGESIA    Analgesia: y    Activity: y    Adverse effects: n    Adherence to Rx protocol: y    [place] Board of Pharmacy Data Base Reviewed:    YES;     Pain history:  Daniella Sexton is a 39 year old female who first started having problems with pain chronic hx of prior lumbar surgery. The pt reports 2004 hurt herself on the job. At that itme she was only having axial LBP. She reports benefit with surgery.  The pt reports 4/19 she started having bilateral LBP radiating to her LE. She denies any specific inciting event. Although she was doing more work on the floor. The pt reports numbness and tingling in her feet. She denies any buring. The pain is constant in her back. The pain is sharp shooting. The pain a deep ache. The pain is squeezing in  nature. The pain is worse with flexion. The pain is worse with prolonged standing. Pain is worse when going from a seated to standing position. The pt notes some benefit with sitting. Some benefit with leaning back. The pt reports some benefit with gabapentin. The pt currently takes 900, 600, and 900. The pt takes flexeril BID. The pt takes norco approx once a day to help her sleep. The pt denies any progressive weaknes. She denies any recent falls. She denies any incontinene.    The pain sig affects her quality of life      She does smoke approx 5 cigs a day.  Pain rating: intensity  Averages 9/10 on a 0-10 scale.      Current treatments include:  norco   zanaflex   Gabapentin 900, 600, 900  -Cannabis  Elavil  Previous medication treatments included:  Robaxin-no benefit/flexeril      Other treatments have included:  Daniella NETTA Sexton has been seen at a pain clinic in the past.    PT: last PHYSICAL THERAPY  8/18  Chiropractic: benefit   Acupuncture: n  TENs Unit: benefit  Injections:   Facet 7/19 benefit 1 week no pain  Bilateral L4/5 /L5-S1 on 9/4 minimal relief   Epidural w/o imaging prior to surgery in 2004 no benefit  Past Medical History:  No past medical history on file.  Past Surgical History:  Past Surgical History:   Procedure Laterality Date     BACK SURGERY  2005    L 3-4, L 4-5     LITHOTRIPSY  2016     OPTICAL TRACKING SYSTEM ENDOSCOPIC SINUS SURGERY Bilateral 1/10/2019    Procedure: BILATERAL IMAGE GUIDED ENDOSCOPIC SINUS SURGERY, BILATERAL TURBINATE REDUCTION;  Surgeon: Maikol Miguel MD;  Location: [facility] OR     RELEASE CARPAL TUNNEL Left      TONSILLECTOMY       TUBAL LIGATION       Medications:  Current Outpatient Medications   Medication Sig Dispense Refill     albuterol (PROAIR HFA/PROVENTIL HFA/VENTOLIN HFA) 108 (90 Base) MCG/ACT inhaler Inhale 2 puffs into the lungs every 6 hours 8.5 g 3     amitriptyline (ELAVIL) 50 MG tablet Take 1 tablet (50 mg) by mouth At Bedtime 30 tablet 1     diclofenac  (VOLTAREN) 75 MG EC tablet Take 1 tablet (75 mg) by mouth 2 times daily 60 tablet 0     gabapentin (NEURONTIN) 300 MG capsule Take 3 capsules (900 mg) by mouth 3 times daily 270 capsule 1     HYDROcodone-acetaminophen (NORCO) 5-325 MG tablet Take 0.5-1 tablets by mouth 2 times daily as needed for breakthrough pain Min of 30day supply. Fill now and start now 30 tablet 0     rizatriptan (MAXALT) 10 MG tablet Take 1 tablet (10 mg) by mouth at onset of headache for migraine 10 tablet 2     tiZANidine (ZANAFLEX) 4 MG tablet Take 1 tablet (4 mg) by mouth 2 times daily as needed for muscle spasms 60 tablet 3     beclomethasone HFA (QVAR REDIHALER) 80 MCG/ACT inhaler Inhale 1 puff into the lungs 2 times daily (Patient not taking: Reported on 9/10/2020) 10.6 g 2     diclofenac (VOLTAREN) 1 % topical gel Place 4 g onto the skin 4 times daily (Patient not taking: Reported on 5/19/2020) 100 g 11     Lidocaine (LIDOCARE) 4 % Patch Place 1 patch onto the skin every 24 hours To prevent lidocaine toxicity, patient should be patch free for 12 hrs daily. 15 patch 3     methylPREDNISolone (MEDROL DOSEPAK) 4 MG tablet therapy pack Follow Package Directions (Patient not taking: Reported on 9/10/2020) 21 tablet 0     nicotine (NICORETTE) 2 MG gum Place 1 each (2 mg) inside cheek as needed for smoking cessation (Patient not taking: Reported on 5/12/2020) 50 each 11     order for DME Equipment being ordered: Low back brace 1 each 0     triamcinolone (KENALOG) 0.1 % external cream Apply topically 3 times daily as needed (itching) (Patient not taking: Reported on 9/10/2020) 80 g 0     varenicline (CHANTIX PAULA) 0.5 MG X 11 & 1 MG X 42 tablet Take 0.5 mg tab daily for 3 days, THEN 0.5 mg tab twice daily for 4 days, THEN 1 mg twice daily. (Patient not taking: Reported on 5/5/2020) 53 tablet 0     varenicline (CHANTIX) 0.5 MG tablet Take 1 tablet (0.5 mg) by mouth 2 times daily (Patient not taking: Reported on 5/5/2020) 60 tablet 5      Allergies:     Allergies   Allergen Reactions     Amoxicillin Anaphylaxis     Morphine Other (See Comments) and Nausea and Vomiting     Penicillins Unknown     Social History:  Home situation: fam  Occupation/Schooling: y  Tobacco use: y  Alcohol use: n  Drug use: n  History of chemical dependency treatment: n    Family history:  Family History   Problem Relation Age of Onset     Heart Disease No family hx of      Diabetes No family hx of      Cancer No family hx of      Family history of headaches: n    Review of Systems:  Skin: negative  Eyes: negative  Ears/Nose/Throat: negative  Respiratory: No shortness of breath, dyspnea on exertion, cough, or hemoptysis  Cardiovascular: negative  Gastrointestinal: negative  Genitourinary: negative  Musculoskeletal: negative  Neurologic: negative  Psychiatric: negative  Hematologic/Lymphatic/Immunologic: negative  Endocrine: negative    Physical Exam:  There were no vitals filed for this visit.  Exam: Obese  Constitutional: healthy, alert and no distress  Respiratory: Speaking in full sentences no accessory muscles use     Psychiatric: mentation appears normal and affect normal/bright    Musculoskeletal exam:  Gait/Station/Posture: Antalgic         Lumbar spine:     ROM: decreased secondary to pain                 Slump: Positive on the left negative on the right   Has reproduction of symptoms with extension               Neurologic exam:    Motor:  5/5 UE and LE strength      Diagnostic tests:  MRI reviewed with patient       T12-L1: No significant disc herniation. No spinal canal or neural  foraminal narrowing.      L1-L2: No significant disc herniation. No spinal canal or neural  foraminal narrowing.      L2-L3: No significant disc herniation. No spinal canal or neural  foraminal narrowing.      L3-L4: No significant disc herniation. No spinal canal or neural  foraminal narrowing.      L4-L5: Sequela of previous right-sided keyhole laminectomy and  discectomy. There is  a circumferential disc bulge and endplate  osteophytic spurring along with bilateral facet hypertrophy resulting  in moderate left and mild right neural foraminal narrowing. No  significant central spinal canal narrowing following laminectomy.      L5-S1: No significant disc herniation. No spinal canal or neural  foraminal narrowing. Bilateral facet hypertrophy.     Paraspinous soft tissues: Normal.                                                                       IMPRESSION: Postoperative changes at the L4-L5 level with previous  right-sided keyhole laminectomy and microdiscectomy. Residual or  recurrent circumferential disc bulge and endplate osteophytic spurring  at this level with bilateral facet hypertrophy causes moderate left  and mild right neural foraminal narrowing.        D.I.R.E Score: Patient Selection for Chronic Opioid Analgesia  2        For each factor, rate the patient's score from 1 - 3 based on the explanations on the right.       Diagnosis             2         1 = Benign chronic condition with minimal objective findings or no definite medical diagnosis.  Examples:  fibromyalgia, migraine, headaches, non-specific back pain.  2 = Slowly progressive condition concordant with moderate pain, or fixed condition with moderate objective findings.  Examples: failed back surgery syndrome, back pain with moderate degenerative changes, neuropathic pain.  3 = Advanced condition concordant with severe pain with objective findings.  Examples: severe ischemic vascular disease, advanced neuropathy, severe spinal stenosis.    Intractability             2         1 = Few therapies have been tried and the patient takes a passive role in his/her pain management process.   2 = Most costomary treatments have been tried but the patient is not fully engaged in the pain management process, or barriers prevent (insurance, transportation, medical illness)  3 = Patient fully engaged in a spectrum of appropriate  treatments but with inadequate response.    Risk   (Risk = Total of P+C+R+S below)       Psychological             2         1 = Serious personality dysfunction or mental illness interfering with care.  Examples: personality disorder, severe affective disorder, significant personality issues.  2 = Personality or mental health interferes moderately.  Example: depression or anxiety disorder.  3 = Good communication with the clinic.  No significant personality dysfunction or mental illness.       Chemical      Health             2         1 = Active or very recent use of illicit drugs, excessive alcohol, or prescription drug abuse.  2 = Chemical coper (uses medications to cope with stress) or history of chemical dependency in remission.  3 = No CD history.  Not drug-focused or chemically reliant       Reliability             2         1 = History of numerous problems: medication misuse, missed appointments, rarely follows through.  2 = Occasional difficulties with compliance, but generally reliable.  3 = Highly reliable patient with medications, appointments and treatment.       Social      Support             2         1= Life in chaos.  Little family support and few close relationships.  Loss of most normal life roles.  2 = Reduction in some relationships and life roles.  3 = Supportive family/close relationships.  Involved in work or school and no social isolation.    Efficacy score             2         1 = Poor function or minimal pain relief despite moderate to high doses.  2 = Moderate benefit with function improved in a number of ways (or insufficient info - hasn't tried opioid yet or very low doses or too short a trial.  3 = Good improvement in pain and function and quality of life with stable doses over time.                                    14    Total score = D + I + R + E    Score 7-13: Not a suitable candidate for long-term opioid analgesia  Score 14-21: May be a good  candidate      Assessment/Plan:  Daniella Sexton is a 39 year old female who presents with the complaints of axial low back pain with occasional radiation to her left anterior leg..   Daniella was seen today for pain.    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    -zanaflex increased to 4 mg 3 times daily   - continue voltaren PO   - continue gabapentin to 900mg three times a day    -Continue Elavil 50mg at night. Will titrate as tolerated    - reasonable to continue 0.5-1 tablet of norco at day as needed for severe pain  - Physical Therapy: continue  pain PHYSICAL THERAPY    - Clinical Health Psychologist to address issues of relaxation, behavioral change, coping style, and other factors important to improvement: consider in the future   - Follow up:    -Injection   -3 months   - discussed the importance of smoking cessation and its association with pain                Total time spent was 20 minutes    Colby Moss MD  [facility]  This note was created with voice recognition software, and while reviewed for accuracy, typos may remain.

## 2020-09-10 NOTE — PATIENT INSTRUCTIONS
----------------------------------------------------------------  [facility] Number:  [phone]     Call with any questions about your care and for scheduling assistance.     Calls are returned Monday through Friday between 8 AM and 4:30 PM. We usually get back to you within 2 business days depending on the issue/request.    If we are prescribing your medications:    For opioid medication refills, call the clinic or send a [employer] message 7 days in advance.  Please include:    Name of requested medication    Name of the pharmacy.    For non-opioid medications, call your pharmacy directly to request a refill. Please allow 3-4 days to be processed.     Per [place] State Law:    All controlled substance prescriptions must be filled within 30 days of being written.      For those controlled substances allowing refills, pickup must occur within 30 days of last fill.      We believe regular attendance is key to your success in our program!      Any time you are unable to keep your appointment we ask that you call us at least 24 hours in advance to cancel.This will allow us to offer the appointment time to another patient.     Multiple missed appointments may lead to dismissal from the clinic.

## 2020-09-11 NOTE — TELEPHONE ENCOUNTER
No PA required, okay to schedule          Renee ESCOBAR    [facility]

## 2020-09-12 ENCOUNTER — MYC REFILL (OUTPATIENT)
Dept: PALLIATIVE MEDICINE | Facility: CLINIC | Age: 40
End: 2020-09-12

## 2020-09-12 ENCOUNTER — MYC REFILL (OUTPATIENT)
Dept: FAMILY MEDICINE | Facility: CLINIC | Age: 40
End: 2020-09-12

## 2020-09-12 DIAGNOSIS — G89.29 CHRONIC BILATERAL LOW BACK PAIN WITH BILATERAL SCIATICA: ICD-10-CM

## 2020-09-12 DIAGNOSIS — M79.18 MYOFASCIAL MUSCLE PAIN: ICD-10-CM

## 2020-09-12 DIAGNOSIS — M54.42 CHRONIC BILATERAL LOW BACK PAIN WITH BILATERAL SCIATICA: ICD-10-CM

## 2020-09-12 DIAGNOSIS — M54.41 CHRONIC BILATERAL LOW BACK PAIN WITH BILATERAL SCIATICA: ICD-10-CM

## 2020-09-12 DIAGNOSIS — M47.816 SPONDYLOSIS OF LUMBAR REGION WITHOUT MYELOPATHY OR RADICULOPATHY: ICD-10-CM

## 2020-09-12 DIAGNOSIS — G43.909 MIGRAINE WITHOUT STATUS MIGRAINOSUS, NOT INTRACTABLE, UNSPECIFIED MIGRAINE TYPE: ICD-10-CM

## 2020-09-12 DIAGNOSIS — M54.16 LUMBAR RADICULOPATHY: ICD-10-CM

## 2020-09-12 RX ORDER — AMITRIPTYLINE HYDROCHLORIDE 50 MG/1
50 TABLET ORAL AT BEDTIME
Qty: 30 TABLET | Refills: 1 | Status: CANCELLED | OUTPATIENT
Start: 2020-09-12

## 2020-09-12 RX ORDER — DICLOFENAC SODIUM 75 MG/1
75 TABLET, DELAYED RELEASE ORAL 2 TIMES DAILY
Qty: 60 TABLET | Refills: 0 | Status: CANCELLED | OUTPATIENT
Start: 2020-09-12

## 2020-09-12 RX ORDER — HYDROCODONE BITARTRATE AND ACETAMINOPHEN 5; 325 MG/1; MG/1
.5-1 TABLET ORAL 2 TIMES DAILY PRN
Qty: 30 TABLET | Refills: 0 | Status: CANCELLED | OUTPATIENT
Start: 2020-09-12

## 2020-09-12 RX ORDER — GABAPENTIN 300 MG/1
900 CAPSULE ORAL 3 TIMES DAILY
Qty: 270 CAPSULE | Refills: 1 | Status: CANCELLED | OUTPATIENT
Start: 2020-09-12

## 2020-09-14 RX ORDER — HYDROCODONE BITARTRATE AND ACETAMINOPHEN 5; 325 MG/1; MG/1
.5-1 TABLET ORAL 2 TIMES DAILY PRN
Qty: 30 TABLET | Refills: 0 | Status: SHIPPED | OUTPATIENT
Start: 2020-09-14 | End: 2021-01-12

## 2020-09-14 RX ORDER — DICLOFENAC SODIUM 75 MG/1
75 TABLET, DELAYED RELEASE ORAL 2 TIMES DAILY
Qty: 60 TABLET | Refills: 0 | Status: SHIPPED | OUTPATIENT
Start: 2020-09-14 | End: 2020-10-27

## 2020-09-14 NOTE — TELEPHONE ENCOUNTER
Received [employer] message from patient requesting refill(s) for amitriptyline (ELAVIL) 50 MG tablet     Refills available at pharmacy. Patient will contact to refill.

## 2020-09-14 NOTE — TELEPHONE ENCOUNTER
Received [employer] message from patient requesting refill(s) for gabapentin (NEURONTIN) 300 MG capsule      Refills available at pharmacy. Patient will contact to fill.

## 2020-09-14 NOTE — TELEPHONE ENCOUNTER
Received [email or web address] message from patient requesting refill(s) for HYDROcodone-acetaminophen (NORCO) 5-325 MG tablet     Last dispensed from pharmacy on 8/12/2020    Pt last seen by prescribing provider on 9/10/2020  Next appt scheduled for 9/22/2020     checked in the past 6 months? YES If no, print current report and give to RN    Last urine drug screen date 12/10/2019  Current opioid agreement on file? Yes Date of opioid agreement: 4/10/2020    E-prescribe to:    [id number] - [place], [place] - [address] [facility] [address]    Will route to nursing pool for review and preparation of prescription(s).

## 2020-09-14 NOTE — TELEPHONE ENCOUNTER
Medication refill information reviewed.     Due date for HYDROcodone-acetaminophen (NORCO) 5-325 MG tablet is anytime     Prescriptions prepped for review.     Will route to provider.     Dany Shukla, RN  Care Coordinator   [facility]

## 2020-09-14 NOTE — TELEPHONE ENCOUNTER
Received [employer] message from patient requesting refill(s) for diclofenac (VOLTAREN) 75 MG EC tablet     Last refilled on 8/12/2020    Pt last seen on 9/10/2020  Next appt scheduled for 9/22/2020    E-prescribe to:    [id number] - [place], [place] - [address] AT [facility] [address]     Will facilitate refill.

## 2020-09-15 RX ORDER — RIZATRIPTAN BENZOATE 10 MG/1
10 TABLET ORAL
Qty: 10 TABLET | Refills: 0 | Status: SHIPPED | OUTPATIENT
Start: 2020-09-15 | End: 2021-05-12

## 2020-09-16 ENCOUNTER — MYC MEDICAL ADVICE (OUTPATIENT)
Dept: PALLIATIVE MEDICINE | Facility: CLINIC | Age: 40
End: 2020-09-16

## 2020-09-16 DIAGNOSIS — M47.816 FACET ARTHROPATHY, LUMBAR: ICD-10-CM

## 2020-09-16 DIAGNOSIS — M54.42 CHRONIC BILATERAL LOW BACK PAIN WITH BILATERAL SCIATICA: Primary | ICD-10-CM

## 2020-09-16 DIAGNOSIS — M54.16 LUMBAR RADICULOPATHY: ICD-10-CM

## 2020-09-16 DIAGNOSIS — G89.29 CHRONIC BILATERAL LOW BACK PAIN WITH BILATERAL SCIATICA: Primary | ICD-10-CM

## 2020-09-16 DIAGNOSIS — M54.41 CHRONIC BILATERAL LOW BACK PAIN WITH BILATERAL SCIATICA: Primary | ICD-10-CM

## 2020-09-16 NOTE — TELEPHONE ENCOUNTER
Per patient myChart message:  From: IRMA NETTA Sexton      Created: 9/16/2020 5:19 AM      hello i can barley stand the pain in my low back and my hips are so bad, my low back is doing a lot of popping which also hurts but gives me momentary relief for a minute, so im wondering the is there anything that i can do to get some relief i haven't been to work in almost a week. i know that my procedure is next week but i would love to be able to get out of bed and work, i was going to ask about a tordal shot, but i dont think that i can have any shots due to my procedure. thanks autumn      ______________    Mychart sent to pt:  From: Carli aVrgas RN      Created: 9/16/2020 2:51 PM      Hi Irma.  At your recent visit with Dr. Moss he had recommended you continue to see Fred Stinson, PT.  You can call the appointment line to schedule this. [phone].  What are you doing to help w/ your low back pain?  Are you using ice/heat? Are you doing any stretches?   You are allotted norco 30 tabs/month.  You could set your alarm in the morning for about an hour before you get up and take one.     Any idea on what would help right now?      Hopefully the medial branch block/rfa will be helpful for you.   We do not do Toradol shots but is is NOT contraindicated with the injection so you can have that if indicated. It may be contraindicated as you are already on an oral NSAID-voltaren.        Carli Vargas RN-BSN  [facility]

## 2020-09-16 NOTE — LETTER
2020        Re: Daniella Sexton        [address]        [place] [facility] [place] [facility] [address]        :  1980      To whom it may concern:    RE: Daniella Sexton    Patient was scheduled for a procedure on 20. There was malfunction of our C-arm and the procedure had to be cancelled. Her appointment was at 1345. Daniella was informed of the cancellation at 1305.    Please contact our clinic if you have any further questions      Sincerely,        [facility]

## 2020-09-16 NOTE — LETTER
[facility]  [address]  [place] [address]  Phone: [phone]  Fax: [phone]    September 25, 2020        Daniella Sexton  [address]  [place] [address]          To whom it may concern:    RE: Daniella Sexton has been out of work since 9/8/2020 secondary to severe low back pain.  Goal is for patient to return to work by 9/28/2020.  Patient has been under the care of our clinic.  Patient has been very motivated to improve and actively participating in her care.  Patient had procedure today.  Again goal is to return to work by 9/28/2020.    Please contact me for questions or concerns.      Sincerely,        Colby Moss MD

## 2020-09-23 NOTE — TELEPHONE ENCOUNTER
Per patient myChart message:  From: IRMA Sexton      Created: 9/23/2020 1:13 PM      the letter that i need to collect some short term disability needs to state that i have been out of work since 9/8/2020 due to back pain and will be expected to return to work on 9/28/2020.  i have also rescheduled my procedure foe friday the 25      ________________  Mychart sent to pt:  From: Carli Vargas RN      Created: 9/23/2020 2:34 PM      Al Brewer.  Glad that you rescheduled the injection.     Is there a previous provider that wrote a workability form for you recommending you be off work?  You will need to talk w/ that provider for this note. Did you discuss this with Dr. Moss at your 9/10/20 appointments that you weren't going to work? There aren't any notes referring to this.        The work letter regarding the cancelled injection was mailed to pt.    MCKAYLA Boyce-BSN  [facility]

## 2020-09-23 NOTE — TELEPHONE ENCOUNTER
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  I also need something in writing that the machine broke and that why i didnt have my procedure done for work, because this is the 2 time now weve had to reschedule, also i cant afford to drive out to [place] for P.t. am i able to do it in [facility]      ____________  Work excuse letter written and in nursing bin pending pt reply on where to sent it.    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  I also need something in writing that the machine broke and that why i didnt have my procedure done for work, because this is the 2 time now weve had to reschedule, also i cant afford to drive out to [place] for P.t. am i able to do it in [facility]        Dr. Moss pt wants to do PT in [place] is this okay? Read pt message for other request for work letter to be off.    Carli, RN-BSN  [facility]

## 2020-09-25 ENCOUNTER — MYC MEDICAL ADVICE (OUTPATIENT)
Dept: FAMILY MEDICINE | Facility: CLINIC | Age: 40
End: 2020-09-25

## 2020-09-25 ENCOUNTER — RADIOLOGY INJECTION OFFICE VISIT (OUTPATIENT)
Dept: PALLIATIVE MEDICINE | Facility: CLINIC | Age: 40
End: 2020-09-25
Payer: COMMERCIAL

## 2020-09-25 ENCOUNTER — ANCILLARY PROCEDURE (OUTPATIENT)
Dept: RADIOLOGY | Facility: CLINIC | Age: 40
End: 2020-09-25
Attending: PAIN MEDICINE
Payer: COMMERCIAL

## 2020-09-25 VITALS — HEART RATE: 98 BPM | SYSTOLIC BLOOD PRESSURE: 138 MMHG | DIASTOLIC BLOOD PRESSURE: 85 MMHG

## 2020-09-25 DIAGNOSIS — M47.816 SPONDYLOSIS OF LUMBAR REGION WITHOUT MYELOPATHY OR RADICULOPATHY: Primary | ICD-10-CM

## 2020-09-25 DIAGNOSIS — M47.816 SPONDYLOSIS OF LUMBAR REGION WITHOUT MYELOPATHY OR RADICULOPATHY: ICD-10-CM

## 2020-09-25 PROCEDURE — 64493 INJ PARAVERT F JNT L/S 1 LEV: CPT | Mod: 50 | Performed by: PAIN MEDICINE

## 2020-09-25 PROCEDURE — 64494 INJ PARAVERT F JNT L/S 2 LEV: CPT | Performed by: PAIN MEDICINE

## 2020-09-25 ASSESSMENT — PAIN SCALES - GENERAL: PAINLEVEL: EXTREME PAIN (9)

## 2020-09-25 NOTE — NURSING NOTE
Discharge Information    IV Discontiued Time:  NA    Amount of Fluid Infused:  NA    Discharge Criteria = When patient returns to baseline or as per MD order    Consciousness:  Pt is fully awake    Circulation:  BP +/- 20% of pre-procedure level    Respiration:  Patient is able to breathe deeply    O2 Sat:  Patient is able to maintain O2 Sat >92% on room air    Activity:  Moves 4 extremities on command    Ambulation:  Patient is able to stand and walk or stand and pivot into wheelchair    Dressing:  Clean/dry or No Dressing    Notes:   Discharge instructions and AVS given to patient    Patient meets criteria for discharge?  YES    Admitted to PCU?  No    Responsible adult present to accompany patient home?  Yes    Signature/Title:    Dany Shukla RN  RN Care Coordinator  [facility]

## 2020-09-25 NOTE — NURSING NOTE
Pre-procedure Intake    Have you been fasting? NA    If yes, for how long? NA    Are you taking a prescribed blood thinner such as coumadin, Plavix, Xarelto?    No    If yes, when did you take your last dose? NA    Do you take aspirin?  No    If cervical procedure, have you held aspirin for 6 days?   NA    Do you have any allergies to contrast dye, iodine, steroid and/or numbing medications?  NO    Are you currently taking antibiotics or have an active infection?  NO    Have you had a fever/elevated temperature within the past week? NO    Are you currently taking oral steroids? NO    Do you have a ? Yes       Are you pregnant or breastfeeding?  NO    Are the vital signs normal?  Yes      Smita Duff CMA ([facility])

## 2020-09-25 NOTE — TELEPHONE ENCOUNTER
Form came in via [employer] message to be completed by the provider: [employer]    This was sent to [facility]  Where was the form placed? Given to physician  What number is listed as a contact on the form?     Please fax to [phone]      [facility] papers that need to be updated and returned on 10/2/2020 the [facility] paperwork can be faxed to  directly [phone]

## 2020-09-25 NOTE — TELEPHONE ENCOUNTER
Please reach out to patient letting her know I wrote a note stating that she has been out of work secondary to her back pain since 9/8 with a goal of returning to work 9/28.  I hope this is what she needs.

## 2020-09-25 NOTE — PROGRESS NOTES
Pre procedure Diagnosis: facet arthropathy, lumbosacral spondylosis   Post procedure Diagnosis: Same  Procedure performed: Bilateral lumbar medial branch block  Indication:  Diagnostic   Anesthesia: none  Complications: none  Operators: Colby Moss MD   Indications:   Daniella Sexton is a 40 year old female. The patient has a history of bilateral axial low back pain.  Exam shows +++ and pain with extension/rotation, and they have tried conservative treatment including PHYSICAL THERAPY  and meds.    Options/alternatives, benefits and risks were discussed with the patient including but not limited to bleeding, infection, tissue trauma, exposure to radiation, reaction to medications, spinal cord injury, weakness, numbness and paralysis.  Questions were answered to her satisfaction and she agrees to proceed. Voluntary informed consent was obtained and signed.     Vitals were reviewed: Yes  Allergies were reviewed:  Yes   Medications were reviewed:  Yes   Pre-procedure pain score: 9/10    Procedure:  After obtaining signed informed consent, the patient was brought into the procedure suite and was placed in a prone position on the procedure table.   A Pause for the Cause was performed.  The patient was prepped and draped in the usual sterile fashion.     Under AP fluoroscopic guidance the L3, L4, L5 vertebral bodies were identified. The C-arm was rotated to the oblique view to afford optimal visualization the pedicles.  Lidocaine 1% was used to anesthetize the skin at each level.  Under intermittent fluoroscopy, 25G 3.5inch spinal needles were positioned inferior and lateral to the intersection of the transverse process and pedicle at the Bilateral L4 & L5 levels sacral ala. The needle positions were verified and optimized from the AP view.    The anatomic targets for the L3 & L4 medial nerve and L5 dorsal ramus (which functionally incorporates the medial branch) were the  L4 & L5 transverse processes and sacral  alar notch, with laterality as described above, resulting in blockade of the L4/5 and L5/S1 facet joints.    Bupivacaine 0.25% 1 ml was injected at each location.  The needles were removed.      Hemostasis was achieved, the area was cleaned, and bandaids were placed when appropriate.  The patient tolerated the procedure well, and was taken to the recovery room.    Images were saved to PACS.     The patient will continue to monitor progress, and they were given a pain diary to complete at home.  They will either fax or mail this back to us or bring it to their next appointment. We will determine the treatment plan after we review the diary.      Post-procedure pain score: 0/10  Follow-up includes:   -f/u phone call in one week  -will await diary for further planning.    Colby Moss MD  [facility]

## 2020-09-25 NOTE — TELEPHONE ENCOUNTER
Pt in clinic and Pt given note.    Dany Shukla, RN  Care Coordinator   [facility]

## 2020-09-25 NOTE — PATIENT INSTRUCTIONS
[facility]   Medial Branch Block Discharge Instructions      Your procedure was performed by:   Dr. Colby Moss    Medications used include:  Lidocaine  Bupivicaine  Omnipaque  Omniscan  Ropivicaine Normal saline     You will need to complete the Pain Scale Log form and return it to us as soon as possible.  Once we have received the form, we will review it and call you to determine the next steps.     The form can be faxed to [phone] or mailed to:   [facility]   [address]   [place] [address]      You may resume your regular medications    If you were holding your blood thinning medication, please restart taking it: N/A    You may resume your regular activities    Be cautious with walking as numbness and/or weakness in the lower extremities may occur for up to 6-8 hours due to the effects of the anesthetic.    Avoid driving for 6 hours. The local anesthetic could slow your reflexes.     You may shower, however no swimming or tub baths or hot tubs for 24 hours following your procedure.    Your pain will return after the numbing medications have worn off.  You may use your current pain medications as needed.    Unless you have been directed to avoid the use of anti-inflammatory medications (NSAIDS), you may use medications such as ibuprofen, Aleve or Tylenol for pain control if needed.  Some people find it helpful to alternate ibuprofen and Tylenol every 3 hours for a couple of days.    You may use ice packs 10-15 minutes three to four times a day at the injection site for comfort.     Do not use heat to painful areas for 6 to 8 hours. This will give the local anesthetic time to wear off and prevent you from accidentally burning your skin.     If you experience any of the following, call the Pain Clinic during work hours (Monday through Friday 8 am-4:30 pm) at [phone] or the Provider Line after hours at [phone]:  -Fever over 100  degree F  -Swelling, bleeding, redness, drainage, warmth at the injection site  -Progressive weakness or numbness in your legs or arms  -If lumbar, call if you have a loss of bowel or bladder function  -Unusual new onset of pain that is not improving

## 2020-09-30 ENCOUNTER — VIRTUAL VISIT (OUTPATIENT)
Dept: FAMILY MEDICINE | Facility: CLINIC | Age: 40
End: 2020-09-30
Payer: COMMERCIAL

## 2020-09-30 DIAGNOSIS — M54.41 CHRONIC BILATERAL LOW BACK PAIN WITH BILATERAL SCIATICA: Primary | ICD-10-CM

## 2020-09-30 DIAGNOSIS — G89.29 CHRONIC BILATERAL LOW BACK PAIN WITH BILATERAL SCIATICA: Primary | ICD-10-CM

## 2020-09-30 DIAGNOSIS — M54.42 CHRONIC BILATERAL LOW BACK PAIN WITH BILATERAL SCIATICA: Primary | ICD-10-CM

## 2020-09-30 PROCEDURE — 99213 OFFICE O/P EST LOW 20 MIN: CPT | Mod: 95 | Performed by: NURSE PRACTITIONER

## 2020-09-30 NOTE — TELEPHONE ENCOUNTER
Confirmed fax of Completed FMLA letter in the chart. Rita Godwin,     Renetta Morrow, APRN CNP  [employer]               Please fax FMLA forms to [facility] [phone]

## 2020-09-30 NOTE — LETTER
Certification of Health Care Provider  Family Medical Leave Act (FMLA)      Employer's name and contact:     Employee's job title: Regular work schedule:     Employee's essential job functions:     Patient's name: Daniella Sexton    Provider's name and business address:  Renetta Morrow  [address]  [place] [address]  Phone: [phone]      PART A:  MEDICAL FACTS  1)  Approximate date condition commenced:  4/3/19    Probable duration of condition:  6 months     Pravin below as applicable:  Was the patient admitted for an overnight stay in a hospital, hospice, or residential medical care facility?  no.    Date(s) you treated the patient for condition: 4/3/19, 05/08/19, 11/07/19, 05/13/20, 09/30/20    Will the patient need to have treatment visits at least twice per year due to the                     condition? yes    Was medication, other than over-the-counter medication prescribed?  yes    Was the patient referred to other health care provider(s) for evaluation or treatment     (e.g., physical therapist)?  yes:  State the nature of such treatments and expected duration of treatment: Pain Clinic for ongoing Pain Management       2)  Is the medical condition pregnancy?  no.      3)  Is the employee unable to perform any of his/her job functions due to the     condition: yes:  Identify the job functions the employee is unable to perform: prolonged standing      4)  Describe other relevant medical facts, if any, related to the condition which the employee seeks leave (such as medical facts may include symptoms, diagnosis, or any regimen of continuing treatment such as the use of specialized equipment):     Chronic low back pain      PART B: AMOUNT OF LEAVE NEEDED  5)  Will the employee be incapacitated for a single continuous period of time due to his/her medical condition, including any time for treatment and recovery?  yes:  Estimate the beginning and ending dates for  the period of incapacity: 4/29/2020- 5/31/2020    6)  Will the employee need to attend follow-up treatment appointments or work part-time or on a reduced schedule because of the employee's medical condition?  no.     Patient must work with restrictions:  -No standing for more than 45 minutes continuously  -No lifting/pushing/pulling more than 20 lbs    7) Will the condition cause episodic flare ups periodically preventing the employee from performing his/her job functions? yes:  Is it medically necessary for the patient to be absent from work during the flare-ups?  yes:  Explain: flare-ups of back pain causing her to be unable to stand for long periods of time    Based upon the patient's medical history and your knowledge of the medical condition, estimate the frequency of flare-ups and the duration of related incapacity that the patient may have over the next six months (e.g., 1 episode every 3 months lasting 1-2 days):    Frequency: 2 X a month  Duration: maximum 6 day(s)       ADDITIONAL INFORMATION: IDENTIFY QUESTION NUMBER WITH YOUR ADDITIONAL ANSWER       ===========================================    IF EMPLOYEE'S FAMILY MEMBER IS THE PATIENT, PLEASE COMPLETE SECTION BELOW:  N/A      13) Does the patient/family member need the employee to provide basic medical or personal needs, or for safety or transportation?      14)  If no, would the employee's presence to provide psychological comfort be beneficial to the patient or assist in the patient's recovery?        ================================================================    TO BE COMPLETED BY THE HEALTH CARE PROVIDER:    Signature:  Renetta Morrow ________________________________________  Date:   09/30/20

## 2020-09-30 NOTE — PROGRESS NOTES
"Daniella Sexton is a 40 year old female who is being evaluated via a billable video visit.      The patient has been notified of following:     \"This video visit will be conducted via a call between you and your physician/provider. We have found that certain health care needs can be provided without the need for an in-person physical exam.  This service lets us provide the care you need with a video conversation.  If a prescription is necessary we can send it directly to your pharmacy.  If lab work is needed we can place an order for that and you can then stop by our lab to have the test done at a later time.    Video visits are billed at different rates depending on your insurance coverage.  Please reach out to your insurance provider with any questions.    If during the course of the call the physician/provider feels a video visit is not appropriate, you will not be charged for this service.\"    Patient has given verbal consent for Video visit? Yes  How would you like to obtain your AVS? Mail a copy  If you are dropped from the video visit, the video invite should be resent to: Text to cell phone: [phone]  Will anyone else be joining your video visit? No    Subjective     Daniella Sexton is a 40 year old female who presents today via video visit for the following health issues:    HPI    Chief Complaint   Patient presents with     FMLA paperwork     Patient with chronic low back pain presents for FMLA paperwork. Back pain is now being managed by the Pain Clinic. Had procedure recently and follow up procedures scheduled, doing Physical Therapy.       Video Start Time: 10:26 AM      Review of Systems   Constitutional, HEENT, cardiovascular, pulmonary, gi and gu systems are negative, except as otherwise noted.      Objective           Vitals:  No vitals were obtained today due to virtual visit.    Physical Exam     GENERAL: Healthy, alert and no distress  EYES: Eyes grossly normal to inspection.  No discharge or " erythema, or obvious scleral/conjunctival abnormalities.  RESP: No audible wheeze, cough, or visible cyanosis.  No visible retractions or increased work of breathing.    SKIN: Visible skin clear. No significant rash, abnormal pigmentation or lesions.  NEURO: Cranial nerves grossly intact.  Mentation and speech appropriate for age.  PSYCH: Mentation appears normal, affect normal/bright, judgement and insight intact, normal speech and appearance well-groomed.      No results found for any visits on 09/30/20.        Assessment & Plan     Chronic bilateral low back pain with bilateral sciatica  FMLA forms updated for patient. She will discuss with pain provider about taking this over in 6 months when this current FMLA expires.           See Patient Instructions    No follow-ups on file.    JENAE Sibley CNP  [facility]      Video-Visit Details    Type of service:  Video Visit    Video End Time:10:35 AM    Originating Location (pt. Location): Home    Distant Location (provider location):  [facility]     Platform used for Video Visit: Yesy

## 2020-10-01 ENCOUNTER — TELEPHONE (OUTPATIENT)
Dept: FAMILY MEDICINE | Facility: CLINIC | Age: 40
End: 2020-10-01

## 2020-10-01 NOTE — TELEPHONE ENCOUNTER
Reason for Call:  Form, our goal is to have forms completed with 72 hours, however, some forms may require a visit or additional information.    Type of letter, form or note:  FMLA    Who is the form from?: Patient    Where did the form come from: Patient or family brought in       What clinic location was the form placed at?: [facility]    Where the form was placed: Renetta Morrow's Box/Folder    What number is listed as a contact on the form?: Fax- [phone] phone- [phone]       Additional comments: Patient would like completed form faxed and then would like to  the original.    Call taken on 10/1/2020 at 3:19 PM by Maya Green

## 2020-10-01 NOTE — LETTER
Certification of Health Care Provider  Family Medical Leave Act (FMLA)    October 2, 2020    Employer's name and contact: [employer]  [address], [place] [address]   Phone: [phone] Fax: [phone]    Employee's job title:    Regular work schedule: See attached  Employee's essential job functions: See Attached    Patient's name: Daniella Sexton 1980    Provider's name and business address:  JENAE Sibley [address]  [place] [address]  Phone: [phone]  Fax: [phone]    PART A:  MEDICAL FACTS  1)  Approximate date condition commenced:  4/3/19    Probable duration of condition:  Left long     Pravin below as applicable:  Was the patient admitted for an overnight stay in a hospital, hospice, or residential medical care facility?  no.    Date(s) you treated the patient for condition: 4/3/19, 05/08/19, 11/07/19, 05/13/20, 09/30/20    Will the patient need to have treatment visits at least twice per year due to the                     condition? yes    Was medication, other than over-the-counter medication prescribed?  yes    Was the patient referred to other health care provider(s) for evaluation or treatment     (e.g., physical therapist)?  yes:  State the nature of such treatments and expected duration of treatment: Pain Clinic for ongoing Pain Management       2)  Is the medical condition pregnancy?  no.      3)  Is the employee unable to perform any of his/her job functions due to the     condition: yes:  Identify the job functions the employee is unable to perform: prolonged standing    4)  Describe other relevant medical facts, if any, related to the condition which the employee seeks leave (such as medical facts may include symptoms, diagnosis, or any regimen of continuing treatment such as the use of specialized equipment):     Chronic low back pain    PART B: AMOUNT OF LEAVE  NEEDED   5)  Will the employee be incapacitated for a single continuous period of time due to his/her medical condition, including any time for treatment and recovery?  yes:  Estimate the beginning and ending dates for the period of incapacity: 4/29/2020- 5/31/2020    6)  Will the employee need to attend follow-up treatment appointments or work part-time or on a reduced schedule because of the employee's medical condition?  no.     Patient must work with restrictions:  -No standing for more than 45 minutes continuously  -No lifting/pushing/pulling more than 20 lbs    7) Will the condition cause episodic flare ups periodically preventing the employee from performing his/her job functions? yes:  Is it medically necessary for the patient to be absent from work during the flare-ups? No    Based upon the patient's medical history and your knowledge of the medical condition, estimate the frequency of flare-ups and the duration of related incapacity that the patient may have over the next six months (e.g., 1 episode every 3 months lasting 1-2 days):    Frequency: 2 X a month  Duration: maximum 6 day(s)     ADDITIONAL INFORMATION: IDENTIFY QUESTION NUMBER WITH YOUR ADDITIONAL ANSWER     ===========================================  IF EMPLOYEE'S FAMILY MEMBER IS THE PATIENT, PLEASE COMPLETE SECTION BELOW:  N/A    13) Does the patient/family member need the employee to provide basic medical or personal needs, or for safety or transportation?      14)  If no, would the employee's presence to provide psychological comfort be beneficial to the patient or assist in the patient's recovery?    ================================================================    TO BE COMPLETED BY THE HEALTH CARE PROVIDER:    Signature:  Renetta Morrow ________________________________________  Date:   09/30/20

## 2020-10-01 NOTE — LETTER
Certification of Health Care Provider  Family Medical Leave Act (FMLA)    October 2, 2020    Employer's name and contact: [employer]  [address], [place] [address]   Phone: [phone] Fax: [phone]    Employee's job title:    Regular work schedule: See attached  Employee's essential job functions: See Attached    Patient's name: Daniella Sexton 1980    Provider's name and business address:  JENAE Sibley [address]  [place] [address]  Phone: [phone]  Fax: [phone]    PART A:  MEDICAL FACTS  1)  Approximate date condition commenced:  4/3/19    Probable duration of condition:  Lifelong     Pravin below as applicable:  Was the patient admitted for an overnight stay in a hospital, hospice, or residential medical care facility?  no.    Date(s) you treated the patient for condition: 4/3/19, 05/08/19, 11/07/19, 05/13/20, 09/30/20    Will the patient need to have treatment visits at least twice per year due to the                     condition? yes    Was medication, other than over-the-counter medication prescribed?  yes    Was the patient referred to other health care provider(s) for evaluation or treatment     (e.g., physical therapist)?  yes:  State the nature of such treatments and expected duration of treatment: Pain Clinic for ongoing Pain Management       2)  Is the medical condition pregnancy?  no.      3)  Is the employee unable to perform any of his/her job functions due to the     condition: yes:  Identify the job functions the employee is unable to perform: prolonged standing    4)  Describe other relevant medical facts, if any, related to the condition which the employee seeks leave (such as medical facts may include symptoms, diagnosis, or any regimen of continuing treatment such as the use of specialized equipment):     Chronic low back pain    PART B: AMOUNT OF LEAVE  NEEDED   5)  Will the employee be incapacitated for a single continuous period of time due to his/her medical condition, including any time for treatment and recovery?  no.    6)  Will the employee need to attend follow-up treatment appointments or work part-time or on a reduced schedule because of the employee's medical condition?  no.     Patient must work with restrictions:  -No standing for more than 45 minutes continuously  -No lifting/pushing/pulling more than 20 lbs    7) Will the condition cause episodic flare ups periodically preventing the employee from performing his/her job functions? yes:  Is it medically necessary for the patient to be absent from work during the flare-ups? No    Based upon the patient's medical history and your knowledge of the medical condition, estimate the frequency of flare-ups and the duration of related incapacity that the patient may have over the next six months (e.g., 1 episode every 3 months lasting 1-2 days):    Frequency: 2 X a month  Duration: maximum 6 day(s)     ADDITIONAL INFORMATION: IDENTIFY QUESTION NUMBER WITH YOUR ADDITIONAL ANSWER     ===========================================  IF EMPLOYEE'S FAMILY MEMBER IS THE PATIENT, PLEASE COMPLETE SECTION BELOW:  N/A    13) Does the patient/family member need the employee to provide basic medical or personal needs, or for safety or transportation?      14)  If no, would the employee's presence to provide psychological comfort be beneficial to the patient or assist in the patient's recovery?    ================================================================    TO BE COMPLETED BY THE HEALTH CARE PROVIDER:    Signature:  Renetta Morrow ________________________________________  Date:   09/30/20

## 2020-10-02 NOTE — TELEPHONE ENCOUNTER
Updated the form in Epic letters after huddling with the provider.     The forms have been given back to Renetta Morrow CNP to complete.     Rita Godwin,

## 2020-10-02 NOTE — TELEPHONE ENCOUNTER
Talked with Renetta to let her know these forms have come back.     Will huddle with her. Rita Godwin,

## 2020-10-05 ENCOUNTER — THERAPY VISIT (OUTPATIENT)
Dept: PHYSICAL THERAPY | Facility: CLINIC | Age: 40
End: 2020-10-05
Attending: PAIN MEDICINE
Payer: COMMERCIAL

## 2020-10-05 DIAGNOSIS — M47.816 FACET ARTHROPATHY, LUMBAR: ICD-10-CM

## 2020-10-05 DIAGNOSIS — M54.50 LUMBAGO: ICD-10-CM

## 2020-10-05 PROCEDURE — 97161 PT EVAL LOW COMPLEX 20 MIN: CPT | Mod: GP | Performed by: PHYSICAL THERAPIST

## 2020-10-05 PROCEDURE — 97530 THERAPEUTIC ACTIVITIES: CPT | Mod: GP | Performed by: PHYSICAL THERAPIST

## 2020-10-05 PROCEDURE — 97112 NEUROMUSCULAR REEDUCATION: CPT | Mod: GP | Performed by: PHYSICAL THERAPIST

## 2020-10-05 NOTE — LETTER
JOE MARION PT  [address]  [address]  [place] [address]  [phone]    2020    Re: Daniella Sexton   :   1980  MRN:  [id number]   REFERRING PHYSICIAN:   MD JOE El PT  Date of Initial Evaluation:  10/05/2020  Visits:  Rxs Used: 1  Reason for Referral:     Facet arthropathy, lumbar  Lumbago    EVALUATION SUMMARY    [facility] Initial Evaluation    Lumbar Spine Evaluation    Physical Therapy Initial Examination/Evaluation 2020   Daniella Sexton is a 40 year old female referred to physical therapy by Dr. Colby Moss for treatment of LBP with Precautions/Restrictions/MD instructions E&T   Therapist Assessment:   Clinical Impression: Pt presents to [facility] with primary complaint of low back pain and LE numbness/tingling.  Per clinical examination, pt with overall good lumbar ROM and strength. Pt reports extension feels good and she has responded well to traction in the past.    Pt will benefit from skilled physical therapy for core stability program, trial of traction, and possible directional preference exercise.     Subjective: Pt reports that she has had back pain for over 1 year. She did get relief from traction in the past. Pt reports numbness/tingling in bilat LEs down to toes. Low back pain goes to hips. She started medial lumbar branch block in pain clinic. She has had relief with this already, with pain relief about 5 days. She is starting phase 2 on Oct 13th. She hasn't been able to work for a full week all year due to back pain   DOI/onset: MD order 2020   Mechanism of injury: slipped and fell at work ~16 years ago; chronic pain   DOS Discectomy in lumbar spine in  or     Related PMH: chronic back pain  Previous treatment: PT   Imaging: See EMR   Chief Complaint: lumbar back pain    Pain: rest  /10, activity 10/10  Described as: sharp, numbness/tingling in LEs  Alleviated by: traction, ice, heat Exacerbated by:  Standing  Progression of symptoms since initial onset: variable Time of day when pain is worse: end of day   Sleeping: sleeps with pillow between her legs   Social history: lives with boyfriend and 17 year old daughter   Occupation: production  Job duties: Stands for 45 mins, sits for 15 mins   Re: Daniella Sexton   :   1980    Current HEP/exercise regimen: stretches, has recumbent bike at home    Patient's goals are Decrease pain    Other pertinent PMH: Asthma, migraines/headaches, numbness/tingling, overweight, smoking  General health as reported by patient: Good    Return to MD: 10/13/2020 for injection      Static Posture  Knee: Varus/Valgus: unremarkable      Hip: Adduction/IR: unremarkable     Dynamic Movement Screen  Single leg stance observations: 5s on the L, 15s on the R   Double limb squat observations: Good mechanics  Gait:posterior pelvic tilt , poor core support     Trunk Range of Motion  Flexion: 75% ROM   Extension: WNL-repeated motions feels good  Side bendin% L, 50% R  Rotation: 25% L with mm spasm, 25% R    Hip and Knee Strength   MMT Hip Abduction Hip Extension Hip Flexion    Left 5/5 4+/5 5/5   Right 5/5 4+/5 5/5     Special Tests  Neural tension: negative   Dermatomes: Able to identify light touch   Myotomes: WNL    Assessment/Plan:    Patient is a 40 year old female with lumbar complaints.    Patient has the following significant findings with corresponding treatment plan.                Diagnosis 1:  Low Back Pain   Pain -  hot/cold therapy, mechanical traction, manual therapy, self management, education, directional preference exercise and home program  Decreased strength - therapeutic exercise, therapeutic activities and home program  Decreased function - therapeutic activities and home program  Impaired posture - neuro re-education, therapeutic activities and home program    Therapy Evaluation Codes:   1) History comprised  of:   Personal factors that impact the plan of care:      Past/current experiences and Time since onset of symptoms.    Comorbidity factors that impact the plan of care are:      Asthma, Migraines/headaches, Numbness/tingling, Overweight and Smoking.     Medications impacting care: Anti-inflammatory, Muscle relaxant and Pain.  2) Examination of Body Systems comprised of:   Body structures and functions that impact the plan of care:    Re: Daniella Sexton   :   1980      Lumbar spine.   Activity limitations that impact the plan of care are:      Bathing, Bending, Cooking, Driving, Dressing, Lifting, Sitting, Standing, Walking, Working and Sleeping.  3) Clinical presentation characteristics are:   Stable/Uncomplicated.  4) Decision-Making    Low complexity using standardized patient assessment instrument and/or measureable assessment of functional outcome.  Cumulative Therapy Evaluation is: Low complexity.    Previous and current functional limitations:  (See Goal Flow Sheet for this information)    Short term and Long term goals: (See Goal Flow Sheet for this information)     Communication ability:  Patient appears to be able to clearly communicate and understand verbal and written communication and follow directions correctly.  Treatment Explanation - The following has been discussed with the patient:   RX ordered/plan of care  Anticipated outcomes  Possible risks and side effects  This patient would benefit from PT intervention to resume normal activities.   Rehab potential is good.    Frequency:  1 X week, once daily  Duration:  for 6 weeks  Discharge Plan:  Achieve all LTG.  Independent in home treatment program.  Reach maximal therapeutic benefit.    Please refer to the daily flowsheet for treatment today, total treatment time and time spent performing 1:1 timed codes.         Thank you for your referral.    INQUIRIES  Therapist: Kelsey Gutierrez, PT DPT  JOE MARION PT  [address]  [place] [address]  Phone: [phone]  Fax: [phone]

## 2020-10-05 NOTE — TELEPHONE ENCOUNTER
Called and updated Daniella the forms have been completed.     She will  the originals at the [facility]    Completed forms are down at [facility]  ready for .     Designated pick-up person will need to provided a photo ID at time of .    Forms have been faxed to the HR department contact & a copy has been sent to be added to the chart.     Rita Godwin,

## 2020-10-05 NOTE — PROGRESS NOTES
[facility] Initial Evaluation  Subjective:  HPI                    Objective:  System    Physical Exam    General     ROS      Lumbar Spine Evaluation    Physical Therapy Initial Examination/Evaluation October 5, 2020   Daniella Sexton is a 40 year old female referred to physical therapy by Dr. Colby Moss for treatment of LBP with Precautions/Restrictions/MD instructions E&T   Therapist Assessment:   Clinical Impression: Pt presents to [facility] with primary complaint of low back pain and LE numbness/tingling.  Per clinical examination, pt with overall good lumbar ROM and strength. Pt reports extension feels good and she has responded well to traction in the past.    Pt will benefit from skilled physical therapy for core stability program, trial of traction, and possible directional preference exercise.     Subjective: Pt reports that she has had back pain for over 1 year. She did get relief from traction in the past. Pt reports numbness/tingling in bilat LEs down to toes. Low back pain goes to hips. She started medial lumbar branch block in pain clinic. She has had relief with this already, with pain relief about 5 days. She is starting phase 2 on Oct 13th. She hasn't been able to work for a full week all year due to back pain   DOI/onset: MD order 9/25/2020   Mechanism of injury: slipped and fell at work ~16 years ago; chronic pain   DOS Discectomy in lumbar spine in 2005 or 2006    Related PMH: chronic back pain  Previous treatment: PT   Imaging: See EMR   Chief Complaint: lumbar back pain    Pain: rest 1 /10, activity 10/10  Described as: sharp, numbness/tingling in LEs Alleviated by: traction, ice, heat Exacerbated by:  Standing  Progression of symptoms since initial onset: variable Time of day when pain is worse: end of day   Sleeping: sleeps with pillow between her legs   Social history: lives with boyfriend and 17 year old daughter   Occupation: production  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, sits for 15 mins   Current HEP/exercise regimen: stretches, has recumbent bike at home    Patient's goals are Decrease pain    Other pertinent PMH: Asthma, migraines/headaches, numbness/tingling, overweight, smoking  General health as reported by patient: Good    Return to MD: 10/13/2020 for injection      Static Posture    Knee: Varus/Valgus: unremarkable        Hip: Adduction/IR: unremarkable       Dynamic Movement Screen  Single leg stance observations: 5s on the L, 15s on the R   Double limb squat observations: Good mechanics  Gait:posterior pelvic tilt , poor core support     Trunk Range of Motion  Flexion: 75% ROM   Extension: WNL-repeated motions feels good  Side bendin% L, 50% R  Rotation: 25% L with mm spasm, 25% R        Hip and Knee Strength   MMT Hip Abduction Hip Extension Hip Flexion    Left 5/5 4+/5 5/5   Right 5/5 4+/5 5/5     Special Tests  Neural tension: negative   Dermatomes: Able to identify light touch   Myotomes: WNL    Assessment/Plan:    Patient is a 40 year old female with lumbar complaints.    Patient has the following significant findings with corresponding treatment plan.                Diagnosis 1:  Low Back Pain   Pain -  hot/cold therapy, mechanical traction, manual therapy, self management, education, directional preference exercise and home program  Decreased strength - therapeutic exercise, therapeutic activities and home program  Decreased function - therapeutic activities and home program  Impaired posture - neuro re-education, therapeutic activities and home program    Therapy Evaluation Codes:   1) History comprised of:   Personal factors that impact the plan of care:      Past/current experiences and Time since onset of symptoms.    Comorbidity factors that impact the plan of care are:      Asthma, Migraines/headaches, Numbness/tingling, Overweight and Smoking.     Medications impacting care: Anti-inflammatory, Muscle relaxant and  Pain.  2) Examination of Body Systems comprised of:   Body structures and functions that impact the plan of care:      Lumbar spine.   Activity limitations that impact the plan of care are:      Bathing, Bending, Cooking, Driving, Dressing, Lifting, Sitting, Standing, Walking, Working and Sleeping.  3) Clinical presentation characteristics are:   Stable/Uncomplicated.  4) Decision-Making    Low complexity using standardized patient assessment instrument and/or measureable assessment of functional outcome.  Cumulative Therapy Evaluation is: Low complexity.    Previous and current functional limitations:  (See Goal Flow Sheet for this information)    Short term and Long term goals: (See Goal Flow Sheet for this information)     Communication ability:  Patient appears to be able to clearly communicate and understand verbal and written communication and follow directions correctly.  Treatment Explanation - The following has been discussed with the patient:   RX ordered/plan of care  Anticipated outcomes  Possible risks and side effects  This patient would benefit from PT intervention to resume normal activities.   Rehab potential is good.    Frequency:  1 X week, once daily  Duration:  for 6 weeks  Discharge Plan:  Achieve all LTG.  Independent in home treatment program.  Reach maximal therapeutic benefit.    Please refer to the daily flowsheet for treatment today, total treatment time and time spent performing 1:1 timed codes.

## 2020-10-08 ENCOUNTER — TELEPHONE (OUTPATIENT)
Dept: PALLIATIVE MEDICINE | Facility: CLINIC | Age: 40
End: 2020-10-08

## 2020-10-08 NOTE — TELEPHONE ENCOUNTER
Disability forms received.  Did patient have a formal evaluation of disability.  We will need to get clarification from patient on what the exact situation is.  Additionally, we will need to have a appointment to complete forms.

## 2020-10-09 NOTE — TELEPHONE ENCOUNTER
Writer called and LVM with Dr. Moss's response    Dany Shukla, RN  Care Coordinator   [facility]

## 2020-10-12 ENCOUNTER — THERAPY VISIT (OUTPATIENT)
Dept: PHYSICAL THERAPY | Facility: CLINIC | Age: 40
End: 2020-10-12
Payer: COMMERCIAL

## 2020-10-12 DIAGNOSIS — M54.50 LUMBAGO: ICD-10-CM

## 2020-10-12 PROCEDURE — 97110 THERAPEUTIC EXERCISES: CPT | Mod: GP | Performed by: PHYSICAL THERAPIST

## 2020-10-12 PROCEDURE — 97112 NEUROMUSCULAR REEDUCATION: CPT | Mod: GP | Performed by: PHYSICAL THERAPIST

## 2020-10-13 ENCOUNTER — RADIOLOGY INJECTION OFFICE VISIT (OUTPATIENT)
Dept: PALLIATIVE MEDICINE | Facility: CLINIC | Age: 40
End: 2020-10-13
Payer: COMMERCIAL

## 2020-10-13 ENCOUNTER — ANCILLARY PROCEDURE (OUTPATIENT)
Dept: RADIOLOGY | Facility: CLINIC | Age: 40
End: 2020-10-13
Attending: PAIN MEDICINE
Payer: COMMERCIAL

## 2020-10-13 VITALS — DIASTOLIC BLOOD PRESSURE: 66 MMHG | SYSTOLIC BLOOD PRESSURE: 119 MMHG | OXYGEN SATURATION: 98 % | HEART RATE: 89 BPM

## 2020-10-13 DIAGNOSIS — M47.816 SPONDYLOSIS OF LUMBAR REGION WITHOUT MYELOPATHY OR RADICULOPATHY: Primary | ICD-10-CM

## 2020-10-13 DIAGNOSIS — M47.816 SPONDYLOSIS OF LUMBAR REGION WITHOUT MYELOPATHY OR RADICULOPATHY: ICD-10-CM

## 2020-10-13 PROCEDURE — 99207 ZZC NO CHARGE LOS: CPT | Performed by: PAIN MEDICINE

## 2020-10-13 PROCEDURE — 99207 PR DROP WITH A PROCEDURE: CPT | Performed by: PAIN MEDICINE

## 2020-10-13 ASSESSMENT — PAIN SCALES - GENERAL: PAINLEVEL: MILD PAIN (3)

## 2020-10-13 NOTE — NURSING NOTE
Pre-procedure Intake    Have you been fasting? NA    If yes, for how long? NA    Are you taking a prescribed blood thinner such as coumadin, Plavix, Xarelto?    No    If yes, when did you take your last dose? NA    Do you take aspirin?  No    If cervical procedure, have you held aspirin for 6 days?   NA    Do you have any allergies to contrast dye, iodine, steroid and/or numbing medications?  NO    Are you currently taking antibiotics or have an active infection?  NO    Have you had a fever/elevated temperature within the past week? Not Applicable    Are you currently taking oral steroids? Not Applicable    Do you have a ? Yes       Are you pregnant or breastfeeding?  NO    Are the vital signs normal?  Yes      Smita Duff CMA ([facility])

## 2020-10-13 NOTE — PROGRESS NOTES
Pre procedure Diagnosis: facet arthropathy, lumbosacral spondylosis   Post procedure Diagnosis: Same  Procedure performed: Bilateral lumbar medial branch block  Indication:  Diagnostic   Anesthesia: none  Complications: none  Operators: Colby Moss MD   Indications:   Daniella Sexton is a 40 year old female. The patient has a history of bilateral axial low back pain.  Exam shows +++ and pain with extension/rotation, and they have tried conservative treatment including PHYSICAL THERAPY  and meds.    Options/alternatives, benefits and risks were discussed with the patient including but not limited to bleeding, infection, tissue trauma, exposure to radiation, reaction to medications, spinal cord injury, weakness, numbness and paralysis.  Questions were answered to her satisfaction and she agrees to proceed. Voluntary informed consent was obtained and signed.     Vitals were reviewed: Yes  Allergies were reviewed:  Yes   Medications were reviewed:  Yes   Pre-procedure pain score: 3/10    Procedure:  After obtaining signed informed consent, the patient was brought into the procedure suite and was placed in a prone position on the procedure table.   A Pause for the Cause was performed.  The patient was prepped and draped in the usual sterile fashion.     Under AP fluoroscopic guidance the L3, L4, L5 vertebral bodies were identified. The C-arm was rotated to the oblique view to afford optimal visualization the pedicles.  Lidocaine 1% was used to anesthetize the skin at each level.  Under intermittent fluoroscopy, 25G 3.5inch spinal needles were positioned inferior and lateral to the intersection of the transverse process and pedicle at the Bilateral L4 & L5 levels sacral ala. The needle positions were verified and optimized from the AP view.    The anatomic targets for the L3 & L4 medial nerve and L5 dorsal ramus (which functionally incorporates the medial branch) were the  L4 & L5 transverse processes and sacral  alar notch, with laterality as described above, resulting in blockade of the L4/5 and L5/S1 facet joints.    Bupivacaine 0.25% 1 ml was injected at each location.  The needles were removed.      Hemostasis was achieved, the area was cleaned, and bandaids were placed when appropriate.  The patient tolerated the procedure well, and was taken to the recovery room.    Images were saved to PACS.     The patient will continue to monitor progress, and they were given a pain diary to complete at home.  They will either fax or mail this back to us or bring it to their next appointment. We will determine the treatment plan after we review the diary.      Post-procedure pain score: 0/10  Follow-up includes:   -f/u phone call in one week  -will await diary for further planning.    Colby Moss MD  [facility]

## 2020-10-13 NOTE — NURSING NOTE
Discharge Information    IV Discontiued Time:  NA    Amount of Fluid Infused:  NA    Discharge Criteria = When patient returns to baseline or as per MD order    Consciousness:  Pt is fully awake    Circulation:  BP +/- 20% of pre-procedure level    Respiration:  Patient is able to breathe deeply    O2 Sat:  Patient is able to maintain O2 Sat >92% on room air    Activity:  Moves 4 extremities on command    Ambulation:  Patient is able to stand and walk or stand and pivot into wheelchair    Dressing:  Clean/dry or No Dressing    Notes:   Discharge instructions and AVS given to patient    Patient meets criteria for discharge?  YES    Admitted to PCU?  No    Responsible adult present to accompany patient home?  Yes    Signature/Title:    jing reynolds RN  RN Care Coordinator  [facility]

## 2020-10-15 NOTE — TELEPHONE ENCOUNTER
Pt was seen recently for an injection.   She was doing well and this is not needed at this time.    Carli RN-BSN  [facility]

## 2020-10-20 ENCOUNTER — MYC MEDICAL ADVICE (OUTPATIENT)
Dept: PALLIATIVE MEDICINE | Facility: CLINIC | Age: 40
End: 2020-10-20

## 2020-10-20 DIAGNOSIS — Z20.822 ENCOUNTER FOR LABORATORY TESTING FOR COVID-19 VIRUS: Primary | ICD-10-CM

## 2020-10-20 NOTE — TELEPHONE ENCOUNTER
Pt had a bilateral Lumbar  medial branch block # 2 on 10/13/20.  The post medial branch block form was  received.      Max % of pain relief from medial branch block #1:          At rest:                 100%  Left fact load:       90%  Right facet load:  90%    Normal activity:    90%    Max relief from block #2 is:    At rest:                 100%  Left fact load:       100%  Right facet load:  100%  Normal activity:    did not fill out correctly%    Insurance[place]  [employer]      Does pt have adequate relief insurance-wise to proceed to radiofrequency ablation?  TBD     Physical therapy:                  Last done in?:  current.     How many sessions?:  2 with 2 more scheduled. #4 on 10/29/20.    Where was it done?  [place]     Called pt and informed him/her that insurance would be checked and will be  called once we get a response.  Done    The pain diary was faxed to [facility] for insurance review.    Mychart sent to pt:  From: Carli Vargas RN      Created: 10/20/2020 2:34 PM      Hi Daniella.  We received your pain diary.  Looks like PT visit #4 is on 10/29/20.  Let us know when this is done and we can submit all the information to your insurance for the radiofrequency ablation.          MCKAYLA Boyce-BSN  [facility]

## 2020-10-22 ENCOUNTER — THERAPY VISIT (OUTPATIENT)
Dept: PHYSICAL THERAPY | Facility: CLINIC | Age: 40
End: 2020-10-22
Payer: COMMERCIAL

## 2020-10-22 DIAGNOSIS — M54.50 LUMBAGO: ICD-10-CM

## 2020-10-22 PROCEDURE — 97112 NEUROMUSCULAR REEDUCATION: CPT | Mod: GP | Performed by: PHYSICAL THERAPIST

## 2020-10-22 PROCEDURE — 97110 THERAPEUTIC EXERCISES: CPT | Mod: GP | Performed by: PHYSICAL THERAPIST

## 2020-10-22 PROCEDURE — 97530 THERAPEUTIC ACTIVITIES: CPT | Mod: GP | Performed by: PHYSICAL THERAPIST

## 2020-10-27 ENCOUNTER — VIRTUAL VISIT (OUTPATIENT)
Dept: FAMILY MEDICINE | Facility: OTHER | Age: 40
End: 2020-10-27

## 2020-10-27 DIAGNOSIS — M54.41 CHRONIC BILATERAL LOW BACK PAIN WITH BILATERAL SCIATICA: ICD-10-CM

## 2020-10-27 DIAGNOSIS — G89.29 CHRONIC BILATERAL LOW BACK PAIN WITH BILATERAL SCIATICA: ICD-10-CM

## 2020-10-27 DIAGNOSIS — M47.816 SPONDYLOSIS OF LUMBAR REGION WITHOUT MYELOPATHY OR RADICULOPATHY: ICD-10-CM

## 2020-10-27 DIAGNOSIS — M54.42 CHRONIC BILATERAL LOW BACK PAIN WITH BILATERAL SCIATICA: ICD-10-CM

## 2020-10-27 RX ORDER — DICLOFENAC SODIUM 75 MG/1
75 TABLET, DELAYED RELEASE ORAL 2 TIMES DAILY
Qty: 60 TABLET | Refills: 0 | Status: SHIPPED | OUTPATIENT
Start: 2020-10-27 | End: 2020-12-07

## 2020-10-27 RX ORDER — GABAPENTIN 300 MG/1
900 CAPSULE ORAL 3 TIMES DAILY
Qty: 270 CAPSULE | Refills: 1 | Status: SHIPPED | OUTPATIENT
Start: 2020-10-27 | End: 2021-03-30

## 2020-10-27 NOTE — TELEPHONE ENCOUNTER
Received fax request from [facility] pharmacy requesting refill(s) for gabapentin (NEURONTIN) 300 MG capsule    Last refilled on 09/18/20    Pt last seen on 09/10/20 (has had procedure visits since)  Next appt scheduled for : none    Will facilitate refill.

## 2020-10-27 NOTE — PROGRESS NOTES
"Date: 10/27/2020 10:06:32  Clinician: Areli Aguiar  Clinician NPI: [id number]  Patient: IRMA WEST  Patient : 1980  Patient Address[facility] [place] [address]  Patient Phone: [phone]  Visit Protocol: URI  Patient Summary:  IRMA is a 40 year old ( : 1980 ) female who initiated a [facility] Visit for COVID-19 (Coronavirus) evaluation and screening. When asked the question \"Please sign me up to receive news, health information and promotions. \", IRMA responded \"Yes\".    IRMA states her symptoms started suddenly 3-4 days ago.   Her symptoms consist of a headache, wheezing, a cough, nausea, facial pain or pressure, myalgia, chills, malaise, and diarrhea. IRMA also feels feverish but was unable to measure her temperature.   Symptom details     Cough: IRMA coughs every 5-10 minutes and her cough is not more bothersome at night. Phlegm does not come into her throat when she coughs. She does not believe her cough is caused by post-nasal drip.     Wheezing: IRMA has been diagnosed with asthma. Additional wheezing details as reported by the patient (free text): the main time i really hear it is when im laying down to go to bed       Facial pain or pressure: The facial pain or pressure does not feel worse when bending or leaning forward.     Headache: She states the headache is severe (7-9 on a 10 point pain scale).      IRMA denies having ear pain, nasal congestion, anosmia, vomiting, rhinitis, sore throat, teeth pain, and ageusia. She also denies double sickening (worsening symptoms after initial improvement), having a sinus infection within the past year, taking antibiotic medication in the past month, and having recent facial or sinus surgery in the past 60 days. She is not experiencing dyspnea.   Precipitating events  She has not recently been exposed to someone with influenza. IRMA has been in close contact with the following high risk individuals: adults 65 or older, " people with asthma, heart disease or diabetes, and children under the age of 5.   Pertinent COVID-19 (Coronavirus) information  In the past 14 days, IRMA has not worked in a congregate living setting.   She does not work or volunteer as healthcare worker or a  and does not work or volunteer in a healthcare facility.   IRMA has lived in a congregate living setting in the past 14 days. She does not live with a healthcare worker.   IRMA has not had a close contact with a laboratory-confirmed COVID-19 patient within 14 days of symptom onset.   Since December 2019, IRMA and has had upper respiratory infection (URI) or influenza-like illness. Has not been diagnosed with lab-confirmed COVID-19 test      Date(s) of previous URI or influenza-like illness (free-text): since 10/25/2020     Symptoms IRMA experienced during previous URI or influenza-like illness as reported by the patient (free-text): head ache, body ache slight fever, sour stomach        Pertinent medical history  IRMA does not get yeast infections when she takes antibiotics.   IRMA needs a return to work/school note.   Weight: 275 lbs   IRMA smokes or uses smokeless tobacco.   She denies pregnancy and denies breastfeeding. She is currently menstruating.   Weight: 275 lbs    MEDICATIONS: diclofenac-misoprostol oral, amitriptyline-chlordiazepoxide oral, gabapentin enacarbil oral, ALLERGIES: Morpholine Analogues, Penicillins, amoxicillin  Clinician Response:  Dear IRMA,   Your symptoms show that you may have coronavirus (COVID-19). This illness can cause fever, cough and trouble breathing. Many people get a mild case and get better on their own. Some people can get very sick.  Based on the symptoms you have shared, I would like you to be re-checked in 2 to 3 days. Please call your family clinic to set up a video or phone visit.  Will I be tested for COVID-19?  We would like to test you for this virus.   Please call [phone] to  "schedule your visit. Explain that you were [facility] to have a COVID-19 test. Be ready to share your [facility] ID number.  Please note that if you are assessed for Covid-19 testing and receive an order for testing from [facility] hospital, that the scheduling of your Covid test at [facility] may be delayed by three or four days or more due to limited availability for testing. Additional options for testing can be found on the [place] Covid-19 Response website. https://[place].gov/covid19/     The following will serve as your written order for this COVID Test, ordered by me, for the indication of suspected COVID [Z20.828]: The test will be ordered in [employer], our electronic health record, after you are scheduled. It will show as ordered and authorized by Hay Winters MD.  Order: COVID-19 (Coronavirus) PCR for SYMPTOMATIC testing from Critical access hospital.   1.When it's time for your COVID test:   Stay at least 6 feet away from others. (If someone will drive you to your test, stay in the backseat, as far away from the  as you can.)   Cover your mouth and nose with a mask, tissue or washcloth.  Go straight to the testing site. Don't make any stops on the way there or back.      2.Starting now: Stay home and away from others (self-isolate) until:   You've had no fever---and no medicine that reduces fever---for one full day (24 hours). And...   Your other symptoms have gotten better. For example, your cough or breathing has improved. And...   At least 10 days have passed since your symptoms started.       During this time, don't leave the house except for testing or medical care.   Stay in your own room, even for meals. Use your own bathroom if you can.   Stay away from others in your home. No hugging, kissing or shaking hands. No visitors.  Don't go to work, school or anywhere else.    Clean \"high touch\" surfaces often (doorknobs, counters, handles, etc.). Use a household cleaning spray or wipes. You'll find a full list of "  on the EPA website: www.epa.gov/pesticide-registration/list-n-disinfectants-use-against-sars-cov-2.   Cover your mouth and nose with a mask, tissue or washcloth to avoid spreading germs.  Wash your hands and face often. Use soap and water.  Caregivers in these groups are at risk for severe illness due to COVID-19:  o People 65 years and older  o People who live in a nursing home or long-term care facility  o People with chronic disease (lung, heart, cancer, diabetes, kidney, liver, immunologic)   o People who have a weakened immune system, including those who:   Are in cancer treatment  Take medicine that weakens the immune system, such as corticosteroids  Had a bone marrow or organ transplant  Have an immune deficiency  Have poorly controlled HIV or AIDS  Are obese (body mass index of 40 or higher)  Smoke regularly   o Caregivers should wear gloves while washing dishes, handling laundry and cleaning bedrooms and bathrooms.  o Use caution when washing and drying laundry: Don't shake dirty laundry, and use the warmest water setting that you can.  o For more tips, go to www.cdc.gov/coronavirus/2019-ncov/downloads/10Things.pdf.      How can I take care of myself?   Get lots of rest. Drink extra fluids (unless a doctor has told you not to)   Take Tylenol (acetaminophen) for fever or pain. If you have liver or kidney problems, ask your family doctor if it's okay to take Tylenol.   Adults can take either:    650 mg (two 325 mg pills) every 4 to 6 hours, or...   1,000 mg (two 500 mg pills) every 8 hours as needed.    Note: Don't take more than 3,000 mg in one day. Acetaminophen is found in many medicines (both prescribed and over-the-counter medicines). Read all labels to be sure you don't take too much.   For children, check the Tylenol bottle for the right dose. The dose is based on the child's age or weight.    If you have other health problems (like cancer, heart failure, an organ transplant or severe kidney  disease): Call your specialty clinic if you don't feel better in the next 2 days.       Know when to call 911. Emergency warning signs include:    Trouble breathing or shortness of breath Pain or pressure in the chest that doesn't go away Feeling confused like you haven't felt before, or not being able to wake up Bluish-colored lips or face  Where can I get more information?   [facility] -- About COVID-19: [email or web address]   CDC -- What to Do If You're Sick: www.cdc.gov/coronavirus/2019-ncov/about/steps-when-sick.html   CDC -- Ending Home Isolation: www.cdc.gov/coronavirus/2019-ncov/hcp/disposition-in-home-patients.html   [facility] -- Caring for Someone: www.cdc.gov/coronavirus/2019-ncov/if-you-are-sick/care-for-someone.html   [facility] -- Interim Guidance for Hospital Discharge to Home: www.[facility].[place]./diseases/coronavirus/hcp/hospdischarge.pdf   [facility] clinical trials (COVID-19 research studies): clinicalaffairs.[facility]-clinical-trials    Below are the COVID-19 hotlines at the [facility] of Health ([facility]). Interpreters are available.    For health questions: Call 789-743-0473 or 1-976.512.3624 (7 a.m. to 7 p.m.) For questions about schools and childcare: Call 284-162-2456 or 1-221.406.4061 (7 a.m. to 7 p.m.)       Diagnosis: Contact with and (suspected) exposure to other viral communicable diseases  Diagnosis ICD: Z20.828

## 2020-10-29 ENCOUNTER — THERAPY VISIT (OUTPATIENT)
Dept: PHYSICAL THERAPY | Facility: CLINIC | Age: 40
End: 2020-10-29
Payer: COMMERCIAL

## 2020-10-29 DIAGNOSIS — M54.50 LUMBAGO: ICD-10-CM

## 2020-10-29 PROCEDURE — 97530 THERAPEUTIC ACTIVITIES: CPT | Mod: 95 | Performed by: PHYSICAL THERAPIST

## 2020-10-29 PROCEDURE — 97112 NEUROMUSCULAR REEDUCATION: CPT | Mod: 95 | Performed by: PHYSICAL THERAPIST

## 2020-10-29 NOTE — LETTER
JOE MARION PT  [address]  [address]  [place] [address]  [phone]    2020    Re: Daniella Sexton   :   1980  MRN:  [id number]   REFERRING PHYSICIAN:   MD JOE El PT  Date of Initial Evaluation:  10/05/2020  Visits:  Rxs Used: 4  Reason for Referral:  Lumbago    EVALUATION SUMMARY    PROGRESS  REPORT  Progress reporting period is from 10/05/2020 to 10/29/2020.       SUBJECTIVE  Subjective: Pt reports exercises are going well. Extension feels good and she is performing every couple of hours. She did look into getting new inserts for her shoes and is saving up to purchase these as they have been helpful in the past.     Current Pain level: 0/10.     Initial Pain level: 10/10.   Changes in function:  Yes (See Goal flowsheet attached for changes in current functional level)  Adverse reaction to treatment or activity: None    OBJECTIVE  Changes noted in objective findings:  Yes, improved pain and function with standing and using proper body mechanics with daily activities  Objective: Continuing extension based exercises and progressed core strengthening as able through virtual visit. Pt is more aware of body mechanics with daily activities. Pain levels and function are improved.      ASSESSMENT/PLAN  Updated problem list and treatment plan: Diagnosis 1:  Low Back Pain   Decreased strength - therapeutic exercise, therapeutic activities and home program  Impaired muscle performance - neuro re-education and home program  Decreased function - therapeutic activities and home program  STG/LTGs have been met or progress has been made towards goals:  Yes (See Goal flow sheet completed today.)  Assessment of Progress: The patient's condition is improving.  Self Management Plans:  Patient has been instructed in a home treatment program.  I have re-evaluated this patient and find that the nature, scope, duration and intensity of the therapy is appropriate for  the medical condition of the patient.  Daniella continues to require the following intervention to meet STG and LTG's:  PT      Re: Daniella Sexton   :   1980    Recommendations:  This patient would benefit from continued therapy.     Frequency:  2 X a month, once daily  Duration:  for 1 months      Please refer to the daily flowsheet for treatment today, total treatment time and time spent performing 1:1 timed codes.      Thank you for your referral.    INQUIRIES  Therapist: Kelsey Gutierrez, PT, DPT   JOE MARION PT  [address] [address]  [facility] [address]  Phone: [phone]  Fax: [phone]

## 2020-10-29 NOTE — PROGRESS NOTES
Subjective:  HPI  Physical Exam                    Objective:  System    Physical Exam    General     ROS    Assessment/Plan:    PROGRESS  REPORT    Progress reporting period is from 10/05/2020 to 10/29/2020.       SUBJECTIVE  Subjective: Pt reports exercises are going well. Extension feels good and she is performing every couple of hours. She did look into getting new inserts for her shoes and is saving up to purchase these as they have been helpful in the past.     Current Pain level: 0/10.     Initial Pain level: 10/10.   Changes in function:  Yes (See Goal flowsheet attached for changes in current functional level)  Adverse reaction to treatment or activity: None    OBJECTIVE  Changes noted in objective findings:  Yes, improved pain and function with standing and using proper body mechanics with daily activities  Objective: Continuing extension based exercises and progressed core strengthening as able through virtual visit. Pt is more aware of body mechanics with daily activities. Pain levels and function are improved.      ASSESSMENT/PLAN  Updated problem list and treatment plan: Diagnosis 1:  Low Back Pain   Decreased strength - therapeutic exercise, therapeutic activities and home program  Impaired muscle performance - neuro re-education and home program  Decreased function - therapeutic activities and home program  STG/LTGs have been met or progress has been made towards goals:  Yes (See Goal flow sheet completed today.)  Assessment of Progress: The patient's condition is improving.  Self Management Plans:  Patient has been instructed in a home treatment program.  I have re-evaluated this patient and find that the nature, scope, duration and intensity of the therapy is appropriate for the medical condition of the patient.  Daniella continues to require the following intervention to meet STG and LTG's:  PT    Recommendations:  This patient would benefit from continued therapy.     Frequency:  2 X a month, once  daily  Duration:  for 1 months        Please refer to the daily flowsheet for treatment today, total treatment time and time spent performing 1:1 timed codes.

## 2020-11-05 NOTE — PROGRESS NOTES
"Daniella Sexton is a 40 year old female who is being evaluated via a billable video visit.      The patient has been notified of following:     \"This video visit will be conducted via a call between you and your physician/provider. We have found that certain health care needs can be provided without the need for an in-person physical exam.  This service lets us provide the care you need with a video conversation.  If a prescription is necessary we can send it directly to your pharmacy.  If lab work is needed we can place an order for that and you can then stop by our lab to have the test done at a later time.    Video visits are billed at different rates depending on your insurance coverage.  Please reach out to your insurance provider with any questions.    If during the course of the call the physician/provider feels a video visit is not appropriate, you will not be charged for this service.\"    Patient has given verbal consent for Video visit? Yes  How would you like to obtain your AVS? MyChart  If you are dropped from the video visit, the video invite should be resent to: Text to cell phone: [phone]  Will anyone else be joining your video visit? No    Subjective     Daniella Sexton is a 40 year old female who presents today via video visit for the following health issues:    HPI     Headache  Onset/Duration: 2 WEEKS  Description  Location: its all over the head  Character: throbbing pain, global, pressure  Frequency:  constant  Duration:  constant  Wake with headaches: YES  Able to do daily activities when headache present: no   Intensity:  moderate  Progression of Symptoms:  worsening and constant  Accompanying signs and symptoms:  Stiff neck: YES  Neck or upper back pain: YES  Sinus or URI symptoms YES- cough and sinus pressure  Fever: YES- last week   Nausea or vomiting: YES nausea  Dizziness: no  Numbness/tingling: no  Weakness: no  Visual changes: none  History  Head trauma: no  Family history of " migraines: YES  Daily pain medication use: YES  Previous tests for headaches: no  Neurologist evaluation: no  Precipitating or Alleviating factors (light/sound/sleep/caffeine): Dark and sleeping  Therapies tried and outcome: thera flu, maxalt and Tylenol    But didn't help          Outcome - not effective  Frequent/daily pain medication use: as needed    Cough, headache started over 1 week ago and facial pressure began yesterday. Also nauseated and diarrhea. Symptoms began on 10/25- had fever of 100.5 which lasted for 4 days. No known exposures. Has been home quarantine.   Had COVID test last week at [facility] in [place] and was negative.   Can taste/smell. No shortness of breath or chest pain.    Patient notes that her teenage daughter recently moved out. Daughter told the police that Autumn was being physically aggressive toward her and relapsed with her meth use. Has restraining order against her now. Daniella states none of this is true.    Video Start Time: 2:52 PM        Review of Systems   Constitutional, HEENT, cardiovascular, pulmonary, GI, , musculoskeletal, neuro, skin, endocrine and psych systems are negative, except as otherwise noted.      Objective           Vitals:  No vitals were obtained today due to virtual visit.    Physical Exam     GENERAL: alert and fatigued  EYES: Eyes grossly normal to inspection.  No discharge or erythema, or obvious scleral/conjunctival abnormalities.  HENT: sinuses: maxillary tenderness on bilateral  RESP: Occasional cough, no visible dyspnea, able to speak in complete sentences  SKIN: Visible skin clear. No significant rash, abnormal pigmentation or lesions.  NEURO: Cranial nerves grossly intact.  Mentation and speech appropriate for age.  PSYCH: Mentation appears normal, affect normal/bright, judgement and insight intact, normal speech and appearance well-groomed.      No results found for any visits on 11/06/20.        Assessment & Plan     Cough  Recommend repeat  COVID-19 test due to ongoing symptoms. Continue conservative care such as tea with honey, over the counter cough medication, fluids, rest. If not improving next week, consider treatment for sinusitis.  - Symptomatic COVID-19 Virus (Coronavirus) by PCR; Future    Congestion of paranasal sinus  As above    Family conflict  Conflict with daughter- police involved            See Patient Instructions    No follow-ups on file.    JENAE Sibley CNP  [facility]      Video-Visit Details    Type of service:  Video Visit    Video End Time:3:07 PM    Originating Location (pt. Location): Home    Distant Location (provider location):  [facility]     Platform used for Video Visit: [employer]

## 2020-11-06 ENCOUNTER — MYC MEDICAL ADVICE (OUTPATIENT)
Dept: FAMILY MEDICINE | Facility: CLINIC | Age: 40
End: 2020-11-06

## 2020-11-06 ENCOUNTER — VIRTUAL VISIT (OUTPATIENT)
Dept: FAMILY MEDICINE | Facility: CLINIC | Age: 40
End: 2020-11-06
Payer: COMMERCIAL

## 2020-11-06 DIAGNOSIS — R09.81 CONGESTION OF PARANASAL SINUS: ICD-10-CM

## 2020-11-06 DIAGNOSIS — Z63.8 FAMILY CONFLICT: ICD-10-CM

## 2020-11-06 DIAGNOSIS — R05.9 COUGH: Primary | ICD-10-CM

## 2020-11-06 PROCEDURE — 99213 OFFICE O/P EST LOW 20 MIN: CPT | Mod: 95 | Performed by: NURSE PRACTITIONER

## 2020-11-06 SDOH — SOCIAL STABILITY - SOCIAL INSECURITY: OTHER SPECIFIED PROBLEMS RELATED TO PRIMARY SUPPORT GROUP: Z63.8

## 2020-11-09 ENCOUNTER — MYC MEDICAL ADVICE (OUTPATIENT)
Dept: FAMILY MEDICINE | Facility: CLINIC | Age: 40
End: 2020-11-09

## 2020-11-09 NOTE — TELEPHONE ENCOUNTER
Please see other [employer] message.    Danna Payne RN  [facility]

## 2020-11-16 ENCOUNTER — MYC MEDICAL ADVICE (OUTPATIENT)
Dept: FAMILY MEDICINE | Facility: CLINIC | Age: 40
End: 2020-11-16

## 2020-11-16 DIAGNOSIS — R09.81 CONGESTION OF PARANASAL SINUS: Primary | ICD-10-CM

## 2020-11-16 RX ORDER — AZITHROMYCIN 250 MG/1
TABLET, FILM COATED ORAL
Qty: 6 TABLET | Refills: 0 | Status: SHIPPED | OUTPATIENT
Start: 2020-11-16 | End: 2020-11-21

## 2020-11-16 NOTE — TELEPHONE ENCOUNTER
Please see [email or web address] message and advise. She was seen 11/06 for virtual visit. Covid-19 test negative. Pt was given Rx for doxy 11/09.

## 2020-11-16 NOTE — TELEPHONE ENCOUNTER
Stop Doxy and start Azithromycin. Sent to pharmacy. If not better with this, will need follow up appt.     Renetta Morrow, CNP

## 2020-11-23 NOTE — PROGRESS NOTES
"Daniella Sexton is a 40 year old female who is being evaluated via a billable video visit.      The patient has been notified of following:     \"This video visit will be conducted via a call between you and your physician/provider. We have found that certain health care needs can be provided without the need for an in-person physical exam.  This service lets us provide the care you need with a video conversation.  If a prescription is necessary we can send it directly to your pharmacy.  If lab work is needed we can place an order for that and you can then stop by our lab to have the test done at a later time.    Video visits are billed at different rates depending on your insurance coverage.  Please reach out to your insurance provider with any questions.    If during the course of the call the physician/provider feels a video visit is not appropriate, you will not be charged for this service.\"    Patient has given verbal consent for Video visit? Yes  How would you like to obtain your AVS? MyChart  If you are dropped from the video visit, the video invite should be resent to: Text to cell phone: [phone]   Will anyone else be joining your video visit? No      Subjective     Daniella Sexton is a 40 year old female who presents today via video visit for the following health issues:    HPI     Chief Complaint   Patient presents with     Sinus Problem     x 2 weeks-see appt notes. Pt states her ears and teeth are starting to hurt now too.     Sinus pressure x 4 weeks   A lot of sinus pressure, headache, ear pains, teeth. No fever, no drainage  Also has flonase and sinus rinse    Had sinus surgery 2 years ago; and recurrence of sinus infection reduced significantly    Video Start Time: 12:51 PM    Review of Systems   Constitutional, HEENT, cardiovascular, pulmonary, gi and gu systems are negative, except as otherwise noted.      Objective           Vitals:  No vitals were obtained today due to virtual " visit.    Physical Exam     GENERAL: Healthy, alert and no distress  RS; Not in distress, sounds plugged up  PSYCH: Mentation appears normal, affect normal/bright, normal speech and appearance well-groomed.    Assessment & Plan     Daniella was seen today for sinus problem.    Diagnoses and all orders for this visit:    Subacute maxillary sinusitis    Given done 2 antibiotics has no fever; likely more of a congestion problem. Discussed doing a medication that will help with congestion; sice been doing Flonase with do a short course of prednisone taper. Recheck in 1 week or sooner with concerns.  -     predniSONE (DELTASONE) 20 MG tablet; Take 3 tabs by mouth daily x 3 days, then 2 tabs daily x 3 days, then 1 tab daily x 3 days, then 1/2 tab daily x 3 days.    Congestion of paranasal sinus  -     predniSONE (DELTASONE) 20 MG tablet; Take 3 tabs by mouth daily x 3 days, then 2 tabs daily x 3 days, then 1 tab daily x 3 days, then 1/2 tab daily x 3 days.    Return in about 1 week (around 12/2/2020) for Follow up for symptoms recheck.    Olvin Lloyd MD  [facility]      Video-Visit Details    Type of service:  Video Visit    Video End Time:1:00 PM    Originating Location (pt. Location): Home    Distant Location (provider location):  [facility]     Platform used for Video Visit: Yesy

## 2020-11-25 ENCOUNTER — VIRTUAL VISIT (OUTPATIENT)
Dept: FAMILY MEDICINE | Facility: CLINIC | Age: 40
End: 2020-11-25
Payer: COMMERCIAL

## 2020-11-25 DIAGNOSIS — R09.81 CONGESTION OF PARANASAL SINUS: ICD-10-CM

## 2020-11-25 DIAGNOSIS — J01.00 SUBACUTE MAXILLARY SINUSITIS: Primary | ICD-10-CM

## 2020-11-25 PROCEDURE — 99213 OFFICE O/P EST LOW 20 MIN: CPT | Mod: 95 | Performed by: FAMILY MEDICINE

## 2020-11-25 RX ORDER — PREDNISONE 20 MG/1
TABLET ORAL
Qty: 20 TABLET | Refills: 0 | Status: SHIPPED | OUTPATIENT
Start: 2020-11-25 | End: 2020-11-27

## 2020-11-29 ENCOUNTER — HEALTH MAINTENANCE LETTER (OUTPATIENT)
Age: 40
End: 2020-11-29

## 2020-12-02 NOTE — PROGRESS NOTES
Subjective     Daniella Sexton is a 40 year old female who presents to clinic today for the following health issues:    HPI       Chief Complaint   Patient presents with     Sinus Problem     Fup     Other     2-3 weeks ago had negative COVID test at [facility]           Has had sinus pressure and cough ongoing for almost 2 months. Continues to have constant headache 10/10. Temples seem swollen, vision slightly blurred. Continues to cough. Ears hurt, teeth hurt. Seems different than usual migraine. Maxalt did not help.  Tested negative for COVID-19 twice.   Initially had fever but none since then.   Has had 2 rounds of antibiotics (Doxy, Azithromycin) and steroids.   Has diarrhea every time she eats. Is having diarrhea 10 times per day.  Had abnormal CXR in March and couldn't get in for follow up chest CT.      Review of Systems   Constitutional, HEENT, cardiovascular, pulmonary, GI, , musculoskeletal, neuro, skin, endocrine and psych systems are negative, except as otherwise noted.      Objective    /86 (BP Location: Left arm, Patient Position: Chair, Cuff Size: Adult Large)   Pulse 113   Temp 97.9  F (36.6  C) (Oral)   Wt 124.3 kg (274 lb)   SpO2 98%   BMI 44.22 kg/m    Body mass index is 44.22 kg/m .  Physical Exam   GENERAL: healthy, alert and no distress  HENT: normal cephalic/atraumatic, ear canals and TM's normal, nose and mouth without ulcers or lesions, oropharynx clear, oral mucous membranes moist and sinuses: maxillary, frontal tenderness on bilateral  NECK: no adenopathy, no asymmetry, masses, or scars and thyroid normal to palpation  RESP: lungs clear to auscultation - no rales, rhonchi or wheezes  CV: regular rate and rhythm, normal S1 S2, no S3 or S4, no murmur, click or rub, no peripheral edema and peripheral pulses strong  ABDOMEN: soft, nontender, no hepatosplenomegaly, no masses and bowel sounds normal  NEURO: Normal strength and tone, sensory exam grossly normal, mentation intact, cranial  nerves 2-12 intact, Romberg normal and rapid alternating movements normal    No results found for any visits on 12/09/20.        Assessment & Plan     Migraine without status migrainosus, not intractable, unspecified migraine type  Persistent headache consistent with migraine rather than sinusitis as she has no nasal discharge and has completed two course of antibiotics. Has also failed to respond to Prednisone and scheduled Voltaren two times daily. Will obtain updated brain imaging due to duration and severity of this headache.  Patient to hold voltaren and return tomorrow for Toradol injection, as this has alleviated migraines in the past for her.   - MR Brain w/o Contrast; Future  - MRA Brain ([facility]) wo Contrast; Future  - ketorolac (TORADOL) 30 MG/ML injection; Inject 1 mL (30 mg) into the muscle every 6 hours as needed for moderate pain  - ketorolac (TORADOL) injection 30 mg    Cough  Persistent cough with prior abnormal CXR. Reviewed importance of getting CT done to r/o malignancy. Patient agreeable and my MA assisted in scheduling this for her while patient was in clinic today.    Diarrhea, unspecified type  Has had C. Diff in the past and recent antibiotic use.   - Clostridium difficile toxin B PCR; Future       See Patient Instructions    Return in about 1 day (around 12/10/2020) for Ancillary for Toradol shot.    JENAE Sibley [facility]

## 2020-12-04 DIAGNOSIS — M79.18 MYOFASCIAL MUSCLE PAIN: ICD-10-CM

## 2020-12-04 DIAGNOSIS — M47.816 SPONDYLOSIS OF LUMBAR REGION WITHOUT MYELOPATHY OR RADICULOPATHY: ICD-10-CM

## 2020-12-04 DIAGNOSIS — M54.16 LUMBAR RADICULOPATHY: ICD-10-CM

## 2020-12-04 RX ORDER — AMITRIPTYLINE HYDROCHLORIDE 50 MG/1
50 TABLET ORAL AT BEDTIME
Qty: 30 TABLET | Refills: 1 | Status: SHIPPED | OUTPATIENT
Start: 2020-12-04 | End: 2021-04-08

## 2020-12-04 NOTE — TELEPHONE ENCOUNTER
Received fax request from   [id number] - [place], [place] - [address] [facility] [address]  [address]  [place] [address]  Phone: [phone] Fax: [phone]     pharmacy requesting refill(s) for amitriptyline (ELAVIL) 50 MG tablet    Last refilled on 11/05/20    Pt last seen on 09/10/20    No future appointments scheduled at this time    Will facilitate refill.    Barbara Fang CMA  [facility]  [place]

## 2020-12-07 DIAGNOSIS — M47.816 SPONDYLOSIS OF LUMBAR REGION WITHOUT MYELOPATHY OR RADICULOPATHY: ICD-10-CM

## 2020-12-07 RX ORDER — DICLOFENAC SODIUM 75 MG/1
75 TABLET, DELAYED RELEASE ORAL 2 TIMES DAILY
Qty: 60 TABLET | Refills: 0 | Status: SHIPPED | OUTPATIENT
Start: 2020-12-07 | End: 2021-01-06

## 2020-12-07 NOTE — TELEPHONE ENCOUNTER
Pending Prescriptions:                       Disp   Refills    diclofenac (VOLTAREN) 75 MG EC tablet     60 tab*0            Sig: Take 1 tablet (75 mg) by mouth 2 times daily    Last refill 10/28/2020  Last office visit 10/13/2020  Next appointment None     Niesha Johnson MA

## 2020-12-09 ENCOUNTER — OFFICE VISIT (OUTPATIENT)
Dept: FAMILY MEDICINE | Facility: CLINIC | Age: 40
End: 2020-12-09
Payer: COMMERCIAL

## 2020-12-09 VITALS
BODY MASS INDEX: 44.22 KG/M2 | HEART RATE: 113 BPM | DIASTOLIC BLOOD PRESSURE: 86 MMHG | OXYGEN SATURATION: 98 % | TEMPERATURE: 97.9 F | SYSTOLIC BLOOD PRESSURE: 120 MMHG | WEIGHT: 274 LBS

## 2020-12-09 DIAGNOSIS — R05.9 COUGH: ICD-10-CM

## 2020-12-09 DIAGNOSIS — G43.909 MIGRAINE WITHOUT STATUS MIGRAINOSUS, NOT INTRACTABLE, UNSPECIFIED MIGRAINE TYPE: Primary | ICD-10-CM

## 2020-12-09 DIAGNOSIS — R19.7 DIARRHEA, UNSPECIFIED TYPE: ICD-10-CM

## 2020-12-09 PROCEDURE — 99214 OFFICE O/P EST MOD 30 MIN: CPT | Performed by: NURSE PRACTITIONER

## 2020-12-09 RX ORDER — KETOROLAC TROMETHAMINE 30 MG/ML
30 INJECTION, SOLUTION INTRAMUSCULAR; INTRAVENOUS EVERY 6 HOURS PRN
Qty: 1 ML | Refills: 0 | OUTPATIENT
Start: 2020-12-09 | End: 2021-05-20

## 2020-12-09 RX ORDER — KETOROLAC TROMETHAMINE 30 MG/ML
30 INJECTION, SOLUTION INTRAMUSCULAR; INTRAVENOUS ONCE
Status: COMPLETED | OUTPATIENT
Start: 2020-12-09 | End: 2020-12-10

## 2020-12-09 ASSESSMENT — PATIENT HEALTH QUESTIONNAIRE - PHQ9: SUM OF ALL RESPONSES TO PHQ QUESTIONS 1-9: 14

## 2020-12-09 NOTE — LETTER
December 9, 2020      Daniella Sexton  [address]  [place] [facility] [address]        To Whom It May Concern:    Daniella Sexton  was seen on 12/09/20.  Please excuse her for 1 week due to illness.        Sincerely,        JENAE Sibley CNP

## 2020-12-09 NOTE — PATIENT INSTRUCTIONS
We're going to do an MRI/MRA for your headaches.  You can come back tomorrow for a shot of your Toradol- do NOT take any Voltaren between now and then.  We need to do your chest CT scan.

## 2020-12-10 ENCOUNTER — MYC MEDICAL ADVICE (OUTPATIENT)
Dept: FAMILY MEDICINE | Facility: CLINIC | Age: 40
End: 2020-12-10

## 2020-12-10 ENCOUNTER — ALLIED HEALTH/NURSE VISIT (OUTPATIENT)
Dept: NURSING | Facility: CLINIC | Age: 40
End: 2020-12-10
Payer: COMMERCIAL

## 2020-12-10 DIAGNOSIS — G43.909 MIGRAINE WITHOUT STATUS MIGRAINOSUS, NOT INTRACTABLE, UNSPECIFIED MIGRAINE TYPE: Primary | ICD-10-CM

## 2020-12-10 DIAGNOSIS — R19.7 DIARRHEA, UNSPECIFIED TYPE: ICD-10-CM

## 2020-12-10 LAB
C DIFF TOX B STL QL: NEGATIVE
SPECIMEN SOURCE: NORMAL

## 2020-12-10 PROCEDURE — 87493 C DIFF AMPLIFIED PROBE: CPT | Performed by: NURSE PRACTITIONER

## 2020-12-10 PROCEDURE — 96372 THER/PROPH/DIAG INJ SC/IM: CPT

## 2020-12-10 RX ADMIN — KETOROLAC TROMETHAMINE 30 MG: 30 INJECTION, SOLUTION INTRAMUSCULAR; INTRAVENOUS at 12:00

## 2020-12-10 ASSESSMENT — ASTHMA QUESTIONNAIRES: ACT_TOTALSCORE: 13

## 2020-12-16 ENCOUNTER — ANCILLARY PROCEDURE (OUTPATIENT)
Dept: MRI IMAGING | Facility: CLINIC | Age: 40
End: 2020-12-16
Attending: NURSE PRACTITIONER
Payer: COMMERCIAL

## 2020-12-16 ENCOUNTER — MYC MEDICAL ADVICE (OUTPATIENT)
Dept: FAMILY MEDICINE | Facility: CLINIC | Age: 40
End: 2020-12-16

## 2020-12-16 ENCOUNTER — ANCILLARY PROCEDURE (OUTPATIENT)
Dept: CT IMAGING | Facility: CLINIC | Age: 40
End: 2020-12-16
Attending: NURSE PRACTITIONER
Payer: COMMERCIAL

## 2020-12-16 DIAGNOSIS — J32.9 CHRONIC SINUSITIS, UNSPECIFIED LOCATION: Primary | ICD-10-CM

## 2020-12-16 DIAGNOSIS — G43.909 MIGRAINE WITHOUT STATUS MIGRAINOSUS, NOT INTRACTABLE, UNSPECIFIED MIGRAINE TYPE: ICD-10-CM

## 2020-12-16 DIAGNOSIS — R91.8 PULMONARY NODULES: ICD-10-CM

## 2020-12-16 DIAGNOSIS — J45.20 MILD INTERMITTENT ASTHMA WITHOUT COMPLICATION: ICD-10-CM

## 2020-12-16 DIAGNOSIS — J43.9 PULMONARY EMPHYSEMA, UNSPECIFIED EMPHYSEMA TYPE (H): ICD-10-CM

## 2020-12-16 PROBLEM — K76.0 FATTY LIVER: Status: ACTIVE | Noted: 2020-12-16

## 2020-12-16 PROCEDURE — 71250 CT THORAX DX C-: CPT | Mod: TC | Performed by: RADIOLOGY

## 2020-12-16 PROCEDURE — 70551 MRI BRAIN STEM W/O DYE: CPT | Mod: TC | Performed by: RADIOLOGY

## 2020-12-16 PROCEDURE — 70544 MR ANGIOGRAPHY HEAD W/O DYE: CPT | Mod: TC | Performed by: RADIOLOGY

## 2020-12-16 RX ORDER — ALBUTEROL SULFATE 90 UG/1
2 AEROSOL, METERED RESPIRATORY (INHALATION) EVERY 6 HOURS
Qty: 8.5 G | Refills: 2 | Status: SHIPPED | OUTPATIENT
Start: 2020-12-16 | End: 2022-07-08

## 2020-12-16 RX ORDER — CLINDAMYCIN HCL 300 MG
300 CAPSULE ORAL 4 TIMES DAILY
Qty: 28 CAPSULE | Refills: 0 | Status: SHIPPED | OUTPATIENT
Start: 2020-12-16 | End: 2020-12-23

## 2020-12-16 NOTE — TELEPHONE ENCOUNTER
Called patient and reviewed her findings from chest CT, brain MRI/MRA. Will start antibiotics for sinusitis and refer to ENT. Start Qvar for asthma/COPD. Cough worsened on Friday and patient will have COVID test tomorrow- letter written to excuse her from work for the rest of the week. If test negative and symptoms improving, can return to work on Monday even with cough as this is a chronic cough and this will be patient's third COVID-19 test. Letter sent to patient on [employer].    Patient needs new Nebulizer order and would like to pick this up in clinic- routing to team to assist with neb.    Renetta Morrow, CNP

## 2020-12-16 NOTE — TELEPHONE ENCOUNTER
Renetta-    Can you please sign the order for the DME and can you please fill out the DME form for the nebulizer. I have pended the order for the DME.    Thank you  Adelina Emerson MA

## 2020-12-17 PROBLEM — R91.1 LUNG NODULE: Status: ACTIVE | Noted: 2020-12-17

## 2020-12-17 NOTE — RESULT ENCOUNTER NOTE
High Risk,  CT to be ordered in 1 year 12/16/21  Currently managed with follow up imaging by [facility] Results Team.

## 2020-12-21 ENCOUNTER — MYC MEDICAL ADVICE (OUTPATIENT)
Dept: FAMILY MEDICINE | Facility: CLINIC | Age: 40
End: 2020-12-21

## 2020-12-22 ENCOUNTER — MYC MEDICAL ADVICE (OUTPATIENT)
Dept: FAMILY MEDICINE | Facility: CLINIC | Age: 40
End: 2020-12-22

## 2020-12-22 DIAGNOSIS — R91.8 PULMONARY NODULES: Primary | ICD-10-CM

## 2020-12-29 ENCOUNTER — VIRTUAL VISIT (OUTPATIENT)
Dept: FAMILY MEDICINE | Facility: CLINIC | Age: 40
End: 2020-12-29
Payer: COMMERCIAL

## 2020-12-29 DIAGNOSIS — J32.9 CHRONIC SINUSITIS, UNSPECIFIED LOCATION: Primary | ICD-10-CM

## 2020-12-29 DIAGNOSIS — J01.90 ACUTE SINUSITIS WITH SYMPTOMS > 10 DAYS: ICD-10-CM

## 2020-12-29 DIAGNOSIS — Z72.0 TOBACCO ABUSE: ICD-10-CM

## 2020-12-29 DIAGNOSIS — R91.8 PULMONARY NODULES: ICD-10-CM

## 2020-12-29 DIAGNOSIS — J20.9 ACUTE BRONCHITIS WITH SYMPTOMS > 10 DAYS: ICD-10-CM

## 2020-12-29 PROCEDURE — 99214 OFFICE O/P EST MOD 30 MIN: CPT | Mod: 95 | Performed by: FAMILY MEDICINE

## 2020-12-29 RX ORDER — IPRATROPIUM BROMIDE AND ALBUTEROL SULFATE 2.5; .5 MG/3ML; MG/3ML
1 SOLUTION RESPIRATORY (INHALATION) EVERY 6 HOURS PRN
Qty: 1 BOX | Refills: 3 | Status: SHIPPED | OUTPATIENT
Start: 2020-12-29 | End: 2022-07-08

## 2020-12-29 RX ORDER — CLINDAMYCIN HCL 300 MG
300 CAPSULE ORAL 4 TIMES DAILY
Qty: 56 CAPSULE | Refills: 0 | Status: SHIPPED | OUTPATIENT
Start: 2020-12-29 | End: 2021-01-12

## 2020-12-29 NOTE — PROGRESS NOTES
"Daniella Sexton is a 40 year old female who is being evaluated via a billable video visit.      The patient has been notified of following:     \"This video visit will be conducted via a call between you and your physician/provider. We have found that certain health care needs can be provided without the need for an in-person physical exam.  This service lets us provide the care you need with a video conversation.  If a prescription is necessary we can send it directly to your pharmacy.  If lab work is needed we can place an order for that and you can then stop by our lab to have the test done at a later time.    Video visits are billed at different rates depending on your insurance coverage.  Please reach out to your insurance provider with any questions.    If during the course of the call the physician/provider feels a video visit is not appropriate, you will not be charged for this service.\"    Patient has given verbal consent for Video visit? Yes  How would you like to obtain your AVS? MyChart  If you are dropped from the video visit, the video invite should be resent to: Text to cell phone: [phone]  Will anyone else be joining your video visit? No     Subjective     Daniella Sexton is a 40 year old female who presents today via video visit for the following health issues:    HPI     She currently has a sinus infection with cough and congestion that is causing her rib pain       Video Start Time: 12:21 pm         Review of Systems   Coughing for 3 months    Bad sinus symptoms    On 3abx and one steroid    The last round of antibiotics helped a little she states    Finished that about a week ago    Clindamycin was last antibiotic    Sinus surgery about 2 years ago, helped     Only one sinus procedure in past    Plans on quitting smoking new years    Down to 2 cigarettes per day    Has gum to use after quit date    Needs letter  For work    [employer] work      Objective           Vitals:  No vitals " were obtained today due to virtual visit.    Physical Exam     GENERAL: Healthy, alert and no distress  EYES: Eyes grossly normal to inspection.  No discharge or erythema, or obvious scleral/conjunctival abnormalities.  RESP: No audible wheeze, cough, or visible cyanosis.  No visible retractions or increased work of breathing.    SKIN: Visible skin clear. No significant rash, abnormal pigmentation or lesions.  NEURO: Cranial nerves grossly intact.  Mentation and speech appropriate for age.  PSYCH: Mentation appears normal, affect normal/bright, judgement and insight intact, normal speech and appearance well-groomed.                ASSESSMENT / PLAN:  (J32.9) Chronic sinusitis, unspecified location  (primary encounter diagnosis)  Comment: prudent to see ent given past sinus surgery and recent symptoms the last few months  Plan: OTOLARYNGOLOGY REFERRAL             (J01.90) Acute sinusitis with symptoms > 10 days  Comment: try a longer course of the recent antibiotic that did help  Plan: clindamycin (CLEOCIN) 300 MG capsule             (J20.9) Acute bronchitis with symptoms > 10 days  Comment: as above. She also wanted refill of the nebulizer solution she had in hospital.  She has neb machine at home.   Plan: clindamycin (CLEOCIN) 300 MG capsule,         ipratropium - albuterol 0.5 mg/2.5 mg/3 mL         (DUONEB) 0.5-2.5 (3) MG/3ML neb solution        Patient also has prescription for steroid po at home.  Take that also.     (Z72.0) Tobacco abuse  Comment: discussed in detail.    Plan: encouraged cessation.  Has nicotine replaceement    (R91.8) Pulmonary nodules  Comment: reviewed radiology report in detail  Plan: plan repeat CT December 2021      I reviewed the patient's medications, allergies, medical history, family history, and social history.    Edenilson Trevino MD          Video-Visit Details    Type of service:  Video Visit    Video End Time:12:41 pm    Originating Location (pt. Location): Home    Distant  Location (provider location):  [facility]     Platform used for Video Visit: [email or web address]

## 2020-12-29 NOTE — PATIENT INSTRUCTIONS
Take the antibiotics and  Steroid    Refill of duoneb solution sent in     Work on smoking cessation    See ENT doctor    Plan return to work Jan 18    Follow up as needed based on symptoms    Be seen promptly if symptoms acutely worsen

## 2020-12-29 NOTE — LETTER
[facility]  [address]  [place] [address]  [phone]              2020    To whom it may concern:    Daniella [address] 80 is a patient of our clinic who has been off work due to illness since early October.  She is still  Having symptoms and under treatment.   Tentatively plan  Return to work 2021.              Sincerely,                      Edenilson Trevino MD

## 2021-01-06 DIAGNOSIS — M47.816 SPONDYLOSIS OF LUMBAR REGION WITHOUT MYELOPATHY OR RADICULOPATHY: ICD-10-CM

## 2021-01-06 RX ORDER — DICLOFENAC SODIUM 75 MG/1
75 TABLET, DELAYED RELEASE ORAL 2 TIMES DAILY
Qty: 60 TABLET | Refills: 0 | Status: CANCELLED | OUTPATIENT
Start: 2021-01-06

## 2021-01-06 RX ORDER — DICLOFENAC SODIUM 75 MG/1
75 TABLET, DELAYED RELEASE ORAL 2 TIMES DAILY
Qty: 60 TABLET | Refills: 0 | Status: SHIPPED | OUTPATIENT
Start: 2021-01-06 | End: 2021-03-30

## 2021-01-06 NOTE — TELEPHONE ENCOUNTER
Pending Prescriptions:                       Disp   Refills    diclofenac (VOLTAREN) 75 MG EC tablet     60 tab*0            Sig: Take 1 tablet (75 mg) by mouth 2 times daily    Last refill 12/07/2020  Last office visit 10/13/2020  Next appointment None     Niesha Johnson MA

## 2021-01-06 NOTE — TELEPHONE ENCOUNTER
Please process a refill of diclofenac (VOLTAREN) 75 MG EC tablet to [facility] in [place].    LEORA MalinN, RN  Care Coordinator  [facility] [place]

## 2021-01-08 NOTE — PATIENT INSTRUCTIONS
Scheduling Information  To schedule your CT/MRI scan, please contact [facility] at [phone] OR [facility] at [phone]    To schedule your Surgery, please contact our Specialty Schedulers at [phone]      ENT Clinic Locations Clinic Hours Telephone Number     [facility] Her  [address] [place] [address]   Monday:           1:00pm -- 5:00pm    Friday:              8:00am - 12:00pm   To schedule/reschedule an appointment with   Dr. Miguel,   please contact our   Specialty Scheduling Department at:     [phone]       [facility]  [address] [place] [address] Tuesday:          8:00am -- 2:00pm         Urgent Care Locations Clinic Hours Telephone Numbers     [facility] Saunders  [address] [place] [address]     Monday-Friday:     11:00am - 9:00pm    Saturday-Sunday:  9:00am - 5:00pm   [phone]     [facility]  [address] [place] [address]     Monday-Friday:      5:00pm - 9:00pm     Saturday-Sunday:  9:00am - 5:00pm   [phone]

## 2021-01-08 NOTE — PROGRESS NOTES
History of Present Illness - Autumn M Darshan is a very pleasant 40 year old female being seen in remote follow up from 2019, previously seen for the first time at the consult of Dr. Trevino for sinus issues. After work up, we found pansinusitis, and functional endoscopic sinus surgery was done on 1/10/2019.    PROCEDURES PERFORMED:   1. Bilateral endoscopic maxillary antrostomy with tissue removal.   2. Bilateral endoscopic total ethmoidectomy.   3. Bilateral endoscopic submucous resection of inferior turbinates    She tells me that she did great for a long time, then in October of 2020 she got a sinus infection and has never been the same.  She is constantly congested, headache, teeth pain, pressure in the face.  She has been four rounds of antibiotics and prednisone.  While she is on them, there is slight improvement, but the issues come right back following the end of treatment.    She also was being worked up for a persistent cough, and a chest CT was done on 12/16/2020 that did not show any acute process.    Past Medical History -   Patient Active Problem List   Diagnosis     Anxiety     Depressive disorder     Duodenal ulcer     History of alcohol abuse     History of methamphetamine abuse (H)     Migraines     Seasonal allergic rhinitis     Sleep disturbance     Tobacco abuse     CARDIOVASCULAR SCREENING; LDL GOAL LESS THAN 160     Chronic bilateral low back pain with bilateral sciatica     Pure hypercholesterolemia     Mild intermittent asthma without complication     Chronic pansinusitis     Obesity (BMI 35.0-39.9) with comorbidity (H)     Lumbago     Fatty liver     Lung nodule       Current Medications -   Current Outpatient Medications:      albuterol (PROAIR HFA/PROVENTIL HFA/VENTOLIN HFA) 108 (90 Base) MCG/ACT inhaler, Inhale 2 puffs into the lungs every 6 hours, Disp: 8.5 g, Rfl: 2     amitriptyline (ELAVIL) 50 MG tablet, Take 1 tablet (50 mg) by mouth At Bedtime, Disp: 30 tablet, Rfl: 1      beclomethasone HFA (QVAR REDIHALER) 80 MCG/ACT inhaler, Inhale 1 puff into the lungs 2 times daily, Disp: 10.6 g, Rfl: 2     clindamycin (CLEOCIN) 300 MG capsule, Take 1 capsule (300 mg) by mouth 4 times daily for 14 days, Disp: 56 capsule, Rfl: 0     diclofenac (VOLTAREN) 75 MG EC tablet, Take 1 tablet (75 mg) by mouth 2 times daily, Disp: 60 tablet, Rfl: 0     gabapentin (NEURONTIN) 300 MG capsule, Take 3 capsules (900 mg) by mouth 3 times daily, Disp: 270 capsule, Rfl: 1     HYDROcodone-acetaminophen (NORCO) 5-325 MG tablet, Take 0.5-1 tablets by mouth 2 times daily as needed for breakthrough pain Min of 30day supply. Fill 9/14/20 and start 9/14/20, Disp: 30 tablet, Rfl: 0     ipratropium - albuterol 0.5 mg/2.5 mg/3 mL (DUONEB) 0.5-2.5 (3) MG/3ML neb solution, Take 1 vial (3 mLs) by nebulization every 6 hours as needed for shortness of breath / dyspnea or wheezing, Disp: 1 Box, Rfl: 3     ketorolac (TORADOL) 30 MG/ML injection, Inject 1 mL (30 mg) into the muscle every 6 hours as needed for moderate pain, Disp: 1 mL, Rfl: 0     nicotine (NICORETTE) 2 MG gum, Place 1 each (2 mg) inside cheek as needed for smoking cessation, Disp: 50 each, Rfl: 11     predniSONE (DELTASONE) 20 MG tablet, Take 3 tabs by mouth daily x 3 days, then 2 tabs daily x 3 days, then 1 tab daily x 3 days, then 1/2 tab daily x 3 days., Disp: 20 tablet, Rfl: 0     rizatriptan (MAXALT) 10 MG tablet, Take 1 tablet (10 mg) by mouth at onset of headache for migraine, Disp: 10 tablet, Rfl: 0     tiZANidine (ZANAFLEX) 4 MG tablet, Take 1 tablet (4 mg) by mouth 2 times daily as needed for muscle spasms, Disp: 60 tablet, Rfl: 3    Allergies -   Allergies   Allergen Reactions     Amoxicillin Anaphylaxis     Morphine Other (See Comments) and Nausea and Vomiting     Penicillins Unknown       Social History -   Social History     Socioeconomic History     Marital status:      Spouse name: Not on file     Number of children: Not on file     Years of  "education: Not on file     Highest education level: Not on file   Occupational History     Not on file   Social Needs     Financial resource strain: Not on file     Food insecurity     Worry: Not on file     Inability: Not on file     Transportation needs     Medical: Not on file     Non-medical: Not on file   Tobacco Use     Smoking status: Current Every Day Smoker     Packs/day: 0.50     Types: Cigarettes     Smokeless tobacco: Never Used     Tobacco comment: 5 cigarettes/day   Substance and Sexual Activity     Alcohol use: Yes     Comment: 3-4 beers a night      Drug use: No     Sexual activity: Yes   Lifestyle     Physical activity     Days per week: Not on file     Minutes per session: Not on file     Stress: Not on file   Relationships     Social connections     Talks on phone: Not on file     Gets together: Not on file     Attends Confucianist service: Not on file     Active member of club or organization: Not on file     Attends meetings of clubs or organizations: Not on file     Relationship status: Not on file     Intimate partner violence     Fear of current or ex partner: Not on file     Emotionally abused: Not on file     Physically abused: Not on file     Forced sexual activity: Not on file   Other Topics Concern     Parent/sibling w/ CABG, MI or angioplasty before 65F 55M? Not Asked   Social History Narrative     Not on file       Family History -   Family History   Problem Relation Age of Onset     Heart Disease No family hx of      Diabetes No family hx of      Cancer No family hx of        Review of Systems - As per HPI and PMHx, otherwise 10+ system review of the head and neck, and general constitution is negative.    Physical Exam  /85   Pulse 104   Resp 17   Ht 1.676 m (5' 6\")   Wt 124.3 kg (274 lb)   SpO2 95%   BMI 44.22 kg/m      General - The patient is well nourished and well developed, and appears to have good nutritional status.  Alert and oriented to person and place, answers " questions and cooperates with examination appropriately.   Head and Face - Normocephalic and atraumatic, with no gross asymmetry noted of the contour of the facial features.  The facial nerve is intact, with strong symmetric movements.  Voice and Breathing - The patient was breathing comfortably without the use of accessory muscles. There was no wheezing, stridor, or stertor.  The patients voice was clear and strong, and had appropriate pitch and quality.  Ears - The tympanic membranes are normal in appearance, bony landmarks are intact.  No retraction, perforation, or masses.  No fluid or purulence was seen in the external canal or the middle ear. No evidence of infection of the middle ear or external canal, cerumen was normal in appearance.  Eyes - Extraocular movements intact, and the pupils were reactive to light.  Sclera were not icteric or injected, conjunctiva were pink and moist.  Mouth - Examination of the oral cavity showed pink, healthy oral mucosa. No lesions or ulcerations noted.  The tongue was mobile and midline, and the dentition were in good condition.    Throat - The walls of the oropharynx were smooth, pink, moist, symmetric, and had no lesions or ulcerations.  The tonsillar pillars and soft palate were symmetric.  The uvula was midline on elevation.    Neck - Normal midline excursion of the laryngotracheal complex during swallowing.  Full range of motion on passive movement.  Palpation of the occipital, submental, submandibular, internal jugular chain, and supraclavicular nodes did not demonstrate any abnormal lymph nodes or masses.  The carotid pulse was palpable bilaterally.  Palpation of the thyroid was soft and smooth, with no nodules or goiter appreciated.  The trachea was mobile and midline.  Nose - External contour is symmetric, no gross deflection or scars.  Nasal mucosa is pink and moist with no abnormal mucus.  The septum was midline and non-obstructive, turbinates of normal size and  position.  No polyps, masses, or purulence noted on examination.      A/P - Daniella NETTA Sexton is a 40 year old female  (J32.4) Chronic pansinusitis  (primary encounter diagnosis)    Her symptoms and history strongly suggest a return of her chronic sinus disease.  I would recommend further work up with a new CT scan.  I will call her to discuss results, and whether or not this is a situation that looks like it will need revision surgery vs medical management.    Time spent on review of previous records and operative reports, relevant labs and imaging, and relevant outside records totaled 30

## 2021-01-11 ENCOUNTER — ANCILLARY PROCEDURE (OUTPATIENT)
Dept: CT IMAGING | Facility: CLINIC | Age: 41
End: 2021-01-11
Attending: OTOLARYNGOLOGY
Payer: COMMERCIAL

## 2021-01-11 ENCOUNTER — OFFICE VISIT (OUTPATIENT)
Dept: OTOLARYNGOLOGY | Facility: CLINIC | Age: 41
End: 2021-01-11
Payer: COMMERCIAL

## 2021-01-11 VITALS
OXYGEN SATURATION: 95 % | RESPIRATION RATE: 17 BRPM | WEIGHT: 274 LBS | HEART RATE: 104 BPM | HEIGHT: 66 IN | SYSTOLIC BLOOD PRESSURE: 132 MMHG | DIASTOLIC BLOOD PRESSURE: 85 MMHG | BODY MASS INDEX: 44.03 KG/M2

## 2021-01-11 DIAGNOSIS — J32.4 CHRONIC PANSINUSITIS: ICD-10-CM

## 2021-01-11 DIAGNOSIS — J32.4 CHRONIC PANSINUSITIS: Primary | ICD-10-CM

## 2021-01-11 PROCEDURE — 70486 CT MAXILLOFACIAL W/O DYE: CPT | Mod: TC | Performed by: RADIOLOGY

## 2021-01-11 PROCEDURE — 99214 OFFICE O/P EST MOD 30 MIN: CPT | Performed by: OTOLARYNGOLOGY

## 2021-01-11 ASSESSMENT — MIFFLIN-ST. JEOR: SCORE: 1929.61

## 2021-01-11 ASSESSMENT — PAIN SCALES - GENERAL: PAINLEVEL: NO PAIN (0)

## 2021-01-11 NOTE — LETTER
1/11/2021         RE: Daniella Sexton  [address]  [facility] [address]        Dear Colleague,    Thank you for referring your patient, Daniella Sexton, to the [facility]. Please see a copy of my visit note below.    History of Present Illness - Daniella Sexton is a very pleasant 40 year old female being seen in remote follow up from 2019, previously seen for the first time at the consult of Dr. Trevino for sinus issues. After work up, we found pansinusitis, and functional endoscopic sinus surgery was done on 1/10/2019.    PROCEDURES PERFORMED:   1. Bilateral endoscopic maxillary antrostomy with tissue removal.   2. Bilateral endoscopic total ethmoidectomy.   3. Bilateral endoscopic submucous resection of inferior turbinates    She tells me that she did great for a long time, then in October of 2020 she got a sinus infection and has never been the same.  She is constantly congested, headache, teeth pain, pressure in the face.  She has been four rounds of antibiotics and prednisone.  While she is on them, there is slight improvement, but the issues come right back following the end of treatment.    She also was being worked up for a persistent cough, and a chest CT was done on 12/16/2020 that did not show any acute process.    Past Medical History -   Patient Active Problem List   Diagnosis     Anxiety     Depressive disorder     Duodenal ulcer     History of alcohol abuse     History of methamphetamine abuse (H)     Migraines     Seasonal allergic rhinitis     Sleep disturbance     Tobacco abuse     CARDIOVASCULAR SCREENING; LDL GOAL LESS THAN 160     Chronic bilateral low back pain with bilateral sciatica     Pure hypercholesterolemia     Mild intermittent asthma without complication     Chronic pansinusitis     Obesity (BMI 35.0-39.9) with comorbidity (H)     Lumbago     Fatty liver     Lung nodule       Current Medications -   Current Outpatient Medications:      albuterol (PROAIR  HFA/PROVENTIL HFA/VENTOLIN HFA) 108 (90 Base) MCG/ACT inhaler, Inhale 2 puffs into the lungs every 6 hours, Disp: 8.5 g, Rfl: 2     amitriptyline (ELAVIL) 50 MG tablet, Take 1 tablet (50 mg) by mouth At Bedtime, Disp: 30 tablet, Rfl: 1     beclomethasone HFA (QVAR REDIHALER) 80 MCG/ACT inhaler, Inhale 1 puff into the lungs 2 times daily, Disp: 10.6 g, Rfl: 2     clindamycin (CLEOCIN) 300 MG capsule, Take 1 capsule (300 mg) by mouth 4 times daily for 14 days, Disp: 56 capsule, Rfl: 0     diclofenac (VOLTAREN) 75 MG EC tablet, Take 1 tablet (75 mg) by mouth 2 times daily, Disp: 60 tablet, Rfl: 0     gabapentin (NEURONTIN) 300 MG capsule, Take 3 capsules (900 mg) by mouth 3 times daily, Disp: 270 capsule, Rfl: 1     HYDROcodone-acetaminophen (NORCO) 5-325 MG tablet, Take 0.5-1 tablets by mouth 2 times daily as needed for breakthrough pain Min of 30day supply. Fill 9/14/20 and start 9/14/20, Disp: 30 tablet, Rfl: 0     ipratropium - albuterol 0.5 mg/2.5 mg/3 mL (DUONEB) 0.5-2.5 (3) MG/3ML neb solution, Take 1 vial (3 mLs) by nebulization every 6 hours as needed for shortness of breath / dyspnea or wheezing, Disp: 1 Box, Rfl: 3     ketorolac (TORADOL) 30 MG/ML injection, Inject 1 mL (30 mg) into the muscle every 6 hours as needed for moderate pain, Disp: 1 mL, Rfl: 0     nicotine (NICORETTE) 2 MG gum, Place 1 each (2 mg) inside cheek as needed for smoking cessation, Disp: 50 each, Rfl: 11     predniSONE (DELTASONE) 20 MG tablet, Take 3 tabs by mouth daily x 3 days, then 2 tabs daily x 3 days, then 1 tab daily x 3 days, then 1/2 tab daily x 3 days., Disp: 20 tablet, Rfl: 0     rizatriptan (MAXALT) 10 MG tablet, Take 1 tablet (10 mg) by mouth at onset of headache for migraine, Disp: 10 tablet, Rfl: 0     tiZANidine (ZANAFLEX) 4 MG tablet, Take 1 tablet (4 mg) by mouth 2 times daily as needed for muscle spasms, Disp: 60 tablet, Rfl: 3    Allergies -   Allergies   Allergen Reactions     Amoxicillin Anaphylaxis     Morphine  Other (See Comments) and Nausea and Vomiting     Penicillins Unknown       Social History -   Social History     Socioeconomic History     Marital status:      Spouse name: Not on file     Number of children: Not on file     Years of education: Not on file     Highest education level: Not on file   Occupational History     Not on file   Social Needs     Financial resource strain: Not on file     Food insecurity     Worry: Not on file     Inability: Not on file     Transportation needs     Medical: Not on file     Non-medical: Not on file   Tobacco Use     Smoking status: Current Every Day Smoker     Packs/day: 0.50     Types: Cigarettes     Smokeless tobacco: Never Used     Tobacco comment: 5 cigarettes/day   Substance and Sexual Activity     Alcohol use: Yes     Comment: 3-4 beers a night      Drug use: No     Sexual activity: Yes   Lifestyle     Physical activity     Days per week: Not on file     Minutes per session: Not on file     Stress: Not on file   Relationships     Social connections     Talks on phone: Not on file     Gets together: Not on file     Attends Gnosticist service: Not on file     Active member of club or organization: Not on file     Attends meetings of clubs or organizations: Not on file     Relationship status: Not on file     Intimate partner violence     Fear of current or ex partner: Not on file     Emotionally abused: Not on file     Physically abused: Not on file     Forced sexual activity: Not on file   Other Topics Concern     Parent/sibling w/ CABG, MI or angioplasty before 65F 55M? Not Asked   Social History Narrative     Not on file       Family History -   Family History   Problem Relation Age of Onset     Heart Disease No family hx of      Diabetes No family hx of      Cancer No family hx of        Review of Systems - As per HPI and PMHx, otherwise 10+ system review of the head and neck, and general constitution is negative.    Physical Exam  /85   Pulse 104   Resp  "17   Ht 1.676 m (5' 6\")   Wt 124.3 kg (274 lb)   SpO2 95%   BMI 44.22 kg/m      General - The patient is well nourished and well developed, and appears to have good nutritional status.  Alert and oriented to person and place, answers questions and cooperates with examination appropriately.   Head and Face - Normocephalic and atraumatic, with no gross asymmetry noted of the contour of the facial features.  The facial nerve is intact, with strong symmetric movements.  Voice and Breathing - The patient was breathing comfortably without the use of accessory muscles. There was no wheezing, stridor, or stertor.  The patients voice was clear and strong, and had appropriate pitch and quality.  Ears - The tympanic membranes are normal in appearance, bony landmarks are intact.  No retraction, perforation, or masses.  No fluid or purulence was seen in the external canal or the middle ear. No evidence of infection of the middle ear or external canal, cerumen was normal in appearance.  Eyes - Extraocular movements intact, and the pupils were reactive to light.  Sclera were not icteric or injected, conjunctiva were pink and moist.  Mouth - Examination of the oral cavity showed pink, healthy oral mucosa. No lesions or ulcerations noted.  The tongue was mobile and midline, and the dentition were in good condition.    Throat - The walls of the oropharynx were smooth, pink, moist, symmetric, and had no lesions or ulcerations.  The tonsillar pillars and soft palate were symmetric.  The uvula was midline on elevation.    Neck - Normal midline excursion of the laryngotracheal complex during swallowing.  Full range of motion on passive movement.  Palpation of the occipital, submental, submandibular, internal jugular chain, and supraclavicular nodes did not demonstrate any abnormal lymph nodes or masses.  The carotid pulse was palpable bilaterally.  Palpation of the thyroid was soft and smooth, with no nodules or goiter appreciated.  " The trachea was mobile and midline.  Nose - External contour is symmetric, no gross deflection or scars.  Nasal mucosa is pink and moist with no abnormal mucus.  The septum was midline and non-obstructive, turbinates of normal size and position.  No polyps, masses, or purulence noted on examination.      A/P - Daniella Sexton is a 40 year old female  (J32.4) Chronic pansinusitis  (primary encounter diagnosis)    Her symptoms and history strongly suggest a return of her chronic sinus disease.  I would recommend further work up with a new CT scan.  I will call her to discuss results, and whether or not this is a situation that looks like it will need revision surgery vs medical management.    Time spent on review of previous records and operative reports, relevant labs and imaging, and relevant outside records totaled 30        Again, thank you for allowing me to participate in the care of your patient.        Sincerely,        Maikol Miguel MD

## 2021-01-12 ENCOUNTER — MYC REFILL (OUTPATIENT)
Dept: PALLIATIVE MEDICINE | Facility: CLINIC | Age: 41
End: 2021-01-12

## 2021-01-12 ENCOUNTER — MYC MEDICAL ADVICE (OUTPATIENT)
Dept: PALLIATIVE MEDICINE | Facility: CLINIC | Age: 41
End: 2021-01-12

## 2021-01-12 DIAGNOSIS — J32.4 CHRONIC PANSINUSITIS: Primary | ICD-10-CM

## 2021-01-12 DIAGNOSIS — M54.41 CHRONIC BILATERAL LOW BACK PAIN WITH BILATERAL SCIATICA: ICD-10-CM

## 2021-01-12 DIAGNOSIS — M54.42 CHRONIC BILATERAL LOW BACK PAIN WITH BILATERAL SCIATICA: ICD-10-CM

## 2021-01-12 DIAGNOSIS — G89.29 CHRONIC BILATERAL LOW BACK PAIN WITH BILATERAL SCIATICA: ICD-10-CM

## 2021-01-12 DIAGNOSIS — Z79.899 ENCOUNTER FOR LONG-TERM (CURRENT) USE OF MEDICATIONS: Primary | ICD-10-CM

## 2021-01-12 RX ORDER — HYDROCODONE BITARTRATE AND ACETAMINOPHEN 5; 325 MG/1; MG/1
.5-1 TABLET ORAL 2 TIMES DAILY PRN
Qty: 30 TABLET | Refills: 0 | Status: CANCELLED | OUTPATIENT
Start: 2021-01-12

## 2021-01-12 RX ORDER — HYDROCODONE BITARTRATE AND ACETAMINOPHEN 5; 325 MG/1; MG/1
.5-1 TABLET ORAL 2 TIMES DAILY PRN
Qty: 30 TABLET | Refills: 0 | Status: SHIPPED | OUTPATIENT
Start: 2021-01-12 | End: 2021-02-25

## 2021-01-12 NOTE — TELEPHONE ENCOUNTER
See the 10/20/20 mychart encounter for more information.    Carli Vargas RN-BSN  [facility]

## 2021-01-12 NOTE — PROGRESS NOTES
Called and spoke with Daniella about the new sinus CT scan.  There is definitely signs of moderate return of chronic sinus infection.  However, the antrostomies previously created in surgery are stilly widely open.  Therefore I recommend Medicated nasal irrigations with Gent/Dex for a month, then let me know how we are doing

## 2021-01-12 NOTE — TELEPHONE ENCOUNTER
Medication refill information reviewed.     Due date for HYDROcodone-acetaminophen (NORCO) 5-325 MG tablet  is 1/12/21     Prescriptions prepped for review.     UDS ordered.  Pt to have before Friday    Will route to provider.     Dany Shukla, RN  Care Coordinator   [facility]

## 2021-01-12 NOTE — TELEPHONE ENCOUNTER
Routed to MA pool to gather required information for opioid refill.    Gail COREYN, RN Care Coordinator  [facility]  [facility]

## 2021-01-12 NOTE — TELEPHONE ENCOUNTER
Patient requesting refill(s) of HYDROcodone-acetaminophen (NORCO) 5-325 MG tablet   Last dispensed from pharmacy on 09/14/2020    Patient's last office/virtual visit by prescribing provider on 10/13/2020   Next office/virtual appointment scheduled for None     Last urine drug screen date 12/10/2019  Current opioid agreement on file (completed within the last year) Yes Date of opioid agreement: 4/10/2020    E-prescribe to [id number] - [place], [place]     Will route to nursing [place] for review and preparation of prescription(s).

## 2021-01-12 NOTE — TELEPHONE ENCOUNTER
Per patient myChart message:  From: Daniella Sexton      Created: 1/12/2021 11:59 AM      I was just wondering about if you heard anything from the insurance company about my back,  burning of the nerves      ______________________    Renee can you check insurance coverage?    Carli RN-BSN  [facility]

## 2021-01-13 NOTE — TELEPHONE ENCOUNTER
No PA required, okay to schedule.      BCBS RFA REQUIREMENTS-    [employer] COMMERCIAL- AIM  o Thermal Medial Branch Radiofrequency Neurotomy (RFN)    RFN may be offered to patients if dual diagnostic MBB injections each produce ? 80% relief of the primary (index) pain and the onset and minimum duration of relief is consistent with the agent employed.    RFN may be performed at the same level no more than twice annually and only if the initial radiofrequency lesion results in significant pain relief (at least 50%) and improvement in patient specific ADLs for at least 6 months.    For each covered spinal region (cervical or lumbar), RFN should be performed at no more than four (4) joints per session (e.g., two [2] bilateral levels or four [4] unilateral levels).      Renee ESCOBAR    [facility]

## 2021-01-14 NOTE — TELEPHONE ENCOUNTER
CAYDEN to schedule Lumbar RFA        Gabrielle Hutchinson    [facility]

## 2021-01-15 DIAGNOSIS — Z79.899 ENCOUNTER FOR LONG-TERM (CURRENT) USE OF MEDICATIONS: ICD-10-CM

## 2021-01-15 PROCEDURE — 99000 SPECIMEN HANDLING OFFICE-LAB: CPT | Performed by: PAIN MEDICINE

## 2021-01-15 PROCEDURE — 80307 DRUG TEST PRSMV CHEM ANLYZR: CPT | Mod: 90 | Performed by: PAIN MEDICINE

## 2021-01-18 ENCOUNTER — MYC MEDICAL ADVICE (OUTPATIENT)
Dept: OTOLARYNGOLOGY | Facility: CLINIC | Age: 41
End: 2021-01-18

## 2021-01-18 ENCOUNTER — MYC MEDICAL ADVICE (OUTPATIENT)
Dept: PALLIATIVE MEDICINE | Facility: CLINIC | Age: 41
End: 2021-01-18

## 2021-01-18 NOTE — TELEPHONE ENCOUNTER
Screening Questions for RFA Procedure      Procedure ordered? LRFA    What insurance are we billing for this procedure?  BCBS    Has patient had this injection before? No  This procedure requires that a COVID-19 lab test be done within 4 days of the procedure.   If patient asks why? We will not always be able to maintain a 6' distance, the procedure takes a long time, there will be more people in a smaller area, and use of sedation medication increases the risk of respiratory treatments.    Would you still like to move forward with scheduling the procedure?  Yes  If YES, let patient know that someone will call them to schedule the COVID-19 test nd that they will only receive a call back if the result is positive. Route to nursing to enter order.   Any chance of pregnancy? NO   If YES, do NOT schedule and route to RN pool     Is  Needed?: No  Will patient have a ?  Yes   If pt is given sedation meds, no driving for 24 hours.  Is pt taking a cab or transportation service? NO        If so will need to be accompanied by an adult too (friend/family member) in order for IV sedation to be given.      Per [facility] Policy:  Outpatients are to have responsible adult or family member to accompany them at discharge and drive them home. A service providing medically trained drivers or attendants would be acceptable. Public transportation would not be acceptable unless the patient is accompanied by a responsible adult or family member.  Is patient taking any blood thinners (i.e. plavix, coumadin, jantoven, warfarin, heparin, pradaxa or dabigatran, etc)? No   If YES, do NOT schedule, and route to RN pool    Is patient taking any aspirin products? No     If more than 325mg/day, OK to schedule; Instruct pt to decrease to less than 325 mg for 7 days AND route to RN pool    For CERVICAL procedures, hold all aspirin products for 6 days.     Tell pt that if aspirin product is not held for 6 days, the procedure WILL BE  cancelled.      Does the patient have a bleeding or clotting disorder? No     If YES, it it OKAY to schedule AND route to RN pool    **For any patients with platelet count <100, must be forwarded to provider**    Is patient diabetic? No If YES, have them bring their glucometer.    Does patient have an active infection or treated for one within the past week? Yes - Sinus Infection   If YES, do NOT schedule and route to RN nurse pool     Is patient currently taking any antibiotics?  Yes - Sinus Rinse 1 month    For patients on chronic, preventative, or prophylactic antibiotics, procedures may be scheduled.     For patients on antibiotics for active or recent infection:antibiotic course must have been completed for 4 days    Is patient currently taking any steroid medications? (i.e. Prednisone, Medrol)  No     For patients on steroid medications, course must have been completed for 4 days    Is patient actively being treated for cancer or immunocompromised, including the spleen having been removed? No  If YES, do NOT schedule and route to RN pool     Any history of complications with sedation medications?  NO   If YES, OK to schedule AND route to RN pool     Any history of sleep apnea?  NO   If YES, OK to schedule AND route to RN pool     Any cardiac history?  NO   If YES, OK to schedule AND route to RN pool     Do you have an implanted pacemaker, ICD (implanted cardiac device) or AICD (automatic implanted cardiac device)?  NO    If YES, do NOT schedule AND route to RN pool.     Obtain name of device :       Obtain name of cardiologist:       Do you have an implanted stimulator?  NO    If YES, OK to schedule AND route to nursing.     Instruct patient to bring in the remote to the appointment and it will need to be turned off.      Does patient have an allergy to contrast dye, iodine or shellfish?  No   If YES, OK to schedule. Route to RN pool AND add allergy information to appointment notes    Are you able  to get on and off an exam table with minimal or no assistance? Yes   If NO, do NOT schedule and route to RN pool    Are you able to roll over and lay on your stomach with minimal or no assistance? Yes   If NO, do NOT schedule and route to RN pool    Reminders:    If you are started on any steroids or antibiotics between now and your appointment, you must contact us because it may affect our ability to perform your procedure.  Yes    Informed patient that s/he needs to fast for 6 hours before procedure?  YES    Informed patient that it is OK to take normal medications with sips of water, especially blood pressure medications, before the procedure and must hold blood thinners as instructed.  Yes    Informed patient to arrive 30 minutes before procedure time to have an IV inserted.  reviewed   Do NOT schedule at 0745, 0815 or 1245.  reviewed     All radiofrequency ablations are in a 90 minute time slot.  reviewed   Dori SINGLETARY    [facility]

## 2021-01-19 LAB — COMPREHEN DRUG ANALYSIS UR: NORMAL

## 2021-01-20 NOTE — TELEPHONE ENCOUNTER
See the 1/11/21 visit with Abdulaziz Miguel. Per his notes:    She tells me that she did great for a long time, then in October of 2020 she got a sinus infection and has never been the same.  She is constantly congested, headache, teeth pain, pressure in the face.  She has been four rounds of antibiotics and prednisone.  While she is on them, there is slight improvement, but the issues come right back following the end of treatment.     She also was being worked up for a persistent cough, and a chest CT was done on 12/16/2020 that did not show any acute process.    Her symptoms and history strongly suggest a return of her chronic sinus disease.  I would recommend further work up with a new CT scan.  I will call her to discuss results, and whether or not this is a situation that looks like it will need revision surgery vs medical management.    Dr. Moss review above. Chronic sinusitis.  Okay to schedule radiofrequency ablation?    Carli RN-BSN  [facility]

## 2021-01-20 NOTE — TELEPHONE ENCOUNTER
Pt is calling to follow up on scheduling her RFA.    Dori SINGLETARY    [facility]

## 2021-01-21 NOTE — TELEPHONE ENCOUNTER
Patient scheduled.        Routing to nursing to place COVID test       Gabrielle Hutchinson    [facility]

## 2021-01-29 DIAGNOSIS — Z20.822 ENCOUNTER FOR LABORATORY TESTING FOR COVID-19 VIRUS: ICD-10-CM

## 2021-01-29 LAB
LABORATORY COMMENT REPORT: NORMAL
SARS-COV-2 RNA RESP QL NAA+PROBE: NEGATIVE
SARS-COV-2 RNA RESP QL NAA+PROBE: NORMAL
SPECIMEN SOURCE: NORMAL
SPECIMEN SOURCE: NORMAL

## 2021-01-29 PROCEDURE — U0003 INFECTIOUS AGENT DETECTION BY NUCLEIC ACID (DNA OR RNA); SEVERE ACUTE RESPIRATORY SYNDROME CORONAVIRUS 2 (SARS-COV-2) (CORONAVIRUS DISEASE [COVID-19]), AMPLIFIED PROBE TECHNIQUE, MAKING USE OF HIGH THROUGHPUT TECHNOLOGIES AS DESCRIBED BY CMS-2020-01-R: HCPCS | Performed by: PAIN MEDICINE

## 2021-01-29 PROCEDURE — U0005 INFEC AGEN DETEC AMPLI PROBE: HCPCS | Performed by: PAIN MEDICINE

## 2021-02-02 ENCOUNTER — RADIOLOGY INJECTION OFFICE VISIT (OUTPATIENT)
Dept: PALLIATIVE MEDICINE | Facility: CLINIC | Age: 41
End: 2021-02-02
Payer: COMMERCIAL

## 2021-02-02 VITALS
RESPIRATION RATE: 16 BRPM | OXYGEN SATURATION: 99 % | HEART RATE: 89 BPM | SYSTOLIC BLOOD PRESSURE: 131 MMHG | DIASTOLIC BLOOD PRESSURE: 71 MMHG

## 2021-02-02 DIAGNOSIS — G89.18 PAIN ASSOCIATED WITH SURGICAL PROCEDURE: Primary | ICD-10-CM

## 2021-02-02 DIAGNOSIS — G89.18 POST PROCEDURE DISCOMFORT: ICD-10-CM

## 2021-02-02 DIAGNOSIS — M47.816 SPONDYLOSIS OF LUMBAR REGION WITHOUT MYELOPATHY OR RADICULOPATHY: ICD-10-CM

## 2021-02-02 PROCEDURE — 64635 DESTROY LUMB/SAC FACET JNT: CPT | Mod: 50 | Performed by: PAIN MEDICINE

## 2021-02-02 PROCEDURE — 99153 MOD SED SAME PHYS/QHP EA: CPT | Performed by: PAIN MEDICINE

## 2021-02-02 PROCEDURE — 99152 MOD SED SAME PHYS/QHP 5/>YRS: CPT | Performed by: PAIN MEDICINE

## 2021-02-02 RX ORDER — OXYCODONE HYDROCHLORIDE 5 MG/1
5 TABLET ORAL EVERY 12 HOURS PRN
Qty: 6 TABLET | Refills: 0 | Status: SHIPPED | OUTPATIENT
Start: 2021-02-02 | End: 2021-05-13

## 2021-02-02 RX ORDER — KETOROLAC TROMETHAMINE 30 MG/ML
30 INJECTION, SOLUTION INTRAMUSCULAR; INTRAVENOUS ONCE
Status: DISCONTINUED | OUTPATIENT
Start: 2021-02-02 | End: 2021-05-20

## 2021-02-02 RX ORDER — FENTANYL CITRATE 50 UG/ML
12.5-25 INJECTION, SOLUTION INTRAMUSCULAR; INTRAVENOUS EVERY 5 MIN PRN
Status: DISCONTINUED | OUTPATIENT
Start: 2021-02-02 | End: 2021-05-20

## 2021-02-02 RX ADMIN — FENTANYL CITRATE 25 MCG: 50 INJECTION, SOLUTION INTRAMUSCULAR; INTRAVENOUS at 10:57

## 2021-02-02 RX ADMIN — FENTANYL CITRATE 25 MCG: 50 INJECTION, SOLUTION INTRAMUSCULAR; INTRAVENOUS at 11:02

## 2021-02-02 ASSESSMENT — PAIN SCALES - GENERAL: PAINLEVEL: MILD PAIN (3)

## 2021-02-02 NOTE — NURSING NOTE
MD Time IN: 1050   Sedation start time:  1055  Sedation end time:  1141    Medications given: fentanyl 50 mcg IV; versed 2 mg IV; toradol 0 mg IV  Intravenous fluids were administered, normal saline 200 cc's.  Sedation Level Achieved:  Moderate (conscious) sedation    Carli RN-BSN  [facility]

## 2021-02-02 NOTE — PROGRESS NOTES
Pre procedure Diagnosis: lumbosacral facet arthropathy, lumbosacral spondylosis  Post procedure Diagnosis: Same  Procedure performed: lumbosacral radiofrequency ablation at L4, L5, sacral ala, fluoroscopically guided  Anesthesia: MD Time IN: 1050   Sedation start time:  1055  Sedation end time:  1141     Medications given: fentanyl 50 mcg IV; versed 2 mg IV; toradol 0 mg IV  Complications: none  Operators: Colby Moss MD     Indications:   40 year old woman was sent for lumbar  radiofrequency ablation.  They have a history of axial low back pain.  Exam shows positive Kemps bilaterally and they have tried conservative treatment including meds/PT.    Patient had two diagnostic medial branch blocks showing appropriate pain relief, therefore radiofrequency ablation will be done.     Options/alternatives, benefits and risks were discussed with the patient including bleeding, infection, no pain relief, tissue trauma, exposure to radiation, reaction to medications including seizure, spinal cord injury,increased pain after the procedure, weakness, numbness or sensory changes and headache.   We also discussed risks of sedation, including reaction to medications and cardiovascular collapse.    Questions were answered to her satisfaction and she agrees to proceed. Voluntary informed consent was obtained and signed.     Vitals were reviewed: Yes  Allergies were reviewed:  Yes   Medications were reviewed:  Yes   Pre-procedure pain score: 4/10    Procedure:  After obtaining signed, informed consent, the patient was brought into the procedure suite and was placed in a prone position on the procedure table.   A Pause for the Cause was performed.  Patient was prepped and draped in sterile fashion.     Hayde MARLEE Curtis had an IV line placed prior the procedure.  The C-arm was positioned in the right oblique view to afford optimal view of the L4-L5 vertebral bodies. Lidocaine 1% was used to anesthetize the skin at each level.   At each level, a 15cm, 20G curved radiofrequency cannula with a 10mm active tip was positioned, overlying the intersection of the transverse process and pedicle at L4 & L5, and was advanced under intermittent fluoroscopy until it contacted the transverse process and notch, and the tip slightly overran that process, just lateral to the mamillary process.  The position of each cannula was verified and optimized in the oblique view and AP views.    In the AP view, another cannula was placed at the sacral alar notch.      Each position was tested for motor and sensory stimulation, and was positioned so that stimulation was negative for stimuli outside the immediate area of the desired lesion.  Sensory stimulation was completed at 50 Hz, with max stimulation up to 0.2V.  Motor stimulation was completed at 2Hz, up to 1.5V.   Bupivacaine 0.5 % 1ml was injected at each level.  After allowing the local anesthetic to set up, a 90 second, 80 degree Celsius lesion was generated at all three levels.    The needles were then rotated within the pathway of the medial branch, and locations were evaluated with repeat imaging.  A second lesion was then generated at each location.    The procedure was then repeated on the left side, using the same technique as described above.  Sensory stimulation was positive at 0.3 V and motor stimulation was negative at 1.5 V except for multifidus movement.    The needles were flushed with the local anesthetic as they were withdrawn.        Hemostasis was achieved, the area was cleaned, and bandaids were placed when appropriate.  The patient tolerated the procedure well, and was taken to the recovery room.    Images were saved to PACS.      Post-procedure pain score: 4/10  Follow-up includes:   -f/u phone call in one week  -post-procedure pain medications: Oxycodone 5 mg twice daily as needed 6 tablets dispensed  -f/u with referring provider in 8 weeks    Colby Moss MD  [facility]

## 2021-02-02 NOTE — NURSING NOTE
20 gauge Peripheral IV inserted into right anticubital - attempts: 1    Carli RN-BSN  [facility]

## 2021-02-02 NOTE — PATIENT INSTRUCTIONS
[facility]   Radiofrequency Ablation (RFA) Discharge Instructions     Today you saw:     Dr. Colby Moss        You should anticipate procedural pain for up to 2 weeks.     You may receive a prescription for pain medication and you should take this as directed.     It may take up to 8 weeks to receive relief from the RFA     Follow up with Dr. Moss in 8 weeks.     If you received sedation before, during or after your procedure, for the next 24 hours you shall NOT:    -Drive    -Operate machinery    -Drink alcohol    -Sign any legal documents     You may resume your normal diet     You may resume your regular medications after the procedure     Be cautious with walking. Numbness and/or weakness in the lower extremities may occur for up to 6-8 hours due to effect of local anesthetic    Avoid strenuous activity for the first 24 hours     You may resume your regular activities after 24 hours     You may shower, however no swimming, tub baths or hot tubs for 24 hours following your procedure     You may use ice packs 10-15 minutes three to four times a day at the injection site for comfort     Do not use heat to painful areas for 6 to 8 hours. This will give the local anesthetic time to wear off and prevent you from accidentally burning your skin.     Unless you have been directed to avoid the use of anti-inflammatory medications (NSAIDS), you may use medications such as ibuprofen, Aleve or Tylenol for pain control if needed.     If you experience any of the following, call the Pain Clinic during work hours (Monday through Friday 8 am-4:30 pm) at [phone] or the Provider Line after hours at [phone]:   -Fever over 100 degree F    -Swelling, bleeding, redness, drainage, warmth at the injection site    -Progressive weakness or numbness in your legs or arms    -Loss of bowel or bladder function    -Unusual headache that is not relieved by Tylenol    -Unusual new onset of pain  that is not improving

## 2021-02-02 NOTE — NURSING NOTE
Discharge Information    IV Discontiued Time:  1158 Catheter intact     Discharge Criteria = When patient returns to baseline or as per MD order    Consciousness:  Pt is fully awake    Circulation:  BP +/- 20% of pre-procedure level    Respiration:  Patient is able to breathe deeply    O2 Sat:  Patient is able to maintain O2 Sat >92% on room air    Activity:  Moves 4 extremities on command    Ambulation:  Patient is able to stand and walk or stand and pivot into wheelchair    Dressing:  Clean/dry or No Dressing    Notes:   Discharge instructions and AVS given to patient    Patient meets criteria for discharge?  YES    Admitted to PCU?  No    Responsible adult present to accompany patient home?  Yes    Signature/Title:    jing reynolds RN  RN Care Coordinator  [facility] [place]

## 2021-02-02 NOTE — LETTER
[facility]  [address]  [place] [address]  Phone: [phone]  Fax: [phone]    February 2, 2021        Daniella Sexton  [address] [facility] [address]          To whom it may concern:    RE: Daniella Sexton    Patient was seen and treated today at our clinic and missed work.  Patient may return to work tomorrow with the following:  Light duty-unable to lift more than 10-15 pounds.  Additionally, please allow patient to take sitting breaks every 2 hours.  The following restrictions apply until 02/16/21:        Please contact me for questions or concerns.      Sincerely,        Colby Moss MD

## 2021-02-02 NOTE — NURSING NOTE
Pre-procedure Intake    Have you been fasting? Yes    If yes, for how long? More than 6 hours    Are you taking a prescribed blood thinner such as coumadin, Plavix, Xarelto?    No    If yes, when did you take your last dose? NA    Do you take aspirin?  No    If cervical procedure, have you held aspirin for 6 days?   NA    Do you have any allergies to contrast dye, iodine, steroid and/or numbing medications?  NO    Are you currently taking antibiotics or have an active infection?  NO    Have you had a fever/elevated temperature within the past week? NO    Are you currently taking oral steroids? NO    Do you have a ? Yes       Are you pregnant or breastfeeding?  NO    Are the vital signs normal?  Yes      Smita Duff CMA ([facility])

## 2021-02-25 ENCOUNTER — MYC REFILL (OUTPATIENT)
Dept: PALLIATIVE MEDICINE | Facility: CLINIC | Age: 41
End: 2021-02-25

## 2021-02-25 DIAGNOSIS — M54.41 CHRONIC BILATERAL LOW BACK PAIN WITH BILATERAL SCIATICA: ICD-10-CM

## 2021-02-25 DIAGNOSIS — G89.29 CHRONIC BILATERAL LOW BACK PAIN WITH BILATERAL SCIATICA: ICD-10-CM

## 2021-02-25 DIAGNOSIS — M54.42 CHRONIC BILATERAL LOW BACK PAIN WITH BILATERAL SCIATICA: ICD-10-CM

## 2021-02-25 NOTE — LETTER
[facility]  03/02/21    Patient: Daniella Sexton  YOB: 1980  Medical Record Number: [id number]                                                                  Opioid / Opioid Plus Controlled Substance Agreement    I understand that my care provider has prescribed an opioid (narcotic) controlled substance to help manage my condition(s). I am taking this medicine to help me function or work. I know this is strong medicine, and that it can cause serious side effects. Opioid medicine can be sedating, addicting and may cause a dependency on the drug. They can affect my ability to drive or think, and cause depression. They need to be taken exactly as prescribed. Combining opioids with certain medicines or chemicals (such as cocaine, sedatives and tranquilizers, sleeping pills, meth) can be dangerous or even fatal. Also, if I stop opioids suddenly, I may have severe withdrawal symptoms. Last, I understand that opioids do not work for all types of pain nor for all patients. If not helpful, I may be asked to stop them.      The risks, benefits, and side effects of these medicine(s) were explained to me. I agree that:    1. I will take part in other treatments as advised by my care team. This may be psychiatry or counseling, physical therapy, behavioral therapy, group treatment or a referral to a pain clinic. I will reduce or stop my medicine when my care team tells me to do so.  2. I will take my medicines as prescribed. I will not change the dose or schedule unless my care team tells me to. There will be no refills if I  run out early.   I may be contactedwithout warning and asked to complete a urine drug test or pill count at any time.   3. I will keep all my appointments, and understand this is part of the monitoring of opioids. My care team may require an office visit for EVERY opioid/controlled substance refill. If I miss appointments or don t follow instructions, my care team may stop  my medicine.  4. I will not ask other providers to prescribe controlled substances, and I will not accept controlled substances from other people. If I need another prescribed controlled substance for a new reason, I will tell my care team within 1 business day.  5. I will use one pharmacy to fill all of my controlled substance prescriptions, and it is up to me to make sure that I do not run out of my medicines on weekends or holidays. If my care team is willing to refill my opioid prescription without a visit, I must request refills only during office hours, refills may take up to 3 days to process, and it may take up to 5 to 7 days for my medicine to be mailed and ready at my pharmacy. Prescriptions will not be mailed anywhere except my pharmacy.        [id number]  Rev 12/18         Registration to scan to EHR                             Page 1 of 2               Controlled Substance Agreement [facility]  03/02/21  Patient: Daniella Sexton  YOB: 1980  Medical Record Number: [id number]                                                                  6. I am responsible for my prescriptions. If the medicine/prescription is lost or stolen, it will not be replaced. I also agree not to share controlled substance medicines with anyone.  7. I agree to not use ANY illegal or recreational drugs. This includes marijuana, cocaine, bath salts or other drugs. I agree not to use alcohol unless my care team says I may.          I agree to give urine samples whenever asked. If I don t give a urine sample, the care team may stop my medicine.    8. If I enroll in the [place] [facility] Marijuana program, I will tell my care team. I will also sign an agreement to share my medical records with my care team.   9. I will bring in my list of medicines (or my medicine bottles) each time I come to the clinic.   10. I will tell my care team right away if I become pregnant or have a new medical  problem treated outside of my regular clinic.  11. I understand that this medicine can affect my thinking and judgment. It may be unsafe for me to drive, use machinery and do dangerous tasks. I will not do any of these things until I know how the medicine affects me. If my dose changes, I will wait to see how it affects me. I will contact my care team if I have concerns about medicine side effects.    I understand that if I do not follow any of the conditions above, my prescriptions or treatment may be stopped.      I agree that my provider, clinic care team, and pharmacy may work with any city, state or federal law enforcement agency that investigates the misuse, sale, or other diversion of my controlled medicine. I will allow my provider to discuss my care with or share a copy of this agreement with any other treating provider, pharmacy or emergency room where I receive care. I agree to give up (waive) any right of privacy or confidentiality with respect to these consents.     I have read this agreement and have asked questions about anything I did not understand.      ________________________________________________________________________  Patient signature - Date/Time -  Daniella Sexton                                      ________________________________________________________________________  Witness signature                                                            ________________________________________________________________________  Provider signature - Colby Moss MD      [id number]  Rev 12/18         Registration to scan to EHR                         Page 2 of 2                   Controlled Substance Agreement Opioid           Page 1 of 2  Opioid Pain Medicines (also known as Narcotics)  What You Need to Know    What are opioids?   Opioids are pain medicines that must be prescribed by a doctor.  They are also known as narcotics.    Examples are:     morphine (MS Contin, Liyah)    oxycodone  (Oxycontin)    oxycodone and acetaminophen (Percocet)    hydrocodone and acetaminophen (Vicodin, Norco)     fentanyl patch (Duragesic)     hydromorphone (Dilaudid)     methadone     What do opioids do well?   Opioids are best for short-term pain after a surgery or injury. They also work well for cancer pain. Unlike other pain medicines, they do not cause liver or kidney failure or ulcers. They may help some people with long-lasting (chronic) pain.     What do opioids NOT do well?   Opioids never get rid of pain entirely, and they do not work well for most patients with chronic pain. Opioids do not reduce swelling, one of the causes of pain. They also don t work well for nerve pain.                           For informational purposes only.  Not to replace the advice of your care provider.  Copyright [facility]. All right reserved. [employer] 500388-Pbj 02/18.      Page 2 of 2    Risks and side effects   Talk to your doctor before you start or decide to keep taking one of these medicines. Side effects include:    Lowering your breathing rate enough to cause death    Overdose, including death, especially if taking higher than prescribed doses    Long-term opioid use    Worse depression symptoms; less pleasure in things you usually enjoy    Feeling tired or sluggish    Slower thoughts or cloudy thinking    Being more sensitive to pain over time; pain is harder to control    Trouble sleeping or restless sleep    Changes in hormone levels (for example, less testosterone)    Changes in sex drive or ability to have sex    Constipation    Unsafe driving    Itching and sweating    Feeling dizzy    Nausea, vomiting and dry mouth    What else should I know about opioids?  When someone takes opioids for too long or too often, they become dependent. This means that if you stop or reduce the medicine too quickly, you will have withdrawal symptoms.    Dependence is not the same as addiction. Addiction is when people  keep using a substance that harms their body, their mind or their relations with others. If you have a history of drug or alcohol abuse, taking opioids can cause a relapse.    Over time, opioids don t work as well. Most people will need higher and higher doses. The higher the dose, the more serious the side effects. We don t know the long-term effects of opioids.      Prescribed opioids aren't the best way to manage chronic pain    Other ways to manage pain include:      Ibuprofen or acetaminophen.  You should always try this first.      Treat health problems that may be causing pain.      acupuncture or massage, deep breathing, meditation, visual imagery, aromatherapy.      Use heat or ice at the pain site      Physical therapy and exercise      Stop smoking      See a counselor or therapist                                                  People who have used opioids for a long time may have a lower quality of life, worse depression, higher levels of pain and more visits to doctors.    Never share your opioids with others. Be sure to store opioids in a secure place, locked if possible.Young children can easily swallow them and overdose.     You can overdose on opioids.  Signs of overdose include decrease or loss of consciousness, slowed breathing, trouble waking and blue lips.  If someone is worried about overdose, they should call 911.    If you are at risk for overdose, you may get naloxone (Narcan, a medicine that reverses the effects of opioids.  If you overdose, a friend or family member can give you Narcan while waiting for the ambulance.  They need to know the signs of overdose and how to give Narcan.    While you're taking opioids:    Don't use alcohol or street drugs. Taking them together can cause death.    Don't take any of these medicines unless your doctor says its okay.  Taking these with opioids can cause death.    Benzodiazepines (such as lorazepam         or diazepam)    Muscle relaxers (such as  cyclobenzaprine)    sleeping pills    other opioids    Safe disposal of opioids  Find your area drug take-back program, your pharmacy mail-back program, buy a special disposal bag (such as Deterra) from your pharmacy or flush them down the toilet.  Use the guidelines at:  www.fda.gov/drugs/resourcesforyou

## 2021-02-26 NOTE — TELEPHONE ENCOUNTER
Refills have been requested for the following medications:         HYDROcodone-acetaminophen (NORCO) 5-325 MG tablet [Colby Moss MD]      Patient Comment: being back at work full time standing is really painful sometimes, i take a pill before work and after work along with my muscle relaxer     Preferred pharmacy: [id number] - [place], [place] - [address] AT [address]

## 2021-02-26 NOTE — TELEPHONE ENCOUNTER
Patient Comment: being back at work full time standing is really painful sometimes, i take a pill before work and after work along with my muscle relaxer    Patient requesting refill(s) of HYDROcodone-acetaminophen (NORCO) 5-325 MG tablet   Last dispensed from pharmacy on 01/12/2021    Patient's last office/virtual visit by prescribing provider on 02/02/2021  Next office/virtual appointment scheduled for 03/29/2021    Last urine drug screen date 12/10/2019  Current opioid agreement on file (completed within the last year) Yes Date of opioid agreement: 4/10/2020    E-prescribe to [facility] #[id number] [place], [place] pharmacy    Will route to nursing [place] for review and preparation of prescription(s).

## 2021-03-02 NOTE — TELEPHONE ENCOUNTER
Medication refill information reviewed.     Due date for HYDROcodone-acetaminophen (NORCO) 5-325 MG tablet is 3/2/21     Prescriptions prepped for review.     CSA completed over phone.  UDS was done 12/10/21    Will route to provider.     Dany Shukla, RN  Care Coordinator   [facility] [place]

## 2021-03-04 ENCOUNTER — TELEPHONE (OUTPATIENT)
Dept: PALLIATIVE MEDICINE | Facility: CLINIC | Age: 41
End: 2021-03-04

## 2021-03-04 RX ORDER — HYDROCODONE BITARTRATE AND ACETAMINOPHEN 5; 325 MG/1; MG/1
.5-1 TABLET ORAL 2 TIMES DAILY PRN
Qty: 30 TABLET | Refills: 0 | Status: SHIPPED | OUTPATIENT
Start: 2021-03-04 | End: 2021-05-20 | Stop reason: ALTCHOICE

## 2021-03-04 NOTE — TELEPHONE ENCOUNTER
Prior Authorization Specialty Medication Request    Medication/Dose: HYDROcodone-acetaminophen (NORCO) 5-325 MG tablet  ICD code (if different than what is on RX): Chronic bilateral low back pain with bilateral sciatica [M54.42, M54.41, G89.29]    Previously Tried and Failed:  ...    Important Lab Values: ...  Rationale: ...    Insurance Name:   Coverage information:     Subscriber: [id number] [employer]     Rel to sub: 01 - Self     Member ID: [id number]     Payor: 3-BC[employer] Ph: [phone]     Benefit plan: [id number]-BCBS OUT OF STATE Ph: [phone]     Group number: [id number]     Member effective dates: from 03/01/19          Pharmacy Information (if different than what is on RX)  Name:  ...  Phone:  ...

## 2021-03-04 NOTE — TELEPHONE ENCOUNTER
Central Prior Authorization Team   Phone: [phone]      PA Initiation    Medication: HYDROcodone-acetaminophen (NORCO) 5-325 MG tablet - INITIATED  Insurance Company: [employer] [place] - Phone [phone] Fax [phone]  Pharmacy Filling the Rx: [id number] - [place] [address] AT [facility] [address] [facility]  Filling Pharmacy Phone: [phone]  Filling Pharmacy Fax: [phone]  Start Date: 3/4/2021

## 2021-03-08 NOTE — TELEPHONE ENCOUNTER
Central Prior Authorization Team   Phone: [phone]      Prior Authorization Approval    Authorization Effective Date: 3/5/2021  Authorization Expiration Date: 3/5/2022  Medication: HYDROcodone-acetaminophen (NORCO) 5-325 MG tablet - APPROVED  Approved Dose/Quantity: 30 FOR 30  Reference #:     Insurance Company: [employer] [place] - Phone [phone] Fax [phone]  Expected CoPay:       CoPay Card Available:      Foundation Assistance Needed:    Which Pharmacy is filling the prescription (Not needed for infusion/clinic administered): [facility] #[id number] - [facility] [address] AT [facility] [address] [facility]  Pharmacy Notified: Yes  Patient Notified: Yes (**Instructed pharmacy to notify patient when script is ready to /ship.**)

## 2021-03-29 ENCOUNTER — VIRTUAL VISIT (OUTPATIENT)
Dept: PALLIATIVE MEDICINE | Facility: CLINIC | Age: 41
End: 2021-03-29
Payer: COMMERCIAL

## 2021-03-29 DIAGNOSIS — M54.42 CHRONIC BILATERAL LOW BACK PAIN WITH BILATERAL SCIATICA: ICD-10-CM

## 2021-03-29 DIAGNOSIS — M47.816 SPONDYLOSIS OF LUMBAR REGION WITHOUT MYELOPATHY OR RADICULOPATHY: ICD-10-CM

## 2021-03-29 DIAGNOSIS — M54.41 CHRONIC BILATERAL LOW BACK PAIN WITH BILATERAL SCIATICA: ICD-10-CM

## 2021-03-29 DIAGNOSIS — G89.29 CHRONIC BILATERAL LOW BACK PAIN WITH BILATERAL SCIATICA: ICD-10-CM

## 2021-03-29 DIAGNOSIS — M47.816 SPONDYLOSIS OF LUMBAR REGION WITHOUT MYELOPATHY OR RADICULOPATHY: Primary | ICD-10-CM

## 2021-03-29 PROCEDURE — 99213 OFFICE O/P EST LOW 20 MIN: CPT | Mod: 95 | Performed by: PAIN MEDICINE

## 2021-03-29 RX ORDER — OXYCODONE HYDROCHLORIDE 5 MG/1
2.5-5 TABLET ORAL DAILY
Qty: 30 TABLET | Refills: 0 | Status: SHIPPED | OUTPATIENT
Start: 2021-03-29 | End: 2021-05-20

## 2021-03-29 ASSESSMENT — PAIN SCALES - GENERAL: PAINLEVEL: MILD PAIN (3)

## 2021-03-29 NOTE — TELEPHONE ENCOUNTER
Received fax from pharmacy requesting refill(s) for gabapentin (NEURONTIN) 300 MG capsule     Last refilled on 2/1/21    Pt last seen on 9/22/20  Next appt scheduled for 3/29/21    E-prescribe to:       [facility] #[id number] - [place], [place] - [address] AT [facility] [address]       Will facilitate refill.

## 2021-03-29 NOTE — PATIENT INSTRUCTIONS
----------------------------------------------------------------  [facility] Number:  [phone]     Call with any questions about your care and for scheduling assistance.     Calls are returned Monday through Friday between 8 AM and 4:30 PM. We usually get back to you within 2 business days depending on the issue/request.    If we are prescribing your medications:    For opioid medication refills, call the clinic or send a Directly message 7 days in advance.  Please include:    Name of requested medication    Name of the pharmacy.    For non-opioid medications, call your pharmacy directly to request a refill. Please allow 3-4 days to be processed.     Per [place] State Law:    All controlled substance prescriptions must be filled within 30 days of being written.      For those controlled substances allowing refills, pickup must occur within 30 days of last fill.      We believe regular attendance is key to your success in our program!      Any time you are unable to keep your appointment we ask that you call us at least 24 hours in advance to cancel.This will allow us to offer the appointment time to another patient.     Multiple missed appointments may lead to dismissal from the clinic.

## 2021-03-29 NOTE — PROGRESS NOTES
Daniella is a 40 year old who is being evaluated via a billable video visit.      How would you like to obtain your AVS? MyChart  If the video visit is dropped, the invitation should be resent by: Text to cell phone: [phone]  Will anyone else be joining your video visit? Saskia Mcconnell MA  [facility]      Video Start Time: 345  Video-Visit Details    Type of service:  Video Visit    Video End Time:4    Originating Location (pt. Location): Home    Distant Location (provider location):  [facility]     Platform used for Video Visit: [facility] follow-up  Chief Complaint:    Chief Complaint   Patient presents with     Pain     MME prescribed prior to seeing patient: 5 current MME:  Recommendations:    - Further procedures recommended:    - Ordered Lumber MEDIAL BRANCH BLOCK/RFA waiting on PA   - Medication Management: Will make one change at a time.    -zanaflex increased to 4 mg 3 times daily   - continue voltaren PO   - continue gabapentin to 900mg three times a day    -Continue Elavil 50mg at night. Will titrate as tolerated    - reasonable to continue 0.5-1 tablet of norco at day as needed for severe pain  - Physical Therapy: continue  pain PHYSICAL THERAPY    - Clinical Health Psychologist to address issues of relaxation, behavioral change, coping style, and other factors important to improvement: consider in the future   - Follow up:    -Injection   -3 months   - discussed the importance of smoking cessation and its association with pain              Interval:  S/p rfa 2/2/21  The pt reports benefit with procedure  The pt reports sig benefit with right sided pain   She cont to have some left sided pain   The pain continues to be axial denies any radiation    The pain is worse with standing. The pain is worse with activity. The pain is worse with work. The pt notes some benefit with heat. The pt notes some benefit with  ice. The pt notes some benefit on current medical regimen. She denies any recent falls.    She denies any overt progressive weakness.      She denies any incontinence.    In general she denies numbness and tingling       Of note patient is losing insurance in a week and is working on establishing a new plan  THE 4 A's OF OPIOID MAINTENANCE ANALGESIA    Analgesia: y    Activity: y    Adverse effects: n    Adherence to Rx protocol: y    [place] Board of Pharmacy Data Base Reviewed:    YES;     Pain history:  Daniella Sexton is a 39 year old female who first started having problems with pain chronic hx of prior lumbar surgery. The pt reports 2004 hurt herself on the job. At that itme she was only having axial LBP. She reports benefit with surgery.  The pt reports 4/19 she started having bilateral LBP radiating to her LE. She denies any specific inciting event. Although she was doing more work on the floor. The pt reports numbness and tingling in her feet. She denies any buring. The pain is constant in her back. The pain is sharp shooting. The pain a deep ache. The pain is squeezing in nature. The pain is worse with flexion. The pain is worse with prolonged standing. Pain is worse when going from a seated to standing position. The pt notes some benefit with sitting. Some benefit with leaning back. The pt reports some benefit with gabapentin. The pt currently takes 900, 600, and 900. The pt takes flexeril BID. The pt takes norco approx once a day to help her sleep. The pt denies any progressive weaknes. She denies any recent falls. She denies any incontinene.    The pain sig affects her quality of life      She does smoke approx 5 cigs a day.  Pain rating: intensity  Averages 9/10 on a 0-10 scale.      Current treatments include:  norco   zanaflex   Gabapentin 900, 600, 900  -Cannabis  Elavil  Previous medication treatments included:  Robaxin-no benefit/flexeril      Other treatments have included:  Daniella Sexton has  been seen at a pain clinic in the past.    PT: last PHYSICAL THERAPY  8/18  Chiropractic: benefit   Acupuncture: n  TENs Unit: benefit  Injections:   Facet 7/19 benefit 1 week no pain  Bilateral L4/5 /L5-S1 on 9/4 minimal relief   Epidural w/o imaging prior to surgery in 2004 no benefit  Past Medical History:  Past Medical History:   Diagnosis Date     Alcohol abuse      C. 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)      Past Surgical History:  Past Surgical History:   Procedure Laterality Date     BACK SURGERY  2005    L 3-4, L 4-5     LITHOTRIPSY  2016     OPTICAL TRACKING SYSTEM ENDOSCOPIC SINUS SURGERY Bilateral 1/10/2019    Procedure: BILATERAL IMAGE GUIDED ENDOSCOPIC SINUS SURGERY, BILATERAL TURBINATE REDUCTION;  Surgeon: Maikol Miguel MD;  Location: [facility] OR     RELEASE CARPAL TUNNEL Left      TONSILLECTOMY       TUBAL LIGATION       Medications:  Current Outpatient Medications   Medication Sig Dispense Refill     albuterol (PROAIR HFA/PROVENTIL HFA/VENTOLIN HFA) 108 (90 Base) MCG/ACT inhaler Inhale 2 puffs into the lungs every 6 hours 8.5 g 2     amitriptyline (ELAVIL) 50 MG tablet Take 1 tablet (50 mg) by mouth At Bedtime 30 tablet 1     beclomethasone HFA (QVAR REDIHALER) 80 MCG/ACT inhaler Inhale 1 puff into the lungs 2 times daily 10.6 g 2     COMPOUNDED NON-CONTROLLED SUBSTANCE (CMPD RX) - PHARMACY TO MIX COMPOUNDED MEDICATION Apply 20 mLs into each nare 2 times daily Gentamicin/Dexamethasone 160/120mg/liter saline for nasal irrigation 2000 mL 6     diclofenac (VOLTAREN) 75 MG EC tablet Take 1 tablet (75 mg) by mouth 2 times daily 60 tablet 0     gabapentin (NEURONTIN) 300 MG capsule Take 3 capsules (900 mg) by mouth 3 times daily 270 capsule 1     HYDROcodone-acetaminophen (NORCO) 5-325 MG tablet Take 0.5-1 tablets by mouth 2 times daily as needed for breakthrough pain Min of 30day supply. Fill 3/2/21 30 tablet 0     ipratropium - albuterol 0.5 mg/2.5 mg/3 mL (DUONEB) 0.5-2.5  (3) MG/3ML neb solution Take 1 vial (3 mLs) by nebulization every 6 hours as needed for shortness of breath / dyspnea or wheezing 1 Box 3     ketorolac (TORADOL) 30 MG/ML injection Inject 1 mL (30 mg) into the muscle every 6 hours as needed for moderate pain 1 mL 0     nicotine (NICORETTE) 2 MG gum Place 1 each (2 mg) inside cheek as needed for smoking cessation 50 each 11     oxyCODONE (ROXICODONE) 5 MG tablet Take 1 tablet (5 mg) by mouth every 12 hours as needed for severe pain 6 tablet 0     predniSONE (DELTASONE) 20 MG tablet Take 3 tabs by mouth daily x 3 days, then 2 tabs daily x 3 days, then 1 tab daily x 3 days, then 1/2 tab daily x 3 days. 20 tablet 0     rizatriptan (MAXALT) 10 MG tablet Take 1 tablet (10 mg) by mouth at onset of headache for migraine 10 tablet 0     tiZANidine (ZANAFLEX) 4 MG tablet Take 1 tablet (4 mg) by mouth 2 times daily as needed for muscle spasms 60 tablet 3     Allergies:     Allergies   Allergen Reactions     Amoxicillin Anaphylaxis     Morphine Other (See Comments) and Nausea and Vomiting     Penicillins Unknown     Social History:  Home situation: [facility]  Occupation/Schooling: y  Tobacco use: y  Alcohol use: n  Drug use: n  History of chemical dependency treatment: n    Family history:  Family History   Problem Relation Age of Onset     Heart Disease No family hx of      Diabetes No family hx of      Cancer No family hx of      Family history of headaches: n    Review of Systems:  Skin: negative  Eyes: negative  Ears/Nose/Throat: negative  Respiratory: No shortness of breath, dyspnea on exertion, cough, or hemoptysis  Cardiovascular: negative  Gastrointestinal: negative  Genitourinary: negative  Musculoskeletal: negative  Neurologic: negative  Psychiatric: negative  Hematologic/Lymphatic/Immunologic: negative  Endocrine: negative    Physical Exam:  There were no vitals filed for this visit.  Exam: Obese  Constitutional: healthy, alert and no distress  Respiratory: Speaking in  full sentences no accessory muscles use     Psychiatric: mentation appears normal and affect normal/bright    Musculoskeletal exam:  Gait/Station/Posture: Antalgic         Lumbar spine:     ROM: decreased secondary to pain                 Slump: Positive on the left negative on the right   Has reproduction of symptoms with extension               Neurologic exam:    Motor:  5/5 UE and LE strength      Diagnostic tests:  MRI reviewed with patient       T12-L1: No significant disc herniation. No spinal canal or neural  foraminal narrowing.      L1-L2: No significant disc herniation. No spinal canal or neural  foraminal narrowing.      L2-L3: No significant disc herniation. No spinal canal or neural  foraminal narrowing.      L3-L4: No significant disc herniation. No spinal canal or neural  foraminal narrowing.      L4-L5: Sequela of previous right-sided keyhole laminectomy and  discectomy. There is a circumferential disc bulge and endplate  osteophytic spurring along with bilateral facet hypertrophy resulting  in moderate left and mild right neural foraminal narrowing. No  significant central spinal canal narrowing following laminectomy.      L5-S1: No significant disc herniation. No spinal canal or neural  foraminal narrowing. Bilateral facet hypertrophy.     Paraspinous soft tissues: Normal.                                                                       IMPRESSION: Postoperative changes at the L4-L5 level with previous  right-sided keyhole laminectomy and microdiscectomy. Residual or  recurrent circumferential disc bulge and endplate osteophytic spurring  at this level with bilateral facet hypertrophy causes moderate left  and mild right neural foraminal narrowing.        D.I.R.E Score: Patient Selection for Chronic Opioid Analgesia  2        For each factor, rate the patient's score from 1 - 3 based on the explanations on the right.       Diagnosis             2         1 = Benign chronic condition with  minimal objective findings or no definite medical diagnosis.  Examples:  fibromyalgia, migraine, headaches, non-specific back pain.  2 = Slowly progressive condition concordant with moderate pain, or fixed condition with moderate objective findings.  Examples: failed back surgery syndrome, back pain with moderate degenerative changes, neuropathic pain.  3 = Advanced condition concordant with severe pain with objective findings.  Examples: severe ischemic vascular disease, advanced neuropathy, severe spinal stenosis.    Intractability             2         1 = Few therapies have been tried and the patient takes a passive role in his/her pain management process.   2 = Most costomary treatments have been tried but the patient is not fully engaged in the pain management process, or barriers prevent (insurance, transportation, medical illness)  3 = Patient fully engaged in a spectrum of appropriate treatments but with inadequate response.    Risk   (Risk = Total of P+C+R+S below)       Psychological             2         1 = Serious personality dysfunction or mental illness interfering with care.  Examples: personality disorder, severe affective disorder, significant personality issues.  2 = Personality or mental health interferes moderately.  Example: depression or anxiety disorder.  3 = Good communication with the clinic.  No significant personality dysfunction or mental illness.       Chemical      Health             2         1 = Active or very recent use of illicit drugs, excessive alcohol, or prescription drug abuse.  2 = Chemical coper (uses medications to cope with stress) or history of chemical dependency in remission.  3 = No CD history.  Not drug-focused or chemically reliant       Reliability             2         1 = History of numerous problems: medication misuse, missed appointments, rarely follows through.  2 = Occasional difficulties with compliance, but generally reliable.  3 = Highly reliable patient  with medications, appointments and treatment.       Social      Support             2         1= Life in chaos.  Little family support and few close relationships.  Loss of most normal life roles.  2 = Reduction in some relationships and life roles.  3 = Supportive family/close relationships.  Involved in work or school and no social isolation.    Efficacy score             2         1 = Poor function or minimal pain relief despite moderate to high doses.  2 = Moderate benefit with function improved in a number of ways (or insufficient info - hasn't tried opioid yet or very low doses or too short a trial.  3 = Good improvement in pain and function and quality of life with stable doses over time.                                    14    Total score = D + I + R + E    Score 7-13: Not a suitable candidate for long-term opioid analgesia  Score 14-21: May be a good candidate      Assessment/Plan:  Daneilla Sexton is a 39 year old female who presents with the complaints of axial low back pain with occasional radiation to her left anterior leg..   There are no diagnoses linked to this encounter.     Of note pt going to lose insurance this wk  Gave additional rx for oxycodone and instrusted to take it easy for the next 2 wks    - Further procedures recommended:    -  none  - Medication Management: Will make one change at a time.    - continue Zanaflex  4 mg 3 times daily   - continue voltaren PO   - continue gabapentin to 900mg three times a day    -Continue Elavil 50mg at night. Will titrate as tolerated    - reasonable to continue 0.5-1 tablet of norco at day as needed for severe pain  - Physical Therapy: continue  pain PHYSICAL THERAPY    - Clinical Health Psychologist to address issues of relaxation, behavioral change, coping style, and other factors important to improvement: consider in the future   - Follow up:    -3 months   - discussed the importance of smoking cessation and its association with pain            Total  time spent was 15 minutes    Colby Moss MD  [facility] [place]  This note was created with voice recognition software, and while reviewed for accuracy, typos may remain.

## 2021-03-30 RX ORDER — GABAPENTIN 300 MG/1
900 CAPSULE ORAL 3 TIMES DAILY
Qty: 270 CAPSULE | Refills: 1 | Status: SHIPPED | OUTPATIENT
Start: 2021-03-30 | End: 2021-07-08

## 2021-03-30 RX ORDER — DICLOFENAC SODIUM 75 MG/1
75 TABLET, DELAYED RELEASE ORAL 2 TIMES DAILY
Qty: 60 TABLET | Refills: 0 | Status: SHIPPED | OUTPATIENT
Start: 2021-03-30 | End: 2021-06-04

## 2021-04-01 ENCOUNTER — MYC MEDICAL ADVICE (OUTPATIENT)
Dept: PALLIATIVE MEDICINE | Facility: CLINIC | Age: 41
End: 2021-04-01

## 2021-04-01 NOTE — TELEPHONE ENCOUNTER
Per patient myChart message:  From: Carli Vargas RN      Created: 4/1/2021 2:49 PM      Al Brewer.  Dr. Moss said that these forms were not discussed at your recent visit.  Can you tell us more about what is needed and why?     MCKAYLA Boyce-BSN  [facility]

## 2021-04-01 NOTE — TELEPHONE ENCOUNTER
Per patient myChart message:  From: Daniella Sexton      Created: 4/1/2021 8:55 AM      please fill these out front and back pages for both document numbers and fax them in to the number on the bottom of the form        Dr. Moss did not mention filling these out at the 3/29/21 visit.    Routed to provider.    Carli RN-BSN  [facility]

## 2021-04-05 NOTE — TELEPHONE ENCOUNTER
Dr. Moss's message was sent to quique Boyce, RN-BSN  [facility]

## 2021-04-05 NOTE — TELEPHONE ENCOUNTER
Reviewed chart order was placed on 3-29.  In terms of the disability forms, I wish this was discussed at our appointment.  I can try and get them filled out tomorrow.  I would most likely recommend work with lifting restrictions.  In the future forms cannot just be sent without having discussion prior.  It makes it extremely hard to complete any form and I am unsure of what is being asked of me.  If patient needs the forms filled out today would be worth reaching out to her PCp as I am not in the office.

## 2021-04-05 NOTE — TELEPHONE ENCOUNTER
Pt calling to talk to nursing about her forms. Pt crying and upset. States she just needs the forms filled out for unemployment by tomorrow. Pt also stated her prescription for oxyCODONE (ROXICODONE) 5 MG tablet didn't go through and now she has no health insurance.    Dori SINGLETARY    [facility]

## 2021-04-06 NOTE — TELEPHONE ENCOUNTER
Per patient Kenyetta message:  From: Daniella Sexton      Created: 4/5/2021 5:33 PM      i cant help that unemployment sent the forms out to me after we had our visit, THIS IS NOT FOR DISABLITLY! this is for unemployment! they are dening me thinking that I cant work! because the last time I called in and got fired was because of back pain! and yes he sent the prescription in on that date but the insurance company needed some thing from the dr. so I wanted him to know that I didn't get that med for my back, cause he 2wanted me to let him know how I was feeling in 2 weeks        Routed to provider.    Carli RN-BSN  [facility]

## 2021-04-07 NOTE — TELEPHONE ENCOUNTER
Per patient myChart message:  From: Daniella Sexton      Created: 4/6/2021 4:55 PM      yes that will work, but please do both forms since they have different case numbers on them. thanks        Routed to provider.    Carli RN-BSN  [facility]

## 2021-04-07 NOTE — TELEPHONE ENCOUNTER
From: Colby Moss MD      Created: 4/6/2021 4:20 PM        Did you have any chance to review the form.  I am being asked about your recent ability to work.  I feel it is reasonable to state limited ability with work restrictions such as heavy lifting.  Please let me know if this is what you were requiring     Thanks and sorry you are going through all of this

## 2021-04-08 DIAGNOSIS — M79.18 MYOFASCIAL MUSCLE PAIN: ICD-10-CM

## 2021-04-08 DIAGNOSIS — M54.16 LUMBAR RADICULOPATHY: ICD-10-CM

## 2021-04-08 DIAGNOSIS — M47.816 SPONDYLOSIS OF LUMBAR REGION WITHOUT MYELOPATHY OR RADICULOPATHY: ICD-10-CM

## 2021-04-08 RX ORDER — AMITRIPTYLINE HYDROCHLORIDE 50 MG/1
50 TABLET ORAL AT BEDTIME
Qty: 30 TABLET | Refills: 1 | Status: SHIPPED | OUTPATIENT
Start: 2021-04-08 | End: 2021-11-03

## 2021-04-08 NOTE — TELEPHONE ENCOUNTER
Completed forms faxed back. RightFax confirmed. Sent to scanning. [employer] message sent to patient.

## 2021-04-08 NOTE — TELEPHONE ENCOUNTER
Received fax request from [facility] pharmacy requesting refill(s) for amitriptyline (ELAVIL) 50 MG tablet    Last refilled on 01/05/21    Pt last seen on 03/29/21  Next appt scheduled for : none    Will facilitate refill.

## 2021-05-05 ENCOUNTER — IMMUNIZATION (OUTPATIENT)
Dept: NURSING | Facility: CLINIC | Age: 41
End: 2021-05-05
Payer: COMMERCIAL

## 2021-05-05 PROCEDURE — 0001A PR COVID VAC PFIZER DIL RECON 30 MCG/0.3 ML IM: CPT

## 2021-05-05 PROCEDURE — 91300 PR COVID VAC PFIZER DIL RECON 30 MCG/0.3 ML IM: CPT

## 2021-05-12 ENCOUNTER — MYC REFILL (OUTPATIENT)
Dept: FAMILY MEDICINE | Facility: CLINIC | Age: 41
End: 2021-05-12

## 2021-05-12 ENCOUNTER — MYC REFILL (OUTPATIENT)
Dept: PALLIATIVE MEDICINE | Facility: CLINIC | Age: 41
End: 2021-05-12

## 2021-05-12 DIAGNOSIS — G89.29 CHRONIC BILATERAL LOW BACK PAIN WITH BILATERAL SCIATICA: ICD-10-CM

## 2021-05-12 DIAGNOSIS — M54.42 CHRONIC BILATERAL LOW BACK PAIN WITH BILATERAL SCIATICA: ICD-10-CM

## 2021-05-12 DIAGNOSIS — M54.41 CHRONIC BILATERAL LOW BACK PAIN WITH BILATERAL SCIATICA: ICD-10-CM

## 2021-05-12 DIAGNOSIS — G43.909 MIGRAINE WITHOUT STATUS MIGRAINOSUS, NOT INTRACTABLE, UNSPECIFIED MIGRAINE TYPE: ICD-10-CM

## 2021-05-12 DIAGNOSIS — M47.816 SPONDYLOSIS OF LUMBAR REGION WITHOUT MYELOPATHY OR RADICULOPATHY: ICD-10-CM

## 2021-05-12 RX ORDER — HYDROCODONE BITARTRATE AND ACETAMINOPHEN 5; 325 MG/1; MG/1
.5-1 TABLET ORAL 2 TIMES DAILY PRN
Qty: 30 TABLET | Refills: 0 | Status: CANCELLED | OUTPATIENT
Start: 2021-05-12

## 2021-05-12 NOTE — TELEPHONE ENCOUNTER
Refills have been requested for the following medications:         HYDROcodone-acetaminophen (NORCO) 5-325 MG tablet [Colby Moss MD]     Preferred pharmacy: [facility] #[id number] [place] [address] [address] AT [address]

## 2021-05-12 NOTE — TELEPHONE ENCOUNTER
Received call from patient requesting refill(s) of     HYDROcodone-acetaminophen (NORCO) 5-325 MG tablet [Colby Moss MD]     Last dispensed from pharmacy on 3/5/21    Patient's last office/virtual visit by prescribing provider on 3/29/21  Next office/virtual appointment scheduled for none    Last urine drug screen date 1/15/21  Current opioid agreement on file (completed within the last year) Yes Date of opioid agreement: 3/10/21    E-prescribe to [id number] - [place], [place] - [address] AT [facility] [address] [facility]    Will route to nursing pool for review and preparation of prescription(s).

## 2021-05-13 ENCOUNTER — MYC MEDICAL ADVICE (OUTPATIENT)
Dept: PALLIATIVE MEDICINE | Facility: CLINIC | Age: 41
End: 2021-05-13

## 2021-05-13 NOTE — TELEPHONE ENCOUNTER
See the 5/12/21 mychart encounter for more information.    Carli Vargas RN-BSN  [facility]

## 2021-05-13 NOTE — TELEPHONE ENCOUNTER
Per patient myChart message:  From: IRMA Sexton      Created: 5/13/2021 9:06 AM      chani started back to work on 5/10/21 and I have lots of back pain when standing, we did do some oxy end of march to see if we could get this pain away,  it dulled it greatly after the pills were gone, but now that I'm working again and on my feet for 8 hour shifts it back to hurting really bad again, what should we try this time, this time the right side is the worst      ____________    See the 5/12/21 encounter. Pt requested a norco refill. She is on 30 tabs/month.  _________________________________    Mychart sent to pt:  From: Carli Vargas RN      Created: 5/13/2021 10:23 AM      Al Brewer.  We did receive a norco refill request.  Looks like Dr. Moss has you on 30 tabs/month.  Are you asking to change to oxycodone?     Carli, RN-BSN  [facility]

## 2021-05-14 NOTE — PROGRESS NOTES
"    Assessment & Plan     Acute pain of right shoulder  Consistent with overuse type injury- bursitis vs. Rotator cuff. Recommend course of Physical Therapy- patient has Voltaren, Tizanidine to use for pain control.  - [facility]; Future    Cervicalgia  Muscle tightness also likely related to overuse, as above.  - [facility]; Future    Prescription drug management           Return in about 2 months (around 7/20/2021) for Physical.    JENAE Sibley CNP  [facility] DONI SOLIS is a 40 year old who presents for the following health issues     HPI     Concern - Shoulder Pain    Onset: 1 month  Description: Bilateral Shoulder Pain  Intensity: moderate  Progression of Symptoms:  worsening  Accompanying Signs & Symptoms: Radiates into neck and numbness in right fingers  Previous history of similar problem: none  Precipitating factors:        Worsened by: none  Alleviating factors:        Improved by: none  Therapies tried and outcome: tennis balls, heat, ice, muscle rub    No recent injuries. Notes history of bursitis in right shoulder several years ago.    Review of Systems   Constitutional, HEENT, cardiovascular, pulmonary, gi and gu systems are negative, except as otherwise noted.      Objective    /60 (BP Location: Left arm, Patient Position: Chair, Cuff Size: Adult Large)   Pulse 98   Temp 97  F (36.1  C) (Oral)   Ht 1.676 m (5' 6\")   Wt 120.2 kg (265 lb)   SpO2 99%   BMI 42.77 kg/m    Body mass index is 42.77 kg/m .  Physical Exam   GENERAL: healthy, alert and no distress  MS: No bony tenderness of C-spine, tender right paracervical muscles along trapezius into shoulder, rhomboids. Mildly limited ROM of neck.  Right shoulder tender at rotator cuff. Pain limits flexion, extension, internal rotation. Negative Hawkin's, negative empty can  SKIN: no suspicious lesions or rashes  NEURO: Normal strength and tone, mentation intact and " speech normal    No results found for this or any previous visit (from the past 24 hour(s)).

## 2021-05-14 NOTE — TELEPHONE ENCOUNTER
From: IRMA Sexton      Created: 5/13/2021 10:39 AM        no i dont want it changed, mid march I had a virtual visit and was having a lot of pain, he at the time had also put me on Oxy at the time to see if it would help with the pain and it did.  but now I'm back to work and I'm using norco every day with my muscle relaxers and its just not helping much, so yes I do need that refill.. but I'm asking whatelse can we do with this bad pain flare up I have

## 2021-05-16 RX ORDER — RIZATRIPTAN BENZOATE 10 MG/1
10 TABLET ORAL
Qty: 10 TABLET | Refills: 0 | Status: SHIPPED | OUTPATIENT
Start: 2021-05-16 | End: 2022-07-08

## 2021-05-17 NOTE — TELEPHONE ENCOUNTER
Dr. Moss I think pt wants to change from norco back to oxycodone and wants to know other options. Read her notes.    Carli RN-BSN  [facility]

## 2021-05-18 NOTE — TELEPHONE ENCOUNTER
Call OB/GYN today about heavy periods  If heavy periods persist call office for CBC  Zofran 4 mg every 6 hours x3 days then as needed  [place] diet  Prilosec 20 mg daily for 2 weeks  If nausea persists call office       Order was confirmed faxed to [facility] @ [phone].    Called and left a message for Daniella. We have a script here and need to know which pharmacy she would prefer this to go to.    Please see list below of preferred pharmacy. Please confirm if this is the pharmacy she would prefer or if she would like another one. Please get the pharmacy name, address and phone number if it is not listed in the pharmacy drop down or below. Thank you.      [facility] [address] [place] [address] [address] AT [facility] [address] [facility]  [address]  [facility] [address]  Phone: [phone] Fax: [phone]

## 2021-05-20 ENCOUNTER — OFFICE VISIT (OUTPATIENT)
Dept: FAMILY MEDICINE | Facility: CLINIC | Age: 41
End: 2021-05-20
Payer: COMMERCIAL

## 2021-05-20 VITALS
TEMPERATURE: 97 F | OXYGEN SATURATION: 99 % | BODY MASS INDEX: 42.59 KG/M2 | DIASTOLIC BLOOD PRESSURE: 60 MMHG | SYSTOLIC BLOOD PRESSURE: 108 MMHG | WEIGHT: 265 LBS | HEIGHT: 66 IN | HEART RATE: 98 BPM

## 2021-05-20 DIAGNOSIS — M25.511 ACUTE PAIN OF RIGHT SHOULDER: Primary | ICD-10-CM

## 2021-05-20 DIAGNOSIS — M54.2 CERVICALGIA: ICD-10-CM

## 2021-05-20 PROCEDURE — 99213 OFFICE O/P EST LOW 20 MIN: CPT | Performed by: NURSE PRACTITIONER

## 2021-05-20 RX ORDER — OXYCODONE HYDROCHLORIDE 5 MG/1
2.5-5 TABLET ORAL DAILY
Qty: 30 TABLET | Refills: 0 | Status: SHIPPED | OUTPATIENT
Start: 2021-05-20 | End: 2021-07-09

## 2021-05-20 ASSESSMENT — MIFFLIN-ST. JEOR: SCORE: 1888.78

## 2021-05-20 ASSESSMENT — PATIENT HEALTH QUESTIONNAIRE - PHQ9: SUM OF ALL RESPONSES TO PHQ QUESTIONS 1-9: 6

## 2021-05-20 NOTE — TELEPHONE ENCOUNTER
Called pt and notified of [id number] - [place], [place]     Niesha Johnson MA

## 2021-05-25 ENCOUNTER — THERAPY VISIT (OUTPATIENT)
Dept: PHYSICAL THERAPY | Facility: CLINIC | Age: 41
End: 2021-05-25
Attending: NURSE PRACTITIONER
Payer: COMMERCIAL

## 2021-05-25 DIAGNOSIS — M25.511 RIGHT SHOULDER PAIN: ICD-10-CM

## 2021-05-25 DIAGNOSIS — M54.2 CERVICALGIA: ICD-10-CM

## 2021-05-25 DIAGNOSIS — M25.511 ACUTE PAIN OF RIGHT SHOULDER: ICD-10-CM

## 2021-05-25 PROCEDURE — 97161 PT EVAL LOW COMPLEX 20 MIN: CPT | Mod: GP | Performed by: PHYSICAL THERAPIST

## 2021-05-25 PROCEDURE — 97140 MANUAL THERAPY 1/> REGIONS: CPT | Mod: GP | Performed by: PHYSICAL THERAPIST

## 2021-05-25 PROCEDURE — 97110 THERAPEUTIC EXERCISES: CPT | Mod: GP | Performed by: PHYSICAL THERAPIST

## 2021-05-25 NOTE — PROGRESS NOTES
Physical Therapy Initial Evaluation  Subjective:    Patient Health History  Daniella Sexton being seen for shoulder pain going into my neck and across my shoulders now.     Problem began: 5/1/2021.   Problem occurred: i thought i slept wrong on it   Pain is reported as 7/10 on pain scale.  General health as reported by patient is fair.  Pertinent medical history includes: asthma, mental illness, migraines/headaches, numbness/tingling, overweight, pain at night/rest and smoking.     Medical allergies: none.                                                  Objective:  System    Physical Exam    General     ROS     Physical Therapy Initial Examination/Evaluation May 25, 2021   Daniella Sexton is a 40 year old female referred to physical therapy by Renetta Morrow CNP  for treatment of Acute pain of R shoulder, Cervicalgia  with Precautions/Restrictions/MD instructions E&T    Therapist Assessment:   Clinical Impression: Pt presents to PT with primary complaint of neck and shoulder pain, with pain originally starting in R shoulder but now spreading into L shoulder.  Per clinical examination, pt with limited ROM in cervical spine and pain limiting ROM in UEs.  Motion and testing for evaluation today irritated pt's pain symptoms.  Pt will benefit from skilled physical therapy to improve ROM and postural strength as well as education on proper body mechanics for daily activities including her work.      Subjective: Patient thought she slept wrong and started having pain in R shoulder, traveling up her neck, and across to other shoulder. Every once in a while she will have pain down elbow to medial 3 fingers. Pt is intermittently getting some R ear pain.   DOI/onset: MD order: 5/20/2021   Mechanism of injury: no   DOS NA   Related PMH: NA Previous treatment: PT for low back   Imaging: NA   Chief Complaint: Neck and shoulder pain    Pain: rest 4 /10, activity 10/10  Described as: numbness in arm, constant shoulder/neck pain,  sharp on R side  Alleviated by: nothing Exacerbated by: Sleeping on it Progression of symptoms since initial onset: staying the same  Time of day when pain is worse: end of day   Sleeping: Can't sleep on R side; typically a side sleeper but L side is now worsening    Social history: 2 daughters, 4 year old grandson    Occupation:  at [employer] store and [employer]  Job duties: driving, prolonged standing   Current HEP/exercise regimen: stretching, elliptical bike a few days a week   Patient's goals are decrease pain    Other pertinent PMH: low back pain  General health as reported by patient: Fair    Return to MD: prn      Cervical Spine Evaluation    Posture  Forward Head: Present   Rounded Shoulders: present       Range of Motion  Flexion: WNL-pulling on the R   Extension: WNL-pulling on the R   Sidebend Left: 35 pulling on the R   Right: 43   Rotation Left: 70  Right: 52  Protraction: WNL  Retraction: stretch initially but then increased pain with repetition   Upper Extremity Screen: Pain initially increasing pain with any movement over shoulder height      Upper Extremity Strength  Shoulder MMT Flexion [facility] ER IR   Left 3/5 3/5 NT NT   Right 3/5 3/5 NT NT     Special Tests  Spurling's: difficult to assess due to irritability of symptoms  Reflexes: NT  Dermatomes: WNL    Palpation  Pain along upper trap and cervical paraspinals bilat     Assessment/Plan:  Patient is a 40 year old female with cervical and right side shoulder complaints.    Patient has the following significant findings with corresponding treatment plan.                Diagnosis 1:  Acute pain of R shoulder, cervicalgia   Pain -  hot/cold therapy, mechanical traction, manual therapy, self management, education, directional preference exercise and home program  Decreased ROM/flexibility - manual therapy, therapeutic exercise, therapeutic activity and home program  Decreased strength - therapeutic exercise, therapeutic activities and home  program  Impaired muscle performance - neuro re-education and home program  Decreased function - therapeutic activities and home program  Impaired posture - neuro re-education, therapeutic activities and home program    Therapy Evaluation Codes:   1) History comprised of:   Personal factors that impact the plan of care:      Past/current experiences and Time since onset of symptoms.    Comorbidity factors that impact the plan of care are:      Depression, Emphysema, Migraines/headaches, Numbness/tingling, Overweight, Pain at night/rest, Sleep disorder/apnea, Smoking and Anemia.     Medications impacting care: Anti-inflammatory, Muscle relaxant and Pain.  2) Examination of Body Systems comprised of:   Body structures and functions that impact the plan of care:      Cervical spine and Shoulder.   Activity limitations that impact the plan of care are:      Bathing, Cooking, Driving, Dressing, Lifting, Working, Sleeping and Laying down.  3) Clinical presentation characteristics are:   Stable/Uncomplicated.  4) Decision-Making    Low complexity using standardized patient assessment instrument and/or measureable assessment of functional outcome.  Cumulative Therapy Evaluation is: Low complexity.    Previous and current functional limitations:  (See Goal Flow Sheet for this information)    Short term and Long term goals: (See Goal Flow Sheet for this information)     Communication ability:  Patient appears to be able to clearly communicate and understand verbal and written communication and follow directions correctly.  Treatment Explanation - The following has been discussed with the patient:   RX ordered/plan of care  Anticipated outcomes  Possible risks and side effects  This patient would benefit from PT intervention to resume normal activities.   Rehab potential is good.    Frequency:  1 X week, once daily  Duration:  for 6 weeks  Discharge Plan:  Achieve all LTG.  Independent in home treatment program.  Reach maximal  therapeutic benefit.    Please refer to the daily flowsheet for treatment today, total treatment time and time spent performing 1:1 timed codes.

## 2021-05-26 ENCOUNTER — IMMUNIZATION (OUTPATIENT)
Dept: NURSING | Facility: CLINIC | Age: 41
End: 2021-05-26
Attending: FAMILY MEDICINE
Payer: COMMERCIAL

## 2021-05-26 PROCEDURE — 0002A PR COVID VAC PFIZER DIL RECON 30 MCG/0.3 ML IM: CPT

## 2021-05-26 PROCEDURE — 91300 PR COVID VAC PFIZER DIL RECON 30 MCG/0.3 ML IM: CPT

## 2021-06-03 ENCOUNTER — THERAPY VISIT (OUTPATIENT)
Dept: PHYSICAL THERAPY | Facility: CLINIC | Age: 41
End: 2021-06-03
Payer: COMMERCIAL

## 2021-06-03 DIAGNOSIS — M25.511 RIGHT SHOULDER PAIN: ICD-10-CM

## 2021-06-03 DIAGNOSIS — M54.2 CERVICALGIA: ICD-10-CM

## 2021-06-03 PROCEDURE — 97012 MECHANICAL TRACTION THERAPY: CPT | Mod: GP | Performed by: PHYSICAL THERAPIST

## 2021-06-03 PROCEDURE — 97110 THERAPEUTIC EXERCISES: CPT | Mod: GP | Performed by: PHYSICAL THERAPIST

## 2021-06-04 DIAGNOSIS — M47.816 SPONDYLOSIS OF LUMBAR REGION WITHOUT MYELOPATHY OR RADICULOPATHY: ICD-10-CM

## 2021-06-04 RX ORDER — DICLOFENAC SODIUM 75 MG/1
75 TABLET, DELAYED RELEASE ORAL 2 TIMES DAILY
Qty: 60 TABLET | Refills: 0 | Status: SHIPPED | OUTPATIENT
Start: 2021-06-04 | End: 2021-07-08

## 2021-06-04 NOTE — TELEPHONE ENCOUNTER
Received fax request from [facility] pharmacy requesting refill(s) for diclofenac (VOLTAREN) 75 MG EC tablet    Last refilled on 03/30/21    Pt last seen on 03/29/21  Next appt scheduled for : none    Will facilitate refill.

## 2021-06-17 ENCOUNTER — THERAPY VISIT (OUTPATIENT)
Dept: PHYSICAL THERAPY | Facility: CLINIC | Age: 41
End: 2021-06-17
Payer: COMMERCIAL

## 2021-06-17 DIAGNOSIS — M25.511 RIGHT SHOULDER PAIN: ICD-10-CM

## 2021-06-17 DIAGNOSIS — M54.2 CERVICALGIA: ICD-10-CM

## 2021-06-17 PROCEDURE — 97110 THERAPEUTIC EXERCISES: CPT | Mod: GP | Performed by: PHYSICAL THERAPIST

## 2021-06-17 NOTE — PROGRESS NOTES
Subjective:  HPI  Physical Exam                    Objective:  System    Physical Exam    General     ROS      DISCHARGE REPORT    Progress reporting period is from 5/25/2021 to 6/17/2021.       SUBJECTIVE  Subjective: Patient reports neck and shoulder are doing very well. Exercises have been helpful as well as doing manual home traction with her roommate. She does not have any pain in her neck or shoulder. 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     Current Pain level: 0/10.      Initial Pain level: 10/10.   Changes in function:  Yes (See Goal flowsheet attached for changes in current functional level)  Adverse reaction to treatment or activity: None    OBJECTIVE  Changes noted in objective findings:  Yes, full ROM in neck and R shoulder  Objective: Pt with full ROM in neck and shoulder. Pt has met all PT goals and ready for discharge and continue home program independently     ASSESSMENT/PLAN    STG/LTGs have been met or progress has been made towards goals:  Yes (See Goal flow sheet completed today.)  Assessment of Progress: The patient's condition is improving.  Self Management Plans:  Patient has been instructed in a home treatment program.  Daniella continues to require the following intervention to meet STG and LTG's:  PT intervention is no longer required to meet STG/LTG.    Recommendations:  This patient is ready to be discharged from therapy and continue their home treatment program.    Please refer to the daily flowsheet for treatment today, total treatment time and time spent performing 1:1 timed codes.

## 2021-06-17 NOTE — LETTER
[facility]  [address]  [address]  [place] [address]  [phone]    2021    Re: Daniella Sexton   :   1980  MRN:  [id number]   REFERRING PHYSICIAN:   Renetta Morrow APRN, CNP    [facility]  Date of Initial Evaluation:  2021  Visits:  Rxs Used: 3  Reason for Referral:     Cervicalgia  Right shoulder pain    EVALUATION SUMMARY    DISCHARGE REPORT  Progress reporting period is from 2021 to 2021.       SUBJECTIVE  Subjective: Patient reports neck and shoulder are doing very well. Exercises have been helpful as well as doing manual home traction with her roommate. She does not have any pain in her neck or shoulder. 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     Current Pain level: 0/10.      Initial Pain level: 10/10.   Changes in function:  Yes (See Goal flowsheet attached for changes in current functional level)  Adverse reaction to treatment or activity: None    OBJECTIVE  Changes noted in objective findings:  Yes, full ROM in neck and R shoulder  Objective: Pt with full ROM in neck and shoulder. Pt has met all PT goals and ready for discharge and continue home program independently     ASSESSMENT/PLAN    STG/LTGs have been met or progress has been made towards goals:  Yes (See Goal flow sheet completed today.)  Assessment of Progress: The patient's condition is improving.  Self Management Plans:  Patient has been instructed in a home treatment program.  Daniella continues to require the following intervention to meet STG and LTG's:  PT intervention is no longer required to meet STG/LTG.    Recommendations:  This patient is ready to be discharged from therapy and continue their home treatment program.  Re: Daniella Sexton   :   1980    Please refer to the  daily flowsheet for treatment today, total treatment time and time spent performing 1:1 timed codes.        Thank you for your referral.    INQUIRIES  Therapist: Kelsey Gutierrez PT   [address] [address]  Phone: [phone]  Fax: [phone]

## 2021-07-08 DIAGNOSIS — M54.42 CHRONIC BILATERAL LOW BACK PAIN WITH BILATERAL SCIATICA: ICD-10-CM

## 2021-07-08 DIAGNOSIS — M54.41 CHRONIC BILATERAL LOW BACK PAIN WITH BILATERAL SCIATICA: ICD-10-CM

## 2021-07-08 DIAGNOSIS — M47.816 SPONDYLOSIS OF LUMBAR REGION WITHOUT MYELOPATHY OR RADICULOPATHY: ICD-10-CM

## 2021-07-08 DIAGNOSIS — G89.29 CHRONIC BILATERAL LOW BACK PAIN WITH BILATERAL SCIATICA: ICD-10-CM

## 2021-07-08 RX ORDER — GABAPENTIN 300 MG/1
900 CAPSULE ORAL 3 TIMES DAILY
Qty: 270 CAPSULE | Refills: 1 | Status: SHIPPED | OUTPATIENT
Start: 2021-07-08 | End: 2021-11-03

## 2021-07-08 RX ORDER — DICLOFENAC SODIUM 75 MG/1
75 TABLET, DELAYED RELEASE ORAL 2 TIMES DAILY
Qty: 60 TABLET | Refills: 0 | Status: SHIPPED | OUTPATIENT
Start: 2021-07-08 | End: 2021-09-16

## 2021-07-08 NOTE — TELEPHONE ENCOUNTER
Received fax request from [facility] pharmacy requesting refill(s) for diclofenac (VOLTAREN) 75 MG EC tablet and gabapentin (NEURONTIN) 300 MG capsule    Last refilled on 06/10/21-Diclofenac          06/03/21-Gabapentin    Pt last seen on 03/29/21  Next appt scheduled for : none    Will facilitate refill.

## 2021-07-09 ENCOUNTER — MYC REFILL (OUTPATIENT)
Dept: PALLIATIVE MEDICINE | Facility: CLINIC | Age: 41
End: 2021-07-09

## 2021-07-09 DIAGNOSIS — M47.816 SPONDYLOSIS OF LUMBAR REGION WITHOUT MYELOPATHY OR RADICULOPATHY: ICD-10-CM

## 2021-07-09 RX ORDER — OXYCODONE HYDROCHLORIDE 5 MG/1
2.5-5 TABLET ORAL DAILY
Qty: 30 TABLET | Refills: 0 | Status: SHIPPED | OUTPATIENT
Start: 2021-07-09 | End: 2021-11-29

## 2021-07-09 NOTE — TELEPHONE ENCOUNTER
Received call from patient requesting refill(s) of     oxyCODONE (ROXICODONE) 5 MG tablet     Last dispensed from pharmacy on 5/20/21    Patient's last office/virtual visit by prescribing provider on 3/29/21  Next office/virtual appointment scheduled for none    Last urine drug screen date 1/15/21  Current opioid agreement on file (completed within the last year) Yes Date of opioid agreement: 3/10/21    E-prescribe to [id number] [place], [place]  pharmacy    Will route to nursing [place] for review and preparation of prescription(s).

## 2021-07-09 NOTE — TELEPHONE ENCOUNTER
Refills have been requested for the following medications:         oxyCODONE (ROXICODONE) 5 MG tablet [Colby Moss MD]     Preferred pharmacy: [facility] #[id number] [address] AT [address]

## 2021-07-09 NOTE — TELEPHONE ENCOUNTER
Medication refill information reviewed.     Due date for oxyCODONE (ROXICODONE) 5 MG tablet  is 7/9/21     Prescriptions prepped for review.     Will route to provider.     Dany Shukla RN  Patient Care Supervisor   [facility]

## 2021-07-12 ENCOUNTER — MYC REFILL (OUTPATIENT)
Dept: PALLIATIVE MEDICINE | Facility: CLINIC | Age: 41
End: 2021-07-12

## 2021-07-12 DIAGNOSIS — M47.816 SPONDYLOSIS OF LUMBAR REGION WITHOUT MYELOPATHY OR RADICULOPATHY: ICD-10-CM

## 2021-07-12 RX ORDER — OXYCODONE HYDROCHLORIDE 5 MG/1
2.5-5 TABLET ORAL DAILY
Qty: 30 TABLET | Refills: 0 | Status: CANCELLED | OUTPATIENT
Start: 2021-07-12

## 2021-07-12 NOTE — TELEPHONE ENCOUNTER
Refills have been requested for the following medications:         oxyCODONE (ROXICODONE) 5 MG tablet [Colby Moss MD]       Patient Comment: the state insurance keeps fighting and not wanting to pay for the pain pills, also they are only giving my 7 at a time can someone call them and talk to them please, having a problem with all my meds     Preferred pharmacy: [id number] - [facility] [address] AT [address]

## 2021-07-12 NOTE — TELEPHONE ENCOUNTER
Spoke to pharmacy. Were able to run prescription for 30 days and have filled it. MyChart message sent to patient, along with voicemail asking her to check her MyChart.

## 2021-07-13 ENCOUNTER — TELEPHONE (OUTPATIENT)
Dept: FAMILY MEDICINE | Facility: CLINIC | Age: 41
End: 2021-07-13

## 2021-07-13 DIAGNOSIS — J45.20 MILD INTERMITTENT ASTHMA WITHOUT COMPLICATION: Primary | ICD-10-CM

## 2021-07-13 RX ORDER — FLUTICASONE PROPIONATE 110 UG/1
1 AEROSOL, METERED RESPIRATORY (INHALATION) 2 TIMES DAILY
Qty: 12 G | Refills: 3 | Status: SHIPPED | OUTPATIENT
Start: 2021-07-13 | End: 2022-07-08

## 2021-07-13 NOTE — TELEPHONE ENCOUNTER
Central Prior Authorization Team   Phone: [phone]    PA Initiation    Medication: qvar redihaler 80 mcg  Insurance Company: [employer] - Phone [phone] Fax [phone]  Pharmacy Filling the Rx: [facility] #[id number] - [place] [address] AT [facility] [address]  Filling Pharmacy Phone: [phone]  Filling Pharmacy Fax:    Start Date: 7/13/2021    Called Pravin at [employer].  He states a PA cannot be completed unless the patient has tried/failed 3 of the formulary alternatives.

## 2021-07-13 NOTE — TELEPHONE ENCOUNTER
PRIOR AUTHORIZATION DENIED    Medication: qvar redihaler 80 mcg    Denial Date: 7/13/2021    Denial Rational: Patient must have a history of trial & failure to at least 3 the formulary alternative(s) or have a contraindication or intolerance to the formulary alternatives:        Appeal Information: N/A

## 2021-08-19 NOTE — TELEPHONE ENCOUNTER
7/24/19 Procedure performed: bilateral L4/5 and L5/S1 facet joint injections.    Outreach X1. Left a  requesting call back. Provided call back number.    LEORA MalinN, RN  Care Coordinator  [facility]        
Below information was provided in a VM also asked that she call back with any further questions and provided call back number.    LEORA MalinN, RN  Care Coordinator  [facility]    
Patient left  8/2 at 4:38 pm    Calling about injections she had last week. She was put on hold and then sent to a , please call back          Renee ESCOBAR    [facility]    
Received call from patient who states that she feels her injection wore off. She states that the pain is back and she is having numbness and tingling in her legs. Did advise patient that it can take 2 weeks for the steroid to take full effect. Patient had injection on 7/24. Patient would like a call back. Phone #[phone] -- best to reach after 4:00PM today due to being at work.         Dori Prince    [facility]    
She was sent by primary, but there are covering providers.  Option would be trying another facet joint injection or starting process of medial branch block/RFA. Please explain that this includes numbing just the skin and the nerve, and not the track of the needle- which may be challenging for her.     Either way, we would need an order from a covering provider.    Brigette Sparrow MD  [facility]   
Spoke to patient and she reports that she had 10 pain free days after her bilateral L4/5 and L5/S1 facet joint injections on 7/24/19 and then the pain came back in full. She is emotional on the phone and is wondering what else she can do. She reports the ordering provider is now on maternity leave. Will route to Dr Sparrow to review patients request for options. She is aware Dr. Sparrow will return to clinic next week.    LEORA MalinN, RN  Care Coordinator  [facility]    
Yes

## 2021-09-15 ENCOUNTER — E-VISIT (OUTPATIENT)
Dept: URGENT CARE | Facility: URGENT CARE | Age: 41
End: 2021-09-15
Payer: COMMERCIAL

## 2021-09-15 ENCOUNTER — OFFICE VISIT (OUTPATIENT)
Dept: URGENT CARE | Facility: URGENT CARE | Age: 41
End: 2021-09-15
Payer: COMMERCIAL

## 2021-09-15 VITALS
BODY MASS INDEX: 42.93 KG/M2 | SYSTOLIC BLOOD PRESSURE: 117 MMHG | WEIGHT: 266 LBS | OXYGEN SATURATION: 99 % | TEMPERATURE: 98.3 F | HEART RATE: 79 BPM | DIASTOLIC BLOOD PRESSURE: 81 MMHG | RESPIRATION RATE: 20 BRPM

## 2021-09-15 DIAGNOSIS — J45.20 MILD INTERMITTENT ASTHMA WITHOUT COMPLICATION: ICD-10-CM

## 2021-09-15 DIAGNOSIS — Z20.822 PERSON UNDER INVESTIGATION FOR COVID-19: Primary | ICD-10-CM

## 2021-09-15 DIAGNOSIS — J06.9 VIRAL UPPER RESPIRATORY TRACT INFECTION WITH COUGH: Primary | ICD-10-CM

## 2021-09-15 DIAGNOSIS — R51.9 NONINTRACTABLE HEADACHE, UNSPECIFIED CHRONICITY PATTERN, UNSPECIFIED HEADACHE TYPE: ICD-10-CM

## 2021-09-15 DIAGNOSIS — Z20.822 SUSPECTED COVID-19 VIRUS INFECTION: ICD-10-CM

## 2021-09-15 LAB
DEPRECATED S PYO AG THROAT QL EIA: NEGATIVE
GROUP A STREP BY PCR: NOT DETECTED

## 2021-09-15 PROCEDURE — U0003 INFECTIOUS AGENT DETECTION BY NUCLEIC ACID (DNA OR RNA); SEVERE ACUTE RESPIRATORY SYNDROME CORONAVIRUS 2 (SARS-COV-2) (CORONAVIRUS DISEASE [COVID-19]), AMPLIFIED PROBE TECHNIQUE, MAKING USE OF HIGH THROUGHPUT TECHNOLOGIES AS DESCRIBED BY CMS-2020-01-R: HCPCS | Performed by: PHYSICIAN ASSISTANT

## 2021-09-15 PROCEDURE — 99213 OFFICE O/P EST LOW 20 MIN: CPT | Performed by: PHYSICIAN ASSISTANT

## 2021-09-15 PROCEDURE — 87651 STREP A DNA AMP PROBE: CPT | Performed by: PHYSICIAN ASSISTANT

## 2021-09-15 PROCEDURE — U0005 INFEC AGEN DETEC AMPLI PROBE: HCPCS | Performed by: PHYSICIAN ASSISTANT

## 2021-09-15 PROCEDURE — 99421 OL DIG E/M SVC 5-10 MIN: CPT | Performed by: PHYSICIAN ASSISTANT

## 2021-09-15 ASSESSMENT — ENCOUNTER SYMPTOMS
RHINORRHEA: 0
CARDIOVASCULAR NEGATIVE: 1
NECK PAIN: 0
CHILLS: 0
VOMITING: 0
ALLERGIC/IMMUNOLOGIC NEGATIVE: 1
SHORTNESS OF BREATH: 0
COUGH: 1
HEADACHES: 1
WEAKNESS: 0
MUSCULOSKELETAL NEGATIVE: 1
DIZZINESS: 0
DIARRHEA: 0
FEVER: 0
LIGHT-HEADEDNESS: 0
NAUSEA: 0
NECK STIFFNESS: 0
ARTHRALGIAS: 0
SORE THROAT: 1
MYALGIAS: 0
PALPITATIONS: 0
EYES NEGATIVE: 1
BACK PAIN: 0
WOUND: 0
JOINT SWELLING: 0
ENDOCRINE NEGATIVE: 1

## 2021-09-15 NOTE — PROGRESS NOTES
Chief Complaint:     Chief Complaint   Patient presents with     Pharyngitis     sore throat, headaches and coughing x 4 days.        Results for orders placed or performed in visit on 09/15/21   Streptococcus A Rapid Screen w/Reflex to PCR - Clinic Collect     Status: Normal    Specimen: Throat; Swab   Result Value Ref Range    Group A Strep antigen Negative Negative       Medical Decision Making:    Vital signs reviewed by Arjun Benson PA-C  /81 (BP Location: Right arm, Patient Position: Sitting, Cuff Size: Adult Large)   Pulse 79   Temp 98.3  F (36.8  C) (Oral)   Resp 20   Wt 120.7 kg (266 lb)   SpO2 99%   BMI 42.93 kg/m      Differential Diagnosis:  URI Adult/Peds:  Bronchitis-viral, Influenza, Pneumonia, Sinusitis, Strep pharyngitis, Tonsilitis, Viral pharyngitis, Viral syndrome and Viral upper respiratory illness        ASSESSMENT    1. Viral upper respiratory tract infection with cough    2. Suspected COVID-19 virus infection    3. Mild intermittent asthma without complication        PLAN    Patient is in no acute distress.    Temp is 98.3 in clinic today, lung sounds were clear, and O2 sats at 99% on RA.    Asthma is controlled at this time.  Patient declined refill of asthma medications tonight.  RST was negative.  We will call with PCR results only if positive.  COVID swab collected in clinic today.  Rest, Push fluids, vaporizer, elevation of head of bed.  Ibuprofen and or Tylenol for any fever or body aches.  Over the counter cough suppressant- PRN- as discussed.   If symptoms worsen, recheck immediately otherwise follow up with your PCP in 1 week if symptoms are not improving.  Worrisome symptoms discussed with instructions to go to the ED.  Patient given COVID isolation instructions.  Patient verbalized understanding and agreed with this plan.    Labs:    Results for orders placed or performed in visit on 09/15/21   Streptococcus A Rapid Screen w/Reflex to PCR - Clinic Collect      Status: Normal    Specimen: Throat; Swab   Result Value Ref Range    Group A Strep antigen Negative Negative        Vital signs reviewed by Arjun Benson PA-C  /81 (BP Location: Right arm, Patient Position: Sitting, Cuff Size: Adult Large)   Pulse 79   Temp 98.3  F (36.8  C) (Oral)   Resp 20   Wt 120.7 kg (266 lb)   SpO2 99%   BMI 42.93 kg/m      Current Meds      Current Outpatient Medications:      albuterol (PROAIR HFA/PROVENTIL HFA/VENTOLIN HFA) 108 (90 Base) MCG/ACT inhaler, Inhale 2 puffs into the lungs every 6 hours, Disp: 8.5 g, Rfl: 2     amitriptyline (ELAVIL) 50 MG tablet, Take 1 tablet (50 mg) by mouth At Bedtime, Disp: 30 tablet, Rfl: 1     beclomethasone HFA (QVAR REDIHALER) 80 MCG/ACT inhaler, Inhale 1 puff into the lungs 2 times daily, Disp: 10.6 g, Rfl: 2     diclofenac (VOLTAREN) 75 MG EC tablet, Take 1 tablet (75 mg) by mouth 2 times daily, Disp: 60 tablet, Rfl: 0     fluticasone (FLOVENT HFA) 110 MCG/ACT inhaler, Inhale 1 puff into the lungs 2 times daily, Disp: 12 g, Rfl: 3     gabapentin (NEURONTIN) 300 MG capsule, Take 3 capsules (900 mg) by mouth 3 times daily, Disp: 270 capsule, Rfl: 1     ipratropium - albuterol 0.5 mg/2.5 mg/3 mL (DUONEB) 0.5-2.5 (3) MG/3ML neb solution, Take 1 vial (3 mLs) by nebulization every 6 hours as needed for shortness of breath / dyspnea or wheezing, Disp: 1 Box, Rfl: 3     nicotine (NICORETTE) 2 MG gum, Place 1 each (2 mg) inside cheek as needed for smoking cessation, Disp: 50 each, Rfl: 11     oxyCODONE (ROXICODONE) 5 MG tablet, Take 0.5-1 tablets (2.5-5 mg) by mouth daily, Disp: 30 tablet, Rfl: 0     rizatriptan (MAXALT) 10 MG tablet, Take 1 tablet (10 mg) by mouth at onset of headache for migraine, Disp: 10 tablet, Rfl: 0     tiZANidine (ZANAFLEX) 4 MG tablet, Take 1 tablet (4 mg) by mouth 2 times daily as needed for muscle spasms, Disp: 60 tablet, Rfl: 3      Respiratory History    occasional episodes of bronchitis      SUBJECTIVE    HPI:  Daniella Sexton is an 41 year old female who presents with chest congestion, cough nonproductive, occasional, headache and sore throat.  Symptoms began 4  days ago and has unchanged.  There is no shortness of breath, wheezing and chest pain.  Patient is eating and drinking well.  No fever, nausea, vomiting, or diarrhea.    Patient denies any recent travel or exposure to known COVID positive tested individual.      ROS:     Review of Systems   Constitutional: Negative for chills and fever.   HENT: Positive for congestion and sore throat. Negative for ear pain and rhinorrhea.    Eyes: Negative.    Respiratory: Positive for cough. Negative for shortness of breath.    Cardiovascular: Negative.  Negative for chest pain and palpitations.   Gastrointestinal: Negative for diarrhea, nausea and vomiting.   Endocrine: Negative.    Genitourinary: Negative.    Musculoskeletal: Negative.  Negative for arthralgias, back pain, joint swelling, myalgias, neck pain and neck stiffness.   Skin: Negative.  Negative for rash and wound.   Allergic/Immunologic: Negative.  Negative for immunocompromised state.   Neurological: Positive for headaches. Negative for dizziness, weakness and light-headedness.         Family History   Family History   Problem Relation Age of Onset     Heart Disease No family hx of      Diabetes No family hx of      Cancer No family hx of         Problem history  Patient Active Problem List   Diagnosis     Anxiety     Depressive disorder     Duodenal ulcer     History of alcohol abuse     History of methamphetamine abuse (H)     Migraines     Seasonal allergic rhinitis     Sleep disturbance     Tobacco abuse     CARDIOVASCULAR SCREENING; LDL GOAL LESS THAN 160     Chronic bilateral low back pain with bilateral sciatica     Pure hypercholesterolemia     Mild intermittent asthma without complication     Chronic pansinusitis     Obesity (BMI 35.0-39.9) with comorbidity (H)     Lumbago     Fatty liver     Lung nodule         Allergies  Allergies   Allergen Reactions     Amoxicillin Anaphylaxis     Morphine Other (See Comments) and Nausea and Vomiting     Penicillins Unknown        Social History  Social History     Socioeconomic History     Marital status:      Spouse name: Not on file     Number of children: Not on file     Years of education: Not on file     Highest education level: Not on file   Occupational History     Not on file   Tobacco Use     Smoking status: Current Every Day Smoker     Packs/day: 0.50     Types: Cigarettes     Smokeless tobacco: Never Used     Tobacco comment: 5 cigarettes/day   Substance and Sexual Activity     Alcohol use: Yes     Comment: 3-4 beers a night      Drug use: No     Sexual activity: Yes   Other Topics Concern     Parent/sibling w/ CABG, MI or angioplasty before 65F 55M? Not Asked   Social History Narrative     Not on file     Social Determinants of Health     Financial Resource Strain:      Difficulty of Paying Living Expenses:    Food Insecurity:      Worried About Running Out of Food in the Last Year:      Ran Out of Food in the Last Year:    Transportation Needs:      Lack of Transportation (Medical):      Lack of Transportation (Non-Medical):    Physical Activity:      Days of Exercise per Week:      Minutes of Exercise per Session:    Stress:      Feeling of Stress :    Social Connections:      Frequency of Communication with Friends and Family:      Frequency of Social Gatherings with Friends and Family:      Attends [employer] Services:      Active Member of Clubs or Organizations:      Attends Club or Organization Meetings:      Marital Status:    Intimate Partner Violence:      Fear of Current or Ex-Partner:      Emotionally Abused:      Physically Abused:      Sexually Abused:         OBJECTIVE     Vital signs reviewed by Arjun Benson PA-C  /81 (BP Location: Right arm, Patient Position: Sitting, Cuff Size: Adult Large)   Pulse 79   Temp 98.3  F (36.8  C)  (Oral)   Resp 20   Wt 120.7 kg (266 lb)   SpO2 99%   BMI 42.93 kg/m       Physical Exam  Vitals and nursing note reviewed.   Constitutional:       General: She is not in acute distress.     Appearance: She is well-developed. She is not ill-appearing, toxic-appearing or diaphoretic.   HENT:      Head: Normocephalic and atraumatic.      Right Ear: Hearing, tympanic membrane, ear canal and external ear normal. Tympanic membrane is not perforated, erythematous, retracted or bulging.      Left Ear: Hearing, tympanic membrane, ear canal and external ear normal. Tympanic membrane is not perforated, erythematous, retracted or bulging.      Nose: Congestion present. No mucosal edema or rhinorrhea.      Right Sinus: No maxillary sinus tenderness or frontal sinus tenderness.      Left Sinus: No maxillary sinus tenderness or frontal sinus tenderness.      Mouth/Throat:      Pharynx: Posterior oropharyngeal erythema present. No pharyngeal swelling, oropharyngeal exudate or uvula swelling.      Tonsils: No tonsillar exudate or tonsillar abscesses. 0 on the right. 0 on the left.   Eyes:      General:         Right eye: No discharge.         Left eye: No discharge.      Pupils: Pupils are equal, round, and reactive to light.   Cardiovascular:      Rate and Rhythm: Normal rate and regular rhythm.      Heart sounds: Normal heart sounds. No murmur heard.   No friction rub. No gallop.    Pulmonary:      Effort: Pulmonary effort is normal. No respiratory distress.      Breath sounds: Normal breath sounds. No decreased breath sounds, wheezing, rhonchi or rales.   Chest:      Chest wall: No tenderness.   Abdominal:      General: Bowel sounds are normal. There is no distension.      Palpations: Abdomen is soft. There is no mass.      Tenderness: There is no abdominal tenderness. There is no guarding.   Musculoskeletal:      Cervical back: Normal range of motion and neck supple.   Lymphadenopathy:      Head:      Right side of head: No  submental, submandibular, tonsillar, preauricular or posterior auricular adenopathy.      Left side of head: No submental, submandibular, tonsillar, preauricular or posterior auricular adenopathy.      Cervical: No cervical adenopathy.      Right cervical: No superficial or posterior cervical adenopathy.     Left cervical: No superficial or posterior cervical adenopathy.   Skin:     General: Skin is warm and dry.      Findings: No rash.   Neurological:      Mental Status: She is alert and oriented to person, place, and time.      Cranial Nerves: No cranial nerve deficit.      Deep Tendon Reflexes: Reflexes are normal and symmetric.   Psychiatric:         Behavior: Behavior normal. Behavior is cooperative.         Thought Content: Thought content normal.         Judgment: Judgment normal.           Arjun Benson PA-C  9/15/2021, 5:10 PM

## 2021-09-15 NOTE — PATIENT INSTRUCTIONS
Dear Daniella Sexton,    We are sorry you are not feeling well. Based on the responses you provided - Severe headache, it is recommended that you be seen in-person in urgent care or ER so we can better evaluate your symptoms. Please click here to find the nearest urgent care location to you.    Thank you for trusting us with your care.    Dominik Hernandez PA-C

## 2021-09-15 NOTE — PATIENT INSTRUCTIONS
"Discharge Instructions for COVID-19 Patients  You have--or may have--COVID-19. Please follow the instructions listed below.   If you have a weakened immune system, discuss with your doctor any other actions you need to take.  How can I protect others?  If you have symptoms (fever, cough, body aches or trouble breathing):    Stay home and away from others (self-isolate) until:  ? Your other symptoms have resolved (gotten better). And   ? You've had no fever--and no medicine that reduces fever--for 1 full day (24 hours). And   ? At least 10 days have passed since your symptoms started. (You may need to wait 20 days. Follow the advice of your care team.)  If you don't show symptoms, but testing showed that you have COVID-19:    Stay home and away from others (self-isolate) until at least 10 days have passed since the date of your first positive COVID-19 test.  During this time    Stay in your own room, even for meals. Use your own bathroom if you can.    Stay away from others in your home. No hugging, kissing or shaking hands. No visitors.    Don't go to work, school or anywhere else.    Clean \"high touch\" surfaces often (doorknobs, counters, handles). Use household cleaning spray or wipes.    You'll find a full list of  on the EPA website: www.epa.gov/pesticide-registration/list-n-disinfectants-use-against-sars-cov-2.    Cover your mouth and nose with a mask or other face covering to avoid spreading germs.    Wash your hands and face often. Use soap and water.    Caregivers in these groups are at risk for severe illness due to COVID-19:  ? People 65 years and older  ? People who live in a nursing home or long-term care facility  ? People with chronic disease (lung, heart, cancer, diabetes, kidney, liver, immunologic)  ? People who have a weakened immune system, including those who:    Are in cancer treatment    Take medicine that weakens the immune system, such as corticosteroids    Had a bone marrow or organ " transplant    Have an immune deficiency    Have poorly controlled HIV or AIDS    Are obese (body mass index of 40 or higher)    Smoke regularly    Caregivers should wear gloves while washing dishes, handling laundry and cleaning bedrooms and bathrooms.    Use caution when washing and drying laundry: Don't shake dirty laundry and use the warmest water setting that you can.    For more tips on managing your health at home, go to www.cdc.gov/coronavirus/2019-ncov/downloads/10Things.pdf.  How can I take care of myself at home?  1. Get lots of rest. Drink extra fluids (unless a doctor has told you not to).  2. Take Tylenol (acetaminophen) for fever or pain. If you have liver or kidney problems, ask your family doctor if it's okay to take Tylenol.   Adults can take either:   ? 650 mg (two 325 mg pills) every 4 to 6 hours, or   ? 1,000 mg (two 500 mg pills) every 8 hours as needed.  ? Note: Don't take more than 3,000 mg in one day. Acetaminophen is found in many medicines (both prescribed and over-the-counter medicines). Read all labels to be sure you don't take too much.   For children, check the Tylenol bottle for the right dose. The dose is based on the child's age or weight.  3. If you have other health problems (like cancer, heart failure, an organ transplant or severe kidney disease): Call your specialty clinic if you don't feel better in the next 2 days.  4. Know when to call 911. Emergency warning signs include:  ? Trouble breathing or shortness of breath  ? Pain or pressure in the chest that doesn't go away  ? Feeling confused like you haven't felt before, or not being able to wake up  ? Bluish-colored lips or face  5. Your doctor may have prescribed a blood thinner medicine. Follow their instructions.  Where can I get more information?     [facility] [place] - About COVID-19:   [email or web address]    CDC - What to Do If You're Sick:  www.cdc.gov/coronavirus/2019-ncov/about/steps-when-sick.html    CDC - Ending Home Isolation: www.cdc.gov/coronavirus/2019-ncov/hcp/disposition-in-home-patients.html    CDC - Caring for Someone: www.cdc.gov/coronavirus/2019-ncov/if-you-are-sick/care-for-someone.html    [facility] - Interim Guidance for Hospital Discharge to Home: www.health.[facility].[place]/diseases/coronavirus/hcp/hospdischarge.pdf    Below are the COVID-19 hotlines at the [place] Department of Health ([facility]). Interpreters are available.  ? For health questions: Call 266-542-2128 or 1-646.983.4914 (7 a.m. to 7 p.m.)  ? For questions about schools and childcare: Call 578-804-1248 or 1-140.125.6267 (7 a.m. to 7 p.m.)    For informational purposes only. Not to replace the advice of your health care provider. Clinically reviewed by Dr. Catarino Noel.   Copyright   2020 [employer] [facility]. All rights reserved. [employer] [id number] - REV 01/05/21.      Patient Education     Viral Upper Respiratory Illness (Adult)    You have a viral upper respiratory illness (URI), which is another term for the common cold. This illness is contagious during the first few days. It is spread through the air by coughing and sneezing. It may also be spread by direct contact (touching the sick person and then touching your own eyes, nose, or mouth). Frequent handwashing will decrease risk of spread. Most viral illnesses go away within 7 to 10 days with rest and simple home remedies. Sometimes the illness may last for several weeks. Antibiotics will not kill a virus, and they are generally not prescribed for this condition.  Home care    If symptoms are severe, rest at home for the first 2 to 3 days. When you resume activity, don't let yourself get too tired.    Don't smoke. If you need help stopping, talk with your healthcare provider.    Avoid being exposed to cigarette smoke (yours or others ).    You may use acetaminophen or ibuprofen to control pain and fever, unless another  medicine was prescribed. If you have chronic liver or kidney disease, have ever had a stomach ulcer or gastrointestinal bleeding, or are taking blood-thinning medicines, talk with your healthcare provider before using these medicines. Aspirin should never be given to anyone under 18 years of age who is ill with a viral infection or fever. It may cause severe liver or brain damage.    Your appetite may be poor, so a light diet is fine. Stay well hydrated by drinking 6 to 8 glasses of fluids per day (water, soft drinks, juices, tea, or soup). Extra fluids will help loosen secretions in the nose and lungs.    Over-the-counter cold medicines will not shorten the length of time you re sick, but they may be helpful for the following symptoms: cough, sore throat, and nasal and sinus congestion. If you take prescription medicines, ask your healthcare provider or pharmacist which over-the-counter medicines are safe to use. (Note: Don't use decongestants if you have high blood pressure.)  Follow-up care  Follow up with your healthcare provider, or as advised.  When to seek medical advice  Call your healthcare provider right away if any of these occur:    Cough with lots of colored sputum (mucus)    Severe headache; face, neck, or ear pain    Difficulty swallowing due to throat pain    Fever of 100.4 F (38 C) or higher, or as directed by your healthcare provider  Call 911  Call 911 if any of these occur:    Chest pain, shortness of breath, wheezing, or difficulty breathing    Coughing up blood    Very severe pain with swallowing, especially if it goes along with a muffled voice   [employer] last reviewed this educational content on 6/1/2018 2000-2021 [employer]. All rights reserved. This information is not intended as a substitute for professional medical care. Always follow your healthcare professional's instructions.

## 2021-09-16 ENCOUNTER — MYC REFILL (OUTPATIENT)
Dept: PALLIATIVE MEDICINE | Facility: CLINIC | Age: 41
End: 2021-09-16

## 2021-09-16 DIAGNOSIS — M47.816 SPONDYLOSIS OF LUMBAR REGION WITHOUT MYELOPATHY OR RADICULOPATHY: ICD-10-CM

## 2021-09-16 LAB — SARS-COV-2 RNA RESP QL NAA+PROBE: NEGATIVE

## 2021-09-16 NOTE — TELEPHONE ENCOUNTER
Refills have been requested for the following medications:         diclofenac (VOLTAREN) 75 MG EC tablet [Colby Moss MD]     Preferred pharmacy: [id number] [address] AT [address]

## 2021-09-17 RX ORDER — DICLOFENAC SODIUM 75 MG/1
75 TABLET, DELAYED RELEASE ORAL 2 TIMES DAILY
Qty: 60 TABLET | Refills: 0 | Status: SHIPPED | OUTPATIENT
Start: 2021-09-17 | End: 2021-10-28

## 2021-09-17 NOTE — TELEPHONE ENCOUNTER
Received request from patient requesting refill(s) for diclofenac (VOLTAREN) 75 MG EC tablet    Last refilled on 07/09/21    Pt last seen on 03/29/21  Next appt scheduled for :none    Will facilitate refill.

## 2021-09-25 ENCOUNTER — HEALTH MAINTENANCE LETTER (OUTPATIENT)
Age: 41
End: 2021-09-25

## 2021-10-28 ENCOUNTER — MYC REFILL (OUTPATIENT)
Dept: PALLIATIVE MEDICINE | Facility: CLINIC | Age: 41
End: 2021-10-28

## 2021-10-28 ENCOUNTER — MYC MEDICAL ADVICE (OUTPATIENT)
Dept: PALLIATIVE MEDICINE | Facility: CLINIC | Age: 41
End: 2021-10-28

## 2021-10-28 DIAGNOSIS — M54.16 LUMBAR RADICULOPATHY: ICD-10-CM

## 2021-10-28 DIAGNOSIS — M54.42 CHRONIC BILATERAL LOW BACK PAIN WITH BILATERAL SCIATICA: ICD-10-CM

## 2021-10-28 DIAGNOSIS — M79.18 MYOFASCIAL MUSCLE PAIN: ICD-10-CM

## 2021-10-28 DIAGNOSIS — G89.29 CHRONIC BILATERAL LOW BACK PAIN WITH BILATERAL SCIATICA: ICD-10-CM

## 2021-10-28 DIAGNOSIS — M54.41 CHRONIC BILATERAL LOW BACK PAIN WITH BILATERAL SCIATICA: ICD-10-CM

## 2021-10-28 DIAGNOSIS — M47.816 SPONDYLOSIS OF LUMBAR REGION WITHOUT MYELOPATHY OR RADICULOPATHY: ICD-10-CM

## 2021-10-29 RX ORDER — DICLOFENAC SODIUM 75 MG/1
75 TABLET, DELAYED RELEASE ORAL 2 TIMES DAILY
Qty: 60 TABLET | Refills: 0 | Status: SHIPPED | OUTPATIENT
Start: 2021-10-29 | End: 2021-11-29

## 2021-10-29 NOTE — TELEPHONE ENCOUNTER
Pending Prescriptions:                       Disp   Refills    diclofenac (VOLTAREN) 75 MG EC tablet     60 tab*0            Sig: Take 1 tablet (75 mg) by mouth 2 times daily    Last refill date- 09/17/2021  Last office visit- 03/29/2021  Next office visit- MADHAVI Carranza MA  [facility] [place]

## 2021-10-29 NOTE — TELEPHONE ENCOUNTER
Refills have been requested for the following medications:         diclofenac (VOLTAREN) 75 MG EC tablet [Colby Moss MD]     Preferred pharmacy: [facility] #[id number] [facility] [address] AT [address]

## 2021-11-02 NOTE — TELEPHONE ENCOUNTER
Routed to the nursing pool and to the MA pool to process refill(s).  (pt needs an appointment before oxycodone can be prescribed.  Last visit: 3/2021)      Per patient [employer] message:  From: Daniella Sexton      Created: 11/1/2021 10:34 PM      i have put in the request for the other meds with the pharmacy and I keep getting a call saying that they are waiting for the dr approval since there is no refills, I haven't had my tizanidine for 2 months now, and I really need it, also I just took my last amitriptyline tonight 11/1/21.  please send in refills, thanks      _________________________________    Mychart sent to pt:  From: Carli Vargas RN      Created: 11/2/2021 3:42 PM      Al Brewer.  What pharmacy?     I sent this on to be processed.  We have not received anything from your pharmacy at all.   Dr. Moss will be able to fill these for you but an appointment is needed for future refills after that.  You can call to schedule: [phone]     Carli RN-BSN  [facility]

## 2021-11-02 NOTE — TELEPHONE ENCOUNTER
Waiting for patient to make follow up appointment before prescription is refilled. [employer] message sent.

## 2021-11-03 RX ORDER — GABAPENTIN 300 MG/1
900 CAPSULE ORAL 3 TIMES DAILY
Qty: 270 CAPSULE | Refills: 0 | Status: SHIPPED | OUTPATIENT
Start: 2021-11-03 | End: 2021-11-29

## 2021-11-03 RX ORDER — AMITRIPTYLINE HYDROCHLORIDE 50 MG/1
50 TABLET ORAL AT BEDTIME
Qty: 30 TABLET | Refills: 0 | Status: SHIPPED | OUTPATIENT
Start: 2021-11-03 | End: 2021-11-29

## 2021-11-03 NOTE — TELEPHONE ENCOUNTER
Received call from patient requesting refill(s) of amitriptyline (ELAVIL) 50 MG tablet, tiZANidine (ZANAFLEX) 4 MG tablet, and oxyCODONE (ROXICODONE) 5 MG tablet    Last dispensed from pharmacy on 05/07/21-amitriptyline           06/14/21-tizanidine            07/12/21-oxycodone     Patient's last office/virtual visit by prescribing provider on 03/29/21  Next office/virtual appointment scheduled for : none    Last urine drug screen date 01/15/21  Current opioid agreement on file (completed within the last year) Yes Date of opioid agreement: 03/02/21    E-prescribe to pharmacy-[facility] #[id number] - [place], [place] - [address] AT [facility]     Will route to nursing pool for review and preparation of prescription(s).

## 2021-11-03 NOTE — TELEPHONE ENCOUNTER
Nidia sent to pt:  From: Carli Vargas RN      Created: 11/3/2021 11:06 AM      Al Brewer,     We are unable to process the oxycodone refill request. Per [facility] policy you need to see provider every 3 months if prescribed an opioid medication. You have not seen him for a visit since 3/2021.  You can call to schedule: [phone]      I have sent on refills to Dr. Moss to review and sign for the tizanidine and the amitriptyline.  An appointment will be needed for future refills after this.     Thanks,     MCKAYLA Boyce-BSN  [facility]

## 2021-11-20 ENCOUNTER — HEALTH MAINTENANCE LETTER (OUTPATIENT)
Age: 41
End: 2021-11-20

## 2021-11-29 ENCOUNTER — VIRTUAL VISIT (OUTPATIENT)
Dept: PALLIATIVE MEDICINE | Facility: CLINIC | Age: 41
End: 2021-11-29
Payer: COMMERCIAL

## 2021-11-29 DIAGNOSIS — M54.41 CHRONIC BILATERAL LOW BACK PAIN WITH BILATERAL SCIATICA: ICD-10-CM

## 2021-11-29 DIAGNOSIS — M54.16 LUMBAR RADICULOPATHY: ICD-10-CM

## 2021-11-29 DIAGNOSIS — M47.816 SPONDYLOSIS OF LUMBAR REGION WITHOUT MYELOPATHY OR RADICULOPATHY: ICD-10-CM

## 2021-11-29 DIAGNOSIS — G89.29 CHRONIC BILATERAL LOW BACK PAIN WITH BILATERAL SCIATICA: ICD-10-CM

## 2021-11-29 DIAGNOSIS — M54.42 CHRONIC BILATERAL LOW BACK PAIN WITH BILATERAL SCIATICA: ICD-10-CM

## 2021-11-29 DIAGNOSIS — M79.18 MYOFASCIAL MUSCLE PAIN: ICD-10-CM

## 2021-11-29 PROCEDURE — 99214 OFFICE O/P EST MOD 30 MIN: CPT | Mod: 95 | Performed by: PAIN MEDICINE

## 2021-11-29 RX ORDER — DICLOFENAC SODIUM 75 MG/1
75 TABLET, DELAYED RELEASE ORAL 2 TIMES DAILY
Qty: 60 TABLET | Refills: 3 | Status: SHIPPED | OUTPATIENT
Start: 2021-11-29 | End: 2022-07-08

## 2021-11-29 RX ORDER — GABAPENTIN 300 MG/1
900 CAPSULE ORAL 3 TIMES DAILY
Qty: 270 CAPSULE | Refills: 3 | Status: SHIPPED | OUTPATIENT
Start: 2021-11-29 | End: 2022-12-28

## 2021-11-29 RX ORDER — HYDROCODONE BITARTRATE AND ACETAMINOPHEN 5; 325 MG/1; MG/1
.5-1 TABLET ORAL EVERY 12 HOURS PRN
Qty: 6 TABLET | Refills: 0 | Status: SHIPPED | OUTPATIENT
Start: 2021-11-29 | End: 2021-12-02

## 2021-11-29 RX ORDER — AMITRIPTYLINE HYDROCHLORIDE 50 MG/1
50 TABLET ORAL AT BEDTIME
Qty: 30 TABLET | Refills: 3 | Status: SHIPPED | OUTPATIENT
Start: 2021-11-29 | End: 2022-07-08

## 2021-11-29 ASSESSMENT — PAIN SCALES - GENERAL: PAINLEVEL: NO PAIN (1)

## 2021-11-29 NOTE — PROGRESS NOTES
Daniella is a 41 year old who is being evaluated via a billable video visit.      How would you like to obtain your AVS? MyChart  If the video visit is dropped, the invitation should be resent by: Text to cell phone: [phone]  Will anyone else be joining your video visit? No   Is Pt currently in [place]? Yes    Niesha Johnson MA      NOTE:  If Pt is not in [place], Appointment needs to be canceled and rescheduled.        Video Start Time: 930  Video-Visit Details    Type of service:  Video Visit    Video End Time:9:49 AM    Originating Location (pt. Location): Home    Distant Location (provider location):  [facility] [email or web address]     Platform used for Video Visit: [facility] follow-up  Chief Complaint:    Chief Complaint   Patient presents with     Pain     Video visit due to COVID-19      MME prescribed prior to seeing patient: 5 current MME:  Recommendations:    - Further procedures recommended:    -  none  - Medication Management: Will make one change at a time.    - continue Zanaflex  4 mg 3 times daily   - continue voltaren PO   - continue gabapentin to 900mg three times a day    -Continue Elavil 50mg at night. Will titrate as tolerated    - reasonable to continue 0.5-1 tablet of norco at day as needed for severe pain  - Physical Therapy: continue  pain PHYSICAL THERAPY    - Clinical Health Psychologist to address issues of relaxation, behavioral change, coping style, and other factors important to improvement: consider in the future   - Follow up:    -3 months   - discussed the importance of smoking cessation and its association with pain                    Interval:  Benefit with change to oxycodone   The pt reports cont benefit with procedure  The pain continues to be axial denies any radiation  The pt reports she is working 2 jobs right now  The pt reports moving to full time  The pt reports she doesn't have to to much lifting  The pt does note occasionally poping of  her back but does not hurt      The pt notes some benefit with heat.       The pt notes some benefit with ice.     The pt notes some benefit on current medical regimen.     She denies any recent falls.    She denies any overt progressive weakness.      She denies any incontinence.    She does note occasional numbness and tingling in her right upper ext specifically in her 2-4 digits  At that this time no real no shoulder or neck pain   The pt reports some benefit with pt  She does not some pain over the medial aspect of the elbow      Of oxycodone was intolerable for her  Felt like it was far to strong    Noticed sig benefit with current regimen     Overall the pt feel like she is making good progress   THE 4 A's OF OPIOID MAINTENANCE ANALGESIA    Analgesia: y    Activity: y    Adverse effects: n    Adherence to Rx protocol: y    [place] Board of Pharmacy Data Base Reviewed:    YES;     Pain history:  Daniella Sexton is a 39 year old female who first started having problems with pain chronic hx of prior lumbar surgery. The pt reports 2004 hurt herself on the job. At that itme she was only having axial LBP. She reports benefit with surgery.  The pt reports 4/19 she started having bilateral LBP radiating to her LE. She denies any specific inciting event. Although she was doing more work on the floor. The pt reports numbness and tingling in her feet. She denies any buring. The pain is constant in her back. The pain is sharp shooting. The pain a deep ache. The pain is squeezing in nature. The pain is worse with flexion. The pain is worse with prolonged standing. Pain is worse when going from a seated to standing position. The pt notes some benefit with sitting. Some benefit with leaning back. The pt reports some benefit with gabapentin. The pt currently takes 900, 600, and 900. The pt takes flexeril BID. The pt takes norco approx once a day to help her sleep. The pt denies any progressive weaknes. She denies any recent  falls. She denies any incontinene.    The pain sig affects her quality of life      She does smoke approx 5 cigs a day.  Pain rating: intensity  Averages 9/10 on a 0-10 scale.      Current treatments include:  Diclofenac Bid  zanaflex  bid  Gabapentin 900, 600, 900  -Cannabis  ElavilPM  Previous medication treatments included:  Robaxin-no benefit/flexeril      Other treatments have included:  Daniella Sexton has been seen at a pain clinic in the past.    PT: last PHYSICAL THERAPY  8/18  Chiropractic: benefit   Acupuncture: n  TENs Unit: benefit  Injections: S/p rfa 2/2/21  Facet 7/19 benefit 1 week no pain  Bilateral L4/5 /L5-S1 on 9/4 minimal relief   Epidural w/o imaging prior to surgery in 2004 no benefit  Past Medical History:  Past Medical History:   Diagnosis Date     Alcohol abuse      C. 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)      Past Surgical History:  Past Surgical History:   Procedure Laterality Date     BACK SURGERY  2005    L 3-4, L 4-5     LITHOTRIPSY  2016     OPTICAL TRACKING SYSTEM ENDOSCOPIC SINUS SURGERY Bilateral 1/10/2019    Procedure: BILATERAL IMAGE GUIDED ENDOSCOPIC SINUS SURGERY, BILATERAL TURBINATE REDUCTION;  Surgeon: Maikol Miguel MD;  Location: [facility] OR     RELEASE CARPAL TUNNEL Left      TONSILLECTOMY       TUBAL LIGATION       Medications:  Current Outpatient Medications   Medication Sig Dispense Refill     albuterol (PROAIR HFA/PROVENTIL HFA/VENTOLIN HFA) 108 (90 Base) MCG/ACT inhaler Inhale 2 puffs into the lungs every 6 hours 8.5 g 2     amitriptyline (ELAVIL) 50 MG tablet Take 1 tablet (50 mg) by mouth At Bedtime No further refills until she sees provider 30 tablet 3     beclomethasone HFA (QVAR REDIHALER) 80 MCG/ACT inhaler Inhale 1 puff into the lungs 2 times daily 10.6 g 2     diclofenac (VOLTAREN) 75 MG EC tablet Take 1 tablet (75 mg) by mouth 2 times daily 60 tablet 3     fluticasone (FLOVENT HFA) 110 MCG/ACT inhaler Inhale 1 puff into the lungs 2  times daily 12 g 3     gabapentin (NEURONTIN) 300 MG capsule Take 3 capsules (900 mg) by mouth 3 times daily 270 capsule 3     HYDROcodone-acetaminophen (NORCO) 5-325 MG tablet Take 0.5-1 tablets by mouth every 12 hours as needed for severe pain 6 tablet 0     ipratropium - albuterol 0.5 mg/2.5 mg/3 mL (DUONEB) 0.5-2.5 (3) MG/3ML neb solution Take 1 vial (3 mLs) by nebulization every 6 hours as needed for shortness of breath / dyspnea or wheezing 1 Box 3     nicotine (NICORETTE) 2 MG gum Place 1 each (2 mg) inside cheek as needed for smoking cessation 50 each 11     rizatriptan (MAXALT) 10 MG tablet Take 1 tablet (10 mg) by mouth at onset of headache for migraine 10 tablet 0     tiZANidine (ZANAFLEX) 4 MG tablet Take 1 tablet (4 mg) by mouth 2 times daily as needed for muscle spasms No further refills until she sees provider 60 tablet 3     Allergies:     Allergies   Allergen Reactions     Amoxicillin Anaphylaxis     Morphine Other (See Comments) and Nausea and Vomiting     Penicillins Unknown     Social History:  Home situation: [facility]  Occupation/Schooling: y  Tobacco use: y  Alcohol use: n  Drug use: n  History of chemical dependency treatment: n    Family history:  Family History   Problem Relation Age of Onset     Heart Disease No family hx of      Diabetes No family hx of      Cancer No family hx of      Family history of headaches: n    Review of Systems:  Skin: negative  Eyes: negative  Ears/Nose/Throat: negative  Respiratory: No shortness of breath, dyspnea on exertion, cough, or hemoptysis  Cardiovascular: negative  Gastrointestinal: negative  Genitourinary: negative  Musculoskeletal: negative  Neurologic: negative  Psychiatric: negative  Hematologic/Lymphatic/Immunologic: negative  Endocrine: negative    Physical Exam:  There were no vitals filed for this visit.  Exam: Obese  Constitutional: healthy, alert and no distress  Respiratory: Speaking in full sentences no accessory muscles use     Psychiatric:  mentation appears normal and affect normal/bright    Musculoskeletal exam:  Gait/Station/Posture: Antalgic         Lumbar spine:     ROM: decreased secondary to pain                 Slump: Positive on the left negative on the right   Has reproduction of symptoms with extension               Neurologic exam:  Able to overcome gravity both LE/UE    Neg tinnels/phalnes          Diagnostic tests:  MRI reviewed with patient       T12-L1: No significant disc herniation. No spinal canal or neural  foraminal narrowing.      L1-L2: No significant disc herniation. No spinal canal or neural  foraminal narrowing.      L2-L3: No significant disc herniation. No spinal canal or neural  foraminal narrowing.      L3-L4: No significant disc herniation. No spinal canal or neural  foraminal narrowing.      L4-L5: Sequela of previous right-sided keyhole laminectomy and  discectomy. There is a circumferential disc bulge and endplate  osteophytic spurring along with bilateral facet hypertrophy resulting  in moderate left and mild right neural foraminal narrowing. No  significant central spinal canal narrowing following laminectomy.      L5-S1: No significant disc herniation. No spinal canal or neural  foraminal narrowing. Bilateral facet hypertrophy.     Paraspinous soft tissues: Normal.                                                                       IMPRESSION: Postoperative changes at the L4-L5 level with previous  right-sided keyhole laminectomy and microdiscectomy. Residual or  recurrent circumferential disc bulge and endplate osteophytic spurring  at this level with bilateral facet hypertrophy causes moderate left  and mild right neural foraminal narrowing.        D.I.R.E Score: Patient Selection for Chronic Opioid Analgesia  2        For each factor, rate the patient's score from 1 - 3 based on the explanations on the right.       Diagnosis             2         1 = Benign chronic condition with minimal objective findings or  no definite medical diagnosis.  Examples:  fibromyalgia, migraine, headaches, non-specific back pain.  2 = Slowly progressive condition concordant with moderate pain, or fixed condition with moderate objective findings.  Examples: failed back surgery syndrome, back pain with moderate degenerative changes, neuropathic pain.  3 = Advanced condition concordant with severe pain with objective findings.  Examples: severe ischemic vascular disease, advanced neuropathy, severe spinal stenosis.    Intractability             2         1 = Few therapies have been tried and the patient takes a passive role in his/her pain management process.   2 = Most costomary treatments have been tried but the patient is not fully engaged in the pain management process, or barriers prevent (insurance, transportation, medical illness)  3 = Patient fully engaged in a spectrum of appropriate treatments but with inadequate response.    Risk   (Risk = Total of P+C+R+S below)       Psychological             2         1 = Serious personality dysfunction or mental illness interfering with care.  Examples: personality disorder, severe affective disorder, significant personality issues.  2 = Personality or mental health interferes moderately.  Example: depression or anxiety disorder.  3 = Good communication with the clinic.  No significant personality dysfunction or mental illness.       Chemical      Health             2         1 = Active or very recent use of illicit drugs, excessive alcohol, or prescription drug abuse.  2 = Chemical coper (uses medications to cope with stress) or history of chemical dependency in remission.  3 = No CD history.  Not drug-focused or chemically reliant       Reliability             2         1 = History of numerous problems: medication misuse, missed appointments, rarely follows through.  2 = Occasional difficulties with compliance, but generally reliable.  3 = Highly reliable patient with medications, appointments  and treatment.       Social      Support             2         1= Life in chaos.  Little family support and few close relationships.  Loss of most normal life roles.  2 = Reduction in some relationships and life roles.  3 = Supportive family/close relationships.  Involved in work or school and no social isolation.    Efficacy score             2         1 = Poor function or minimal pain relief despite moderate to high doses.  2 = Moderate benefit with function improved in a number of ways (or insufficient info - hasn't tried opioid yet or very low doses or too short a trial.  3 = Good improvement in pain and function and quality of life with stable doses over time.                                    14    Total score = D + I + R + E    Score 7-13: Not a suitable candidate for long-term opioid analgesia  Score 14-21: May be a good candidate      Assessment/Plan:  Daniella Sexton is a 39 year old female who presents with the complaints of axial low back pain. Of note also having some 2-4dig numbess/ting on the right side, with resolution of neck pain with PHYSICAL THERAPY .   Daniella was seen today for pain.    Diagnoses and all orders for this visit:    Chronic bilateral low back pain with bilateral sciatica  -     gabapentin (NEURONTIN) 300 MG capsule; Take 3 capsules (900 mg) by mouth 3 times daily    Myofascial muscle pain  -     amitriptyline (ELAVIL) 50 MG tablet; Take 1 tablet (50 mg) by mouth At Bedtime No further refills until she sees provider  -     tiZANidine (ZANAFLEX) 4 MG tablet; Take 1 tablet (4 mg) by mouth 2 times daily as needed for muscle spasms No further refills until she sees provider  -     HYDROcodone-acetaminophen (NORCO) 5-325 MG tablet; Take 0.5-1 tablets by mouth every 12 hours as needed for severe pain    Lumbar radiculopathy  -     amitriptyline (ELAVIL) 50 MG tablet; Take 1 tablet (50 mg) by mouth At Bedtime No further refills until she sees provider  -     HYDROcodone-acetaminophen  (NORCO) 5-325 MG tablet; Take 0.5-1 tablets by mouth every 12 hours as needed for severe pain    Spondylosis of lumbar region without myelopathy or radiculopathy  -     amitriptyline (ELAVIL) 50 MG tablet; Take 1 tablet (50 mg) by mouth At Bedtime No further refills until she sees provider  -     diclofenac (VOLTAREN) 75 MG EC tablet; Take 1 tablet (75 mg) by mouth 2 times daily  -     HYDROcodone-acetaminophen (NORCO) 5-325 MG tablet; Take 0.5-1 tablets by mouth every 12 hours as needed for severe pain       - Further procedures recommended:    -  none  - Medication Management:    - continue Zanaflex  4 mg 3 times daily   - continue voltaren PO   - continue gabapentin to 900mg three times a day    -Continue Elavil 50mg at night. Will titrate as tolerated    - reasonable to continue 0.5-1 tablet of norco at day as needed for severe pain dispense 6  - Physical Therapy: continue PHYSICAL THERAPY    - Clinical Health Psychologist to address issues of relaxation, behavioral change, coping style, and other factors important to improvement: consider in the future   - Follow up:    -6 months   - discussed the importance of smoking cessation and its association with pain          The total TIME spent on this patient on the day of the appointment was 20 minutes.   Time spent preparing to see the patient (reviewing records and tests)  Time spend face to face with the patient  Time spent ordering tests, medications, procedures and referrals  Time spent Referring and communicating with other healthcare professionals  Documenting clinical information in Epic      Colby Moss MD  [facility]  This note was created with voice recognition software, and while reviewed for accuracy, typos may remain.

## 2021-11-29 NOTE — PATIENT INSTRUCTIONS
----------------------------------------------------------------  [facility] Number:  [phone]     Call with any questions about your care and for scheduling assistance.     Calls are returned Monday through Friday between 8 AM and 4:30 PM. We usually get back to you within 2 business days depending on the issue/request.    If we are prescribing your medications:    For opioid medication refills, call the clinic or send a [employer] message 7 days in advance.  Please include:    Name of requested medication    Name of the pharmacy.    For non-opioid medications, call your pharmacy directly to request a refill. Please allow 3-4 days to be processed.     Per [place] State Law:    All controlled substance prescriptions must be filled within 30 days of being written.      For those controlled substances allowing refills, pickup must occur within 30 days of last fill.      We believe regular attendance is key to your success in our program!      Any time you are unable to keep your appointment we ask that you call us at least 24 hours in advance to cancel.This will allow us to offer the appointment time to another patient.     Multiple missed appointments may lead to dismissal from the clinic.

## 2021-12-20 ENCOUNTER — OFFICE VISIT (OUTPATIENT)
Dept: URGENT CARE | Facility: URGENT CARE | Age: 41
End: 2021-12-20
Payer: COMMERCIAL

## 2021-12-20 VITALS
WEIGHT: 258.7 LBS | BODY MASS INDEX: 41.76 KG/M2 | RESPIRATION RATE: 20 BRPM | TEMPERATURE: 98.4 F | SYSTOLIC BLOOD PRESSURE: 118 MMHG | DIASTOLIC BLOOD PRESSURE: 83 MMHG | HEART RATE: 94 BPM | OXYGEN SATURATION: 96 %

## 2021-12-20 DIAGNOSIS — J10.1 INFLUENZA B: ICD-10-CM

## 2021-12-20 DIAGNOSIS — J45.20 ASTHMATIC BRONCHITIS, MILD INTERMITTENT, UNCOMPLICATED: Primary | ICD-10-CM

## 2021-12-20 LAB
FLUAV AG SPEC QL IA: NEGATIVE
FLUBV AG SPEC QL IA: POSITIVE

## 2021-12-20 PROCEDURE — 87804 INFLUENZA ASSAY W/OPTIC: CPT | Performed by: PHYSICIAN ASSISTANT

## 2021-12-20 PROCEDURE — 99214 OFFICE O/P EST MOD 30 MIN: CPT | Performed by: PHYSICIAN ASSISTANT

## 2021-12-20 RX ORDER — DOXYCYCLINE 100 MG/1
100 CAPSULE ORAL 2 TIMES DAILY WITH MEALS
Qty: 20 CAPSULE | Refills: 0 | Status: SHIPPED | OUTPATIENT
Start: 2021-12-20 | End: 2021-12-30

## 2021-12-20 RX ORDER — ALBUTEROL SULFATE 90 UG/1
2 AEROSOL, METERED RESPIRATORY (INHALATION) EVERY 4 HOURS PRN
Qty: 18 G | Refills: 0 | Status: SHIPPED | OUTPATIENT
Start: 2021-12-20 | End: 2022-07-08

## 2021-12-20 RX ORDER — BENZONATATE 200 MG/1
200 CAPSULE ORAL 3 TIMES DAILY PRN
Qty: 30 CAPSULE | Refills: 0 | Status: SHIPPED | OUTPATIENT
Start: 2021-12-20 | End: 2021-12-30

## 2021-12-20 RX ORDER — OSELTAMIVIR PHOSPHATE 75 MG/1
75 CAPSULE ORAL 2 TIMES DAILY
Qty: 10 CAPSULE | Refills: 0 | Status: SHIPPED | OUTPATIENT
Start: 2021-12-20 | End: 2021-12-25

## 2021-12-20 ASSESSMENT — ENCOUNTER SYMPTOMS
SHORTNESS OF BREATH: 1
FATIGUE: 0
FEVER: 0
PALPITATIONS: 0
CHILLS: 1
GASTROINTESTINAL NEGATIVE: 1
WHEEZING: 1
SINUS PRESSURE: 1
RHINORRHEA: 1
CARDIOVASCULAR NEGATIVE: 1
SORE THROAT: 0
SINUS PAIN: 1
COUGH: 1

## 2021-12-20 NOTE — PROGRESS NOTES
Deniz Brewer is a 41 year old who presents for the following health issues   HPI   Acute Illness  Acute illness concerns:   Onset/Duration: 2days,  Rapid covid was negative, with PCR test pending at this time.  Symptoms:  Fever: no  Chills/Sweats: YES  Headache (location?): YES  Sinus Pressure: YES  Conjunctivitis:  no  Ear Pain: no  Rhinorrhea: YES  Congestion: YES  Sore Throat: no  Cough: YES-dry, shortness of breath  Wheeze: YES  Decreased Appetite: no  Nausea: no  Vomiting: no  Diarrhea: no  Dysuria/Freq.: no  Dysuria or Hematuria: no  Fatigue/Achiness: no  Sick/Strep Exposure: YES- at covid, smoker. Hx of asthma  Therapies tried and outcome: theraflu, rest, fluids, decongestant, inhalers with minimal relief    Patient Active Problem List   Diagnosis     Anxiety     Depressive disorder     Duodenal ulcer     History of alcohol abuse     History of methamphetamine abuse (H)     Migraines     Seasonal allergic rhinitis     Sleep disturbance     Tobacco abuse     CARDIOVASCULAR SCREENING; LDL GOAL LESS THAN 160     Chronic bilateral low back pain with bilateral sciatica     Pure hypercholesterolemia     Mild intermittent asthma without complication     Chronic pansinusitis     Obesity (BMI 35.0-39.9) with comorbidity (H)     Lumbago     Fatty liver     Lung nodule     Current Outpatient Medications   Medication     albuterol (PROAIR HFA/PROVENTIL HFA/VENTOLIN HFA) 108 (90 Base) MCG/ACT inhaler     amitriptyline (ELAVIL) 50 MG tablet     beclomethasone HFA (QVAR REDIHALER) 80 MCG/ACT inhaler     diclofenac (VOLTAREN) 75 MG EC tablet     fluticasone (FLOVENT HFA) 110 MCG/ACT inhaler     gabapentin (NEURONTIN) 300 MG capsule     ipratropium - albuterol 0.5 mg/2.5 mg/3 mL (DUONEB) 0.5-2.5 (3) MG/3ML neb solution     nicotine (NICORETTE) 2 MG gum     rizatriptan (MAXALT) 10 MG tablet     tiZANidine (ZANAFLEX) 4 MG tablet     No current facility-administered medications for this visit.        Allergies    Allergen Reactions     Amoxicillin Anaphylaxis     Morphine Other (See Comments) and Nausea and Vomiting     Penicillins Unknown       Review of Systems   Constitutional: Positive for chills. Negative for fatigue and fever.   HENT: Positive for congestion, rhinorrhea, sinus pressure and sinus pain. Negative for ear discharge, ear pain, hearing loss and sore throat.    Respiratory: Positive for cough, shortness of breath and wheezing.    Cardiovascular: Negative.  Negative for chest pain, palpitations and peripheral edema.   Gastrointestinal: Negative.    All other systems reviewed and are negative.           Objective    /83 (BP Location: Left arm, Patient Position: Sitting, Cuff Size: Adult Large)   Pulse 94   Temp 98.4  F (36.9  C) (Oral)   Resp 20   Wt 117.3 kg (258 lb 11.2 oz)   SpO2 96%   BMI 41.76 kg/m    Body mass index is 41.76 kg/m .  Physical Exam  Vitals and nursing note reviewed.   Constitutional:       General: She is not in acute distress.     Appearance: Normal appearance. She is well-developed. She is obese. She is not ill-appearing.   HENT:      Head: Normocephalic and atraumatic.      Comments: TMs are intact without any erythema or bulging bilaterally.  Airway is patent.     Nose: Mucosal edema, congestion and rhinorrhea present. Rhinorrhea is purulent.      Left Turbinates: Swollen.      Right Sinus: Maxillary sinus tenderness present. No frontal sinus tenderness.      Left Sinus: Maxillary sinus tenderness present. No frontal sinus tenderness.      Mouth/Throat:      Lips: Pink.      Mouth: Mucous membranes are moist.      Pharynx: Oropharynx is clear. Uvula midline. No pharyngeal swelling, oropharyngeal exudate or posterior oropharyngeal erythema.      Tonsils: No tonsillar exudate.   Eyes:      General: No scleral icterus.     Extraocular Movements: Extraocular movements intact.      Conjunctiva/sclera: Conjunctivae normal.      Pupils: Pupils are equal, round, and reactive to  light.   Neck:      Thyroid: No thyromegaly.   Cardiovascular:      Rate and Rhythm: Normal rate and regular rhythm.      Pulses: Normal pulses.      Heart sounds: Normal heart sounds, S1 normal and S2 normal. No murmur heard.  No friction rub. No gallop.    Pulmonary:      Effort: Pulmonary effort is normal. No accessory muscle usage, respiratory distress or retractions.      Breath sounds: Normal breath sounds and air entry. No stridor. No decreased breath sounds, wheezing, rhonchi or rales.   Musculoskeletal:      Cervical back: Normal range of motion and neck supple.   Lymphadenopathy:      Cervical: No cervical adenopathy.   Skin:     General: Skin is warm and dry.      Nails: There is no clubbing.   Neurological:      Mental Status: She is alert and oriented to person, place, and time.   Psychiatric:         Mood and Affect: Mood normal.         Behavior: Behavior normal.         Thought Content: Thought content normal.         Judgment: Judgment normal.       Results for orders placed or performed in visit on 12/20/21 (from the past 24 hour(s))   Influenza A & B Antigen - Clinic Collect    Specimen: Nose; Swab   Result Value Ref Range    Influenza A antigen Negative Negative    Influenza B antigen Positive (A) Negative    Narrative    Test results must be correlated with clinical data. If necessary, results should be confirmed by a molecular assay or viral culture.         Assessment/Plan:  Asthmatic bronchitis, mild intermittent, uncomplicated:  Covid is pending.  Will treat with [id number], tessalon perles, and refill her albuterol inh as needed for symptoms.  She already has prednisone at home.  Recommend treatment with rest, fluids and chicken soup. Tylenol/ibuprofen prn fever/pain.  Recheck in clinic if symptoms worsen or if symptoms do not improve.  To the ER if he develops hemoptysis, chest pain, fevers>102, worsening shortness of breath/wheezing.    -     doxycycline monohydrate (MONODOX) 100 MG  capsule; Take 1 capsule (100 mg) by mouth 2 times daily (with meals) for 10 days Increases risk of heartburn and also sun sensitivity or sun burn. Contraindicated in pregnancy.  -     benzonatate (TESSALON) 200 MG capsule; Take 1 capsule (200 mg) by mouth 3 times daily as needed for cough  -     albuterol (PROAIR HFA/PROVENTIL HFA/VENTOLIN HFA) 108 (90 Base) MCG/ACT inhaler; Inhale 2 puffs into the lungs every 4 hours as needed for shortness of breath / dyspnea or wheezing  -     Influenza A & B Antigen - Clinic Collect    Influenza B:  Rapid influenza was positive for B.  Will treat with [id number].  He in considered contagious until he has been fever free for at least 24hours without the use of antipyretics.  Cover coughs, sneezes and wash hands.   -     oseltamivir (TAMIFLU) 75 MG capsule; Take 1 capsule (75 mg) by mouth 2 times daily for 5 days          Yaneth Nguyễn PA-C

## 2021-12-27 ENCOUNTER — TELEPHONE (OUTPATIENT)
Dept: FAMILY MEDICINE | Facility: CLINIC | Age: 41
End: 2021-12-27
Payer: COMMERCIAL

## 2021-12-27 ENCOUNTER — TELEPHONE (OUTPATIENT)
Dept: URGENT CARE | Facility: URGENT CARE | Age: 41
End: 2021-12-27
Payer: COMMERCIAL

## 2021-12-27 NOTE — TELEPHONE ENCOUNTER
Called and spoke with pt, she declined having letter faxed to employer. She was able to print the letter and will bring to work tomorrow.    Jessica ZAMARRIPA RN, BSN  [facility]

## 2021-12-27 NOTE — LETTER
[facility]  [address] [facility] [address]  Phone: [phone]    December 27, 2021        Daniella Sexton  [address]  [facility] [address]          To whom it may concern:    RE: Daniella Sexton    Patient may return to work 12/28/2021 with the following:  No working or lifting restrictions    Please contact me for questions or concerns.      Sincerely,        JENAE Sibley CNP

## 2021-12-27 NOTE — TELEPHONE ENCOUNTER
Reason for Call:  Other Letter    Detailed comments: Pt requesting letter saying she can return to work. Pt took a few days off to recover from influenza B but her work is requesting a letter saying she can go back to work.    Phone Number Patient can be reached at: Cell number on file:    Telephone Information:   Mobile [phone]       Best Time: any    Can we leave a detailed message on this number? YES    Call taken on 12/27/2021 at 2:18 PM by Jessica Britt

## 2021-12-27 NOTE — TELEPHONE ENCOUNTER
Reason for Call:  Other Letter    Detailed comments: Pt requesting letter saying she can return to work. Pt took a few days off to recover from influenza B but her work is requesting a letter saying she can go back to work.    Phone Number Patient can be reached at: Cell number on file:    Telephone Information:   Mobile [phone]       Best Time: Anytime - off all day, until this letter is received.    Can we leave a detailed message on this number? YES    Call taken on 12/27/2021 at 10:56 AM by Janee Hannah,   [facility]

## 2022-01-15 ENCOUNTER — HEALTH MAINTENANCE LETTER (OUTPATIENT)
Age: 42
End: 2022-01-15

## 2022-01-27 ENCOUNTER — OFFICE VISIT (OUTPATIENT)
Dept: FAMILY MEDICINE | Facility: CLINIC | Age: 42
End: 2022-01-27
Payer: COMMERCIAL

## 2022-01-27 VITALS
SYSTOLIC BLOOD PRESSURE: 111 MMHG | DIASTOLIC BLOOD PRESSURE: 75 MMHG | TEMPERATURE: 98 F | WEIGHT: 258.6 LBS | OXYGEN SATURATION: 98 % | BODY MASS INDEX: 41.74 KG/M2 | HEART RATE: 95 BPM

## 2022-01-27 DIAGNOSIS — G89.29 CHRONIC BILATERAL LOW BACK PAIN WITH BILATERAL SCIATICA: Primary | ICD-10-CM

## 2022-01-27 DIAGNOSIS — M54.42 CHRONIC BILATERAL LOW BACK PAIN WITH BILATERAL SCIATICA: Primary | ICD-10-CM

## 2022-01-27 DIAGNOSIS — M54.41 CHRONIC BILATERAL LOW BACK PAIN WITH BILATERAL SCIATICA: Primary | ICD-10-CM

## 2022-01-27 PROCEDURE — 99214 OFFICE O/P EST MOD 30 MIN: CPT | Mod: 25 | Performed by: FAMILY MEDICINE

## 2022-01-27 PROCEDURE — 96372 THER/PROPH/DIAG INJ SC/IM: CPT | Performed by: FAMILY MEDICINE

## 2022-01-27 RX ORDER — KETOROLAC TROMETHAMINE 30 MG/ML
60 INJECTION, SOLUTION INTRAMUSCULAR; INTRAVENOUS ONCE
Status: COMPLETED | OUTPATIENT
Start: 2022-01-27 | End: 2022-01-27

## 2022-01-27 RX ADMIN — KETOROLAC TROMETHAMINE 60 MG: 30 INJECTION, SOLUTION INTRAMUSCULAR; INTRAVENOUS at 11:19

## 2022-01-27 ASSESSMENT — PAIN SCALES - GENERAL: PAINLEVEL: WORST PAIN (10)

## 2022-01-27 NOTE — PROGRESS NOTES
Subjective   Daniella is a 41 year old who presents for the following health issues     HPI       Concern - Back Pain  Onset: 12/25ish  Description: worsened since Influenza around 12/25 - feeling like it is left and right sided lower back pain with swelling.  Intensity: severe  Progression of Symptoms:  worsening  Accompanying Signs & Symptoms: cough might be contributing to the pain  Previous history of similar problem: yes 12/25  Precipitating factors:        Worsened by: coughing  Alleviating factors:        Improved by: nothing  Therapies tried and outcome:  none       Review of Systems   Constitutional, HEENT, cardiovascular, pulmonary, GI, , musculoskeletal, neuro, skin, endocrine and psych systems are negative, except as otherwise noted.      Objective    /75 (BP Location: Right arm, Patient Position: Sitting, Cuff Size: Adult Large)   Pulse 95   Temp 98  F (36.7  C) (Oral)   Wt 117.3 kg (258 lb 9.6 oz)   SpO2 98%   BMI 41.74 kg/m    Body mass index is 41.74 kg/m .  Physical Exam   GENERAL: healthy, alert and no distress  NECK: no adenopathy, no asymmetry, masses, or scars and thyroid normal to palpation  RESP: lungs clear to auscultation - no rales, rhonchi or wheezes  CV: regular rate and rhythm, normal S1 S2, no S3 or S4, no murmur, click or rub, no peripheral edema and peripheral pulses strong  ABDOMEN: soft, nontender, no hepatosplenomegaly, no masses and bowel sounds normal  MS: tenderness of paraspinal lumbar muscles    A/P:  (M54.42,  M54.41,  G89.29) Chronic bilateral low back pain with bilateral sciatica  (primary encounter diagnosis)  Comment:   Plan: ketorolac (TORADOL) injection 60 mg        Exacerbated by influenza per patient in December. Reviewed patient's history and medications. Nothing to add at this time. Did give her a toradol im injection in clinic. Continue with her current medications per pain clinic. Patient will reach out to pain clinic for further  recommendations.    Ag Martines MD

## 2022-02-09 ENCOUNTER — ANCILLARY PROCEDURE (OUTPATIENT)
Dept: CT IMAGING | Facility: CLINIC | Age: 42
End: 2022-02-09
Attending: NURSE PRACTITIONER
Payer: COMMERCIAL

## 2022-02-09 DIAGNOSIS — R91.1 LUNG NODULE: ICD-10-CM

## 2022-02-09 PROCEDURE — 71250 CT THORAX DX C-: CPT | Performed by: RADIOLOGY

## 2022-02-18 ENCOUNTER — MYC MEDICAL ADVICE (OUTPATIENT)
Dept: FAMILY MEDICINE | Facility: CLINIC | Age: 42
End: 2022-02-18
Payer: MEDICAID

## 2022-02-18 ENCOUNTER — MYC MEDICAL ADVICE (OUTPATIENT)
Dept: PALLIATIVE MEDICINE | Facility: CLINIC | Age: 42
End: 2022-02-18
Payer: MEDICAID

## 2022-02-18 DIAGNOSIS — M47.816 SPONDYLOSIS OF LUMBAR REGION WITHOUT MYELOPATHY OR RADICULOPATHY: Primary | ICD-10-CM

## 2022-02-18 NOTE — TELEPHONE ENCOUNTER
To: PAIN NURSE      From: Daniella Sexton      Created: 2/18/2022 1:56 PM        *-*-*This message has not been handled.*-*-*    i need a refill on my hydrocodone 5-325        Not an active medication, virtual visit notes indicate ok to continue see below, will prep order & route to provider-     Leana Dias RN, BSN, CMSRN  RN Care Coordinator  [facility]      11/29/21 Virtual Visit Notes Dr. Colby Moss    - Further procedures recommended:               -  none  - Medication Management:               - continue Zanaflex  4 mg 3 times daily              - continue voltaren PO              - continue gabapentin to 900mg three times a day               -Continue Elavil 50mg at night. Will titrate as tolerated               - reasonable to continue 0.5-1 tablet of norco at day as needed for severe pain dispense 6  - Physical Therapy: continue PHYSICAL THERAPY    - Clinical Health Psychologist to address issues of relaxation, behavioral change, coping style, and other factors important to improvement: consider in the future   - Follow up:               -6 months              - discussed the importance of smoking cessation and its association with pain

## 2022-02-24 NOTE — TELEPHONE ENCOUNTER
Routed to the nursing pool and to the MA pool to process refill(s).    Carli Vargas, RN-BSN  [facility]

## 2022-02-25 RX ORDER — HYDROCODONE BITARTRATE AND ACETAMINOPHEN 5; 325 MG/1; MG/1
1 TABLET ORAL EVERY 12 HOURS PRN
Status: DISCONTINUED | OUTPATIENT
Start: 2022-02-25 | End: 2022-07-08

## 2022-02-25 NOTE — TELEPHONE ENCOUNTER
Received call from patient requesting refill(s) of HYDROcodone-acetaminophen (NORCO) 5-325 MG tablet    Last dispensed from pharmacy on 11/29/21    Patient's last office/virtual visit by prescribing provider on 11/29/21  Next office/virtual appointment scheduled for: None    Last urine drug screen date 01/15/21  Current opioid agreement on file (completed within the last year) Yes Date of opioid agreement: 03/02/21    E-prescribe to pharmacy-[id number] - [facility] [address] [facility] [address]     Will route to nursing pool for review and preparation of prescription(s).

## 2022-03-01 RX ORDER — HYDROCODONE BITARTRATE AND ACETAMINOPHEN 5; 325 MG/1; MG/1
.5-1 TABLET ORAL DAILY PRN
Qty: 6 TABLET | Refills: 0 | Status: SHIPPED | OUTPATIENT
Start: 2022-03-01 | End: 2022-07-08

## 2022-03-01 NOTE — TELEPHONE ENCOUNTER
Norco Rx for 1 tablet was signed on 2/25/22 by Dr. Colby Moss but appears to be ordered as a clinic administered medication.     Leana Dias RN, BSN, CMSRN  RN Care Coordinator  [facility]

## 2022-04-07 ENCOUNTER — APPOINTMENT (OUTPATIENT)
Dept: OCCUPATIONAL MEDICINE | Facility: CLINIC | Age: 42
End: 2022-04-07

## 2022-04-07 PROCEDURE — 97799 UNLISTED PHYSCL MED/REHAB PX: CPT | Performed by: FAMILY MEDICINE

## 2022-04-07 PROCEDURE — 99499 UNLISTED E&M SERVICE: CPT | Performed by: FAMILY MEDICINE

## 2022-04-07 PROCEDURE — 99000 SPECIMEN HANDLING OFFICE-LAB: CPT | Performed by: FAMILY MEDICINE

## 2022-04-10 NOTE — LETTER
December 16, 2020      Daniella Sexton  [address]  [facility] [address]        To Whom It May Concern:    Daniella Sexton  was seen on 12/9/20.  Please excuse her  until 12/18/20 due to illness. Patient may return to work on Monday if COVID-19 test is negative and symptoms are improving        Sincerely,        JENAE Sibley CNP     Pt pink, warm and dry, breathing with ease. Fluids encouraged. Prescription explained, parents state understanding. AVS reviewed including follow up appointments, diagnosis, and care of self at home. Denies questions or concerns. Pt is more alert and active. Pt discharged in stable condition with parents.       Jeff Sanz RN  04/10/22 3022

## 2022-04-28 ENCOUNTER — OFFICE VISIT (OUTPATIENT)
Dept: URGENT CARE | Facility: URGENT CARE | Age: 42
End: 2022-04-28
Payer: COMMERCIAL

## 2022-04-28 VITALS
BODY MASS INDEX: 40.58 KG/M2 | TEMPERATURE: 97 F | WEIGHT: 251.4 LBS | SYSTOLIC BLOOD PRESSURE: 129 MMHG | HEART RATE: 90 BPM | OXYGEN SATURATION: 98 % | DIASTOLIC BLOOD PRESSURE: 85 MMHG

## 2022-04-28 DIAGNOSIS — Z20.822 SUSPECTED 2019 NOVEL CORONAVIRUS INFECTION: Primary | ICD-10-CM

## 2022-04-28 DIAGNOSIS — J30.2 SEASONAL ALLERGIC RHINITIS, UNSPECIFIED TRIGGER: ICD-10-CM

## 2022-04-28 PROCEDURE — U0005 INFEC AGEN DETEC AMPLI PROBE: HCPCS | Performed by: EMERGENCY MEDICINE

## 2022-04-28 PROCEDURE — 99213 OFFICE O/P EST LOW 20 MIN: CPT | Mod: CS | Performed by: EMERGENCY MEDICINE

## 2022-04-28 PROCEDURE — U0003 INFECTIOUS AGENT DETECTION BY NUCLEIC ACID (DNA OR RNA); SEVERE ACUTE RESPIRATORY SYNDROME CORONAVIRUS 2 (SARS-COV-2) (CORONAVIRUS DISEASE [COVID-19]), AMPLIFIED PROBE TECHNIQUE, MAKING USE OF HIGH THROUGHPUT TECHNOLOGIES AS DESCRIBED BY CMS-2020-01-R: HCPCS | Performed by: EMERGENCY MEDICINE

## 2022-04-28 RX ORDER — FLUTICASONE PROPIONATE 50 MCG
1 SPRAY, SUSPENSION (ML) NASAL DAILY
Qty: 9.9 ML | Refills: 0 | Status: SHIPPED | OUTPATIENT
Start: 2022-04-28 | End: 2022-07-08

## 2022-04-28 RX ORDER — LORATADINE 10 MG/1
10 TABLET, ORALLY DISINTEGRATING ORAL DAILY
Qty: 30 TABLET | Refills: 0 | Status: SHIPPED | OUTPATIENT
Start: 2022-04-28 | End: 2022-07-08

## 2022-04-28 NOTE — PROGRESS NOTES
Assessment & Plan     Diagnosis:    (Z20.822) Suspected 2019 novel coronavirus infection  (primary encounter diagnosis)  Plan: Symptomatic; Yes; 4/27/2022 COVID-19 Virus         (Coronavirus) by PCR Nose, CANCELED:         Symptomatic; Yes; 4/27/2022 COVID-19 Virus         (Coronavirus) by PCR Nose    (J30.2) Seasonal allergic rhinitis, unspecified trigger  Plan: loratadine (CLARITIN REDITABS) 10 MG ODT,         fluticasone (FLONASE) 50 MCG/ACT nasal spray      Medical Decision Making:  Daniella Sexton is a 41 year old female who presents for evaluation of myalgias, rhinorrhea, cough and sinus congestion/facial pain.  This is consistent with an upper respiratory tract infection and sinusitis.  There is no signs at this point of serious bacterial infection such as OM, RPA, epiglottitis, PTA, strep pharyngitis, pneumonia, meningitis, bacteremia, serious bacterial infection.  She does have some seasonal allergies as well; but given cough, body aches and recent exposure to COVID-19 I suspect this is the cause of her acute symptoms today. PCR pending at this time.     Given clear lungs, no fever, no hypoxia and no respiratory distress I do not feel the patient needs a CXR at this point as the probability of bacterial pneumonia is very unlikely.   Patient is out of medications for her allergies and I refilled her Flonase and Claritin here.    There are gastrointestinal symptoms (diarrhea only), at this point and no signs of dehydration.  Close followup with PCP is indicated.  Go to ED for fever > 104 F, worsening shortness of breath, chest pain or other concerns.  Patient verbalized understanding and agreement with the plan was discharged in stable condition.      Nick Maxwell PA-C  [facility]    Subjective     Daniella Sexton is a 41 year old female who presents to clinic today for the following health issues:  Chief Complaint   Patient presents with     Sinus Problem       HPI    URI Adult    Onset  "of symptoms was 1 day(s) ago.  Course of illness is worsening.    Severity moderate  Current and Associated symptoms: chills, runny nose, cough - non-productive, sore throat, body aches and fatigue  Treatment measures tried include Tylenol/Ibuprofen.  Predisposing factors include ill contact: Family member tested positive for COVID-19 over the weekend and she was hanging out with her recently, HX of asthma and HX of COPD.    Patient describes the symptoms as \"like my allergies but normally I don't get body aches and cough this much\"     Patient denies any fever, chest pain, leg swelling, history of DVT or PE, vomiting,.     Review of Systems    See HPI    Objective      Vitals: /85   Pulse 90   Temp 97  F (36.1  C) (Tympanic)   Wt 114 kg (251 lb 6.4 oz)   SpO2 98%   BMI 40.58 kg/m    Resp: 12    Patient Vitals for the past 24 hrs:   BP Temp Temp src Pulse SpO2 Weight   04/28/22 1314 129/85 97  F (36.1  C) Tympanic 90 98 % 114 kg (251 lb 6.4 oz)       Vital signs reviewed by: Nick Maxwell PA-C    Physical Exam   Constitutional: Patient is alert and cooperative. No acute distress.  Ears: Right TM is clear. Left TM is clear. External ear canals are normal.  Mouth: Mucous membranes are moist. Normal tongue and tonsil. Posterior oropharynx is clear.  Nose: Nasal turbinates are slightly erythematous and boggy.  Yellow/clear rhinorrhea noted.  No maxillary tenderness to percussion.  No septal polyp or masses.  Eyes: Conjunctivae, EOMI and lids are normal. PERRL.  Cardiovascular: Regular rate and rhythm  Pulmonary/Chest: Lungs are clear to auscultation throughout. Effort normal. No respiratory distress. No wheezes, rales or rhonchi.  GI: Abdomen is soft and non-tender throughout. No CVA tenderness bilaterally.  Neurological: Alert and oriented x3.   Skin: No rash noted on visualized skin.  Psychiatric:The patient has a normal mood and affect.       Labs/Imaging:    COVID-19 PCR: pending      Nick Maxwell, " ROGER, April 28, 2022

## 2022-04-28 NOTE — LETTER
[facility] [address] [address]          April 28, 2022    RE:  Daniella Sexton                                                                                                                                                       [address]  [facility] [address]            To whom it may concern:    Daniella Sexton is under my professional care for    Suspected 2019 novel coronavirus infection  Seasonal allergic rhinitis, unspecified trigger She  may return to work with the following: The employee is UNABLE to return to work until 5/2/2022.      Sincerely,        Nick Maxwell PA-C

## 2022-04-29 ENCOUNTER — TELEPHONE (OUTPATIENT)
Dept: NURSING | Facility: CLINIC | Age: 42
End: 2022-04-29
Payer: MEDICAID

## 2022-04-29 LAB — SARS-COV-2 RNA RESP QL NAA+PROBE: POSITIVE

## 2022-04-29 NOTE — TELEPHONE ENCOUNTER
Coronavirus (COVID-19) Notification    Caller Name (Patient, parent, daughter/son, grandparent, etc)  Daniella    Reason for call  Notify of Positive Coronavirus (COVID-19) lab results, assess symptoms,  review [facility] recommendations    Lab Result    Lab test:  2019-nCoV rRt-PCR or SARS-CoV-2 PCR    Oropharyngeal AND/OR nasopharyngeal swabs is POSITIVE for 2019-nCoV RNA/SARS-COV-2 PCR (COVID-19 virus)      Gather patient reported symptoms   Assessment   Current Symptoms at time of phone call, reported by patient: (if no symptoms, document: No symptoms] Cough, runny nose, body aches, neck pain, ear pain, congestion   Date of symptom(s) onset (if applicable) 4/27/2022     If at time of call, Patients symptoms have worsened, the Patient should contact 911 or have someone drive them to Emergency Dept promptly:      If Patient calling 911, inform 911 personal that you have tested positive for the Coronavirus (COVID-19).  Place mask on and await 911 to arrive.    If Emergency Dept, If possible, please have another adult drive you to the Emergency Dept but you need to wear mask when in contact with other people.      Treatment Options:   Patient classified as COVID treatment eligible by Epic high risk algorithm: Yes  Is the patient symptomatic at the time of result notification? Yes. Was the onset of symptoms within the last 5 days? Yes.   There are now oral medications available for the treatment of COVID-19.  Taking one of these medications within the first five days of symptoms (when people may not yet feel severely ill) has been shown to make people feel better, prevent them from getting sicker, and preventing hospitalization and death.   Does the patient agree to have a visit with a provider to discuss treatment options? Yes. Is the patient seen at [facility]?  No: Warm transfer caller to [phone] to be scheduled with a virtual urgent provider.  During transfer, instruct  on  appropriate time frame for visit     Review information with Patient    Your result was positive. This means you have COVID-19 (coronavirus).    How can I protect others?    These guidelines are for isolating before returning to work, school or .    If you DO have symptoms    Stay home and away from others     For at least 5 days after your symptoms started, AND    You are fever free for 24 hours (with no medicine that reduces fever), AND    Your other symptoms are better    Wear a mask for 10 full days anytime you are around others    If you DON'T have symptoms    Stay home and away from others for at least 5 days after your positive test    Wear a mask for 10 full days anytime you are around others    There may be different guidelines for healthcare facilities.  Please check with the specific sites before arriving.    If you have been told by a doctor that you were severely ill with COVID-19 or are immunocompromised, you should isolate for at least 10 days.    You should not go back to work until you meet the guidelines above for ending your home isolation. You don't need to be retested for COVID-19 before going back to work--studies show that you won't spread the virus if it's been at least 10 days since your symptoms started (or 20 days, if you have a weak immune system).    Employers, schools, and daycares: This is an official notice for this person's medical guidelines for returning in-person.  They must meet the above guidelines before going back to work, school or  in person.    You will receive a positive COVID-19 letter via [facility] or the mail soon with additional self-care information (exception, no letters will be sent to presurgical/preprocedure patients).    Would you like me to review some of that information with you now?  Yes    How can I take care of myself?      Get lots of rest. Drink extra fluids (unless a doctor has told you not to).      Take Tylenol (acetaminophen) for fever or  pain. If you have liver or kidney problems, ask your family doctor if it's okay to take Tylenol.     Take either:     650 mg (two 325 mg pills) every 4 to 6 hours, or     1,000 mg (two 500 mg pills) every 8 hours as needed.     Note: Do not take more than 3,000 mg in one day. Acetaminophen is found in many medicines (both prescribed and over-the-counter medicines). Read all labels to be sure you don't take too much.    For children, check the Tylenol bottle for the right dose (based on their age or weight).      If you have other health problems (like cancer, heart failure, an organ transplant or severe kidney disease): Call your specialty clinic if you don't feel better in the next 2 days.      Know when to call 911: Emergency warning signs include:    Trouble breathing or shortness of breath    Pain or pressure in the chest that doesn't go away    Feeling confused like you haven't felt before, or not being able to wake up    Bluish-colored lips or face        If you were tested for an upcoming procedure, please contact your provider for next steps.    Dori Still

## 2022-06-30 ENCOUNTER — APPOINTMENT (OUTPATIENT)
Dept: OPTOMETRY | Facility: CLINIC | Age: 42
End: 2022-06-30
Payer: MEDICAID

## 2022-06-30 ENCOUNTER — OFFICE VISIT (OUTPATIENT)
Dept: OPTOMETRY | Facility: CLINIC | Age: 42
End: 2022-06-30
Payer: MEDICAID

## 2022-06-30 DIAGNOSIS — H52.4 PRESBYOPIA: Primary | ICD-10-CM

## 2022-06-30 PROCEDURE — 92015 DETERMINE REFRACTIVE STATE: CPT | Performed by: OPTOMETRIST

## 2022-06-30 PROCEDURE — V2020 VISION SVCS FRAMES PURCHASES: HCPCS | Performed by: OPTOMETRIST

## 2022-06-30 PROCEDURE — V2200 LENS SPHER BIFOC PLANO 4.00D: HCPCS | Mod: LT | Performed by: OPTOMETRIST

## 2022-06-30 PROCEDURE — 92004 COMPRE OPH EXAM NEW PT 1/>: CPT | Performed by: OPTOMETRIST

## 2022-06-30 ASSESSMENT — TONOMETRY
OD_IOP_MMHG: 18
OS_IOP_MMHG: 18
IOP_METHOD: APPLANATION

## 2022-06-30 ASSESSMENT — REFRACTION_MANIFEST
OS_SPHERE: -0.75
OD_AXIS: 092
OS_AXIS: 075
OD_ADD: +1.50
OS_CYLINDER: +0.25
OD_SPHERE: -0.50
OS_CYLINDER: +0.25
OS_AXIS: 065
OD_SPHERE: PLANO
OD_CYLINDER: +0.25
METHOD_AUTOREFRACTION: 1
OD_CYLINDER: SPHERE
OS_SPHERE: PLANO
OS_ADD: +1.50

## 2022-06-30 ASSESSMENT — CUP TO DISC RATIO
OS_RATIO: 0.25
OD_RATIO: 0.25

## 2022-06-30 ASSESSMENT — VISUAL ACUITY
OS_SC: 20/20
METHOD: SNELLEN - LINEAR
OD_SC: 20/25
OD_SC: 20/20
OD_SC+: -1
OS_SC+: -1
OS_SC: 20/30

## 2022-06-30 ASSESSMENT — KERATOMETRY
OD_K1POWER_DIOPTERS: 45.00
OS_AXISANGLE_DEGREES: 86
OS_K1POWER_DIOPTERS: 45.25
OS_K2POWER_DIOPTERS: 46.25
OS_AXISANGLE2_DEGREES: 176
OD_AXISANGLE_DEGREES: 92
OD_AXISANGLE2_DEGREES: 2
OD_K2POWER_DIOPTERS: 46.00

## 2022-06-30 ASSESSMENT — EXTERNAL EXAM - RIGHT EYE: OD_EXAM: NORMAL

## 2022-06-30 ASSESSMENT — REFRACTION_WEARINGRX
OS_CYLINDER: +0.25
OD_CYLINDER: SPHERE
OS_SPHERE: -0.50
OD_SPHERE: PLANO
OS_ADD: +1.25
OD_ADD: +1.25
OS_AXIS: 075

## 2022-06-30 ASSESSMENT — SLIT LAMP EXAM - LIDS
COMMENTS: NORMAL
COMMENTS: NORMAL

## 2022-06-30 ASSESSMENT — CONF VISUAL FIELD
OS_NORMAL: 1
OD_NORMAL: 1

## 2022-06-30 ASSESSMENT — EXTERNAL EXAM - LEFT EYE: OS_EXAM: NORMAL

## 2022-06-30 NOTE — LETTER
6/30/2022         RE: Daniella Sexton  [address]  [place] [address]        Dear Colleague,    Thank you for referring your patient, Daniella Sexton, to the [facility]. Please see a copy of my visit note below.    Chief Complaint   Patient presents with     Annual Eye Exam         Last Eye Exam: a while ago   Dilated Previously: Yes, side effects of dilation explained today    What are you currently using to see?  does not use glasses or contacts       Distance Vision Acuity: Satisfied with vision    Near Vision Acuity: Not satisfied     Eye Comfort: hurt, dry, itch   Do you use eye drops? : Yes: allergy relief, used to get patanol   Occupation or Hobbies: Computer work     Mayank Wood - Optometric Assistant          Medical, surgical and family histories reviewed and updated 6/30/2022.       OBJECTIVE: See Ophthalmology exam    ASSESSMENT:    ICD-10-CM    1. Presbyopia - Both Eyes  H52.4        PLAN:     Patient Instructions   Presbyopia is the diagnosis. Presbyopia is an age-related condition where the eye's crystalline lens doesn't change shape as easily as it once did.    Eyeglass prescription given.    The affects of the dilating drops last for 4- 6 hours.  You will be more sensitive to light and vision will be blurry up close.  Do not drive if you do not feel comfortable.  Mydriatic sunglasses were given if needed.    Recommend annual eye exams.    Cindy Vasquez O.D.  [facility]   [address] [place] [address]    [phone]           Again, thank you for allowing me to participate in the care of your patient.        Sincerely,        Cindy Vasquez OD

## 2022-06-30 NOTE — PATIENT INSTRUCTIONS
Presbyopia is the diagnosis. Presbyopia is an age-related condition where the eye's crystalline lens doesn't change shape as easily as it once did.    Eyeglass prescription given.    The affects of the dilating drops last for 4- 6 hours.  You will be more sensitive to light and vision will be blurry up close.  Do not drive if you do not feel comfortable.  Mydriatic sunglasses were given if needed.    Recommend annual eye exams.    Cindy Vasquez O.D.  [address] [address]    [phone]

## 2022-06-30 NOTE — PROGRESS NOTES
Chief Complaint   Patient presents with     Annual Eye Exam         Last Eye Exam: a while ago   Dilated Previously: Yes, side effects of dilation explained today    What are you currently using to see?  does not use glasses or contacts       Distance Vision Acuity: Satisfied with vision    Near Vision Acuity: Not satisfied     Eye Comfort: hurt, dry, itch   Do you use eye drops? : Yes: allergy relief, used to get patanol   Occupation or Hobbies: Computer work     Mayank Wood - Optometric Assistant          Medical, surgical and family histories reviewed and updated 6/30/2022.       OBJECTIVE: See Ophthalmology exam    ASSESSMENT:    ICD-10-CM    1. Presbyopia - Both Eyes  H52.4        PLAN:     Patient Instructions   Presbyopia is the diagnosis. Presbyopia is an age-related condition where the eye's crystalline lens doesn't change shape as easily as it once did.    Eyeglass prescription given.    The affects of the dilating drops last for 4- 6 hours.  You will be more sensitive to light and vision will be blurry up close.  Do not drive if you do not feel comfortable.  Mydriatic sunglasses were given if needed.    Recommend annual eye exams.    Cindy Vasquez O.D.  [facility]   [address] [place] [address]    [phone]

## 2022-07-08 ENCOUNTER — OFFICE VISIT (OUTPATIENT)
Dept: FAMILY MEDICINE | Facility: CLINIC | Age: 42
End: 2022-07-08
Payer: COMMERCIAL

## 2022-07-08 VITALS
HEIGHT: 68 IN | HEART RATE: 109 BPM | SYSTOLIC BLOOD PRESSURE: 126 MMHG | WEIGHT: 254 LBS | TEMPERATURE: 98.7 F | OXYGEN SATURATION: 97 % | BODY MASS INDEX: 38.49 KG/M2 | DIASTOLIC BLOOD PRESSURE: 80 MMHG

## 2022-07-08 DIAGNOSIS — B35.3 TINEA PEDIS OF BOTH FEET: ICD-10-CM

## 2022-07-08 DIAGNOSIS — Z13.220 SCREENING FOR HYPERLIPIDEMIA: ICD-10-CM

## 2022-07-08 DIAGNOSIS — E66.01 MORBID OBESITY (H): ICD-10-CM

## 2022-07-08 DIAGNOSIS — M54.16 LUMBAR RADICULOPATHY: ICD-10-CM

## 2022-07-08 DIAGNOSIS — M79.18 MYOFASCIAL MUSCLE PAIN: ICD-10-CM

## 2022-07-08 DIAGNOSIS — Z11.3 SCREEN FOR STD (SEXUALLY TRANSMITTED DISEASE): ICD-10-CM

## 2022-07-08 DIAGNOSIS — Z00.00 ROUTINE GENERAL MEDICAL EXAMINATION AT A HEALTH CARE FACILITY: Primary | ICD-10-CM

## 2022-07-08 DIAGNOSIS — D72.829 LEUKOCYTOSIS, UNSPECIFIED TYPE: ICD-10-CM

## 2022-07-08 DIAGNOSIS — J45.20 MILD INTERMITTENT ASTHMA WITHOUT COMPLICATION: ICD-10-CM

## 2022-07-08 DIAGNOSIS — G43.909 MIGRAINE WITHOUT STATUS MIGRAINOSUS, NOT INTRACTABLE, UNSPECIFIED MIGRAINE TYPE: ICD-10-CM

## 2022-07-08 DIAGNOSIS — Z11.59 NEED FOR HEPATITIS C SCREENING TEST: ICD-10-CM

## 2022-07-08 DIAGNOSIS — F10.11 HISTORY OF ALCOHOL ABUSE: ICD-10-CM

## 2022-07-08 DIAGNOSIS — F17.200 NICOTINE DEPENDENCE, UNCOMPLICATED, UNSPECIFIED NICOTINE PRODUCT TYPE: ICD-10-CM

## 2022-07-08 DIAGNOSIS — F15.11 HISTORY OF METHAMPHETAMINE ABUSE (H): ICD-10-CM

## 2022-07-08 DIAGNOSIS — L30.9 ECZEMA, UNSPECIFIED TYPE: ICD-10-CM

## 2022-07-08 DIAGNOSIS — Z11.4 SCREENING FOR HIV (HUMAN IMMUNODEFICIENCY VIRUS): ICD-10-CM

## 2022-07-08 DIAGNOSIS — J43.9 PULMONARY EMPHYSEMA, UNSPECIFIED EMPHYSEMA TYPE (H): ICD-10-CM

## 2022-07-08 DIAGNOSIS — M47.816 SPONDYLOSIS OF LUMBAR REGION WITHOUT MYELOPATHY OR RADICULOPATHY: ICD-10-CM

## 2022-07-08 DIAGNOSIS — J30.2 SEASONAL ALLERGIC RHINITIS, UNSPECIFIED TRIGGER: ICD-10-CM

## 2022-07-08 DIAGNOSIS — Z12.31 VISIT FOR SCREENING MAMMOGRAM: ICD-10-CM

## 2022-07-08 DIAGNOSIS — R07.81 RIB PAIN: ICD-10-CM

## 2022-07-08 LAB
ALBUMIN SERPL-MCNC: 4 G/DL (ref 3.4–5)
ALP SERPL-CCNC: 108 U/L (ref 40–150)
ALT SERPL W P-5'-P-CCNC: 37 U/L (ref 0–50)
AST SERPL W P-5'-P-CCNC: 26 U/L (ref 0–45)
BASOPHILS # BLD AUTO: 0 10E3/UL (ref 0–0.2)
BASOPHILS NFR BLD AUTO: 0 %
BILIRUB DIRECT SERPL-MCNC: 0.1 MG/DL (ref 0–0.2)
BILIRUB SERPL-MCNC: 0.4 MG/DL (ref 0.2–1.3)
CHOLEST SERPL-MCNC: 249 MG/DL
EOSINOPHIL # BLD AUTO: 0.3 10E3/UL (ref 0–0.7)
EOSINOPHIL NFR BLD AUTO: 2 %
ERYTHROCYTE [DISTWIDTH] IN BLOOD BY AUTOMATED COUNT: 13.6 % (ref 10–15)
FASTING STATUS PATIENT QL REPORTED: NO
FASTING STATUS PATIENT QL REPORTED: NO
GLUCOSE BLD-MCNC: 98 MG/DL (ref 70–99)
HCT VFR BLD AUTO: 40.8 % (ref 35–47)
HDLC SERPL-MCNC: 68 MG/DL
HGB BLD-MCNC: 13.7 G/DL (ref 11.7–15.7)
LDLC SERPL CALC-MCNC: 141 MG/DL
LYMPHOCYTES # BLD AUTO: 4.4 10E3/UL (ref 0.8–5.3)
LYMPHOCYTES NFR BLD AUTO: 29 %
MCH RBC QN AUTO: 31.7 PG (ref 26.5–33)
MCHC RBC AUTO-ENTMCNC: 33.6 G/DL (ref 31.5–36.5)
MCV RBC AUTO: 94 FL (ref 78–100)
MONOCYTES # BLD AUTO: 1.3 10E3/UL (ref 0–1.3)
MONOCYTES NFR BLD AUTO: 9 %
NEUTROPHILS # BLD AUTO: 9.3 10E3/UL (ref 1.6–8.3)
NEUTROPHILS NFR BLD AUTO: 61 %
NONHDLC SERPL-MCNC: 181 MG/DL
PLATELET # BLD AUTO: 262 10E3/UL (ref 150–450)
PROT SERPL-MCNC: 7.5 G/DL (ref 6.8–8.8)
RBC # BLD AUTO: 4.32 10E6/UL (ref 3.8–5.2)
TRIGL SERPL-MCNC: 202 MG/DL
WBC # BLD AUTO: 15.3 10E3/UL (ref 4–11)

## 2022-07-08 PROCEDURE — 82248 BILIRUBIN DIRECT: CPT | Performed by: NURSE PRACTITIONER

## 2022-07-08 PROCEDURE — 36415 COLL VENOUS BLD VENIPUNCTURE: CPT | Performed by: NURSE PRACTITIONER

## 2022-07-08 PROCEDURE — 80061 LIPID PANEL: CPT | Performed by: NURSE PRACTITIONER

## 2022-07-08 PROCEDURE — 99214 OFFICE O/P EST MOD 30 MIN: CPT | Mod: 25 | Performed by: NURSE PRACTITIONER

## 2022-07-08 PROCEDURE — 90471 IMMUNIZATION ADMIN: CPT | Performed by: NURSE PRACTITIONER

## 2022-07-08 PROCEDURE — 90677 PCV20 VACCINE IM: CPT | Performed by: NURSE PRACTITIONER

## 2022-07-08 PROCEDURE — 99396 PREV VISIT EST AGE 40-64: CPT | Mod: 25 | Performed by: NURSE PRACTITIONER

## 2022-07-08 PROCEDURE — 85025 COMPLETE CBC W/AUTO DIFF WBC: CPT | Performed by: NURSE PRACTITIONER

## 2022-07-08 PROCEDURE — 87340 HEPATITIS B SURFACE AG IA: CPT | Performed by: NURSE PRACTITIONER

## 2022-07-08 PROCEDURE — 82947 ASSAY GLUCOSE BLOOD QUANT: CPT | Mod: 59 | Performed by: NURSE PRACTITIONER

## 2022-07-08 PROCEDURE — 80053 COMPREHEN METABOLIC PANEL: CPT | Performed by: NURSE PRACTITIONER

## 2022-07-08 PROCEDURE — 87491 CHLMYD TRACH DNA AMP PROBE: CPT | Performed by: NURSE PRACTITIONER

## 2022-07-08 PROCEDURE — 87389 HIV-1 AG W/HIV-1&-2 AB AG IA: CPT | Performed by: NURSE PRACTITIONER

## 2022-07-08 PROCEDURE — 86803 HEPATITIS C AB TEST: CPT | Performed by: NURSE PRACTITIONER

## 2022-07-08 PROCEDURE — 86780 TREPONEMA PALLIDUM: CPT | Performed by: NURSE PRACTITIONER

## 2022-07-08 PROCEDURE — 87591 N.GONORRHOEAE DNA AMP PROB: CPT | Performed by: NURSE PRACTITIONER

## 2022-07-08 RX ORDER — AMITRIPTYLINE HYDROCHLORIDE 50 MG/1
50 TABLET ORAL AT BEDTIME
Qty: 90 TABLET | Refills: 3 | Status: SHIPPED | OUTPATIENT
Start: 2022-07-08 | End: 2023-07-07

## 2022-07-08 RX ORDER — RIZATRIPTAN BENZOATE 10 MG/1
10 TABLET ORAL
Qty: 10 TABLET | Refills: 2 | Status: SHIPPED | OUTPATIENT
Start: 2022-07-08 | End: 2023-07-07

## 2022-07-08 RX ORDER — PRENATAL VIT 91/IRON/FOLIC/DHA 28-975-200
COMBINATION PACKAGE (EA) ORAL 2 TIMES DAILY
Qty: 30 G | Refills: 0 | Status: SHIPPED | OUTPATIENT
Start: 2022-07-08 | End: 2023-07-07

## 2022-07-08 RX ORDER — TRIAMCINOLONE ACETONIDE 1 MG/G
CREAM TOPICAL 2 TIMES DAILY
Qty: 80 G | Refills: 1 | Status: SHIPPED | OUTPATIENT
Start: 2022-07-08 | End: 2023-07-07

## 2022-07-08 RX ORDER — DICLOFENAC SODIUM 75 MG/1
75 TABLET, DELAYED RELEASE ORAL 2 TIMES DAILY
Qty: 60 TABLET | Refills: 3 | Status: SHIPPED | OUTPATIENT
Start: 2022-07-08 | End: 2023-01-30

## 2022-07-08 RX ORDER — FLUTICASONE PROPIONATE 110 UG/1
1 AEROSOL, METERED RESPIRATORY (INHALATION) 2 TIMES DAILY
Qty: 12 G | Refills: 3 | Status: SHIPPED | OUTPATIENT
Start: 2022-07-08 | End: 2022-08-25

## 2022-07-08 RX ORDER — FLUTICASONE PROPIONATE 50 MCG
1 SPRAY, SUSPENSION (ML) NASAL DAILY
Qty: 9.9 ML | Refills: 0 | Status: SHIPPED | OUTPATIENT
Start: 2022-07-08 | End: 2023-01-03

## 2022-07-08 RX ORDER — ALBUTEROL SULFATE 90 UG/1
2 AEROSOL, METERED RESPIRATORY (INHALATION) EVERY 4 HOURS PRN
Qty: 18 G | Refills: 0 | Status: SHIPPED | OUTPATIENT
Start: 2022-07-08 | End: 2023-01-03

## 2022-07-08 RX ORDER — LORATADINE 10 MG/1
10 TABLET, ORALLY DISINTEGRATING ORAL DAILY
Qty: 90 TABLET | Refills: 1 | Status: SHIPPED | OUTPATIENT
Start: 2022-07-08 | End: 2022-10-06 | Stop reason: ALTCHOICE

## 2022-07-08 RX ORDER — IPRATROPIUM BROMIDE AND ALBUTEROL SULFATE 2.5; .5 MG/3ML; MG/3ML
1 SOLUTION RESPIRATORY (INHALATION) EVERY 6 HOURS PRN
Qty: 90 ML | Refills: 1 | Status: SHIPPED | OUTPATIENT
Start: 2022-07-08 | End: 2023-01-19

## 2022-07-08 ASSESSMENT — ENCOUNTER SYMPTOMS
COUGH: 1
MYALGIAS: 1
EYE PAIN: 0
ARTHRALGIAS: 0
HEADACHES: 0
DYSURIA: 0
WEAKNESS: 0
CONSTIPATION: 0
SORE THROAT: 0
PALPITATIONS: 0
CHILLS: 0
HEMATOCHEZIA: 0
SHORTNESS OF BREATH: 1
JOINT SWELLING: 0
FREQUENCY: 1
NAUSEA: 0
DIARRHEA: 0
FEVER: 0
HEARTBURN: 0
PARESTHESIAS: 0
NERVOUS/ANXIOUS: 0
HEMATURIA: 0
DIZZINESS: 0
BREAST MASS: 0
ABDOMINAL PAIN: 0

## 2022-07-08 ASSESSMENT — ASTHMA QUESTIONNAIRES: ACT_TOTALSCORE: 15

## 2022-07-08 ASSESSMENT — PATIENT HEALTH QUESTIONNAIRE - PHQ9
10. IF YOU CHECKED OFF ANY PROBLEMS, HOW DIFFICULT HAVE THESE PROBLEMS MADE IT FOR YOU TO DO YOUR WORK, TAKE CARE OF THINGS AT HOME, OR GET ALONG WITH OTHER PEOPLE: NOT DIFFICULT AT ALL
SUM OF ALL RESPONSES TO PHQ QUESTIONS 1-9: 5
SUM OF ALL RESPONSES TO PHQ QUESTIONS 1-9: 5

## 2022-07-08 NOTE — PATIENT INSTRUCTIONS
Schedule with Dr. Mittal to discuss medical marijuana (he does not prescribe, but he can certify you have a qualifying medical condition).    Preventive Health Recommendations  Female Ages 40 to 49    Yearly exam:   See your health care provider every year in order to  Review health changes.   Discuss preventive care.    Review your medicines if your doctor prescribed any.    Get a Pap test every three years (unless you have an abnormal result and your provider advises testing more often).    If you get Pap tests with HPV test, you only need to test every 5 years, unless you have an abnormal result. You do not need a Pap test if your uterus was removed (hysterectomy) and you have not had cancer.    You should be tested each year for STDs (sexually transmitted diseases), if you're at risk.   Ask your doctor if you should have a mammogram.    Have a colonoscopy (test for colon cancer) if someone in your family has had colon cancer or polyps before age 50.     Have a cholesterol test every 5 years.     Have a diabetes test (fasting glucose) after age 45. If you are at risk for diabetes, you should have this test every 3 years.    Shots: Get a flu shot each year. Get a tetanus shot every 10 years.     Nutrition:   Eat at least 5 servings of fruits and vegetables each day.  Eat whole-grain bread, whole-wheat pasta and brown rice instead of white grains and rice.  Get adequate Calcium and Vitamin D.      Lifestyle  Exercise at least 150 minutes a week (an average of 30 minutes a day, 5 days a week). This will help you control your weight and prevent disease.  Limit alcohol to one drink per day.  No smoking.   Wear sunscreen to prevent skin cancer.  See your dentist every six months for an exam and cleaning.    The Benefits of Living Tobacco-Free  What do you want to improve in your life by quitting tobacco? Whether you smoke cigarettes, e-cigarettes, cigars, or a pipe, or use smokeless tobacco, there are many reasons to  quit. Check off some reasons you want to be tobacco-free.  Health benefits  ___  I want to breathe without coughing or feeling short of breath.  ___  I want to reduce my risk for lung cancer, oral cancer, heart disease, bone problems such as osteoporosis and fractures, and chronic lung disease. Or reduce my risk for a serious lung injury called EVALI that may happen from vaping or using e-cigarettes.  ___  I want to have fewer wrinkles and softer skin.  ___  I want to improve my sense of taste and smell.  ___  For pregnant women: I want to reduce my risk for a miscarriage, stillbirth,  birth, or a low-birth-weight baby.  Personal benefits  ___  I want to be able to walk, go up stairs, and exercise without becoming out of breath.  ___  I want to feel more in control of my life.  ___  I want to have better-smelling hair, clothes, home, and car.  ___  I want to save time by not having to take smoke breaks, buy tobacco products, or hunt for a light.  ___  I want to have whiter teeth and fresher breath.  Family benefits  ___  I want to reduce my child's respiratory tract infections.  ___  I want to set a healthy example for my child.  ___  I want to have the energy needed to play with my children and not become out-of-breath  ___  I want to reduce my family s cancer risk.  Financial benefits  ___  I want to save hundreds of dollars each year that would be spent on tobacco products.  ___  I want to save money on medical bills.  ___  I want to save money on life, health, and car insurance premiums.  How much do you spend?  A tobacco habit is expensive. Do you know how much you spend on tobacco each year? Fill in the blanks below to find out:  A. How much do you pay for 1 pack of cigarettes, 1 cigar, 1 pouch of pipe tobacco, or 1 can of smokeless tobacco? $________  B. How many packs, cigars, pouches, or cans do you use each day? _______________  C. Multiply answer A with answer B:___________________  D. Multiply  answer C with 365: $___________________. This is your yearly cost of using tobacco.  Besides the cost of buying tobacco, there are other costs. These include:  Costs of cleaning clothing, furniture, and car  Replacement costs for clothing and furniture  Medical costs for tobacco-related illnesses  Missed days of work because of illness  Higher health, life, and car insurance premiums  Get help  QuitNow, www.cdc.gov/tobacco/quit_smoking/, 800-QUIT-NOW (722-048-3449).  QuitLine, www.smokefree.gov, 877-44U-QUIT (648-971-8073).    [employer] last reviewed this educational content on 4/1/2020 2000-2021 [employer]. All rights reserved. This information is not intended as a substitute for professional medical care. Always follow your healthcare professional's instructions.

## 2022-07-08 NOTE — PROGRESS NOTES
SUBJECTIVE:   CC: Daniella Sexton is an 41 year old woman who presents for preventive health visit.     Patient has been advised of split billing requirements and indicates understanding: Yes     Healthy Habits:     Getting at least 3 servings of Calcium per day:  NO    Bi-annual eye exam:  Yes    Dental care twice a year:  NO    Sleep apnea or symptoms of sleep apnea:  Daytime drowsiness    Diet:  Regular (no restrictions)    Frequency of exercise:  None    Taking medications regularly:  Yes    Medication side effects:  None    PHQ-2 Total Score: 1    Additional concerns today:  Yes     -Had COVID in April, since then coughing and wheezing every morning. Has not taken Flovent inhaler for some time due to moving and losing it.  -Pain much better since having RF ablation- feels it is well managed on current medications and prefers not to return to pain clinic.  -Smoking marijuana daily for chronic pain (oney or electric vaping device)- wonders about getting medical marijuana but concerned about cost.  -started new job and really likes it, but feet get sweaty. over the counter athlete's foot spray helping  -Itchy rash left shin, right ankle, right hand, generalized itching  -rarely has migraines, Maxalt effective when needed  -Was drinking more but states she has cut back to 2-3 wine coolers a few nights/week. Went through treatment for alcohol 10 years ago, no longer attends AA  -When standing/lifing a lot at work, occasionally get a sharp spasm in the right or left ribs, ribs are tender to touch when this happens, bending over/squatting helps.      Today's PHQ-2 Score:   PHQ-2 ( 1999 Pfizer) 7/8/2022   Q1: Little interest or pleasure in doing things 1   Q2: Feeling down, depressed or hopeless 0   PHQ-2 Score 1   PHQ-2 Total Score (12-17 Years)- Positive if 3 or more points; Administer PHQ-A if positive -   Q1: Little interest or pleasure in doing things Several days   Q2: Feeling down, depressed or hopeless Not at  all   PHQ-2 Score 1       Abuse: Current or Past (Physical, Sexual or Emotional) - No  Do you feel safe in your environment? Yes    Have you ever done Advance Care Planning? (For example, a Health Directive, POLST, or a discussion with a medical provider or your loved ones about your wishes): No, advance care planning information given to patient to review.  Advanced care planning was discussed at today's visit.    Social History     Tobacco Use     Smoking status: Current Every Day Smoker     Packs/day: 0.50     Types: Cigarettes     Smokeless tobacco: Never Used     Tobacco comment: 5 cigarettes/day   Substance Use Topics     Alcohol use: Yes     Comment: 3-4 beers a night          Alcohol Use 7/8/2022   Prescreen: >3 drinks/day or >7 drinks/week? Yes   Prescreen: >3 drinks/day or >7 drinks/week? -   AUDIT SCORE  8     AUDIT - Alcohol Use Disorders Identification Test - Reproduced from the World Health Organization Audit 2001 (Second Edition) 7/8/2022   1.  How often do you have a drink containing alcohol? 4 or more times a week   2.  How many drinks containing alcohol do you have on a typical day when you are drinking? 3 or 4   3.  How often do you have five or more drinks on one occasion? Weekly   4.  How often during the last year have you found that you were not able to stop drinking once you had started? Never   5.  How often during the last year have you failed to do what was normally expected of you because of drinking? Never   6.  How often during the last year have you needed a first drink in the morning to get yourself going after a heavy drinking session? Never   7.  How often during the last year have you had a feeling of guilt or remorse after drinking? Never   8.  How often during the last year have you been unable to remember what happened the night before because of your drinking? Never   9.  Have you or someone else been injured because of your drinking? No   10. Has a relative, friend, doctor or  other health care worker been concerned about your drinking or suggested you cut down? No   TOTAL SCORE 8       Reviewed orders with patient.  Reviewed health maintenance and updated orders accordingly - Yes  Labs reviewed in EPIC    Breast Cancer Screening:    FHS-7: No flowsheet data found.  click delete button to remove this line now  Mammogram Screening - Offered annual screening and updated Health Maintenance for mutual plan based on risk factor consideration    Pertinent mammograms are reviewed under the imaging tab.    History of abnormal Pap smear: NO - age 30-65 PAP every 5 years with negative HPV co-testing recommended  PAP / HPV Latest Ref Rng & Units 5/8/2019   PAP (Historical) - NIL   HPV16 NEG:Negative Negative   HPV18 NEG:Negative Negative   HRHPV NEG:Negative Negative     Reviewed and updated as needed this visit by clinical staff   Tobacco  Allergies  Meds                Reviewed and updated as needed this visit by Provider                       Review of Systems   Constitutional: Negative for chills and fever.   HENT: Positive for congestion. Negative for ear pain, hearing loss and sore throat.    Eyes: Positive for visual disturbance. Negative for pain.   Respiratory: Positive for cough and shortness of breath.    Cardiovascular: Positive for chest pain. Negative for palpitations and peripheral edema.   Gastrointestinal: Negative for abdominal pain, constipation, diarrhea, heartburn, hematochezia and nausea.   Breasts:  Negative for tenderness, breast mass and discharge.   Genitourinary: Positive for frequency and vaginal discharge. Negative for dysuria, genital sores, hematuria, pelvic pain, urgency and vaginal bleeding.   Musculoskeletal: Positive for myalgias. Negative for arthralgias and joint swelling.   Skin: Positive for rash.   Neurological: Negative for dizziness, weakness, headaches and paresthesias.   Psychiatric/Behavioral: Negative for mood changes. The patient is not  "nervous/anxious.         OBJECTIVE:   /80 (BP Location: Left arm, Patient Position: Sitting, Cuff Size: Adult Large)   Pulse 109   Temp 98.7  F (37.1  C) (Oral)   Ht 1.715 m (5' 7.5\")   Wt 115.2 kg (254 lb)   LMP 06/24/2022   SpO2 97%   BMI 39.19 kg/m    Physical Exam  GENERAL: healthy, alert and no distress  EYES: Eyes grossly normal to inspection, PERRL and conjunctivae and sclerae normal  HENT: ear canals and TM's normal, nose and mouth without ulcers or lesions  NECK: no adenopathy, no asymmetry, masses, or scars and thyroid normal to palpation  RESP: lungs clear to auscultation - no rales, rhonchi or wheezes  BREAST: normal without masses, tenderness or nipple discharge and no palpable axillary masses or adenopathy  CV: regular rate and rhythm, normal S1 S2, no S3 or S4, no murmur, click or rub, no peripheral edema and peripheral pulses strong  ABDOMEN: soft, nontender, no hepatosplenomegaly, no masses and bowel sounds normal  MS: no gross musculoskeletal defects noted, no edema  SKIN: Erythematous, scaly, fine papular patch right dorsal hand and left anterior shin; peeling of skin in web spaces of toes  NEURO: Normal strength and tone, mentation intact and speech normal  PSYCH: mentation appears normal, affect normal/bright    Diagnostic Test Results:  Labs reviewed in Epic  Results for orders placed or performed in visit on 07/08/22 (from the past 24 hour(s))   CBC with platelets and differential    Narrative    The following orders were created for panel order CBC with platelets and differential.  Procedure                               Abnormality         Status                     ---------                               -----------         ------                     CBC with platelets and d...[[id number]]                                                   Please view results for these tests on the individual orders.       ASSESSMENT/PLAN:   (Z00.00) Routine general medical examination at a " health care facility  (primary encounter diagnosis)  Plan: Glucose          (J45.20) Mild intermittent asthma without complication  Comment: Uncontrolled, restart Flovent and follow up in 6 weeks  Plan: fluticasone (FLOVENT HFA) 110 MCG/ACT inhaler,         ipratropium - albuterol 0.5 mg/2.5 mg/3 mL         (DUONEB) 0.5-2.5 (3) MG/3ML neb solution,         albuterol (PROAIR HFA/PROVENTIL HFA/VENTOLIN         HFA) 108 (90 Base) MCG/ACT inhaler          (J43.9) Pulmonary emphysema, unspecified emphysema type (H)  Comment: as above  Plan: ipratropium - albuterol 0.5 mg/2.5 mg/3 mL         (DUONEB) 0.5-2.5 (3) MG/3ML neb solution,         albuterol (PROAIR HFA/PROVENTIL HFA/VENTOLIN         HFA) 108 (90 Base) MCG/ACT inhaler          (J30.2) Seasonal allergic rhinitis, unspecified trigger  Plan: loratadine (CLARITIN REDITABS) 10 MG ODT,         fluticasone (FLONASE) 50 MCG/ACT nasal spray          (M47.816) Spondylosis of lumbar region without myelopathy or radiculopathy  Comment: Medications refilled, patient is doing well with pain control and prefers I take over her medications for chronic pain. Consider referral back to pain clinic as needed  Plan: diclofenac (VOLTAREN) 75 MG EC tablet,         amitriptyline (ELAVIL) 50 MG tablet          (M79.18) Myofascial muscle pain  Plan: amitriptyline (ELAVIL) 50 MG tablet, tiZANidine        (ZANAFLEX) 4 MG tablet          (M54.16) Lumbar radiculopathy  Plan: amitriptyline (ELAVIL) 50 MG tablet          (G43.909) Migraine without status migrainosus, not intractable, unspecified migraine type  Comment: well controlled  Plan: rizatriptan (MAXALT) 10 MG tablet          (B35.3) Tinea pedis of both feet  Comment: Wash socks on hot, use over the counter spray in shoes or leave shoes in direct sun to kill fungus  Plan: terbinafine (LAMISIL) 1 % external cream          (R07.81) Rib pain  Comment: Likely due to large breasts- recommend getting fitted for appropriate sized, supportive  bra. Follow-up if not better    (F10.11) History of alcohol abuse  Comment: Encouraged her to cut back to no more than 7 drinks/week or 3 per occasion. 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 Use unscented Dove bar soap and unscented, dye-free laundry detergent. Kenalog two times daily to rashy areas for flares  Plan: triamcinolone (KENALOG) 0.1 % external cream          (E66.01) Morbid obesity (H)  Comment: diet/exercise counseling performed    (Z11.59) Need for hepatitis C screening test  Plan: Hepatitis C Screen Reflex to HCV RNA Quant and         Genotype          (Z13.220) Screening for hyperlipidemia  Plan: Lipid panel reflex to direct LDL Non-fasting          (F17.200) Nicotine dependence, uncomplicated, unspecified nicotine product type  Comment: Encouraged cessation  Plan: Pneumococcal 20 Valent Conjugate (Prevnar 20)          (Z11.3) Screen for STD (sexually transmitted disease)  Plan: Hepatitis B surface antigen, Treponema Abs w         Reflex to RPR and Titer, NEISSERIA GONORRHOEA         PCR, CHLAMYDIA TRACHOMATIS PCR          (Z12.31) Visit for screening mammogram  Plan: *MA Screening Digital Bilateral          (Z11.4) Screening for HIV (human immunodeficiency virus)  Plan: HIV Antigen Antibody Combo                COUNSELING:  Reviewed preventive health counseling, as reflected in patient instructions       Regular exercise       Healthy diet/nutrition       Immunizations    Vaccinated for: Pneumococcal             Alcohol Use       Contraception       Safe sex practices/STD prevention       Consider Hep C screening for all patients one time for ages 18-79 years       Syphilis screening for  "high risk patients        HIV screeninx in teen years, 1x in adult years, and at intervals if high risk       One time pneumovax for smokers    Estimated body mass index is 39.19 kg/m  as calculated from the following:    Height as of this encounter: 1.715 m (5' 7.5\").    Weight as of this encounter: 115.2 kg (254 lb).    Weight management plan: Discussed healthy diet and exercise guidelines    She reports that she has been smoking cigarettes. She has been smoking about 0.50 packs per day. She has never used smokeless tobacco.  Tobacco Cessation Action Plan:   Self help information given to patient      Counseling Resources:  ATP IV Guidelines  Pooled Cohorts Equation Calculator  Breast Cancer Risk Calculator  BRCA-Related Cancer Risk Assessment: FHS-7 Tool  FRAX Risk Assessment  ICSI Preventive Guidelines  Dietary Guidelines for Americans, 2010  USDA's MyPlate  ASA Prophylaxis  Lung CA Screening    Renetta Morrow, JENAE CNP  [facility] [place]  Answers for HPI/ROS submitted by the patient on 2022  If you checked off any problems, how difficult have these problems made it for you to do your work, take care of things at home, or get along with other people?: Not difficult at all  PHQ9 TOTAL SCORE: 5      "

## 2022-07-09 LAB
C TRACH DNA SPEC QL NAA+PROBE: NEGATIVE
N GONORRHOEA DNA SPEC QL NAA+PROBE: NEGATIVE
T PALLIDUM AB SER QL: NONREACTIVE

## 2022-07-11 LAB
HBV SURFACE AG SERPL QL IA: NONREACTIVE
HCV AB SERPL QL IA: NONREACTIVE
HIV 1+2 AB+HIV1 P24 AG SERPL QL IA: NONREACTIVE

## 2022-07-13 LAB
ANION GAP SERPL CALCULATED.3IONS-SCNC: 19 MMOL/L (ref 7–15)
BUN SERPL-MCNC: 8.1 MG/DL (ref 6–20)
CALCIUM SERPL-MCNC: 9.9 MG/DL (ref 8.6–10)
CHLORIDE SERPL-SCNC: 98 MMOL/L (ref 98–107)
CREAT SERPL-MCNC: 0.65 MG/DL (ref 0.51–0.95)
DEPRECATED HCO3 PLAS-SCNC: 22 MMOL/L (ref 22–29)
GFR SERPL CREATININE-BSD FRML MDRD: >90 ML/MIN/1.73M2
GLUCOSE SERPL-MCNC: 96 MG/DL (ref 70–99)
POTASSIUM SERPL-SCNC: 4.5 MMOL/L (ref 3.4–5.3)
SODIUM SERPL-SCNC: 139 MMOL/L (ref 136–145)

## 2022-07-14 ENCOUNTER — TELEPHONE (OUTPATIENT)
Dept: FAMILY MEDICINE | Facility: CLINIC | Age: 42
End: 2022-07-14

## 2022-07-14 NOTE — TELEPHONE ENCOUNTER
Patient viewed results via [employer].     Thanks,  MCKAYLA Holbrook  [facility]

## 2022-07-14 NOTE — TELEPHONE ENCOUNTER
----- Message from JENAE Sibley CNP sent at 7/13/2022  9:11 AM CDT -----  Please call if patient does not view results.  Renetta Morrow CNP    Hi Daniella,   Here are your recent results.   -Normal red blood cell (hgb) levels, high white blood cell count and normal platelet levels. ADVISE: This can be from many causes such as infection or inflammation. Please return in 1 week for a lab only appointment to recheck these levels.     -LDL(bad) cholesterol level is elevated which can increase your heart disease risk.  A diet high in fat and simple carbohydrates, genetics and being overweight can contribute to this. ADVISE: exercising 150 minutes of aerobic exercise per week (30 minutes for 5 days per week or 50 minutes for 3 days per week are options) and eating a low saturated fat/low carbohydrate diet are helpful to improve this.  -Glucose (diabetic screening test) is normal.  -Liver and gallbladder tests (ALT,AST, Alk phos,bilirubin) are normal.  -Hepatitis C antibody screen test shows no signs of a previous hepatitis C infection.  -HIV test is normal.  -Chlamydia and gonnohrea tests are normal.  -Syphilis and Hepatitis B are negative.     The 10-year ASCVD risk score (Milo DC Jr., et al., 2013) is: 2.4%    Values used to calculate the score:      Age: 41 years      Sex: Female      Is Non- : No      Diabetic: No      Tobacco smoker: Yes      Systolic Blood Pressure: 126 mmHg      Is BP treated: No      HDL Cholesterol: 68 mg/dL      Total Cholesterol: 249 mg/dL        Renetta Morrow CNP   Written by JENAE Sibley

## 2022-07-14 NOTE — TELEPHONE ENCOUNTER
Attempted to call pt, left vm asking for return call or to check mychart.      Thanks,  MCKAYLA Holbrook  [facility]

## 2022-07-15 ENCOUNTER — APPOINTMENT (OUTPATIENT)
Dept: OPTOMETRY | Facility: CLINIC | Age: 42
End: 2022-07-15
Payer: COMMERCIAL

## 2022-07-15 PROCEDURE — 92341 FIT SPECTACLES BIFOCAL: CPT | Performed by: OPTOMETRIST

## 2022-08-05 ENCOUNTER — ANCILLARY PROCEDURE (OUTPATIENT)
Dept: MAMMOGRAPHY | Facility: CLINIC | Age: 42
End: 2022-08-05
Attending: NURSE PRACTITIONER
Payer: COMMERCIAL

## 2022-08-05 DIAGNOSIS — Z12.31 VISIT FOR SCREENING MAMMOGRAM: ICD-10-CM

## 2022-08-05 PROCEDURE — 77067 SCR MAMMO BI INCL CAD: CPT | Mod: TC | Performed by: RADIOLOGY

## 2022-08-23 ENCOUNTER — OFFICE VISIT (OUTPATIENT)
Dept: URGENT CARE | Facility: URGENT CARE | Age: 42
End: 2022-08-23
Payer: COMMERCIAL

## 2022-08-23 VITALS
SYSTOLIC BLOOD PRESSURE: 126 MMHG | TEMPERATURE: 98.8 F | BODY MASS INDEX: 39.19 KG/M2 | HEART RATE: 80 BPM | DIASTOLIC BLOOD PRESSURE: 84 MMHG | WEIGHT: 254 LBS | OXYGEN SATURATION: 96 %

## 2022-08-23 DIAGNOSIS — Z72.0 TOBACCO ABUSE: ICD-10-CM

## 2022-08-23 DIAGNOSIS — J44.0 COPD WITH ACUTE BRONCHITIS (H): Primary | ICD-10-CM

## 2022-08-23 DIAGNOSIS — R91.8 PULMONARY NODULES: ICD-10-CM

## 2022-08-23 DIAGNOSIS — J20.9 COPD WITH ACUTE BRONCHITIS (H): Primary | ICD-10-CM

## 2022-08-23 PROCEDURE — 99214 OFFICE O/P EST MOD 30 MIN: CPT | Performed by: PHYSICIAN ASSISTANT

## 2022-08-23 RX ORDER — PREDNISONE 20 MG/1
20 TABLET ORAL 2 TIMES DAILY
Qty: 14 TABLET | Refills: 0 | Status: SHIPPED | OUTPATIENT
Start: 2022-08-23 | End: 2022-08-30

## 2022-08-23 RX ORDER — CODEINE PHOSPHATE AND GUAIFENESIN 10; 100 MG/5ML; MG/5ML
1-2 SOLUTION ORAL EVERY 4 HOURS PRN
Qty: 118 ML | Refills: 0 | Status: SHIPPED | OUTPATIENT
Start: 2022-08-23 | End: 2022-09-22

## 2022-08-23 RX ORDER — DOXYCYCLINE 100 MG/1
100 CAPSULE ORAL 2 TIMES DAILY WITH MEALS
Qty: 20 CAPSULE | Refills: 0 | Status: SHIPPED | OUTPATIENT
Start: 2022-08-23 | End: 2022-09-02

## 2022-08-23 ASSESSMENT — ENCOUNTER SYMPTOMS
SINUS PRESSURE: 0
SORE THROAT: 0
CONSTITUTIONAL NEGATIVE: 1
GASTROINTESTINAL NEGATIVE: 1
CHILLS: 0
WHEEZING: 1
FEVER: 0
COUGH: 1
FATIGUE: 0
SINUS PAIN: 0
PALPITATIONS: 0
SHORTNESS OF BREATH: 1
RHINORRHEA: 1
CARDIOVASCULAR NEGATIVE: 1
ARTHRALGIAS: 1

## 2022-08-23 NOTE — PROGRESS NOTES
Deniz Brewer is a 42 year old, presenting for the following health issues:  Cough (Coughing since she had Covid couple months ago, back front, rib cage hurts, coughing up yellowish phlegm, coughing to where she almost vomited. Would like to make sure she does not have infection and need antibiotics )    HPI   Acute Illness  Acute illness concerns:   Onset/Duration: 2months  Symptoms:  Fever: No  Chills/Sweats: No  Headache (location?): No  Sinus Pressure: No  Conjunctivitis:  No  Ear Pain: no  Rhinorrhea: YES  Congestion: YES  Sore Throat: No  Cough: YES-productive of yellow sputum, with shortness of breath  Wheeze: YES  Decreased Appetite: No  Nausea: No  Vomiting: YES  Diarrhea: No  Dysuria/Freq.: No  Dysuria or Hematuria: No  Fatigue/Achiness: YES  Sick/Strep Exposure: No ill contacts.  Hx of COPD.  Smoker.  Therapies tried and outcome: inhalers, nebs with minimal relief    Patient Active Problem List   Diagnosis     Anxiety     Depressive disorder     Duodenal ulcer     History of alcohol abuse     History of methamphetamine abuse (H)     Migraines     Seasonal allergic rhinitis     Sleep disturbance     Tobacco abuse     CARDIOVASCULAR SCREENING; LDL GOAL LESS THAN 160     Chronic bilateral low back pain with bilateral sciatica     Pure hypercholesterolemia     Mild intermittent asthma without complication     Chronic pansinusitis     Obesity (BMI 35.0-39.9) with comorbidity (H)     Lumbago     Fatty liver     Lung nodule     Pulmonary emphysema, unspecified emphysema type (H)     Current Outpatient Medications   Medication     albuterol (PROAIR HFA/PROVENTIL HFA/VENTOLIN HFA) 108 (90 Base) MCG/ACT inhaler     amitriptyline (ELAVIL) 50 MG tablet     diclofenac (VOLTAREN) 75 MG EC tablet     fluticasone (FLONASE) 50 MCG/ACT nasal spray     fluticasone (FLOVENT HFA) 110 MCG/ACT inhaler     gabapentin (NEURONTIN) 300 MG capsule     ipratropium - albuterol 0.5 mg/2.5 mg/3 mL (DUONEB) 0.5-2.5 (3) MG/3ML neb  solution     loratadine (CLARITIN REDITABS) 10 MG ODT     nicotine (NICORETTE) 2 MG gum     rizatriptan (MAXALT) 10 MG tablet     terbinafine (LAMISIL) 1 % external cream     tiZANidine (ZANAFLEX) 4 MG tablet     triamcinolone (KENALOG) 0.1 % external cream     No current facility-administered medications for this visit.        Allergies   Allergen Reactions     Amoxicillin Anaphylaxis     Morphine Other (See Comments) and Nausea and Vomiting     Penicillins Unknown       Review of Systems   Constitutional: Negative.  Negative for chills, fatigue and fever.   HENT: Positive for congestion and rhinorrhea. Negative for ear discharge, ear pain, hearing loss, sinus pressure, sinus pain and sore throat.    Respiratory: Positive for cough, shortness of breath and wheezing.    Cardiovascular: Negative.  Negative for chest pain, palpitations and peripheral edema.   Gastrointestinal: Negative.    Musculoskeletal: Positive for arthralgias.   All other systems reviewed and are negative.           Objective    /84 (BP Location: Left arm, Patient Position: Sitting, Cuff Size: Adult Large)   Pulse 80   Temp 98.8  F (37.1  C) (Oral)   Wt 115.2 kg (254 lb)   LMP 06/24/2022   SpO2 96%   BMI 39.19 kg/m    Body mass index is 39.19 kg/m .  Physical Exam  Vitals and nursing note reviewed.   Constitutional:       General: She is not in acute distress.     Appearance: Normal appearance. She is well-developed and normal weight. She is not ill-appearing.   HENT:      Head: Normocephalic and atraumatic.      Comments: TMs are intact without any erythema or bulging bilaterally.  Airway is patent.     Nose: Nose normal.      Mouth/Throat:      Mouth: Mucous membranes are moist.      Pharynx: Uvula midline. No pharyngeal swelling, oropharyngeal exudate or posterior oropharyngeal erythema.      Tonsils: No tonsillar exudate.   Eyes:      General: No scleral icterus.     Extraocular Movements: Extraocular movements intact.       Conjunctiva/sclera: Conjunctivae normal.      Pupils: Pupils are equal, round, and reactive to light.   Neck:      Thyroid: No thyromegaly.   Cardiovascular:      Rate and Rhythm: Normal rate and regular rhythm.      Pulses: Normal pulses.      Heart sounds: Normal heart sounds, S1 normal and S2 normal. No murmur heard.    No friction rub. No gallop.   Pulmonary:      Effort: Pulmonary effort is normal. No accessory muscle usage, respiratory distress or retractions.      Breath sounds: Normal breath sounds and air entry. No stridor. No decreased breath sounds, wheezing, rhonchi or rales.   Musculoskeletal:      Cervical back: Normal range of motion and neck supple.   Lymphadenopathy:      Cervical: No cervical adenopathy.   Skin:     General: Skin is warm and dry.      Nails: There is no clubbing.   Neurological:      Mental Status: She is alert and oriented to person, place, and time.   Psychiatric:         Mood and Affect: Mood normal.         Behavior: Behavior normal.         Thought Content: Thought content normal.         Judgment: Judgment normal.       No results found for this or any previous visit (from the past 24 hour(s)).  CXR PA/lateral:  No infiltrates, effusions or pneumothorax.  RLM nodule which has increased in size compared to CT scan from 2/2022. No fractures.  Per my read.   Will send for overread.      Assessment/Plan:  COPD with acute bronchitis (H): CXR was negative for pneumonia.  Will treat with [id number], [id number], and robitussin AC.  Continue with her nebs and inh as needed for symptoms.  Recommend treatment with rest, fluids and chicken soup. Tylenol/ibuprofen prn fever/pain.  Recheck in clinic if symptoms worsen or if symptoms do not improve.  To the ER if he develops hemoptysis, chest pain, fevers>102, worsening shortness of breath/wheezing.    -     XR Chest 2 Views  -     predniSONE (DELTASONE) 20 MG tablet; Take 1 tablet (20 mg) by mouth 2 times daily for 7 days  -      doxycycline monohydrate (MONODOX) 100 MG capsule; Take 1 capsule (100 mg) by mouth 2 times daily (with meals) for 10 days Increases risk of heartburn and also sun sensitivity or sun burn. Contraindicated in pregnancy.  -     guaiFENesin-codeine (ROBITUSSIN AC) 100-10 MG/5ML solution; Take 5-10 mLs by mouth every 4 hours as needed for cough    Pulmonary nodules:  RML has increased in size.  Will need another CT chest.  Recommend that she f/u with her PCP regarding this.    Tobacco abuse:  Will refill her nicotine gum.  -     nicotine (NICORETTE) 2 MG gum; Place 1 each (2 mg) inside cheek every hour as needed for smoking cessation        ROGER Singh  ..

## 2022-08-25 ENCOUNTER — OFFICE VISIT (OUTPATIENT)
Dept: FAMILY MEDICINE | Facility: CLINIC | Age: 42
End: 2022-08-25
Payer: COMMERCIAL

## 2022-08-25 VITALS
BODY MASS INDEX: 38.34 KG/M2 | WEIGHT: 253 LBS | DIASTOLIC BLOOD PRESSURE: 78 MMHG | HEART RATE: 107 BPM | TEMPERATURE: 98.1 F | HEIGHT: 68 IN | SYSTOLIC BLOOD PRESSURE: 124 MMHG | OXYGEN SATURATION: 99 %

## 2022-08-25 DIAGNOSIS — R91.8 PULMONARY NODULES: ICD-10-CM

## 2022-08-25 DIAGNOSIS — J45.20 MILD INTERMITTENT ASTHMA WITHOUT COMPLICATION: ICD-10-CM

## 2022-08-25 DIAGNOSIS — J44.0 COPD WITH ACUTE BRONCHITIS (H): Primary | ICD-10-CM

## 2022-08-25 DIAGNOSIS — J20.9 COPD WITH ACUTE BRONCHITIS (H): Primary | ICD-10-CM

## 2022-08-25 DIAGNOSIS — Z72.0 TOBACCO ABUSE: ICD-10-CM

## 2022-08-25 PROCEDURE — 99214 OFFICE O/P EST MOD 30 MIN: CPT | Performed by: NURSE PRACTITIONER

## 2022-08-25 RX ORDER — BENZONATATE 200 MG/1
200 CAPSULE ORAL 3 TIMES DAILY PRN
Qty: 21 CAPSULE | Refills: 0 | Status: SHIPPED | OUTPATIENT
Start: 2022-08-25 | End: 2022-09-22

## 2022-08-25 RX ORDER — FLUTICASONE PROPIONATE 110 UG/1
2 AEROSOL, METERED RESPIRATORY (INHALATION) 2 TIMES DAILY
Qty: 12 G | Refills: 3 | Status: SHIPPED | OUTPATIENT
Start: 2022-08-25 | End: 2022-10-06 | Stop reason: ALTCHOICE

## 2022-08-25 NOTE — PROGRESS NOTES
"  Assessment & Plan     COPD with acute bronchitis (H)  Improving on current treatment plan, continue medications as prescribed.  - benzonatate (TESSALON) 200 MG capsule; Take 1 capsule (200 mg) by mouth 3 times daily as needed for cough    Pulmonary nodules  Patient concerned that nodule larger on recent CXR than prior CT, although CXR measurement similar to 2020. Will get recommendations from Lung nodule clinic.  - Adult Oncology/Hematology  Referral; Future    Mild intermittent asthma without complication  Uncontrolled, increase Flovent dose.  - fluticasone (FLOVENT HFA) 110 MCG/ACT inhaler; Inhale 2 puffs into the lungs 2 times daily    Tobacco abuse  Encouraged cessation. Has nicotine gum.      Review of the result(s) of each unique test - CXR, Chest CT  Ordering of each unique test  Prescription drug management         See Patient Instructions    Return in about 6 weeks (around 10/6/2022) for COPD/asthma check.    JENAE [facility] DONI Brewer is a 42 year old accompanied by her self, presenting for the following health issues:  Urgent Care Fup    [facility]     ED/UC Followup:    Facility:  [facility]  Date of visit: 08/23/2022  Reason for visit: Cough  Current Status: Rib cage isn't feeling as bad    Coughing, short of breath, wheezing. Post-tussive vomiting. Went to Urgent Care two days ago due to this- treated with Prednisone, Doxycycline and is having some improvement. Ribcage is sore from coughing. Urgent Care advised follow up for long nodule- Radiology measured at 10.9 mm on CXR, previously 4mm on CT. CXR in 2020 nodule measured 11mm.      Review of Systems   Constitutional, HEENT, cardiovascular, pulmonary, gi and gu systems are negative, except as otherwise noted.      Objective    /78 (BP Location: Left arm, Patient Position: Chair, Cuff Size: Adult Large)   Pulse 107   Temp 98.1  F (36.7  C) (Oral)   Ht 1.715 m (5' 7.5\")   " Wt 114.8 kg (253 lb)   SpO2 99%   BMI 39.04 kg/m    Body mass index is 39.04 kg/m .  Physical Exam   GENERAL: healthy, alert and no distress  RESP: lungs clear to auscultation - no rales, rhonchi or wheezes  CV: regular rate and rhythm, normal S1 S2, no S3 or S4, no murmur, click or rub, no peripheral edema and peripheral pulses strong  PSYCH: mentation appears normal, affect normal/bright    No results found for this or any previous visit (from the past 24 hour(s)).                .  ..

## 2022-08-30 NOTE — CONFIDENTIAL NOTE
RECORDS STATUS - ALL OTHER DIAGNOSIS    Pulmonary nodules  RECORDS RECEIVED FROM:[facility]   DATE RECEIVED: 11/7/2022    Action    Action Taken 8/30/2022 7:51AM TIM   I called [facility] [phone]     9/8/2022 7:54AM TIM  I resolved the image in PACS      NOTES STATUS DETAILS   OFFICE NOTE from referring provider Complete Renetta Almeida APRN CNP   DISCHARGE SUMMARY from hospital     DISCHARGE REPORT from the ER     MEDICATION LIST Complete [facility]   CLINICAL TRIAL TREATMENTS TO DATE     LABS     PATHOLOGY REPORTS     ANYTHING RELATED TO DIAGNOSIS COmplete Labs last update on 7/13/2022    GENONOMIC TESTING     TYPE:     IMAGING (NEED IMAGES & REPORT)     CT SCANS Complete CT Chest 2/9/2022   MRI     Xray Chest Complete    Complete- [facility] 8/23/2022   MAMMO     ULTRASOUND     PET

## 2022-09-09 NOTE — TELEPHONE ENCOUNTER
NEUROLOGY CLINIC NOTE    Patient ID:  Sadie Elliott  [id number]  36 y.o.  1951    Date of Visit:  September 9, 2022    Referring Physician: Dr. Norma Scott    Reason for Visit:  Hand and feet cold sensation    Chief Complaint   Patient presents with    Follow-up       History of Present Illness:     Patient Active Problem List    Diagnosis Date Noted    Raynaud's phenomenon without gangrene 05/17/2022    Paresthesia 02/10/2022    Cervical radiculopathy 10/08/2021    Lumbar radiculopathy 10/08/2021    LEO (obstructive sleep apnea) 07/20/2021    TIA (transient ischemic attack)     Cervical spondylosis with radiculopathy 03/21/2021    DDD (degenerative disc disease), cervical 03/21/2021    Other spondylosis with radiculopathy, lumbar region 03/21/2021    Benign prostatic hyperplasia 01/15/2021    Essential hypertension 01/15/2021    Hypogonadism in male 01/15/2021    Other specified hypothyroidism 01/15/2021    Mixed hyperlipidemia 01/15/2021    Prediabetes 01/15/2021    Rosacea 01/15/2021    Severe obesity ([address] 09/10/2019     Past Medical History:   Diagnosis Date    Benign prostatic hyperplasia     Blood glucose abnormal     Essential hypertension     Hypogonadism in male     Mixed hyperlipidemia     Other specified hypothyroidism     Palpitations     Prediabetes     Rosacea     TIA (transient ischemic attack)     Per patient. Negative imgaing. Past Surgical History:   Procedure Laterality Date    HX CATARACT REMOVAL  08/2021    HX CIRCUMCISION  2001    HX COLONOSCOPY  07/13/2018    2 adenomatous polyps removed -Dr. Viky Sagastume COLONOSCOPY  01/01/2012    HX CYST REMOVAL  1963    pilonidal cyst surgery    HX HERNIA REPAIR      HX KNEE REPLACEMENT Bilateral     HX ORTHOPAEDIC      Neuroma on foot removed     HX VASECTOMY      HX VEIN STRIPPING  12/2008    Dr. Michael Whittaker      Prior to Admission medications    Medication Sig Start Date End Date Taking?  Authorizing Provider   amLODIPine (NORVASC) 10 mg tablet Take Received fax request from [facility] pharmacy requesting refill(s) for diclofenac (VOLTAREN) 75 MG EC tablet    Last refilled on 09/14/20    Pt last seen on 09/10/20  Next appt scheduled for : none    Will facilitate refill.     1 Tablet by mouth daily. 7/1/22  Yes Shelton Lucas MD   Synthroid 100 mcg tablet TAKE 1 TABLET EVERY MORNING 6/20/22  Yes Royer Stanley NP   gabapentin (NEURONTIN) 100 mg capsule Take 2 Capsules by mouth three (3) times daily. Max Daily Amount: 600 mg. 4/18/22  Yes Gracy Collet., MD   atorvastatin (LIPITOR) 20 mg tablet TAKE 1 TABLET DAILY 4/14/22  Yes Sehlton Lucas MD   clopidogreL (PLAVIX) 75 mg tab TAKE 1 TABLET ONCE DAILY 3/9/22  Yes Royer Stanley NP   cholecalciferol (VITAMIN D3) (1000 Units /25 mcg) tablet TAKE 2 TABLETS DAILY 3/9/22  Yes Shelton Lucas MD   tadalafiL (CIALIS) 5 mg tablet Take 5 mg by mouth daily. 1/14/22  Yes Provider, Historical   benazepril-hydroCHLOROthiazide (LOTENSIN HCT) 10-12.5 mg per tablet TAKE 1 TABLET DAILY FOR    BLOOD PRESSURE 1/9/22  Yes Shelton Lucas MD   testosterone enanthate Nohemi Bleak) 75 mg/0.5 mL atIn 0.5 mL by SubCUTAneous route Every Saturday. Yes Provider, Historical   latanoprost (XALATAN) 0.005 % ophthalmic solution Administer 1 Drop to both eyes nightly. Yes Provider, Historical   tamsulosin (FLOMAX) 0.4 mg capsule Take 0.4 mg by mouth. 11/22/21   Provider, Historical     Allergies   Allergen Reactions    Imiquimod Rash    Zocor [Simvastatin] Myalgia      Social History     Tobacco Use    Smoking status: Never    Smokeless tobacco: Never   Substance Use Topics    Alcohol use: Never      Family History   Problem Relation Age of Onset    Heart Disease Mother         had complications after heart valve surgery     Bleeding Prob Half-brother         Brain Tumor     Bleeding Prob Half-sister         Bone Cancer     Heart Attack Half-brother         2003    Heart Attack Half-brother         1980's        Subjective:      Alix Alvarez is a 70 y.o. RHWM with history of BPH, HTN, hypogonadism, hyperlipidemia, thyroid disorder, prediabetes and TIA who was referred here by Dr. Johanny Petty for further evaluation of hand and feet cold sensation. Patient is here for follow up. Noticeable for the past 2 years but longer than that. Coldness in the fingers and toes - mid morning or early evening in the winter  Bluish discoloration  Not much during the summertime   Feels unstable. No falls. No weakness or loss of muscle bulk. Issues with ankle swelling better with compression stockings  Some discomfort but no pain  No sense of weakness in the hands or dropping things. 6 months sleeps with the arms up to prevent numbness and tingling. Resting elbow on armrest causes numbness and tingling bilateral 4th and 5th fingers    Teacher at [employer]    Denies any significant alcohol use. History of TIA. Placed on Aggrenox but due to nosebleeds and shifted to clopidrogel 75 mg daily    Patient was seen by his PCP last 1/18/2021. Note mentions pins and needle sensation and tingling in the feet. Less often in the hands. Also notes discoloration of the fingers. Labs done were unremarkable (CBC, CMP, B12, folate, magnesium, lipid panel, TSH and hgbA1c) except for extremely low vitamin D levels. Patient underwent EMG/NCS of bilateral lower extremity 3/2/2021 which revealed mild chronic left at least L5 greater than right lumbar radiculopathy. No evidence of generalized large fiber neuropathy or myopathy. EMG/NCS of bilateral upper extremity done 3/3/2021 revealed right greater than left mild mid to lower cervical radiculopathy. No evidence of entrapment neuropathy, generalized large fiber neuropathy or myopathy. Cervical and lumbar MRIs were ordered. Initially denied by insurance. Cervical x-ray done 3/16/2021 revealed mild multilevel degenerative disc disease and spondylosis most significant at C5-6 and C6-7. Lumbar x-ray done 3/16/2021 revealed multilevel degenerative changes with spondylosis. Patient was informed and referred to physical therapy and advised to take Aleve or Motrin as needed.       Review of records revealed patient was admitted [place] [address] last 7/20/2021 and discharged the same day for chest pains, nausea and diaphoresis. Negative EKG and troponin. Elevated TSH at 5.53. Unremarkable CBC, CMP, magnesium, lipase, free T4, CK-MB and index, urinalysis, lipid panel and hemoglobin A1c. Patient was seen by Dr. Lyric Moreno (cardiology) 7/27/2021 and mentions patient has had previous extensive cardiac work-up in 2019. No need to repeat. Monitor for now. Since the last visit on 2/24/2022, patient reports sustained benefit of his condition with gabapentin. He could not tolerate  200 mg 3 times daily due to walking instability and sleepiness. He is taking 100 mg TID. His ability to easily close his fingers is sustained. It continues to help significantly with numbness and tingling that occurred intermittently in his hands and feet. He has episodes of left neck and shoulder discomforts. Patient is compliant with Plavix 75 mg daily for TIA prophylaxis. Denies any side effects. Outside reports reviewed: none    Review of Systems:    A comprehensive review of systems was performed:   Constitutional: positive for none  Eyes: positive for none  Ears, nose, mouth, throat, and face: positive for hearing loss  Respiratory: positive for none  Cardiovascular: positive for leg swelling  Gastrointestinal: positive for none  Genitourinary: positive for none  Integument/breast: positive for none  Hematologic/lymphatic: positive for none  Musculoskeletal: positive for joint pain  Neurological: positive for numbness/tingling   Behavioral/Psych: positive for none  Endocrine: positive for none  Allergic/Immunologic: positive for none      Objective:     Visit Vitals  BP (!) 140/72 (BP 1 Location: Right upper arm, BP Patient Position: Sitting, BP Cuff Size: Adult)   Pulse (!) 57   Resp 20   SpO2 99%       PHYSICAL EXAM:    NEUROLOGICAL EXAM:    Appearance:   The patient is obese, provides a coherent history and is in no acute distress. Mental Status: Oriented to time, place and person. Fluent, no aphasia or dysarthria. Mood and affect appropriate. Cranial Nerves:   II - XII were intact. Motor:  5/5 strength in upper and lower proximal and distal muscles. Normal bulk and tone. No pronator drift. Reflexes:   Deep tendon reflexes were symmetrical.    Sensory:   Normal to PP. Decrease cold in soles. Profound decrease in vibratory sensation. .   Gait:  Antalgic but steady. No Romberg. Problems tandem walking. Tremor:   No tremor noted. Cerebellar:  Intact FTN/AVANI/HTS. Assessment:   Cervical radiculopathy  Lumbar radiculopathy  Paresthesia  TIA    Plan:   Neurological examination reveals decrease cold sensation in the soles but more profound loss of vibratory sensation causing problems with tandem walking. Previous EMG/NCS of bilateral upper extremity did not reveal any entrapment neuropathy, generalized large fiber neuropathy or myopathy. It did reveal mild right greater than left mid to lower cervical radiculopathy. Cervical x-ray revealed mild multilevel spondylosis from C5-C7. Discussed again symptomatic treatment with medication and continue exercises taught by physical therapy and if continues to persist or progress especially associated weakness then we will proceed with cervical MRI. Patient understood. Continue Gabapentin 100 mg 3 times daily. EMG/NCS of bilateral lower extremity did not reveal any neuropathy or myopathy. Mild chronic left at least L5 greater than right lumbar radiculopathy. Lumbar x-ray revealed degenerative changes and spondylosis. No clear benefit with physical therapy. Continue Gabapentin as above. If condition continues to progress then will obtain lumbar MRI for correlate. Hands and feet numbness and tingling is also suspicious for possible Raynaud's. Monitor for now. History of TIA. Continue Plavix 75 mg daily for stroke prophylaxis. This will be a life time need. Call 911 if new deficits occur. Strict compliance with dietary modifications and medications for HTN and hyperlipidemia. All questions and concerns were answered. This note was created using voice recognition software. Despite editing, there may be syntax errors.

## 2022-09-12 ENCOUNTER — OFFICE VISIT (OUTPATIENT)
Dept: INTERNAL MEDICINE | Facility: CLINIC | Age: 42
End: 2022-09-12
Payer: COMMERCIAL

## 2022-09-12 VITALS
TEMPERATURE: 98.8 F | OXYGEN SATURATION: 98 % | SYSTOLIC BLOOD PRESSURE: 116 MMHG | BODY MASS INDEX: 39.5 KG/M2 | HEART RATE: 92 BPM | WEIGHT: 256 LBS | RESPIRATION RATE: 14 BRPM | DIASTOLIC BLOOD PRESSURE: 72 MMHG

## 2022-09-12 DIAGNOSIS — G89.29 CHRONIC BILATERAL LOW BACK PAIN WITH BILATERAL SCIATICA: Primary | ICD-10-CM

## 2022-09-12 DIAGNOSIS — Z79.899 MEDICAL MARIJUANA USE: ICD-10-CM

## 2022-09-12 DIAGNOSIS — M54.41 CHRONIC BILATERAL LOW BACK PAIN WITH BILATERAL SCIATICA: Primary | ICD-10-CM

## 2022-09-12 DIAGNOSIS — M54.42 CHRONIC BILATERAL LOW BACK PAIN WITH BILATERAL SCIATICA: Primary | ICD-10-CM

## 2022-09-12 PROCEDURE — 99213 OFFICE O/P EST LOW 20 MIN: CPT | Performed by: INTERNAL MEDICINE

## 2022-09-12 NOTE — PROGRESS NOTES
Assessment & Plan     Chronic bilateral low back pain with bilateral sciatica  Seeing this patient at the request of her primary health care provider [facility] nurse practitioner. Patient has a legitimate chronic pain syndrome with her back and has admittedly already has been using illicit marijuana with good benefit for her chronic pain, because of her lung issues she is recommended not to be smoking marijuana and is looking at some of the offerings through the [facility] including edibles, creams and drops,etc. We reviewed all of the relevant issues issues . All questions answered. I will certify patient with the Office of Medical Cannabis registry    Medical marijuana use  As above , recheck in one year         Return in about 1 year (around 9/12/2023).    Bob Mittal MD  [facility] DONI Brewer is a 42 year old presenting for the following health issues:  Pre-certification (Medical cannabis )    Encounter Diagnoses   Name Primary?     Chronic bilateral low back pain with bilateral sciatica Yes     Medical marijuana use         History of Present Illness       Reason for visit:  Marijuana card for my pain    She eats 2-3 servings of fruits and vegetables daily.She consumes 1 sweetened beverage(s) daily.She exercises with enough effort to increase her heart rate 9 or less minutes per day.  She exercises with enough effort to increase her heart rate 3 or less days per week.   She is taking medications regularly.     New patient to the Internal Medicine department for Medical Marijuana certification   Primary care with [facility] nurse practitioner     Used to see Dr. Colby Moss, with [facility] but no longer taking opioid pain medication   You qualify for the reduced rate of 50 dollar registration fee if you have one of the following 3 qualifying financial considerations    1. If you are on [place]  Care  2. If you are on medical assistance   3. If you are on social security disability or, were on social security disability and transitioned to regular social security after 65.    Otherwise the annual Medical Marijuana registration fee is 200 dollars.      Review of Systems   Constitutional, HEENT, cardiovascular, pulmonary, gi and gu systems are negative, except as otherwise noted.      Objective    /72   Pulse 92   Temp 98.8  F (37.1  C) (Oral)   Resp 14   Wt 116.1 kg (256 lb)   SpO2 98%   BMI 39.50 kg/m    Body mass index is 39.5 kg/m .  Physical Exam   GENERAL: healthy, alert and no distress  EYES: Eyes grossly normal to inspection, PERRL and conjunctivae and sclerae normal  MS: no gross musculoskeletal defects noted, no edema  SKIN: no suspicious lesions or rashes  NEURO: Normal strength and tone, mentation intact and speech normal  PSYCH: mentation appears normal, affect normal/bright

## 2022-09-12 NOTE — PATIENT INSTRUCTIONS
Patient Registry Services  Office of Medical Cannabis   Office: [phone]    Fax: [phone]   [email or web address]   Office of Medical Cannabis   [address] [address]   [place] [address]

## 2022-09-12 NOTE — PROGRESS NOTES
Answers for HPI/ROS submitted by the patient on 9/12/2022  What is the reason for your visit today? : Marijuana card for my pain  How many servings of fruits and vegetables do you eat daily?: 2-3  On average, how many sweetened beverages do you drink each day (Examples: soda, juice, sweet tea, etc.  Do NOT count diet or artificially sweetened beverages)?: 1  How many minutes a day do you exercise enough to make your heart beat faster?: 9 or less  How many days a week do you exercise enough to make your heart beat faster?: 3 or less  How many days per week do you miss taking your medication?: 0

## 2022-09-21 ENCOUNTER — MYC MEDICAL ADVICE (OUTPATIENT)
Dept: FAMILY MEDICINE | Facility: CLINIC | Age: 42
End: 2022-09-21

## 2022-09-21 ENCOUNTER — E-VISIT (OUTPATIENT)
Dept: FAMILY MEDICINE | Facility: CLINIC | Age: 42
End: 2022-09-21
Payer: COMMERCIAL

## 2022-09-21 DIAGNOSIS — R05.9 COUGH: Primary | ICD-10-CM

## 2022-09-21 PROCEDURE — 99207 PR NON-BILLABLE SERV PER CHARTING: CPT | Performed by: NURSE PRACTITIONER

## 2022-09-21 NOTE — TELEPHONE ENCOUNTER
Needs appt for this concern, virtual is ok. Any provider or Urgent Care ok if I don't have availability.  [facility], CNP

## 2022-09-21 NOTE — TELEPHONE ENCOUNTER
Epi message routed to PCP, pt requesting medication refill d/t worsening productive cough.    Gail BLOCK  [facility]

## 2022-09-21 NOTE — PATIENT INSTRUCTIONS
Dear Daniella Sexton,    We are sorry you are not feeling well. Based on the responses you provided, it is recommended that you be seen in-person in urgent care so we can better evaluate your symptoms. Please click here to find the nearest urgent care location to you.   You will not be charged for this Visit. Thank you for trusting us with your care.    JENAE Sibley CNP

## 2022-09-22 ENCOUNTER — OFFICE VISIT (OUTPATIENT)
Dept: URGENT CARE | Facility: URGENT CARE | Age: 42
End: 2022-09-22
Payer: COMMERCIAL

## 2022-09-22 VITALS
HEART RATE: 68 BPM | DIASTOLIC BLOOD PRESSURE: 79 MMHG | HEIGHT: 67 IN | OXYGEN SATURATION: 97 % | WEIGHT: 258.5 LBS | BODY MASS INDEX: 40.57 KG/M2 | TEMPERATURE: 97.9 F | SYSTOLIC BLOOD PRESSURE: 137 MMHG

## 2022-09-22 DIAGNOSIS — J44.0 COPD WITH ACUTE BRONCHITIS (H): Primary | ICD-10-CM

## 2022-09-22 DIAGNOSIS — J20.9 COPD WITH ACUTE BRONCHITIS (H): Primary | ICD-10-CM

## 2022-09-22 PROCEDURE — 99214 OFFICE O/P EST MOD 30 MIN: CPT | Performed by: PHYSICIAN ASSISTANT

## 2022-09-22 RX ORDER — LEVOFLOXACIN 500 MG/1
500 TABLET, FILM COATED ORAL DAILY
Qty: 10 TABLET | Refills: 0 | Status: SHIPPED | OUTPATIENT
Start: 2022-09-22 | End: 2022-10-02

## 2022-09-22 RX ORDER — CODEINE PHOSPHATE AND GUAIFENESIN 10; 100 MG/5ML; MG/5ML
1-2 SOLUTION ORAL EVERY 4 HOURS PRN
Qty: 118 ML | Refills: 0 | Status: SHIPPED | OUTPATIENT
Start: 2022-09-22 | End: 2023-01-19

## 2022-09-22 ASSESSMENT — ENCOUNTER SYMPTOMS
WHEEZING: 0
SORE THROAT: 0
FEVER: 0
SINUS PRESSURE: 1
CARDIOVASCULAR NEGATIVE: 1
COUGH: 1
CONSTITUTIONAL NEGATIVE: 1
SINUS PAIN: 1
RHINORRHEA: 1
PALPITATIONS: 0
SHORTNESS OF BREATH: 1
FATIGUE: 0
GASTROINTESTINAL NEGATIVE: 1
CHILLS: 0

## 2022-09-22 NOTE — PROGRESS NOTES
Deniz Brewer is a 42 year old, presenting for the following health issues:  Cough, Bronchitis (Pt said that she think that she might have bronchitis. ), Headache, Chest Pain (Chest pain from excessive coughing. ), and Facial Pressure    HPI   Acute Illness  Acute illness concerns:   Onset/Duration: 3days.  Had similar symptoms 1month ago and was given doxy, prednisone and robitussin AC with good relief.  Symptoms:  Fever: No  Chills/Sweats: No  Headache (location?): YES  Sinus Pressure: YES  Conjunctivitis:  No  Ear Pain: no  Rhinorrhea: YES  Congestion: YES  Sore Throat: No  Cough: YES-productive of yellow sputum, with shortness of breath  Wheeze: No  Decreased Appetite: No  Nausea: No  Vomiting: No  Diarrhea: No  Dysuria/Freq.: No  Dysuria or Hematuria: No  Fatigue/Achiness: No  Sick/Strep Exposure: No ill contacts.  Hx of asthma, COPD, s/p sinus surgery.  Smoker.  Therapies tried and outcome: inhalers, nyquil with minimal relief    Patient Active Problem List   Diagnosis     Anxiety     Depressive disorder     Duodenal ulcer     History of alcohol abuse     History of methamphetamine abuse (H)     Migraines     Seasonal allergic rhinitis     Sleep disturbance     Tobacco abuse     CARDIOVASCULAR SCREENING; LDL GOAL LESS THAN 160     Chronic bilateral low back pain with bilateral sciatica     Pure hypercholesterolemia     Mild intermittent asthma without complication     Chronic pansinusitis     Obesity (BMI 35.0-39.9) with comorbidity (H)     Lumbago     Fatty liver     Lung nodule     Pulmonary emphysema, unspecified emphysema type (H)     Medical marijuana use     Current Outpatient Medications   Medication     albuterol (PROAIR HFA/PROVENTIL HFA/VENTOLIN HFA) 108 (90 Base) MCG/ACT inhaler     amitriptyline (ELAVIL) 50 MG tablet     benzonatate (TESSALON) 200 MG capsule     diclofenac (VOLTAREN) 75 MG EC tablet     fluticasone (FLONASE) 50 MCG/ACT nasal spray     fluticasone (FLOVENT HFA) 110 MCG/ACT  "inhaler     gabapentin (NEURONTIN) 300 MG capsule     guaiFENesin-codeine (ROBITUSSIN AC) 100-10 MG/5ML solution     ipratropium - albuterol 0.5 mg/2.5 mg/3 mL (DUONEB) 0.5-2.5 (3) MG/3ML neb solution     loratadine (CLARITIN REDITABS) 10 MG ODT     nicotine (NICORETTE) 2 MG gum     nicotine (NICORETTE) 2 MG gum     rizatriptan (MAXALT) 10 MG tablet     terbinafine (LAMISIL) 1 % external cream     tiZANidine (ZANAFLEX) 4 MG tablet     triamcinolone (KENALOG) 0.1 % external cream     No current facility-administered medications for this visit.        Allergies   Allergen Reactions     Amoxicillin Anaphylaxis     Morphine Other (See Comments) and Nausea and Vomiting     Penicillins Unknown       Review of Systems   Constitutional: Negative.  Negative for chills, fatigue and fever.   HENT: Positive for congestion, rhinorrhea, sinus pressure and sinus pain. Negative for ear discharge, ear pain, hearing loss and sore throat.    Respiratory: Positive for cough and shortness of breath. Negative for wheezing.    Cardiovascular: Negative.  Negative for chest pain, palpitations and peripheral edema.   Gastrointestinal: Negative.    All other systems reviewed and are negative.           Objective    /79 (BP Location: Right arm, Patient Position: Sitting, Cuff Size: Adult Large)   Pulse 68   Temp 97.9  F (36.6  C) (Tympanic)   Ht 1.702 m (5' 7\")   Wt 117.3 kg (258 lb 8 oz)   SpO2 97%   BMI 40.49 kg/m    Body mass index is 40.49 kg/m .  Physical Exam  Vitals and nursing note reviewed.   Constitutional:       General: She is not in acute distress.     Appearance: Normal appearance. She is well-developed. She is obese. She is not ill-appearing.   HENT:      Head: Normocephalic and atraumatic.      Comments: TMs are intact without any erythema or bulging bilaterally.  Airway is patent.     Nose: Congestion and rhinorrhea present. Rhinorrhea is purulent.      Right Turbinates: Swollen.      Left Turbinates: Swollen.      " Right Sinus: Maxillary sinus tenderness present. No frontal sinus tenderness.      Left Sinus: Maxillary sinus tenderness present. No frontal sinus tenderness.      Mouth/Throat:      Lips: Pink.      Mouth: Mucous membranes are moist.      Pharynx: Oropharynx is clear. Uvula midline. No pharyngeal swelling, oropharyngeal exudate or posterior oropharyngeal erythema.      Tonsils: No tonsillar exudate.   Eyes:      General: No scleral icterus.     Extraocular Movements: Extraocular movements intact.      Conjunctiva/sclera: Conjunctivae normal.      Pupils: Pupils are equal, round, and reactive to light.   Neck:      Thyroid: No thyromegaly.   Cardiovascular:      Rate and Rhythm: Normal rate and regular rhythm.      Pulses: Normal pulses.      Heart sounds: Normal heart sounds, S1 normal and S2 normal. No murmur heard.    No friction rub. No gallop.   Pulmonary:      Effort: Pulmonary effort is normal. No accessory muscle usage, respiratory distress or retractions.      Breath sounds: Normal air entry. No stridor. Decreased breath sounds present. No wheezing, rhonchi or rales.   Musculoskeletal:      Cervical back: Normal range of motion and neck supple.   Lymphadenopathy:      Cervical: No cervical adenopathy.   Skin:     General: Skin is warm and dry.      Nails: There is no clubbing.   Neurological:      Mental Status: She is alert and oriented to person, place, and time.   Psychiatric:         Mood and Affect: Mood normal.         Behavior: Behavior normal.         Thought Content: Thought content normal.         Judgment: Judgment normal.          Assessment/Plan:  COPD with acute bronchitis (H):  She declined covid test, CXR.  Will treat with [id number] (per patient request), robitussin AC, and continue with her albuterol inh as needed for symptoms.  Discussed risks and benefits of medication along with side effects, direction for use.  No driving or operating machinery due to sedation.  Recommend  treatment with rest, fluids and chicken soup. Tylenol/ibuprofen prn fever/pain.  Keep appointments with her lung specialist.  To the ER if he develops hemoptysis, chest pain, fevers>102, worsening shortness of breath/wheezing.    -     guaiFENesin-codeine (ROBITUSSIN AC) 100-10 MG/5ML solution; Take 5-10 mLs by mouth every 4 hours as needed for cough  -     levofloxacin (LEVAQUIN) 500 MG tablet; Take 1 tablet (500 mg) by mouth daily for 10 days        Yaneth Nguyễn PA-C

## 2022-10-01 ENCOUNTER — OFFICE VISIT (OUTPATIENT)
Dept: URGENT CARE | Facility: URGENT CARE | Age: 42
End: 2022-10-01
Payer: COMMERCIAL

## 2022-10-01 VITALS
DIASTOLIC BLOOD PRESSURE: 84 MMHG | WEIGHT: 259 LBS | TEMPERATURE: 98.2 F | OXYGEN SATURATION: 98 % | HEART RATE: 89 BPM | BODY MASS INDEX: 40.57 KG/M2 | SYSTOLIC BLOOD PRESSURE: 134 MMHG

## 2022-10-01 DIAGNOSIS — M54.50 BILATERAL LOW BACK PAIN WITHOUT SCIATICA, UNSPECIFIED CHRONICITY: Primary | ICD-10-CM

## 2022-10-01 PROCEDURE — 99214 OFFICE O/P EST MOD 30 MIN: CPT | Mod: 25 | Performed by: FAMILY MEDICINE

## 2022-10-01 PROCEDURE — 96372 THER/PROPH/DIAG INJ SC/IM: CPT | Performed by: FAMILY MEDICINE

## 2022-10-01 RX ORDER — KETOROLAC TROMETHAMINE 30 MG/ML
60 INJECTION, SOLUTION INTRAMUSCULAR; INTRAVENOUS ONCE
Status: COMPLETED | OUTPATIENT
Start: 2022-10-01 | End: 2022-10-01

## 2022-10-01 RX ADMIN — KETOROLAC TROMETHAMINE 60 MG: 30 INJECTION, SOLUTION INTRAMUSCULAR; INTRAVENOUS at 10:55

## 2022-10-01 ASSESSMENT — PAIN SCALES - GENERAL: PAINLEVEL: EXTREME PAIN (9)

## 2022-10-01 NOTE — PROGRESS NOTES
Assessment & Plan     Bilateral low back pain without sciatica, unspecified chronicity  42-year-old female presented with acute on chronic low back pain, symptoms worsening for last few days.  History of spinal surgeries, back injections.  Treatment options discussed.  Toradol injection administered in office today, has worked well in the past.  Recommended to continue well hydration, over-the-counter analgesia, gabapentin, tizanidine and to follow-up with pain management as well.  Return criteria explained in detail.  Patient understood and in agreement with above plan.  All questions answered.  - ketorolac (TORADOL) injection 60 mg      Gus Miles MD  [facility] RALPHTRINA Brewer is a 42 year old, presenting for the following health issues:  Urgent Care and Back Pain (Lower back pain for a week )      HPI     Concern -   Onset: acute on chronic back pain  Description: low back pain, non-radiating   Intensity: moderate  Progression of Symptoms:  same  Accompanying Signs & Symptoms: No fever, chills, diarrhea, constipation, limb paresthesia or other relevant systemic symptoms  Previous history of similar problem: chronic back pain  Therapies tried and outcome: gabapentin, amitriptyline, diclofenac, tizanidine         Review of Systems   Constitutional, HEENT, cardiovascular, pulmonary, gi and gu systems are negative, except as otherwise noted.      Objective    /84 (BP Location: Right arm, Patient Position: Sitting, Cuff Size: Adult Large)   Pulse 89   Temp 98.2  F (36.8  C) (Oral)   Wt 117.5 kg (259 lb)   SpO2 98%   BMI 40.57 kg/m    Body mass index is 40.57 kg/m .  Physical Exam   GENERAL: alert and no distress  EYES: Eyes grossly normal to inspection, PERRL and conjunctivae and sclerae normal  NECK: no adenopathy, no asymmetry, masses, or scars and thyroid normal to palpation  RESP: lungs clear to auscultation - no rales, rhonchi or wheezes  CV: regular  rate and rhythm, normal S1 S2, no S3 or S4, no murmur, click or rub, no peripheral edema and peripheral pulses strong  ABDOMEN: soft, nontender  MS: subjective low back pain, no spinal/paraspinal tenderness, swelling or skin discoloration noted, surgical scar C/D/I, right-sided SLR equivocal, gait slight antalgic, normal lower extremities strength, sensation to touch and pressure intact  SKIN: no suspicious lesions or rashes  NEURO: Normal strength and tone, mentation intact and speech normal  PSYCH: mentation appears normal, affect normal/bright

## 2022-10-01 NOTE — NURSING NOTE
Clinic Administered Medication Documentation      Injectable Medication Documentation    Patient was given Ketorolac Tromethamine (Toradol). Prior to medication administration, verified patients identity using patient s name and date of birth. Please see MAR and medication order for additional information. Patient instructed to pt states had it before with no reaction .      Was entire vial of medication used? Yes  Vial/Syringe: Single dose vial  Expiration Date:  02/24  Was this medication supplied by the patient? No     Horace Jeter CMA

## 2022-10-06 ENCOUNTER — OFFICE VISIT (OUTPATIENT)
Dept: FAMILY MEDICINE | Facility: CLINIC | Age: 42
End: 2022-10-06
Payer: COMMERCIAL

## 2022-10-06 VITALS
TEMPERATURE: 99.2 F | SYSTOLIC BLOOD PRESSURE: 128 MMHG | OXYGEN SATURATION: 98 % | WEIGHT: 261 LBS | DIASTOLIC BLOOD PRESSURE: 80 MMHG | BODY MASS INDEX: 40.88 KG/M2 | HEART RATE: 99 BPM

## 2022-10-06 DIAGNOSIS — J45.40 MODERATE PERSISTENT ASTHMA WITHOUT COMPLICATION: Primary | ICD-10-CM

## 2022-10-06 DIAGNOSIS — F17.200 NICOTINE DEPENDENCE, UNCOMPLICATED, UNSPECIFIED NICOTINE PRODUCT TYPE: ICD-10-CM

## 2022-10-06 DIAGNOSIS — M79.18 MYOFASCIAL MUSCLE PAIN: ICD-10-CM

## 2022-10-06 DIAGNOSIS — J32.4 CHRONIC PANSINUSITIS: ICD-10-CM

## 2022-10-06 DIAGNOSIS — M54.9 ACUTE UPPER BACK PAIN: ICD-10-CM

## 2022-10-06 DIAGNOSIS — J43.9 PULMONARY EMPHYSEMA, UNSPECIFIED EMPHYSEMA TYPE (H): ICD-10-CM

## 2022-10-06 PROCEDURE — 0124A COVID-19,PF,PFIZER BOOSTER BIVALENT: CPT | Performed by: NURSE PRACTITIONER

## 2022-10-06 PROCEDURE — 99214 OFFICE O/P EST MOD 30 MIN: CPT | Mod: 25 | Performed by: NURSE PRACTITIONER

## 2022-10-06 PROCEDURE — 90471 IMMUNIZATION ADMIN: CPT | Performed by: NURSE PRACTITIONER

## 2022-10-06 PROCEDURE — 91312 COVID-19,PF,PFIZER BOOSTER BIVALENT: CPT | Performed by: NURSE PRACTITIONER

## 2022-10-06 PROCEDURE — 90686 IIV4 VACC NO PRSV 0.5 ML IM: CPT | Performed by: NURSE PRACTITIONER

## 2022-10-06 RX ORDER — FLUTICASONE PROPIONATE AND SALMETEROL 100; 50 UG/1; UG/1
1 POWDER RESPIRATORY (INHALATION) EVERY 12 HOURS
Qty: 60 EACH | Refills: 5 | Status: SHIPPED | OUTPATIENT
Start: 2022-10-06 | End: 2023-07-07

## 2022-10-06 RX ORDER — CETIRIZINE HYDROCHLORIDE 10 MG/1
10 TABLET ORAL DAILY
Qty: 90 TABLET | Refills: 3 | Status: SHIPPED | OUTPATIENT
Start: 2022-10-06 | End: 2023-07-07

## 2022-10-06 NOTE — PATIENT INSTRUCTIONS
Schedule a follow up with Dr. Miguel    You will be called for the [facility] referral.       Nicotine Chewing Gum  Nicorelief, Nicorette, Thrive  Uses  For quitting smoking.  Instructions  Chew the gum when you feel the urge to smoke. Chew very slowly until it tingles, then move it to the space between your cheek and gum. Keep it there until it stops tingling. When the tingle is gone, begin chewing the gum againuntil the tingle returns. Most of the nicotine will be gone after 30 minutes.  Do not eat or drink for 15 minutes before or during use of the gum.  Store at room temperature in a dry place. Do not keep in the bathroom.  Keep the medicine away from heat and light.  Please tell your doctor and pharmacist about all the medicines you take. Include both prescription and over-the-counter medicines. Also tell them aboutany vitamins, herbal medicines, or anything else you take for your health.  It is very important that you continue using this medicine for the full number of days that it is prescribed. Please do not stop the medicine even if youstart to feel better after the first few days.  This medicine contains sodium. If you are on a low sodium diet, consider thisas part of your total sodium diet.  If you have been told to follow a low sodium diet, speak to your doctor beforeusing this medicine.  Do not take the medicine more than 24 times during 24 hours.  Cautions  Tell your doctor and pharmacist if you ever had an allergic reaction to a medicine. Symptoms of an allergic reaction can include trouble breathing, skinrash, itching, swelling, or severe dizziness.  Do not use the medication any more than instructed.  Avoid smoking while on this medicine. Smoking may increase your risk for stroke, heart attack, blood clots, high blood pressure, and other diseases ofthe heart and blood vessels.  Tell the doctor or pharmacist if you are pregnant, planning to be pregnant, orbreastfeeding.  Ask your pharmacist if this  medicine can interact with any of your othermedicines. Be sure to tell them about all the medicines you take.  Please tell all your doctors and dentists that you are on this medicine beforethey provide care.  Side Effects  The following is a list of some common side effects from this medicine. Please speak with your doctor about what you should do if you experience these orother side effects.  jaw pain  irritation of mouth and gum tissue  If you have any of the following side effects, you may be getting too muchmedicine. Please contact your doctor to let them know about these side effects.  diarrhea  dizziness  rapid heartbeat  nausea  vomiting  weakness  A few people may have an allergic reaction to this medicine. Symptoms can include difficulty breathing, skin rash, itching, swelling, or severedizziness. If you notice any of these symptoms, seek medical help quickly.  Extra  Please speak with your doctor, nurse, or pharmacist if you have any questionsabout this medicine.  [email or web address]  IMPORTANT NOTE: This document tells you briefly how to take your medicine, but it does not tell you all there is to know about it. Your doctor or pharmacist may give you other documents about your medicine. Please talk to them if you have any questions. Always follow their advice. There is a more complete description of this medicine available in English. Scan this code on your smartphone or tablet or use the web address below. You can also ask your pharmacist for a printout. If you have any questions, please ask your pharmacist.   2021 [employer].      8849-9802 [employer]. All rights reserved. This information is not intended as a substitute for professional medical care. Always follow yourhealthcare professional's instructions.          How to Quit Smoking  Smoking is a hard habit to break. About 50% of all people who have ever smoked have been able to quit. Most people who  still smoke want to quit. Here are some of the best ways to stop smoking.     Keep in mind the health benefits of quitting  The health benefits of quitting start right away. They keep improving the longer you go without smoking. Knowing this can help inspire you to stay on track. These benefits occur at any age. If you are 17 or 70, quitting is a good choice. Some of the health benefits after your last cigarette include:   After 20 minutes: Your blood pressure and pulse return to normal.  After 8 hours: Your oxygen levels return to normal.  After 2 days: Your ability to smell and taste start to improve as damaged nerves regrow.  After 2 to 3 weeks: Your circulation and lung function improve.  After 1 to 9 months: Your coughing, congestion, and shortness of breath decrease. Your tiredness decreases.  After 1 year: Your risk of heart attack decreases by 50%.  After 5 years: Your risk of lung cancer decreases by 50%. Your risk of stroke becomes the same as a nonsmoker s.  Go cold turkey  Most former smokers quit cold turkey. This means stopping all at once. Trying to cut back slowly often doesn't work as well. This may be because it continues the habit of smoking. Also you may inhale more smoke while smoking fewer cigarettes. This leads to the same amount of nicotine in your body.   Get support  Support programs can be a big help, especially for heavy smokers. These groups offer lectures, ways to change behavior, and peer support. Here are some ways to find a support program:   Free national quitline 800-QUIT-NOW (295-021-3643)  [facility] quit-smoking programs  American Lung Association 427-311-0013  American Cancer Society 800-252-2509  Support at home is important too. Family and friends can offer praise and reassurance. If the smoker in your life finds it hard to quit, encourage them to keep trying.   Try over-the-counter medicine  Nicotine replacement therapy may make it easier to quit. Some aids are available  without a prescription. These include a nicotine patch, gum, and lozenges. But it's best to use these under the care of your healthcare provider. The skin patch gives a steady supply of nicotine. Nicotine gum and lozenges give short-time doses of low levels of nicotine. Both methods reduce the craving for cigarettes. If you have nausea, vomiting, dizziness, weakness, or a fast heartbeat, stop using these products. See your provider.   Ask about prescription medicine  After reviewing your smoking patterns and past attempts to quit, your healthcare provider may offer a prescription medicine such as bupropion, varenicline, a nicotine inhaler, or nasal spray. Each has advantages and side effects. Your provider can review these with you.   Keep trying  Most smokers make many attempts at quitting before they succeed. It s important not to give up.   To learn more  For more on how to quit smoking, try these resources:   www.cdc.gov/tobacco/quit_smoking/ 800-QUIT-NOW (978-495-6787)  www.smokefree.gov 877-44U-QUIT (332-445-3727)  www.lung.org/stop-smoking/ 800-LUNGUSA (750-546-6269)  [employer] last reviewed this educational content on 12/1/2019 2000-2021 [employer]. All rights reserved. This information is not intended as a substitute for professional medical care. Always follow your healthcare professional's instructions.

## 2022-10-06 NOTE — LETTER
October 6, 2022      Daniella Sexton  [address]  [place] [address]        To Whom It May Concern,     Daniella Sexton is under my medical care. I recommend a stool while at work to improve body mechanics and back pain.      Sincerely,        JENAE Sibley CNP

## 2022-10-06 NOTE — PROGRESS NOTES
Assessment & Plan     Moderate persistent asthma without complication  Multiple exacerbations in the last year. Will step up therapy from ICS to ICS-LABA combination. Intensify allergy treatment- stop Loratadine and start Cetirizine.  - cetirizine (ZYRTEC) 10 MG tablet; Take 1 tablet (10 mg) by mouth daily  - fluticasone-salmeterol (ADVAIR) 100-50 MCG/ACT inhaler; Inhale 1 puff into the lungs every 12 hours    Pulmonary emphysema, unspecified emphysema type (H)  As above  - fluticasone-salmeterol (ADVAIR) 100-50 MCG/ACT inhaler; Inhale 1 puff into the lungs every 12 hours    Chronic pansinusitis  Follow-up with ENT and chronic sinusitis likely contributing. If sinus disease deemed not a factor, consider referral to Allergist  - cetirizine (ZYRTEC) 10 MG tablet; Take 1 tablet (10 mg) by mouth daily    Nicotine dependence, uncomplicated, unspecified nicotine product type  Counseled regarding proper use of nicotine gum, recommend setting quit date, referred to MTM for follow up  - Medication Therapy Management Referral    Acute upper back pain  Related to frequent URIs/coughing- letter written for stool at work for better body mechanics    Myofascial muscle pain  Tizanidine dose increased  - tiZANidine (ZANAFLEX) 4 MG tablet; Take 1-2 tablets (4-8 mg) by mouth 3 times daily as needed for muscle spasms    Review of prior external note(s) from - Outside records from Urgent Care  Ordering of each unique test  Prescription drug management         See Patient Instructions    Return in about 3 months (around 1/6/2023) for Asthma.    JENAE Sibley [facility] DONI Brewer is a 42 year old accompanied by her self, presenting for the following health issues:  COPD/Asthma Fup      History of Present Illness     Asthma:  She presents for follow up of asthma.  She has some cough, some wheezing, and no shortness of breath. She is using a relief medication a few times a week. She  does not miss any doses of her controller medication throughout the week.Patient is aware of the following triggers: smoke and upper respiratory infections. The patient has had a visit to the Emergency Room, Urgent Care or Hospital due to asthma since the last clinic visit. She has been to the Emergency Room or Urgent Care 2 times.    COPD:  She presents for follow up of COPD.  Overall, COPD symptoms are better since last visit. She has same as usual fatigue or shortness of breath with exertion and same as usual shortness of breath at rest.  She sometimes coughs and does have change in sputum. No recent fever. She can walk less than a mile without stopping to rest. She can walk 2 flights of stairs without resting.The patient has had an ED, urgent care, or hospital admission because of COPD since the last visit. She states she has had 1 visit(s) to an ED, Urgent Care, or Hospital due to her COPD.    She eats 2-3 servings of fruits and vegetables daily.She consumes 2 sweetened beverage(s) daily.She exercises with enough effort to increase her heart rate 9 or less minutes per day.  She exercises with enough effort to increase her heart rate 3 or less days per week. She is missing 1 dose(s) of medications per week.  She is not taking prescribed medications regularly due to remembering to take.     Was treated with Levaquin for bronchitis and sinusitis. Nose still a little congested, ears full. Slight cough. Saw ENT 1/2021 and records reviewed.  Multiple episodes of bronchitis over the past year.    Wants to quit smoking but gum makes her nauseated    Upper back sore since having bronchitis, needs note for work to sit on stool so she's not bending over    Review of Systems   Constitutional, HEENT, cardiovascular, pulmonary, gi and gu systems are negative, except as otherwise noted.      Objective    /80 (BP Location: Left arm, Patient Position: Chair, Cuff Size: Adult Large)   Pulse 99   Temp 99.2  F (37.3  C)  (Oral)   Wt 118.4 kg (261 lb)   SpO2 98%   BMI 40.88 kg/m    Body mass index is 40.88 kg/m .  Physical Exam   GENERAL: healthy, alert and no distress  EYES: Eyes grossly normal to inspection, PERRL and conjunctivae and sclerae normal  HENT: normal cephalic/atraumatic, ear canals and TM's normal, nose and mouth without ulcers or lesions, oropharynx clear, oral mucous membranes moist and sinuses: not tender  NECK: no adenopathy, no asymmetry, masses, or scars and thyroid normal to palpation  RESP: lungs clear to auscultation - no rales, rhonchi or wheezes  CV: regular rate and rhythm, normal S1 S2, no S3 or S4, no murmur, click or rub, no peripheral edema and peripheral pulses strong    No results found for any visits on 10/06/22.

## 2022-10-10 ENCOUNTER — TELEPHONE (OUTPATIENT)
Dept: FAMILY MEDICINE | Facility: CLINIC | Age: 42
End: 2022-10-10

## 2022-10-10 NOTE — TELEPHONE ENCOUNTER
MTM referral from: [facility] visit (referral by provider)    MTM referral outreach attempt #2 on October 10, 2022 at 11:58 AM      Outcome: Patient not reachable after several attempts, will route to MTM Pharmacist/Provider as an FYI.  MT scheduling number is [phone].  Thank you for the referral.    Geo Catalan, MTM coordinator

## 2022-10-17 DIAGNOSIS — J44.9 COPD (CHRONIC OBSTRUCTIVE PULMONARY DISEASE) (H): ICD-10-CM

## 2022-10-17 DIAGNOSIS — J45.909 ASTHMA: ICD-10-CM

## 2022-10-17 DIAGNOSIS — R91.8 PULMONARY NODULES: Primary | ICD-10-CM

## 2022-10-26 ENCOUNTER — TELEPHONE (OUTPATIENT)
Dept: OTOLARYNGOLOGY | Facility: CLINIC | Age: 42
End: 2022-10-26

## 2022-10-26 DIAGNOSIS — J32.4 CHRONIC PANSINUSITIS: Primary | ICD-10-CM

## 2022-10-26 RX ORDER — PREDNISONE 10 MG/1
10 TABLET ORAL 2 TIMES DAILY
Qty: 10 TABLET | Refills: 2 | Status: SHIPPED | OUTPATIENT
Start: 2022-10-26 | End: 2022-10-31

## 2022-10-26 RX ORDER — CLARITHROMYCIN 500 MG
500 TABLET ORAL 2 TIMES DAILY
Qty: 28 TABLET | Refills: 0 | Status: SHIPPED | OUTPATIENT
Start: 2022-10-26 | End: 2022-11-09

## 2022-10-26 NOTE — TELEPHONE ENCOUNTER
Health Call Center    Phone Message    May a detailed message be left on voicemail: yes     Reason for Call: Symptoms or Concerns     If patient has red-flag symptoms, warm transfer to triage line    Current symptom or concern: Sinus infection    Symptoms have been present for:  2 month(s)    Has patient previously been seen for this? Yes    By :Dr. Maikol Miguel  Date: 01/11/2021    Are there any new or worsening symptoms? Yes: Per pt has sinus infection. The first available appt is not until December. Please call to discuss. Thank you      Action Taken: Message routed to:  [facility] Ent    Travel Screening: Not Applicable

## 2022-10-30 ENCOUNTER — ANCILLARY PROCEDURE (OUTPATIENT)
Dept: GENERAL RADIOLOGY | Facility: CLINIC | Age: 42
End: 2022-10-30
Attending: STUDENT IN AN ORGANIZED HEALTH CARE EDUCATION/TRAINING PROGRAM
Payer: COMMERCIAL

## 2022-10-30 ENCOUNTER — OFFICE VISIT (OUTPATIENT)
Dept: URGENT CARE | Facility: URGENT CARE | Age: 42
End: 2022-10-30
Payer: COMMERCIAL

## 2022-10-30 VITALS
OXYGEN SATURATION: 96 % | SYSTOLIC BLOOD PRESSURE: 148 MMHG | BODY MASS INDEX: 39.16 KG/M2 | DIASTOLIC BLOOD PRESSURE: 85 MMHG | HEART RATE: 115 BPM | TEMPERATURE: 98.4 F | WEIGHT: 250 LBS

## 2022-10-30 DIAGNOSIS — M25.572 PAIN IN JOINT INVOLVING ANKLE AND FOOT, LEFT: ICD-10-CM

## 2022-10-30 DIAGNOSIS — S82.65XA CLOSED NONDISPLACED FRACTURE OF LATERAL MALLEOLUS OF LEFT FIBULA, INITIAL ENCOUNTER: Primary | ICD-10-CM

## 2022-10-30 PROCEDURE — 99214 OFFICE O/P EST MOD 30 MIN: CPT | Performed by: STUDENT IN AN ORGANIZED HEALTH CARE EDUCATION/TRAINING PROGRAM

## 2022-10-30 PROCEDURE — 73610 X-RAY EXAM OF ANKLE: CPT | Mod: TC | Performed by: RADIOLOGY

## 2022-10-30 NOTE — LETTER
[facility] [address] [address]  Phone: [phone]    October 30, 2022        Daniella Sexton  [address]  [place] [address]          To whom it may concern:    RE: Daniella Brewer was seen by me in urgent care for left ankle fracture. She needs to be non-weightbearing until she is seen by orthopedics to further evaluate to finalize treatment plan.     Please contact me for questions or concerns.      Sincerely,        JENAE Garrison CNP

## 2022-10-30 NOTE — LETTER
[facility] [address] [address]  Phone: [phone]    October 30, 2022        Daniella Sexton  [address]  [facility] [address]          To whom it may concern:    RE: Daniella Brewer was seen by me in urgent care for left ankle fracture. She needs to be in a walking boot for 3-6 weeks and will be seen by orthopedics to further evaluate to finalize treatment plan.     Please contact me for questions or concerns.      Sincerely,        JENAE Garrison CNP

## 2022-10-30 NOTE — PROGRESS NOTES
Assessment & Plan     Closed nondisplaced fracture of lateral malleolus of left fibula, initial encounter  Appearance of nondisplaced fracture of the lateral left malleolus per my review of x-ray.  Awaiting official read from radiologist.  Advised no weightbearing until she sees orthopedics for further evaluation to determine best treatment plan.  I wrote a note for work discussing her need to be nonweightbearing and she will ask to see if accommodations can be made so she is allowed to work.  Otherwise advised to rest, elevate, ice the ankle and take Tylenol or ibuprofen as needed for pain.  - Orthopedic  Referral    Pain in joint involving ankle and foot, left  - XR Ankle Left G/E 3 Views       No follow-ups on file.    Adelina Poole, JENAE CNP  [employer]    Deniz Brewer is a 42 year old female who presents to clinic today for the following health issues:  Chief Complaint   Patient presents with     Musculoskeletal Problem     Left ankle twisted     HPI    Tripped over a dog toy in the back yard and heard a pop as her ankle twisted.      Review of Systems  Constitutional, HEENT, cardiovascular, pulmonary, gi and gu systems are negative, except as otherwise noted.      Objective    BP (!) 148/85   Pulse 115   Temp 98.4  F (36.9  C) (Tympanic)   Wt 113.4 kg (250 lb)   SpO2 96%   BMI 39.16 kg/m    Physical Exam   GENERAL APPEARANCE: alert and no distress  MS: Generalized swelling of the left ankle and foot with localized tenderness over the lateral malleolus  SKIN: no suspicious lesions or rashes  NEURO: Normal strength and tone, mentation intact and speech normal  PSYCH: mentation appears normal and affect normal/bright    Xray - Reviewed and interpreted by me.  Appearance of nondisplaced fracture of the lateral left malleolus per my review of x-ray

## 2022-10-31 NOTE — PROGRESS NOTES
"SUBJECTIVE:   Daniella Sexton is a 42 year old female who is seen as Urgent Care referral for evaluation of a left ankle injury that occurred on 10/28.     Present symptoms: pain laterally.  Using crutches along with crutches to walk.      Treatments to this point: Tylenol, Diclofenac, Gabapentin. All for chronic back issues.  Ice, boot crutches.    Orthopedic PMH: history of a \"chipped bone on the outer part of my ankle a few years ago\" so she has a boot and crutches.    Review of Systems:  Constitutional:  NEGATIVE for fever, chills, change in weight  Integumentary/Skin:  NEGATIVE for worrisome rashes, moles or lesions  Eyes:  NEGATIVE for vision changes or irritation  ENT/Mouth:  NEGATIVE for ear, mouth and throat problems  Resp:  NEGATIVE for significant cough or SOB  Breast:  NEGATIVE for masses, tenderness or discharge  CV:  NEGATIVE for chest pain, palpitations or peripheral edema  GI:  NEGATIVE for nausea, abdominal pain, heartburn, or change in bowel habits  :  Negative   Musculoskeletal:  See HPI above  Neuro:  NEGATIVE for weakness, dizziness or paresthesias  Endocrine:  NEGATIVE for temperature intolerance, skin/hair changes  Heme/allergy/immune:  NEGATIVE for bleeding problems  Psychiatric:  NEGATIVE for changes in mood or affect    Past Medical History:   Past Medical History:   Diagnosis Date     Alcohol abuse      Arthritis      C. 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)      Past Surgical History:   Past Surgical History:   Procedure Laterality Date     BACK SURGERY  2005    L 3-4, L 4-5     LITHOTRIPSY  2016     OPTICAL TRACKING SYSTEM ENDOSCOPIC SINUS SURGERY Bilateral 1/10/2019    Procedure: BILATERAL IMAGE GUIDED ENDOSCOPIC SINUS SURGERY, BILATERAL TURBINATE REDUCTION;  Surgeon: Maikol Miguel MD;  Location: [facility] OR     RELEASE CARPAL TUNNEL Left      TONSILLECTOMY       TUBAL LIGATION       Family History:   Family History   Problem Relation Age of Onset     Heart " "Disease No family hx of      Diabetes No family hx of      Cancer No family hx of      Social History:   Social History     Tobacco Use     Smoking status: Every Day     Packs/day: 0.50     Types: Cigarettes     Smokeless tobacco: Never     Tobacco comments:     5 cigarettes/day   Substance Use Topics     Alcohol use: Yes     Comment: 3-4 beers a night        OBJECTIVE:  Physical Exam:  /81   Pulse 102   Ht 1.702 m (5' 7\")   Wt 117.9 kg (260 lb)   SpO2 96%   BMI 40.72 kg/m    General Appearance: healthy, alert and no distress   Skin: no suspicious lesions or rashes  Neuro: Normal strength and tone, mentation intact and speech normal  Vascular: good pulses, and cappillary refill   Lymph: no lymphadenopathy   Psych:  mentation appears normal and affect normal/bright  Resp: no increased work of breathing     Left Ankle Exam:  Gait: not tested   Swelling: moderate  Tender: lateral mall and medial malleolus  Stability: normal  Range of motion: limited by pain.    Left Foot Exam:  Non-tender     X-rays:  EXAM: XR ANKLE LEFT G/E 3 VIEWS  DATE/TIME: 10/30/2022 12:48 PM   There is a nondisplaced fracture of the distal aspect of the lateral malleolus. There is overlying soft tissue swelling. Small plantar calcaneal enthesophyte is noted.     ASSESSMENT:   Encounter Diagnosis   Name Primary?     Closed nondisplaced fracture of lateral malleolus of left fibula, initial encounter         PLAN:   Immobilization: continue boot   Weight bearing as tolerated. Right now, requires cructhes in addition to the boot.  Surgical plan: none  physical therapy: later  Meds: none  Work/school: NOTE no prolonged standing or walking x 4 weeks.    ACE given    Return to clinic: 4 weeks.    KARISSA Melton MD  Dept. Orthopedic Surgery  [facility]     "

## 2022-10-31 NOTE — PROGRESS NOTES
History of Present Illness - Autumn NETTA Sexton is a very pleasant 42 year old female being seen in remote follow up from 1/11/2021, previously seen for the first time at the consult of Dr. Trevino for sinus issues. After work up, we found pansinusitis, and functional endoscopic sinus surgery was done on 1/10/2019.    PROCEDURES PERFORMED:   1. Bilateral endoscopic maxillary antrostomy with tissue removal.   2. Bilateral endoscopic total ethmoidectomy.   3. Bilateral endoscopic submucous resection of inferior turbinates    She tells me that she did great for a long time, then in October of 2020 she got a sinus infection and has never been the same.  She is constantly congested, headache, teeth pain, pressure in the face.  She has been four rounds of antibiotics and prednisone.  While she is on them, there is slight improvement, but the issues come right back following the end of treatment.    She also was being worked up for a persistent cough, and a chest CT was done on 12/16/2020 that did not show any acute process.    Therefore after seeing her in the beginning of 2021, I got a new CT which did show some return of mucosal thickening, but open antrostomies.  Therefore I placed her on a month of Gent Dex irrigations, and she was lost to follow up until now.  She tells me that it worked well, and she had no issues at all until this past August 2022.  She has been put on a few rounds of antibiotics, the latest one by me two weeks ago.      Past Medical History -   Patient Active Problem List   Diagnosis     Anxiety     Depressive disorder     Duodenal ulcer     History of alcohol abuse     History of methamphetamine abuse (H)     Migraines     Seasonal allergic rhinitis     Sleep disturbance     Tobacco abuse     CARDIOVASCULAR SCREENING; LDL GOAL LESS THAN 160     Chronic bilateral low back pain with bilateral sciatica     Pure hypercholesterolemia     Mild intermittent asthma without complication     Chronic pansinusitis      Obesity (BMI 35.0-39.9) with comorbidity (H)     Lumbago     Fatty liver     Lung nodule     Pulmonary emphysema, unspecified emphysema type (H)     Medical marijuana use       Current Medications -   Current Outpatient Medications:      albuterol (PROAIR HFA/PROVENTIL HFA/VENTOLIN HFA) 108 (90 Base) MCG/ACT inhaler, Inhale 2 puffs into the lungs every 4 hours as needed for shortness of breath / dyspnea or wheezing, Disp: 18 g, Rfl: 0     amitriptyline (ELAVIL) 50 MG tablet, Take 1 tablet (50 mg) by mouth At Bedtime, Disp: 90 tablet, Rfl: 3     cetirizine (ZYRTEC) 10 MG tablet, Take 1 tablet (10 mg) by mouth daily, Disp: 90 tablet, Rfl: 3     clarithromycin (BIAXIN) 500 MG tablet, Take 1 tablet (500 mg) by mouth 2 times daily for 14 days (Patient not taking: Reported on 10/30/2022), Disp: 28 tablet, Rfl: 0     diclofenac (VOLTAREN) 75 MG EC tablet, Take 1 tablet (75 mg) by mouth 2 times daily, Disp: 60 tablet, Rfl: 3     fluticasone (FLONASE) 50 MCG/ACT nasal spray, Spray 1 spray into both nostrils daily, Disp: 9.9 mL, Rfl: 0     fluticasone-salmeterol (ADVAIR) 100-50 MCG/ACT inhaler, Inhale 1 puff into the lungs every 12 hours, Disp: 60 each, Rfl: 5     gabapentin (NEURONTIN) 300 MG capsule, Take 3 capsules (900 mg) by mouth 3 times daily, Disp: 270 capsule, Rfl: 3     guaiFENesin-codeine (ROBITUSSIN AC) 100-10 MG/5ML solution, Take 5-10 mLs by mouth every 4 hours as needed for cough, Disp: 118 mL, Rfl: 0     ipratropium - albuterol 0.5 mg/2.5 mg/3 mL (DUONEB) 0.5-2.5 (3) MG/3ML neb solution, Take 1 vial (3 mLs) by nebulization every 6 hours as needed for shortness of breath / dyspnea or wheezing, Disp: 90 mL, Rfl: 1     nicotine (NICORETTE) 2 MG gum, Place 1 each (2 mg) inside cheek every hour as needed for smoking cessation, Disp: 40 each, Rfl: 0     nicotine (NICORETTE) 2 MG gum, Place 1 each (2 mg) inside cheek as needed for smoking cessation, Disp: 50 each, Rfl: 11     predniSONE (DELTASONE) 10 MG tablet,  Take 1 tablet (10 mg) by mouth 2 times daily for 5 days (Patient not taking: Reported on 10/30/2022), Disp: 10 tablet, Rfl: 2     rizatriptan (MAXALT) 10 MG tablet, Take 1 tablet (10 mg) by mouth at onset of headache for migraine, Disp: 10 tablet, Rfl: 2     terbinafine (LAMISIL) 1 % external cream, Apply topically 2 times daily, Disp: 30 g, Rfl: 0     tiZANidine (ZANAFLEX) 4 MG tablet, Take 1-2 tablets (4-8 mg) by mouth 3 times daily as needed for muscle spasms, Disp: 60 tablet, Rfl: 3     triamcinolone (KENALOG) 0.1 % external cream, Apply topically 2 times daily, Disp: 80 g, Rfl: 1    Allergies -   Allergies   Allergen Reactions     Amoxicillin Anaphylaxis     Morphine Other (See Comments) and Nausea and Vomiting     Penicillins Unknown       Social History -   Social History     Socioeconomic History     Marital status:      Spouse name: Not on file     Number of children: Not on file     Years of education: Not on file     Highest education level: Not on file   Occupational History     Not on file   Social Needs     Financial resource strain: Not on file     Food insecurity     Worry: Not on file     Inability: Not on file     Transportation needs     Medical: Not on file     Non-medical: Not on file   Tobacco Use     Smoking status: Current Every Day Smoker     Packs/day: 0.50     Types: Cigarettes     Smokeless tobacco: Never Used     Tobacco comment: 5 cigarettes/day   Substance and Sexual Activity     Alcohol use: Yes     Comment: 3-4 beers a night      Drug use: No     Sexual activity: Yes   Lifestyle     Physical activity     Days per week: Not on file     Minutes per session: Not on file     Stress: Not on file   Relationships     Social connections     Talks on phone: Not on file     Gets together: Not on file     Attends [employer] service: Not on file     Active member of club or organization: Not on file     Attends meetings of clubs or organizations: Not on file     Relationship status: Not  "on file     Intimate partner violence     Fear of current or ex partner: Not on file     Emotionally abused: Not on file     Physically abused: Not on file     Forced sexual activity: Not on file   Other Topics Concern     Parent/sibling w/ CABG, MI or angioplasty before 65F 55M? Not Asked   Social History Narrative     Not on file       Family History -   Family History   Problem Relation Age of Onset     Heart Disease No family hx of      Diabetes No family hx of      Cancer No family hx of        Review of Systems - As per HPI and PMHx, otherwise 10+ system review of the head and neck, and general constitution is negative.    Physical Exam  /68 (BP Location: Right arm, Patient Position: Sitting, Cuff Size: Adult Large)   Pulse 83   Ht 1.702 m (5' 7\")   Wt 118.8 kg (262 lb)   SpO2 99%   BMI 41.04 kg/m      General - The patient is well nourished and well developed, and appears to have good nutritional status.  Alert and oriented to person and place, answers questions and cooperates with examination appropriately.   Head and Face - Normocephalic and atraumatic, with no gross asymmetry noted of the contour of the facial features.  The facial nerve is intact, with strong symmetric movements.  Voice and Breathing - The patient was breathing comfortably without the use of accessory muscles. There was no wheezing, stridor, or stertor.  The patients voice was clear and strong, and had appropriate pitch and quality.  Ears - The tympanic membranes are normal in appearance, bony landmarks are intact.  No retraction, perforation, or masses.  No fluid or purulence was seen in the external canal or the middle ear. No evidence of infection of the middle ear or external canal, cerumen was normal in appearance.  Eyes - Extraocular movements intact, and the pupils were reactive to light.  Sclera were not icteric or injected, conjunctiva were pink and moist.  Mouth - Examination of the oral cavity showed pink, healthy " oral mucosa. No lesions or ulcerations noted.  The tongue was mobile and midline, and the dentition were in good condition.    Throat - The walls of the oropharynx were smooth, pink, moist, symmetric, and had no lesions or ulcerations.  The tonsillar pillars and soft palate were symmetric.  The uvula was midline on elevation.    Neck - Normal midline excursion of the laryngotracheal complex during swallowing.  Full range of motion on passive movement.  Palpation of the occipital, submental, submandibular, internal jugular chain, and supraclavicular nodes did not demonstrate any abnormal lymph nodes or masses.  The carotid pulse was palpable bilaterally.  Palpation of the thyroid was soft and smooth, with no nodules or goiter appreciated.  The trachea was mobile and midline.  Nose - External contour is symmetric, no gross deflection or scars.  Nasal mucosa is pink and moist with no abnormal mucus.  The septum was midline and non-obstructive, turbinates of normal size and position.  No polyps, masses, or purulence noted on examination.            A/P - Daniella NETTA Sexton is a 40 year old female  (J32.4) Chronic pansinusitis  (primary encounter diagnosis)    Her symptoms and history strongly suggest a return of her chronic sinus disease. Before I start rinses, which workede well last time, I have done cultures today.    I will call with culture results and a plan.    Rhinitis sicca is also on the differential here.

## 2022-11-01 ENCOUNTER — OFFICE VISIT (OUTPATIENT)
Dept: ORTHOPEDICS | Facility: CLINIC | Age: 42
End: 2022-11-01
Attending: STUDENT IN AN ORGANIZED HEALTH CARE EDUCATION/TRAINING PROGRAM
Payer: COMMERCIAL

## 2022-11-01 VITALS
BODY MASS INDEX: 40.81 KG/M2 | HEIGHT: 67 IN | SYSTOLIC BLOOD PRESSURE: 129 MMHG | WEIGHT: 260 LBS | DIASTOLIC BLOOD PRESSURE: 81 MMHG | HEART RATE: 102 BPM | OXYGEN SATURATION: 96 %

## 2022-11-01 DIAGNOSIS — S82.65XA CLOSED NONDISPLACED FRACTURE OF LATERAL MALLEOLUS OF LEFT FIBULA, INITIAL ENCOUNTER: ICD-10-CM

## 2022-11-01 PROCEDURE — 99203 OFFICE O/P NEW LOW 30 MIN: CPT | Mod: 57 | Performed by: ORTHOPAEDIC SURGERY

## 2022-11-01 PROCEDURE — 27786 TREATMENT OF ANKLE FRACTURE: CPT | Mod: LT | Performed by: ORTHOPAEDIC SURGERY

## 2022-11-01 ASSESSMENT — PAIN SCALES - GENERAL: PAINLEVEL: EXTREME PAIN (9)

## 2022-11-01 NOTE — LETTER
"    11/1/2022         RE: Daniella Sexton  [address]  [facility] [address]        Dear Colleague,    Thank you for referring your patient, Daniella Sexton, to the [facility]. Please see a copy of my visit note below.    SUBJECTIVE:   Daniella Sexton is a 42 year old female who is seen as Urgent Care referral for evaluation of a left ankle injury that occurred on 10/28.     Present symptoms: pain laterally.  Using crutches along with crutches to walk.      Treatments to this point: Tylenol, Diclofenac, Gabapentin. All for chronic back issues.  Ice, boot crutches.    Orthopedic PMH: history of a \"chipped bone on the outer part of my ankle a few years ago\" so she has a boot and crutches.    Review of Systems:  Constitutional:  NEGATIVE for fever, chills, change in weight  Integumentary/Skin:  NEGATIVE for worrisome rashes, moles or lesions  Eyes:  NEGATIVE for vision changes or irritation  ENT/Mouth:  NEGATIVE for ear, mouth and throat problems  Resp:  NEGATIVE for significant cough or SOB  Breast:  NEGATIVE for masses, tenderness or discharge  CV:  NEGATIVE for chest pain, palpitations or peripheral edema  GI:  NEGATIVE for nausea, abdominal pain, heartburn, or change in bowel habits  :  Negative   Musculoskeletal:  See HPI above  Neuro:  NEGATIVE for weakness, dizziness or paresthesias  Endocrine:  NEGATIVE for temperature intolerance, skin/hair changes  Heme/allergy/immune:  NEGATIVE for bleeding problems  Psychiatric:  NEGATIVE for changes in mood or affect    Past Medical History:   Past Medical History:   Diagnosis Date     Alcohol abuse      Arthritis      C. 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)      Past Surgical History:   Past Surgical History:   Procedure Laterality Date     BACK SURGERY  2005    L 3-4, L 4-5     LITHOTRIPSY  2016     OPTICAL TRACKING SYSTEM ENDOSCOPIC SINUS SURGERY Bilateral 1/10/2019    Procedure: BILATERAL IMAGE GUIDED ENDOSCOPIC SINUS " "SURGERY, BILATERAL TURBINATE REDUCTION;  Surgeon: Maikol Miguel MD;  Location: [facility] OR     RELEASE CARPAL TUNNEL Left      TONSILLECTOMY       TUBAL LIGATION       Family History:   Family History   Problem Relation Age of Onset     Heart Disease No family hx of      Diabetes No family hx of      Cancer No family hx of      Social History:   Social History     Tobacco Use     Smoking status: Every Day     Packs/day: 0.50     Types: Cigarettes     Smokeless tobacco: Never     Tobacco comments:     5 cigarettes/day   Substance Use Topics     Alcohol use: Yes     Comment: 3-4 beers a night        OBJECTIVE:  Physical Exam:  /81   Pulse 102   Ht 1.702 m (5' 7\")   Wt 117.9 kg (260 lb)   SpO2 96%   BMI 40.72 kg/m    General Appearance: healthy, alert and no distress   Skin: no suspicious lesions or rashes  Neuro: Normal strength and tone, mentation intact and speech normal  Vascular: good pulses, and cappillary refill   Lymph: no lymphadenopathy   Psych:  mentation appears normal and affect normal/bright  Resp: no increased work of breathing     Left Ankle Exam:  Gait: not tested   Swelling: moderate  Tender: lateral mall and medial malleolus  Stability: normal  Range of motion: limited by pain.    Left Foot Exam:  Non-tender     X-rays:  EXAM: XR ANKLE LEFT G/E 3 VIEWS  DATE/TIME: 10/30/2022 12:48 PM   There is a nondisplaced fracture of the distal aspect of the lateral malleolus. There is overlying soft tissue swelling. Small plantar calcaneal enthesophyte is noted.     ASSESSMENT:   Encounter Diagnosis   Name Primary?     Closed nondisplaced fracture of lateral malleolus of left fibula, initial encounter         PLAN:   Immobilization: continue boot   Weight bearing as tolerated. Right now, requires cructhes in addition to the boot.  Surgical plan: none  physical therapy: later  Meds: none  Work/school: NOTE no prolonged standing or walking x 4 weeks.    ACE given    Return to clinic: 4 weeks.    JFrantz " Tamra Melton MD  Dept. Orthopedic Surgery  [facility]         Again, thank you for allowing me to participate in the care of your patient.        Sincerely,        Egdar Melton MD

## 2022-11-01 NOTE — LETTER
November 1, 2022      Daniella Sexton  [address]  [facility] [place] [address]        To Whom It May Concern:    Daniella Sexton was seen in our clinic on 11-1-22. She may return to work with the following: no standing or walking for about 4 weeks.      Sincerely,        Edgar Melton MD

## 2022-11-03 ENCOUNTER — ANCILLARY PROCEDURE (OUTPATIENT)
Dept: CT IMAGING | Facility: CLINIC | Age: 42
End: 2022-11-03
Attending: CLINICAL NURSE SPECIALIST
Payer: COMMERCIAL

## 2022-11-03 DIAGNOSIS — J44.9 COPD (CHRONIC OBSTRUCTIVE PULMONARY DISEASE) (H): ICD-10-CM

## 2022-11-03 DIAGNOSIS — J45.909 ASTHMA: ICD-10-CM

## 2022-11-03 DIAGNOSIS — R91.8 PULMONARY NODULES: ICD-10-CM

## 2022-11-03 PROCEDURE — 71250 CT THORAX DX C-: CPT | Mod: GC | Performed by: RADIOLOGY

## 2022-11-04 ENCOUNTER — OFFICE VISIT (OUTPATIENT)
Dept: NURSING | Facility: CLINIC | Age: 42
End: 2022-11-04
Payer: COMMERCIAL

## 2022-11-04 VITALS — BODY MASS INDEX: 41.16 KG/M2 | WEIGHT: 262.8 LBS | OXYGEN SATURATION: 100 % | HEART RATE: 93 BPM

## 2022-11-04 DIAGNOSIS — R91.1 LUNG NODULE: Primary | ICD-10-CM

## 2022-11-04 DIAGNOSIS — Z72.0 TOBACCO ABUSE: ICD-10-CM

## 2022-11-04 DIAGNOSIS — J43.9 PULMONARY EMPHYSEMA, UNSPECIFIED EMPHYSEMA TYPE (H): ICD-10-CM

## 2022-11-04 PROCEDURE — 94375 RESPIRATORY FLOW VOLUME LOOP: CPT | Performed by: INTERNAL MEDICINE

## 2022-11-04 NOTE — PROGRESS NOTES
"PFT Lab Note: [place] done for anticipated apt with Dr Mojica.    Lung volumes and DLCO deferred due to patient c/o feeling unable to cooperate fully with testing requirements. Pt stated that she felt \"like I am going to pass out\" after performing a slow vital capacity and that she is \"not able to blow out that long\".  "

## 2022-11-06 LAB
ERV-%PRED-PRE: 89 %
ERV-PRE: 0.49 L
ERV-PRED: 0.55 L
EXPTIME-PRE: 6.9 SEC
FEF2575-%PRED-PRE: 77 %
FEF2575-PRE: 2.54 L/SEC
FEF2575-PRED: 3.27 L/SEC
FEFMAX-%PRED-PRE: 72 %
FEFMAX-PRE: 5.28 L/SEC
FEFMAX-PRED: 7.33 L/SEC
FEV1-%PRED-PRE: 80 %
FEV1-PRE: 2.59 L
FEV1FEV6-PRE: 81 %
FEV1FEV6-PRED: 84 %
FEV1FVC-PRE: 80 %
FEV1FVC-PRED: 82 %
FEV1SVC-PRE: 78 %
FEV1SVC-PRED: 81 %
FIFMAX-PRE: 3.4 L/SEC
FVC-%PRED-PRE: 81 %
FVC-PRE: 3.23 L
FVC-PRED: 3.97 L
IC-%PRED-PRE: 82 %
IC-PRE: 2.82 L
IC-PRED: 3.42 L
VC-%PRED-PRE: 83 %
VC-PRE: 3.31 L
VC-PRED: 3.97 L

## 2022-11-07 ENCOUNTER — PRE VISIT (OUTPATIENT)
Dept: PULMONOLOGY | Facility: CLINIC | Age: 42
End: 2022-11-07

## 2022-11-07 ENCOUNTER — OFFICE VISIT (OUTPATIENT)
Dept: OTOLARYNGOLOGY | Facility: CLINIC | Age: 42
End: 2022-11-07
Payer: COMMERCIAL

## 2022-11-07 ENCOUNTER — VIRTUAL VISIT (OUTPATIENT)
Dept: PULMONOLOGY | Facility: CLINIC | Age: 42
End: 2022-11-07
Attending: NURSE PRACTITIONER
Payer: COMMERCIAL

## 2022-11-07 VITALS
OXYGEN SATURATION: 99 % | WEIGHT: 262 LBS | BODY MASS INDEX: 41.12 KG/M2 | DIASTOLIC BLOOD PRESSURE: 68 MMHG | HEIGHT: 67 IN | SYSTOLIC BLOOD PRESSURE: 102 MMHG | HEART RATE: 83 BPM

## 2022-11-07 DIAGNOSIS — R91.8 PULMONARY NODULES: Primary | ICD-10-CM

## 2022-11-07 DIAGNOSIS — J41.0 SIMPLE CHRONIC BRONCHITIS (H): ICD-10-CM

## 2022-11-07 DIAGNOSIS — F17.210 CIGARETTE NICOTINE DEPENDENCE, UNCOMPLICATED: ICD-10-CM

## 2022-11-07 DIAGNOSIS — J32.4 CHRONIC PANSINUSITIS: ICD-10-CM

## 2022-11-07 PROCEDURE — 87186 SC STD MICRODIL/AGAR DIL: CPT | Performed by: OTOLARYNGOLOGY

## 2022-11-07 PROCEDURE — 99406 BEHAV CHNG SMOKING 3-10 MIN: CPT | Performed by: INTERNAL MEDICINE

## 2022-11-07 PROCEDURE — 99214 OFFICE O/P EST MOD 30 MIN: CPT | Mod: 25 | Performed by: OTOLARYNGOLOGY

## 2022-11-07 PROCEDURE — 99204 OFFICE O/P NEW MOD 45 MIN: CPT | Mod: 25 | Performed by: INTERNAL MEDICINE

## 2022-11-07 PROCEDURE — 31231 NASAL ENDOSCOPY DX: CPT | Performed by: OTOLARYNGOLOGY

## 2022-11-07 PROCEDURE — 87075 CULTR BACTERIA EXCEPT BLOOD: CPT | Performed by: OTOLARYNGOLOGY

## 2022-11-07 PROCEDURE — G0463 HOSPITAL OUTPT CLINIC VISIT: HCPCS | Mod: PN,RTG | Performed by: INTERNAL MEDICINE

## 2022-11-07 PROCEDURE — 87070 CULTURE OTHR SPECIMN AEROBIC: CPT | Performed by: OTOLARYNGOLOGY

## 2022-11-07 PROCEDURE — 87077 CULTURE AEROBIC IDENTIFY: CPT | Performed by: OTOLARYNGOLOGY

## 2022-11-07 PROCEDURE — 87076 CULTURE ANAEROBE IDENT EACH: CPT | Performed by: OTOLARYNGOLOGY

## 2022-11-07 RX ORDER — NICOTINE 21 MG/24HR
1 PATCH, TRANSDERMAL 24 HOURS TRANSDERMAL EVERY 24 HOURS
Qty: 42 PATCH | Refills: 0 | Status: SHIPPED | OUTPATIENT
Start: 2022-11-07 | End: 2022-12-19

## 2022-11-07 RX ORDER — NICOTINE 21 MG/24HR
1 PATCH, TRANSDERMAL 24 HOURS TRANSDERMAL EVERY 24 HOURS
Qty: 14 PATCH | Refills: 0 | Status: SHIPPED | OUTPATIENT
Start: 2022-12-19 | End: 2023-01-02

## 2022-11-07 NOTE — LETTER
11/7/2022         RE: Daniella Sexton  [address]  [facility] [address]        Dear Colleague,    Thank you for referring your patient, Daneilla Sexton, to the [facility]. Please see a copy of my visit note below.    History of Present Illness - Daniella Sexton is a very pleasant 42 year old female being seen in remote follow up from 1/11/2021, previously seen for the first time at the consult of Dr. Trevino for sinus issues. After work up, we found pansinusitis, and functional endoscopic sinus surgery was done on 1/10/2019.    PROCEDURES PERFORMED:   1. Bilateral endoscopic maxillary antrostomy with tissue removal.   2. Bilateral endoscopic total ethmoidectomy.   3. Bilateral endoscopic submucous resection of inferior turbinates    She tells me that she did great for a long time, then in October of 2020 she got a sinus infection and has never been the same.  She is constantly congested, headache, teeth pain, pressure in the face.  She has been four rounds of antibiotics and prednisone.  While she is on them, there is slight improvement, but the issues come right back following the end of treatment.    She also was being worked up for a persistent cough, and a chest CT was done on 12/16/2020 that did not show any acute process.    Therefore after seeing her in the beginning of 2021, I got a new CT which did show some return of mucosal thickening, but open antrostomies.  Therefore I placed her on a month of Gent Dex irrigations, and she was lost to follow up until now.  She tells me that it worked well, and she had no issues at all until this past August 2022.  She has been put on a few rounds of antibiotics, the latest one by me two weeks ago.      Past Medical History -   Patient Active Problem List   Diagnosis     Anxiety     Depressive disorder     Duodenal ulcer     History of alcohol abuse     History of methamphetamine abuse (H)     Migraines     Seasonal allergic rhinitis     Sleep  disturbance     Tobacco abuse     CARDIOVASCULAR SCREENING; LDL GOAL LESS THAN 160     Chronic bilateral low back pain with bilateral sciatica     Pure hypercholesterolemia     Mild intermittent asthma without complication     Chronic pansinusitis     Obesity (BMI 35.0-39.9) with comorbidity (H)     Lumbago     Fatty liver     Lung nodule     Pulmonary emphysema, unspecified emphysema type (H)     Medical marijuana use       Current Medications -   Current Outpatient Medications:      albuterol (PROAIR HFA/PROVENTIL HFA/VENTOLIN HFA) 108 (90 Base) MCG/ACT inhaler, Inhale 2 puffs into the lungs every 4 hours as needed for shortness of breath / dyspnea or wheezing, Disp: 18 g, Rfl: 0     amitriptyline (ELAVIL) 50 MG tablet, Take 1 tablet (50 mg) by mouth At Bedtime, Disp: 90 tablet, Rfl: 3     cetirizine (ZYRTEC) 10 MG tablet, Take 1 tablet (10 mg) by mouth daily, Disp: 90 tablet, Rfl: 3     clarithromycin (BIAXIN) 500 MG tablet, Take 1 tablet (500 mg) by mouth 2 times daily for 14 days (Patient not taking: Reported on 10/30/2022), Disp: 28 tablet, Rfl: 0     diclofenac (VOLTAREN) 75 MG EC tablet, Take 1 tablet (75 mg) by mouth 2 times daily, Disp: 60 tablet, Rfl: 3     fluticasone (FLONASE) 50 MCG/ACT nasal spray, Spray 1 spray into both nostrils daily, Disp: 9.9 mL, Rfl: 0     fluticasone-salmeterol (ADVAIR) 100-50 MCG/ACT inhaler, Inhale 1 puff into the lungs every 12 hours, Disp: 60 each, Rfl: 5     gabapentin (NEURONTIN) 300 MG capsule, Take 3 capsules (900 mg) by mouth 3 times daily, Disp: 270 capsule, Rfl: 3     guaiFENesin-codeine (ROBITUSSIN AC) 100-10 MG/5ML solution, Take 5-10 mLs by mouth every 4 hours as needed for cough, Disp: 118 mL, Rfl: 0     ipratropium - albuterol 0.5 mg/2.5 mg/3 mL (DUONEB) 0.5-2.5 (3) MG/3ML neb solution, Take 1 vial (3 mLs) by nebulization every 6 hours as needed for shortness of breath / dyspnea or wheezing, Disp: 90 mL, Rfl: 1     nicotine (NICORETTE) 2 MG gum, Place 1 each (2  mg) inside cheek every hour as needed for smoking cessation, Disp: 40 each, Rfl: 0     nicotine (NICORETTE) 2 MG gum, Place 1 each (2 mg) inside cheek as needed for smoking cessation, Disp: 50 each, Rfl: 11     predniSONE (DELTASONE) 10 MG tablet, Take 1 tablet (10 mg) by mouth 2 times daily for 5 days (Patient not taking: Reported on 10/30/2022), Disp: 10 tablet, Rfl: 2     rizatriptan (MAXALT) 10 MG tablet, Take 1 tablet (10 mg) by mouth at onset of headache for migraine, Disp: 10 tablet, Rfl: 2     terbinafine (LAMISIL) 1 % external cream, Apply topically 2 times daily, Disp: 30 g, Rfl: 0     tiZANidine (ZANAFLEX) 4 MG tablet, Take 1-2 tablets (4-8 mg) by mouth 3 times daily as needed for muscle spasms, Disp: 60 tablet, Rfl: 3     triamcinolone (KENALOG) 0.1 % external cream, Apply topically 2 times daily, Disp: 80 g, Rfl: 1    Allergies -   Allergies   Allergen Reactions     Amoxicillin Anaphylaxis     Morphine Other (See Comments) and Nausea and Vomiting     Penicillins Unknown       Social History -   Social History     Socioeconomic History     Marital status:      Spouse name: Not on file     Number of children: Not on file     Years of education: Not on file     Highest education level: Not on file   Occupational History     Not on file   Social Needs     Financial resource strain: Not on file     Food insecurity     Worry: Not on file     Inability: Not on file     Transportation needs     Medical: Not on file     Non-medical: Not on file   Tobacco Use     Smoking status: Current Every Day Smoker     Packs/day: 0.50     Types: Cigarettes     Smokeless tobacco: Never Used     Tobacco comment: 5 cigarettes/day   Substance and Sexual Activity     Alcohol use: Yes     Comment: 3-4 beers a night      Drug use: No     Sexual activity: Yes   Lifestyle     Physical activity     Days per week: Not on file     Minutes per session: Not on file     Stress: Not on file   Relationships     Social connections      "Talks on phone: Not on file     Gets together: Not on file     Attends [employer] service: Not on file     Active member of club or organization: Not on file     Attends meetings of clubs or organizations: Not on file     Relationship status: Not on file     Intimate partner violence     Fear of current or ex partner: Not on file     Emotionally abused: Not on file     Physically abused: Not on file     Forced sexual activity: Not on file   Other Topics Concern     Parent/sibling w/ CABG, MI or angioplasty before 65F 55M? Not Asked   Social History Narrative     Not on file       Family History -   Family History   Problem Relation Age of Onset     Heart Disease No family hx of      Diabetes No family hx of      Cancer No family hx of        Review of Systems - As per HPI and PMHx, otherwise 10+ system review of the head and neck, and general constitution is negative.    Physical Exam  /68 (BP Location: Right arm, Patient Position: Sitting, Cuff Size: Adult Large)   Pulse 83   Ht 1.702 m (5' 7\")   Wt 118.8 kg (262 lb)   SpO2 99%   BMI 41.04 kg/m      General - The patient is well nourished and well developed, and appears to have good nutritional status.  Alert and oriented to person and place, answers questions and cooperates with examination appropriately.   Head and Face - Normocephalic and atraumatic, with no gross asymmetry noted of the contour of the facial features.  The facial nerve is intact, with strong symmetric movements.  Voice and Breathing - The patient was breathing comfortably without the use of accessory muscles. There was no wheezing, stridor, or stertor.  The patients voice was clear and strong, and had appropriate pitch and quality.  Ears - The tympanic membranes are normal in appearance, bony landmarks are intact.  No retraction, perforation, or masses.  No fluid or purulence was seen in the external canal or the middle ear. No evidence of infection of the middle ear or external canal, " cerumen was normal in appearance.  Eyes - Extraocular movements intact, and the pupils were reactive to light.  Sclera were not icteric or injected, conjunctiva were pink and moist.  Mouth - Examination of the oral cavity showed pink, healthy oral mucosa. No lesions or ulcerations noted.  The tongue was mobile and midline, and the dentition were in good condition.    Throat - The walls of the oropharynx were smooth, pink, moist, symmetric, and had no lesions or ulcerations.  The tonsillar pillars and soft palate were symmetric.  The uvula was midline on elevation.    Neck - Normal midline excursion of the laryngotracheal complex during swallowing.  Full range of motion on passive movement.  Palpation of the occipital, submental, submandibular, internal jugular chain, and supraclavicular nodes did not demonstrate any abnormal lymph nodes or masses.  The carotid pulse was palpable bilaterally.  Palpation of the thyroid was soft and smooth, with no nodules or goiter appreciated.  The trachea was mobile and midline.  Nose - External contour is symmetric, no gross deflection or scars.  Nasal mucosa is pink and moist with no abnormal mucus.  The septum was midline and non-obstructive, turbinates of normal size and position.  No polyps, masses, or purulence noted on examination.            A/P - Daniella NETTA Sexton is a 40 year old female  (J32.4) Chronic pansinusitis  (primary encounter diagnosis)    Her symptoms and history strongly suggest a return of her chronic sinus disease. Before I start rinses, which workede well last time, I have done cultures today.    I will call with culture results and a plan.    Rhinitis sicca is also on the differential here.        Again, thank you for allowing me to participate in the care of your patient.        Sincerely,        Maikol Miguel MD

## 2022-11-07 NOTE — LETTER
11/7/2022       RE: Daniella Sexton  [address]  [facility] [address]     Dear Colleague,    Thank you for referring your patient, Daniella Sexton, to the [facility] at [facility]. Please see a copy of my visit note below.      LUNG NODULE & INTERVENTIONAL PULMONARY CLINIC  CL[facility]ICS & SURGERY CENTER, [facility]     Daniella Sexton MRN# [id number]   Age: 42 year old YOB: 1980       Requesting Physician: Renetta Morrow, APRN CNP  [address] [place] [address]       Assessment and Plan:    1. Established multiple pulmonary lung nodule(s). Given the characteristics on current/previous imaging and risk factors; I would classify this to be Low (<6%) risk for cancer. No further follow up recommended. She is below the recommended age for lung cancer screening.    2. Bronchitis. Continue inhalers    3.tobacco use. We discussed smoking cessation in detail.  She had some sedative effects from chantix and is on an MAO so xyban not an option. She will do nicotine patches and gum. More than 3 minutes spent on smoking cessation counseling.    She will follow up with me as needed             History:     Daniella Sexton is a 42 year old female with sig h/o for tobacco use, bronchitis who is here for evaluation/followup of lung nodule(s).  She is currently smoking. She would like to quit.  She is on inhalers as well as some pain medications.    - My interpretation of the images relevant for this visit includes: multiple stable lung nodules   - My interpretation of the PFT's relevant for this visit includes: within normal range            Past Medical History:      Past Medical History:   Diagnosis Date     Alcohol abuse      Arthritis      C. 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)            Past Surgical History:      Past Surgical History:   Procedure  Laterality Date     BACK SURGERY  2005    L 3-4, L 4-5     LITHOTRIPSY  2016     OPTICAL TRACKING SYSTEM ENDOSCOPIC SINUS SURGERY Bilateral 1/10/2019    Procedure: BILATERAL IMAGE GUIDED ENDOSCOPIC SINUS SURGERY, BILATERAL TURBINATE REDUCTION;  Surgeon: Maikol Miguel MD;  Location: [facility] OR     RELEASE CARPAL TUNNEL Left      TONSILLECTOMY       TUBAL LIGATION            Social History:     Social History     Tobacco Use     Smoking status: Every Day     Packs/day: 0.50     Types: Cigarettes     Smokeless tobacco: Never     Tobacco comments:     5 cigarettes/day   Substance Use Topics     Alcohol use: Yes     Comment: 3-4 beers a night           Family History:     Family History   Problem Relation Age of Onset     Heart Disease No family hx of      Diabetes No family hx of      Cancer No family hx of            Allergies:      Allergies   Allergen Reactions     Amoxicillin Anaphylaxis     Morphine Other (See Comments) and Nausea and Vomiting     Penicillins Unknown          Medications:     Current Outpatient Medications   Medication Sig     albuterol (PROAIR HFA/PROVENTIL HFA/VENTOLIN HFA) 108 (90 Base) MCG/ACT inhaler Inhale 2 puffs into the lungs every 4 hours as needed for shortness of breath / dyspnea or wheezing     amitriptyline (ELAVIL) 50 MG tablet Take 1 tablet (50 mg) by mouth At Bedtime     cetirizine (ZYRTEC) 10 MG tablet Take 1 tablet (10 mg) by mouth daily     clarithromycin (BIAXIN) 500 MG tablet Take 1 tablet (500 mg) by mouth 2 times daily for 14 days     COMPOUNDED NON-CONTROLLED SUBSTANCE (CMPD RX) - PHARMACY TO MIX COMPOUNDED MEDICATION Apply 20 mLs into each nare 2 times daily Gentamicin/Dexamethasone 160/120mg/liter saline for nasal irrigation     diclofenac (VOLTAREN) 75 MG EC tablet Take 1 tablet (75 mg) by mouth 2 times daily     fluticasone (FLONASE) 50 MCG/ACT nasal spray Spray 1 spray into both nostrils daily     fluticasone-salmeterol (ADVAIR) 100-50 MCG/ACT inhaler Inhale 1 puff  into the lungs every 12 hours     gabapentin (NEURONTIN) 300 MG capsule Take 3 capsules (900 mg) by mouth 3 times daily     guaiFENesin-codeine (ROBITUSSIN AC) 100-10 MG/5ML solution Take 5-10 mLs by mouth every 4 hours as needed for cough     ipratropium - albuterol 0.5 mg/2.5 mg/3 mL (DUONEB) 0.5-2.5 (3) MG/3ML neb solution Take 1 vial (3 mLs) by nebulization every 6 hours as needed for shortness of breath / dyspnea or wheezing     nicotine (NICORETTE) 2 MG gum Place 1 each (2 mg) inside cheek every hour as needed for smoking cessation     nicotine (NICORETTE) 2 MG gum Place 1 each (2 mg) inside cheek as needed for smoking cessation     rizatriptan (MAXALT) 10 MG tablet Take 1 tablet (10 mg) by mouth at onset of headache for migraine     terbinafine (LAMISIL) 1 % external cream Apply topically 2 times daily     tiZANidine (ZANAFLEX) 4 MG tablet Take 1-2 tablets (4-8 mg) by mouth 3 times daily as needed for muscle spasms     triamcinolone (KENALOG) 0.1 % external cream Apply topically 2 times daily     No current facility-administered medications for this visit.          Review of Systems:     See HPI         Physical Exam:   There were no vitals taken for this visit.    Constitutional - looks well, in no apparent distress  Eyes - no redness or discharge  Respiratory -breathing appears comfortable. No wheeze or rhonchi.   Skin - No appreciable discoloration or lesions (very limited exam)  Neurological - No apparent tremors. Speech fluent and articlate  Psychiatric - no signs of delirium or anxiety          Current Laboratory Data:   All laboratory and imaging data reviewed.    No results found for this or any previous visit (from the past 24 hour(s)).           Again, thank you for allowing me to participate in the care of your patient.      Sincerely,    Dominik Mojica MD

## 2022-11-07 NOTE — NURSING NOTE
"Chief Complaint   Patient presents with     Sinusitis       Vitals:    11/07/22 0920   BP: 102/68   BP Location: Right arm   Patient Position: Sitting   Cuff Size: Adult Large   Pulse: 83   SpO2: 99%   Weight: 118.8 kg (262 lb)   Height: 1.702 m (5' 7\")     Wt Readings from Last 1 Encounters:   11/07/22 118.8 kg (262 lb)     Ht Readings from Last 1 Encounters:   11/07/22 1.702 m (5' 7\")       Jose Alfredo Manjarrez [facility], 11/7/2022 9:21 AM    "

## 2022-11-07 NOTE — PROGRESS NOTES
Daniella is a 42 year old who is being evaluated via a billable video visit.      How would you like to obtain your AVS? MyChart  If the video visit is dropped, the invitation should be resent by: Text to cell phone: [phone]  Will anyone else be joining your video visit? No        Video-Visit Details    Video Start Time: 244    Type of service:  Video Visit    Video End Time:255    Originating Location (pt. Location): Home        Distant Location (provider location):  On-site    Platform used for Video Visit: Yesy Harrison    LUNG NODULE & INTERVENTIONAL PULMONARY CLINIC  CLINICS & SURGERY [facility]     Daniella Sexton MRN# [id number]   Age: 42 year old YOB: 1980       Requesting Physician: JENAE Sibley CNP  [address] [place] [address]       Assessment and Plan:    1. Established multiple pulmonary lung nodule(s). Given the characteristics on current/previous imaging and risk factors; I would classify this to be Low (<6%) risk for cancer. No further follow up recommended. She is below the recommended age for lung cancer screening.    2. Bronchitis. Continue inhalers    3.tobacco use. We discussed smoking cessation in detail.  She had some sedative effects from chantix and is on an MAO so xyban not an option. She will do nicotine patches and gum. More than 3 minutes spent on smoking cessation counseling.    She will follow up with me as needed             History:     Daniella Sexton is a 42 year old female with sig h/o for tobacco use, bronchitis who is here for evaluation/followup of lung nodule(s).  She is currently smoking. She would like to quit.  She is on inhalers as well as some pain medications.    - My interpretation of the images relevant for this visit includes: multiple stable lung nodules   - My interpretation of the PFT's relevant for this visit includes: within normal range            Past Medical  History:      Past Medical History:   Diagnosis Date     Alcohol abuse      Arthritis      C. 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)            Past Surgical History:      Past Surgical History:   Procedure Laterality Date     BACK SURGERY  2005    L 3-4, L 4-5     LITHOTRIPSY  2016     OPTICAL TRACKING SYSTEM ENDOSCOPIC SINUS SURGERY Bilateral 1/10/2019    Procedure: BILATERAL IMAGE GUIDED ENDOSCOPIC SINUS SURGERY, BILATERAL TURBINATE REDUCTION;  Surgeon: Maikol Miguel MD;  Location: [facility] OR     RELEASE CARPAL TUNNEL Left      TONSILLECTOMY       TUBAL LIGATION            Social History:     Social History     Tobacco Use     Smoking status: Every Day     Packs/day: 0.50     Types: Cigarettes     Smokeless tobacco: Never     Tobacco comments:     5 cigarettes/day   Substance Use Topics     Alcohol use: Yes     Comment: 3-4 beers a night           Family History:     Family History   Problem Relation Age of Onset     Heart Disease No family hx of      Diabetes No family hx of      Cancer No family hx of            Allergies:      Allergies   Allergen Reactions     Amoxicillin Anaphylaxis     Morphine Other (See Comments) and Nausea and Vomiting     Penicillins Unknown          Medications:     Current Outpatient Medications   Medication Sig     albuterol (PROAIR HFA/PROVENTIL HFA/VENTOLIN HFA) 108 (90 Base) MCG/ACT inhaler Inhale 2 puffs into the lungs every 4 hours as needed for shortness of breath / dyspnea or wheezing     amitriptyline (ELAVIL) 50 MG tablet Take 1 tablet (50 mg) by mouth At Bedtime     cetirizine (ZYRTEC) 10 MG tablet Take 1 tablet (10 mg) by mouth daily     clarithromycin (BIAXIN) 500 MG tablet Take 1 tablet (500 mg) by mouth 2 times daily for 14 days     COMPOUNDED NON-CONTROLLED SUBSTANCE (CMPD RX) - PHARMACY TO MIX COMPOUNDED MEDICATION Apply 20 mLs into each nare 2 times daily Gentamicin/Dexamethasone 160/120mg/liter saline for nasal irrigation     diclofenac  (VOLTAREN) 75 MG EC tablet Take 1 tablet (75 mg) by mouth 2 times daily     fluticasone (FLONASE) 50 MCG/ACT nasal spray Spray 1 spray into both nostrils daily     fluticasone-salmeterol (ADVAIR) 100-50 MCG/ACT inhaler Inhale 1 puff into the lungs every 12 hours     gabapentin (NEURONTIN) 300 MG capsule Take 3 capsules (900 mg) by mouth 3 times daily     guaiFENesin-codeine (ROBITUSSIN AC) 100-10 MG/5ML solution Take 5-10 mLs by mouth every 4 hours as needed for cough     ipratropium - albuterol 0.5 mg/2.5 mg/3 mL (DUONEB) 0.5-2.5 (3) MG/3ML neb solution Take 1 vial (3 mLs) by nebulization every 6 hours as needed for shortness of breath / dyspnea or wheezing     nicotine (NICORETTE) 2 MG gum Place 1 each (2 mg) inside cheek every hour as needed for smoking cessation     nicotine (NICORETTE) 2 MG gum Place 1 each (2 mg) inside cheek as needed for smoking cessation     rizatriptan (MAXALT) 10 MG tablet Take 1 tablet (10 mg) by mouth at onset of headache for migraine     terbinafine (LAMISIL) 1 % external cream Apply topically 2 times daily     tiZANidine (ZANAFLEX) 4 MG tablet Take 1-2 tablets (4-8 mg) by mouth 3 times daily as needed for muscle spasms     triamcinolone (KENALOG) 0.1 % external cream Apply topically 2 times daily     No current facility-administered medications for this visit.          Review of Systems:     See HPI         Physical Exam:   There were no vitals taken for this visit.    Constitutional - looks well, in no apparent distress  Eyes - no redness or discharge  Respiratory -breathing appears comfortable. No wheeze or rhonchi.   Skin - No appreciable discoloration or lesions (very limited exam)  Neurological - No apparent tremors. Speech fluent and articlate  Psychiatric - no signs of delirium or anxiety          Current Laboratory Data:   All laboratory and imaging data reviewed.    No results found for this or any previous visit (from the past 24 hour(s)).

## 2022-11-10 ENCOUNTER — MYC MEDICAL ADVICE (OUTPATIENT)
Dept: OTOLARYNGOLOGY | Facility: CLINIC | Age: 42
End: 2022-11-10

## 2022-11-10 DIAGNOSIS — J32.4 CHRONIC PANSINUSITIS: Primary | ICD-10-CM

## 2022-11-10 DIAGNOSIS — J31.0 PURULENT RHINITIS: ICD-10-CM

## 2022-11-10 DIAGNOSIS — Z22.322 MRSA (METHICILLIN RESISTANT STAPH AUREUS) CULTURE POSITIVE: ICD-10-CM

## 2022-11-10 LAB
BACTERIA SPEC CULT: ABNORMAL
BACTERIA SPEC CULT: ABNORMAL
GRAM STAIN RESULT: ABNORMAL

## 2022-11-10 RX ORDER — SULFAMETHOXAZOLE/TRIMETHOPRIM 800-160 MG
1 TABLET ORAL 2 TIMES DAILY
Qty: 28 TABLET | Refills: 1 | Status: SHIPPED | OUTPATIENT
Start: 2022-11-10 | End: 2022-11-24

## 2022-11-14 LAB — BACTERIA SPEC CULT: ABNORMAL

## 2022-11-22 ENCOUNTER — TELEPHONE (OUTPATIENT)
Dept: ORTHOPEDICS | Facility: CLINIC | Age: 42
End: 2022-11-22

## 2022-11-22 ENCOUNTER — MYC MEDICAL ADVICE (OUTPATIENT)
Dept: OTOLARYNGOLOGY | Facility: CLINIC | Age: 42
End: 2022-11-22

## 2022-11-22 DIAGNOSIS — J32.4 CHRONIC PANSINUSITIS: Primary | ICD-10-CM

## 2022-11-22 NOTE — TELEPHONE ENCOUNTER
Reason for Call:  Form, our goal is to have forms completed with 72 hours, however, some forms may require a visit or additional information.    Type of letter, form or note:  disability    Who is the form from?: Patient    Where did the form come from: Patient or family brought in       What clinic location was the form placed at?: [facility]    Where the form was placed: Given to physician    What number is listed as a contact on the form?: [phone]       Additional comments: Please call  Daniella to  at  when completed.     Call taken on 11/22/2022 at 11:23 AM by Helen Vasquez

## 2022-11-23 NOTE — TELEPHONE ENCOUNTER
Form completed for: Daniella Sexton  What was done with form: faxed/confirmed to Adebayo BRADFORD @ [phone].  P: pt will p/u @  clinic . Brennan BAKER

## 2022-11-28 ENCOUNTER — ANCILLARY PROCEDURE (OUTPATIENT)
Dept: CT IMAGING | Facility: CLINIC | Age: 42
End: 2022-11-28
Attending: OTOLARYNGOLOGY
Payer: COMMERCIAL

## 2022-11-28 DIAGNOSIS — J32.4 CHRONIC PANSINUSITIS: ICD-10-CM

## 2022-11-28 PROCEDURE — 70486 CT MAXILLOFACIAL W/O DYE: CPT | Performed by: RADIOLOGY

## 2022-11-28 NOTE — PROGRESS NOTES
"SUBJECTIVE:   Daniella Sexton is here in follow up of her 10/28/22 left ankle non displaced lateral malleolar fracture.       Present symptoms: still with pain diffusely. The pain might be getting worse   Treatments tried to this point: boot, and still uses crutches.     Orthopedic PMH: history of a \"chipped bone on the outer part of my ankle a few years ago\"    Review of Systems:  Constitutional/General: Negative for fever, chills, change in weight  Integumentary/Skin: Negative for worrisome rashes, moles, or lesions  Neuro: Negative for weakness, dizziness, or paresthesias   Psychiatric: negative for changes in mood or affect    OBJECTIVE:  Physical Exam:  /85   Pulse 108   Wt 118.8 kg (262 lb)   BMI 41.04 kg/m    General Appearance: healthy, alert and no distress   Skin: no suspicious lesions or rashes  Neuro: Normal strength and tone, mentation intact and speech normal  Vascular: good pulses, and capillary refill   Lymph: no lymphadenopathy   Psych:  mentation appears normal and affect normal/bright  Resp: no increased work of breathing    Left Ankle Exam:  Still mild swelling  Diffuse tenderness, but not hypersensitive  Most tender over lateral malleolar tip area.   No definite signs of RSD    Xrays today: maybe some healing of the distal fibular tip fracture. No displacement     ASSESSMENT:   Encounter Diagnosis   Name Primary?     Closed nondisplaced fracture of lateral malleolus of left fibula with routine healing, subsequent encounter Yes        PLAN:   physical therapy ordered  Desensitization ordered to help avoid CRPS  Continue boot as needed for ambulation.    Return to clinic: 4 weeks     KARISSA Melton MD  Dept. Orthopedic Surgery  [facility]       "

## 2022-11-29 ENCOUNTER — OFFICE VISIT (OUTPATIENT)
Dept: ORTHOPEDICS | Facility: CLINIC | Age: 42
End: 2022-11-29
Payer: COMMERCIAL

## 2022-11-29 ENCOUNTER — ANCILLARY PROCEDURE (OUTPATIENT)
Dept: GENERAL RADIOLOGY | Facility: CLINIC | Age: 42
End: 2022-11-29
Attending: ORTHOPAEDIC SURGERY
Payer: COMMERCIAL

## 2022-11-29 ENCOUNTER — TELEPHONE (OUTPATIENT)
Dept: OTOLARYNGOLOGY | Facility: CLINIC | Age: 42
End: 2022-11-29

## 2022-11-29 VITALS
WEIGHT: 262 LBS | HEART RATE: 108 BPM | SYSTOLIC BLOOD PRESSURE: 127 MMHG | BODY MASS INDEX: 41.04 KG/M2 | DIASTOLIC BLOOD PRESSURE: 85 MMHG

## 2022-11-29 DIAGNOSIS — S82.65XA CLOSED NONDISPLACED FRACTURE OF LATERAL MALLEOLUS OF LEFT FIBULA, INITIAL ENCOUNTER: ICD-10-CM

## 2022-11-29 DIAGNOSIS — S82.65XD CLOSED NONDISPLACED FRACTURE OF LATERAL MALLEOLUS OF LEFT FIBULA WITH ROUTINE HEALING, SUBSEQUENT ENCOUNTER: Primary | ICD-10-CM

## 2022-11-29 DIAGNOSIS — R51.9 FACIAL PAIN: Primary | ICD-10-CM

## 2022-11-29 PROCEDURE — 73610 X-RAY EXAM OF ANKLE: CPT | Mod: TC | Performed by: RADIOLOGY

## 2022-11-29 PROCEDURE — 99207 PR FRACTURE CARE IN GLOBAL PERIOD: CPT | Performed by: ORTHOPAEDIC SURGERY

## 2022-11-29 RX ORDER — BACLOFEN 10 MG/1
10 TABLET ORAL 3 TIMES DAILY
Qty: 40 TABLET | Refills: 1 | Status: SHIPPED | OUTPATIENT
Start: 2022-11-29 | End: 2023-01-19

## 2022-11-29 ASSESSMENT — PAIN SCALES - GENERAL: PAINLEVEL: MODERATE PAIN (4)

## 2022-11-29 NOTE — PROGRESS NOTES
Please mail letter:    Your cholesterol is mildly elevated. Regular exercise and a diet low in simple carbs and saturated fats can improve this. We should recheck this in 1 year.    Renetta Morrow, CNP
[FreeTextEntry1] : Patient pronounces her name "Rogelio." She is a college student.\par 21 year para 0 presents with complaints of recurrent UTIs and a yeast infection and BV. She also feels some symptoms of a UTI occasionally but does not believe they are not true UTIs.\par Her typical UTI symptoms are burning, urinary urgency, hesitancy and dysuria. She has had slight discomfort for the past 2 weeks.\par \par UCx:\par 9/10/2022 - 10-49K Citrobacter\par 4/6/2022 - >100K E coli \par \par Pelvic organ prolapse: no bulge, no pressure/heaviness\par \par Stress urinary incontinence: no x/week no prior incontinence procedures\par \par Overactive bladder syndrome: daily frequency 3-4 x/day, 0-1 x/night,  no urgency,  no x/week UUI episodes,    no pads/day     Bladder irritants include coffee, tea, lemon water,    Prior OAB meds no\par \par Voiding dysfunction: no Incomplete bladder emptying, no hesitancy\par \par Lower urinary tract/vaginal symptoms: 2 UTIs per year, no hematuria, + dysuria, + bladder pain\par \par 7 BM/week   no constipation   Fecal incontinence no \par \par Sexually active +   Dyspareunia no    Pelvic pain no    Vaginal dryness no   LMP October 2022, mostly regular periods\par

## 2022-11-29 NOTE — LETTER
"    11/29/2022         RE: Daniella Sexton  [address]  [facility] [address]        Dear Colleague,    Thank you for referring your patient, Daniella Sexton, to the [facility]. Please see a copy of my visit note below.    SUBJECTIVE:   Daniella Sexton is here in follow up of her 10/28/22 left ankle non displaced lateral malleolar fracture.       Present symptoms: still with pain diffusely. The pain might be getting worse   Treatments tried to this point: boot, and still uses crutches.     Orthopedic PMH: history of a \"chipped bone on the outer part of my ankle a few years ago\"    Review of Systems:  Constitutional/General: Negative for fever, chills, change in weight  Integumentary/Skin: Negative for worrisome rashes, moles, or lesions  Neuro: Negative for weakness, dizziness, or paresthesias   Psychiatric: negative for changes in mood or affect    OBJECTIVE:  Physical Exam:  /85   Pulse 108   Wt 118.8 kg (262 lb)   BMI 41.04 kg/m    General Appearance: healthy, alert and no distress   Skin: no suspicious lesions or rashes  Neuro: Normal strength and tone, mentation intact and speech normal  Vascular: good pulses, and capillary refill   Lymph: no lymphadenopathy   Psych:  mentation appears normal and affect normal/bright  Resp: no increased work of breathing    Left Ankle Exam:  Still mild swelling  Diffuse tenderness, but not hypersensitive  Most tender over lateral malleolar tip area.   No definite signs of RSD    Xrays today: maybe some healing of the distal fibular tip fracture. No displacement     ASSESSMENT:   Encounter Diagnosis   Name Primary?     Closed nondisplaced fracture of lateral malleolus of left fibula with routine healing, subsequent encounter Yes        PLAN:   physical therapy ordered  Desensitization ordered to help avoid CRPS  Continue boot as needed for ambulation.    Return to clinic: 4 weeks     KARISSA Melton MD  Dept. Orthopedic Surgery  [facility]           Again, thank you for allowing me to participate in the care of your patient.        Sincerely,        Edgar Melton MD

## 2022-11-29 NOTE — TELEPHONE ENCOUNTER
Called and spoke with Daniella.      The CT sinus was discussed and only shows some moderate mucosal thickening.    She stopped the nasal irrigations, and also completed oral antibiotics.  These medications have not helped the LEFT facial pain and pressure at all.    My main suspicion is temporomandibular disease, and I will try Baclofen as she is already on Tizanidine.    I will see her on 12/8, and if this persists I will consider MRI Brain to look for other issues.

## 2022-12-05 NOTE — PROGRESS NOTES
History of Present Illness - Autumn NETTA Sexton is a very pleasant 42 year old female being seen in close follow up from 11/7/2022, previously seen for the first time at the consult of Dr. Trevino for sinus issues. After work up, we found pansinusitis, and functional endoscopic sinus surgery was done on 1/10/2019.    PROCEDURES PERFORMED:   1. Bilateral endoscopic maxillary antrostomy with tissue removal.   2. Bilateral endoscopic total ethmoidectomy.   3. Bilateral endoscopic submucous resection of inferior turbinates    She tells me that she did great for a long time, then in October of 2020 she got a sinus infection and has never been the same.  She is constantly congested, headache, teeth pain, pressure in the face.  She has been four rounds of antibiotics and prednisone.  While she is on them, there is slight improvement, but the issues come right back following the end of treatment.    She also was being worked up for a persistent cough, and a chest CT was done on 12/16/2020 that did not show any acute process.    Therefore after seeing her in the beginning of 2021, I got a new CT which did show some return of mucosal thickening, but open antrostomies.  Therefore I placed her on a month of Gent Dex irrigations, and she was lost to follow up until now.  She tells me that it worked well, and she had no issues at all until this past August 2022.  She has been put on a few rounds of antibiotics, the latest one by me two weeks prior to the 11/7/2022 visit but to no effect.  I ordered a new CT sinus which looked good, as did the endoscopic exam in clinic.      I tried her on muscle relaxant, as my thinking was starting to shift towards temporomandibular disease or some other facial pain syndrome as her symptoms did not seem to correlate with CT scan or endoscopic findings that would allow me to attribute this to sinus disease. Although the CT did show a large polyp in the LEFT floor of antrum. She is still numb on the LEFT  side of the lip and upper teeth.      Past Medical History -   Patient Active Problem List   Diagnosis     Anxiety     Depressive disorder     Duodenal ulcer     History of alcohol abuse     History of methamphetamine abuse (H)     Migraines     Seasonal allergic rhinitis     Sleep disturbance     Tobacco abuse     CARDIOVASCULAR SCREENING; LDL GOAL LESS THAN 160     Chronic bilateral low back pain with bilateral sciatica     Pure hypercholesterolemia     Mild intermittent asthma without complication     Chronic pansinusitis     Obesity (BMI 35.0-39.9) with comorbidity (H)     Lumbago     Fatty liver     Lung nodule     Pulmonary emphysema, unspecified emphysema type (H)     Medical marijuana use       Current Medications -   Current Outpatient Medications:      albuterol (PROAIR HFA/PROVENTIL HFA/VENTOLIN HFA) 108 (90 Base) MCG/ACT inhaler, Inhale 2 puffs into the lungs every 4 hours as needed for shortness of breath / dyspnea or wheezing, Disp: 18 g, Rfl: 0     amitriptyline (ELAVIL) 50 MG tablet, Take 1 tablet (50 mg) by mouth At Bedtime, Disp: 90 tablet, Rfl: 3     baclofen (LIORESAL) 10 MG tablet, Take 1 tablet (10 mg) by mouth 3 times daily, Disp: 40 tablet, Rfl: 1     cetirizine (ZYRTEC) 10 MG tablet, Take 1 tablet (10 mg) by mouth daily, Disp: 90 tablet, Rfl: 3     COMPOUNDED NON-CONTROLLED SUBSTANCE (CMPD RX) - PHARMACY TO MIX COMPOUNDED MEDICATION, Apply 20 mLs into each nare 2 times daily Mupirocin 22grams/Liter saline, Disp: 2000 mL, Rfl: 6     COMPOUNDED NON-CONTROLLED SUBSTANCE (CMPD RX) - PHARMACY TO MIX COMPOUNDED MEDICATION, Apply 20 mLs into each nare 2 times daily Gentamicin/Dexamethasone 160/120mg/liter saline for nasal irrigation, Disp: 2000 mL, Rfl: 6     diclofenac (VOLTAREN) 75 MG EC tablet, Take 1 tablet (75 mg) by mouth 2 times daily, Disp: 60 tablet, Rfl: 3     fluticasone (FLONASE) 50 MCG/ACT nasal spray, Spray 1 spray into both nostrils daily, Disp: 9.9 mL, Rfl: 0      fluticasone-salmeterol (ADVAIR) 100-50 MCG/ACT inhaler, Inhale 1 puff into the lungs every 12 hours, Disp: 60 each, Rfl: 5     gabapentin (NEURONTIN) 300 MG capsule, Take 3 capsules (900 mg) by mouth 3 times daily, Disp: 270 capsule, Rfl: 3     guaiFENesin-codeine (ROBITUSSIN AC) 100-10 MG/5ML solution, Take 5-10 mLs by mouth every 4 hours as needed for cough, Disp: 118 mL, Rfl: 0     ipratropium - albuterol 0.5 mg/2.5 mg/3 mL (DUONEB) 0.5-2.5 (3) MG/3ML neb solution, Take 1 vial (3 mLs) by nebulization every 6 hours as needed for shortness of breath / dyspnea or wheezing, Disp: 90 mL, Rfl: 1     [START ON 12/19/2022] nicotine (NICODERM CQ) 14 MG/24HR 24 hr patch, Place 1 patch onto the skin every 24 hours for 14 days, Disp: 14 patch, Rfl: 0     nicotine (NICODERM CQ) 21 MG/24HR 24 hr patch, Place 1 patch onto the skin every 24 hours for 42 days, Disp: 42 patch, Rfl: 0     [START ON 1/3/2023] nicotine (NICODERM CQ) 7 MG/24HR 24 hr patch, Place 1 patch onto the skin every 24 hours for 14 days, Disp: 14 patch, Rfl: 0     nicotine (NICORETTE) 2 MG gum, Place 1 each (2 mg) inside cheek every hour as needed for smoking cessation, Disp: 40 each, Rfl: 0     nicotine (NICORETTE) 2 MG gum, Place 1 each (2 mg) inside cheek as needed for smoking cessation, Disp: 50 each, Rfl: 11     rizatriptan (MAXALT) 10 MG tablet, Take 1 tablet (10 mg) by mouth at onset of headache for migraine, Disp: 10 tablet, Rfl: 2     terbinafine (LAMISIL) 1 % external cream, Apply topically 2 times daily, Disp: 30 g, Rfl: 0     tiZANidine (ZANAFLEX) 4 MG tablet, Take 1-2 tablets (4-8 mg) by mouth 3 times daily as needed for muscle spasms, Disp: 60 tablet, Rfl: 3     triamcinolone (KENALOG) 0.1 % external cream, Apply topically 2 times daily, Disp: 80 g, Rfl: 1    Allergies -   Allergies   Allergen Reactions     Amoxicillin Anaphylaxis     Morphine Other (See Comments) and Nausea and Vomiting     Penicillins Unknown       Social History -   Social  History     Socioeconomic History     Marital status:      Spouse name: Not on file     Number of children: Not on file     Years of education: Not on file     Highest education level: Not on file   Occupational History     Not on file   Social Needs     Financial resource strain: Not on file     Food insecurity     Worry: Not on file     Inability: Not on file     Transportation needs     Medical: Not on file     Non-medical: Not on file   Tobacco Use     Smoking status: Current Every Day Smoker     Packs/day: 0.50     Types: Cigarettes     Smokeless tobacco: Never Used     Tobacco comment: 5 cigarettes/day   Substance and Sexual Activity     Alcohol use: Yes     Comment: 3-4 beers a night      Drug use: No     Sexual activity: Yes   Lifestyle     Physical activity     Days per week: Not on file     Minutes per session: Not on file     Stress: Not on file   Relationships     Social connections     Talks on phone: Not on file     Gets together: Not on file     Attends [employer] service: Not on file     Active member of club or organization: Not on file     Attends meetings of clubs or organizations: Not on file     Relationship status: Not on file     Intimate partner violence     Fear of current or ex partner: Not on file     Emotionally abused: Not on file     Physically abused: Not on file     Forced sexual activity: Not on file   Other Topics Concern     Parent/sibling w/ CABG, MI or angioplasty before 65F 55M? Not Asked   Social History Narrative     Not on file       Family History -   Family History   Problem Relation Age of Onset     Heart Disease No family hx of      Diabetes No family hx of      Cancer No family hx of        Review of Systems - As per HPI and PMHx, otherwise 10+ system review of the head and neck, and general constitution is negative.    Physical Exam  BP (!) 140/95   Pulse 73   Wt 115.7 kg (255 lb)   BMI 39.94 kg/m      General - The patient is well nourished and well  developed, and appears to have good nutritional status.  Alert and oriented to person and place, answers questions and cooperates with examination appropriately.   Head and Face - Normocephalic and atraumatic, with no gross asymmetry noted of the contour of the facial features.  The facial nerve is intact, with strong symmetric movements.  Voice and Breathing - The patient was breathing comfortably without the use of accessory muscles. There was no wheezing, stridor, or stertor.  The patients voice was clear and strong, and had appropriate pitch and quality.  Ears - The tympanic membranes are normal in appearance, bony landmarks are intact.  No retraction, perforation, or masses.  No fluid or purulence was seen in the external canal or the middle ear. No evidence of infection of the middle ear or external canal, cerumen was normal in appearance.  Eyes - Extraocular movements intact, and the pupils were reactive to light.  Sclera were not icteric or injected, conjunctiva were pink and moist.  Mouth - Examination of the oral cavity showed pink, healthy oral mucosa. No lesions or ulcerations noted.  The tongue was mobile and midline, and the dentition were in good condition.    Throat - The walls of the oropharynx were smooth, pink, moist, symmetric, and had no lesions or ulcerations.  The tonsillar pillars and soft palate were symmetric.  The uvula was midline on elevation.    Neck - Normal midline excursion of the laryngotracheal complex during swallowing.  Full range of motion on passive movement.  Palpation of the occipital, submental, submandibular, internal jugular chain, and supraclavicular nodes did not demonstrate any abnormal lymph nodes or masses.  The carotid pulse was palpable bilaterally.  Palpation of the thyroid was soft and smooth, with no nodules or goiter appreciated.  The trachea was mobile and midline.  Nose - External contour is symmetric, no gross deflection or scars.  Nasal mucosa is pink and  moist with no abnormal mucus.  The septum was midline and non-obstructive, turbinates of normal size and position.  No polyps, masses, or purulence noted on examination.    Previous examination endoscopy          A/P - Autumn NETTA Sexton is a 40 year old female  (J32.4) Chronic pansinusitis  (primary encounter diagnosis)  (R51.9) Facial pain  (primary encounter diagnosis)    I am still not totally confident that the sinus disease seen on CT is the cause of these LEFT hemifacial paresthesias.    Therefore I am going to get an MRI of the brain to make sure we are not missing something else.    As a last resort we could do revision sinus surgery, but at this point I am not extremely confident that would fix things.    I am open to the possibility of trigeminal neuralgia.

## 2022-12-06 ENCOUNTER — THERAPY VISIT (OUTPATIENT)
Dept: PHYSICAL THERAPY | Facility: CLINIC | Age: 42
End: 2022-12-06
Attending: ORTHOPAEDIC SURGERY
Payer: COMMERCIAL

## 2022-12-06 DIAGNOSIS — S82.65XD CLOSED NONDISPLACED FRACTURE OF LATERAL MALLEOLUS OF LEFT FIBULA WITH ROUTINE HEALING, SUBSEQUENT ENCOUNTER: ICD-10-CM

## 2022-12-06 PROCEDURE — 97161 PT EVAL LOW COMPLEX 20 MIN: CPT | Mod: GP | Performed by: PHYSICAL THERAPIST

## 2022-12-06 PROCEDURE — 97014 ELECTRIC STIMULATION THERAPY: CPT | Mod: GP | Performed by: PHYSICAL THERAPIST

## 2022-12-06 PROCEDURE — 97140 MANUAL THERAPY 1/> REGIONS: CPT | Mod: GP | Performed by: PHYSICAL THERAPIST

## 2022-12-06 PROCEDURE — 97110 THERAPEUTIC EXERCISES: CPT | Mod: GP | Performed by: PHYSICAL THERAPIST

## 2022-12-06 NOTE — PROGRESS NOTES
Physical Therapy Initial Evaluation  Subjective:    Patient Health History  Daniella NETTA Sexton being seen for Left ankle pain/sprain, avulsion fracture-slow healing .     Problem began: 10/29/2022.   Problem occurred: fell at home    Pain is reported as 5/10 on pain scale.    Pertinent medical history includes: asthma, migraines/headaches, numbness/tingling, overweight and smoking.   Red flags:  None as reported by patient.         Current medications:  Pain medication.    Current occupation is [employer], retail .   Primary job tasks include:  Prolonged standing.                  Therapist Generated HPI Evaluation  Problem details: Pt describes that she continues to have bad ankle pain following her avulsion fracture/sprain back in October.  She wears her LE boot/walking boot most days or an AIRCAST spint. She admits to doing little exs or movement of her ankle and she ices often, elevating only in sitting.  .         Type of problem:  Left ankle.    This is a new condition.  Condition occurred with:  A twist and a fall/slip.  Where condition occurred: at home.  Patient reports pain:  Lateral, anterior, joint, sub calcaneus, medial calcaneal tuberosity, metatarsal heads, medial and longitudinal arch.  Pain is described as aching and sharp   Pain is the same all the time.  Since onset symptoms are unchanged.  Associated symptoms:  Edema and loss of motion/stiffness. Symptoms are exacerbated by ascending stairs, certain positions, walking, standing, bending/squatting, descending stairs and weight bearing  and relieved by nothing.  Special tests included:  X-ray.    Restrictions due to condition include:  Currently not working due to present treatment.  Barriers include:  None as reported by patient.                        Objective:  System    Ankle/Foot Evaluation  ROM:    AROM:    Dorsiflexion: Left:   7  Right:    Plantarflexion: Left:  10    Right:   Inversion: Left:  4     Right:   Eversion: 3     Right:        PROM:    Dorsiflexion: Left:    10     Right:     Plantarflexion: Left:    12     Right:   Inversion: Left:      7     Right:    Eversion: Left:  6     Right:           Strength is not assessed.  LIGAMENT TESTING: normal              SPECIAL TESTS: not assessed    PALPATION:   Left ankle tenderness present at:  plantar fascia; navicular; peroneals; anterior talofibular ligament; posterior talofibular ligament; calcaneofibular ligament and lateral malleolus    EDEMA: Edema ankle: mild vs Rt, sock ring evident, NO discoloration.            FUNCTIONAL TESTS: Functional test ankle: FWB in walker boot,                                                               General     ROS    Assessment/Plan:    Patient is a 42 year old female with left side ankle complaints.    Patient has the following significant findings with corresponding treatment plan.                Diagnosis 1:  Left ankle sprain   Pain -  hot/cold therapy, manual therapy, splint/taping/bracing/orthotics, self management and home program  Decreased ROM/flexibility - manual therapy, therapeutic exercise and home program  Decreased joint mobility - manual therapy, therapeutic exercise and home program  Decreased strength - therapeutic exercise and therapeutic activities  Impaired balance - neuro re-education, gait training, therapeutic activities and home program  Decreased proprioception - neuro re-education and therapeutic activities  Impaired gait - gait training  Impaired muscle performance - neuro re-education  Decreased function - therapeutic activities    Therapy Evaluation Codes:   1) History comprised of:   Personal factors that impact the plan of care:      Coping style, Overall behavior pattern and Past/current experiences.    Comorbidity factors that impact the plan of care are:      Asthma, Migraines/headaches, Osteoarthritis, Overweight and Smoking.     Medications impacting care: Pain.  2) Examination of Body Systems comprised of:   Body  structures and functions that impact the plan of care:      Ankle.   Activity limitations that impact the plan of care are:      Bathing, Cooking, Driving, Dressing, Squatting/kneeling, Stairs, Standing and Walking.  3) Clinical presentation characteristics are:   Stable/Uncomplicated.  4) Decision-Making    Low complexity using standardized patient assessment instrument and/or measureable assessment of functional outcome.  Cumulative Therapy Evaluation is: Low complexity.    Previous and current functional limitations:  (See Goal Flow Sheet for this information)    Short term and Long term goals: (See Goal Flow Sheet for this information)     Communication ability:  Patient appears to be able to clearly communicate and understand verbal and written communication and follow directions correctly.  Treatment Explanation - The following has been discussed with the patient:   RX ordered/plan of care  Anticipated outcomes  Possible risks and side effects  This patient would benefit from PT intervention to resume normal activities.   Rehab potential is good.    Frequency:  1 X week, once daily  Duration:  for 12 weeks  Discharge Plan:  Achieve all LTG.  Independent in home treatment program.  Reach maximal therapeutic benefit.    Please refer to the daily flowsheet for treatment today, total treatment time and time spent performing 1:1 timed codes.

## 2022-12-06 NOTE — PROGRESS NOTES
[facility]    OUTPATIENT Physical Therapy ORTHOPEDIC EVALUATION  PLAN OF TREATMENT FOR OUTPATIENT REHABILITATION  (COMPLETE FOR INITIAL CLAIMS ONLY)  Patient's Last Name, First Name, M.I.  YOB: 1980  Daniella Sexton    Provider s Name:  [facility]   Medical Record No.  [id number]   Start of Care Date:      Onset Date:   10/29/22   Treatment Diagnosis:  Left ankle sprain/avuls Medical Diagnosis:  Closed nondisplaced fracture of lateral malleolus of left fibula with routine healing, subsequent encounter       Goals:     12/06/22 0500   Body Part   Goals listed below are for Left ankle   Goal #1   Goal #1 ambulation   Previous Functional Level No restrictions   Current Functional Level Minutes patient can walk   Performance Level only in boot 5/10   STG Target Performance Minutes patient will be able to walk   Performance Level 2mins w/o boot 3/10   Rationale for safe household ambulation;for safe community ambulation   Due Date 12/27/22    LTG Target Performance Minutes patient will be able to  walk   Performance Level 10' no boot 1/10   Rationale for safe household ambulation;for safe community ambulation;for safe work place ambulation   Due Date 03/03/23       Therapy Frequency:   weekly  Predicted Duration of Therapy Intervention:   12 weeks     Abdoulaye Daugherty, PT                 I CERTIFY THE NEED FOR THESE SERVICES FURNISHED UNDER        THIS PLAN OF TREATMENT AND WHILE UNDER MY CARE     (Physician attestation of this document indicates review and certification of the therapy plan).                     Certification Date From:    12.6.22  Certification Date To:   3.1.23    Referring Provider:  Edgar Melton    Initial Assessment        See Epic Evaluation

## 2022-12-08 ENCOUNTER — OFFICE VISIT (OUTPATIENT)
Dept: OTOLARYNGOLOGY | Facility: CLINIC | Age: 42
End: 2022-12-08
Payer: COMMERCIAL

## 2022-12-08 VITALS
HEART RATE: 73 BPM | DIASTOLIC BLOOD PRESSURE: 95 MMHG | SYSTOLIC BLOOD PRESSURE: 140 MMHG | BODY MASS INDEX: 39.94 KG/M2 | WEIGHT: 255 LBS

## 2022-12-08 DIAGNOSIS — R51.9 FACIAL PAIN: Primary | ICD-10-CM

## 2022-12-08 DIAGNOSIS — J32.4 CHRONIC PANSINUSITIS: ICD-10-CM

## 2022-12-08 PROCEDURE — 99214 OFFICE O/P EST MOD 30 MIN: CPT | Performed by: OTOLARYNGOLOGY

## 2022-12-08 NOTE — LETTER
12/8/2022         RE: Daniella Sexton  [address]  [facility] [address]        Dear Colleague,    Thank you for referring your patient, Daniella Sexton, to the [facility]. Please see a copy of my visit note below.    History of Present Illness - Daniella Sexton is a very pleasant 42 year old female being seen in close follow up from 11/7/2022, previously seen for the first time at the consult of Dr. Trevino for sinus issues. After work up, we found pansinusitis, and functional endoscopic sinus surgery was done on 1/10/2019.    PROCEDURES PERFORMED:   1. Bilateral endoscopic maxillary antrostomy with tissue removal.   2. Bilateral endoscopic total ethmoidectomy.   3. Bilateral endoscopic submucous resection of inferior turbinates    She tells me that she did great for a long time, then in October of 2020 she got a sinus infection and has never been the same.  She is constantly congested, headache, teeth pain, pressure in the face.  She has been four rounds of antibiotics and prednisone.  While she is on them, there is slight improvement, but the issues come right back following the end of treatment.    She also was being worked up for a persistent cough, and a chest CT was done on 12/16/2020 that did not show any acute process.    Therefore after seeing her in the beginning of 2021, I got a new CT which did show some return of mucosal thickening, but open antrostomies.  Therefore I placed her on a month of Gent Dex irrigations, and she was lost to follow up until now.  She tells me that it worked well, and she had no issues at all until this past August 2022.  She has been put on a few rounds of antibiotics, the latest one by me two weeks prior to the 11/7/2022 visit but to no effect.  I ordered a new CT sinus which looked good, as did the endoscopic exam in clinic.      I tried her on muscle relaxant, as my thinking was starting to shift towards temporomandibular disease or some other  facial pain syndrome as her symptoms did not seem to correlate with CT scan or endoscopic findings that would allow me to attribute this to sinus disease. Although the CT did show a large polyp in the LEFT floor of antrum. She is still numb on the LEFT side of the lip and upper teeth.      Past Medical History -   Patient Active Problem List   Diagnosis     Anxiety     Depressive disorder     Duodenal ulcer     History of alcohol abuse     History of methamphetamine abuse (H)     Migraines     Seasonal allergic rhinitis     Sleep disturbance     Tobacco abuse     CARDIOVASCULAR SCREENING; LDL GOAL LESS THAN 160     Chronic bilateral low back pain with bilateral sciatica     Pure hypercholesterolemia     Mild intermittent asthma without complication     Chronic pansinusitis     Obesity (BMI 35.0-39.9) with comorbidity (H)     Lumbago     Fatty liver     Lung nodule     Pulmonary emphysema, unspecified emphysema type (H)     Medical marijuana use       Current Medications -   Current Outpatient Medications:      albuterol (PROAIR HFA/PROVENTIL HFA/VENTOLIN HFA) 108 (90 Base) MCG/ACT inhaler, Inhale 2 puffs into the lungs every 4 hours as needed for shortness of breath / dyspnea or wheezing, Disp: 18 g, Rfl: 0     amitriptyline (ELAVIL) 50 MG tablet, Take 1 tablet (50 mg) by mouth At Bedtime, Disp: 90 tablet, Rfl: 3     baclofen (LIORESAL) 10 MG tablet, Take 1 tablet (10 mg) by mouth 3 times daily, Disp: 40 tablet, Rfl: 1     cetirizine (ZYRTEC) 10 MG tablet, Take 1 tablet (10 mg) by mouth daily, Disp: 90 tablet, Rfl: 3     COMPOUNDED NON-CONTROLLED SUBSTANCE (CMPD RX) - PHARMACY TO MIX COMPOUNDED MEDICATION, Apply 20 mLs into each nare 2 times daily Mupirocin 22grams/Liter saline, Disp: 2000 mL, Rfl: 6     COMPOUNDED NON-CONTROLLED SUBSTANCE (CMPD RX) - PHARMACY TO MIX COMPOUNDED MEDICATION, Apply 20 mLs into each nare 2 times daily Gentamicin/Dexamethasone 160/120mg/liter saline for nasal irrigation, Disp: 2000 mL,  Rfl: 6     diclofenac (VOLTAREN) 75 MG EC tablet, Take 1 tablet (75 mg) by mouth 2 times daily, Disp: 60 tablet, Rfl: 3     fluticasone (FLONASE) 50 MCG/ACT nasal spray, Spray 1 spray into both nostrils daily, Disp: 9.9 mL, Rfl: 0     fluticasone-salmeterol (ADVAIR) 100-50 MCG/ACT inhaler, Inhale 1 puff into the lungs every 12 hours, Disp: 60 each, Rfl: 5     gabapentin (NEURONTIN) 300 MG capsule, Take 3 capsules (900 mg) by mouth 3 times daily, Disp: 270 capsule, Rfl: 3     guaiFENesin-codeine (ROBITUSSIN AC) 100-10 MG/5ML solution, Take 5-10 mLs by mouth every 4 hours as needed for cough, Disp: 118 mL, Rfl: 0     ipratropium - albuterol 0.5 mg/2.5 mg/3 mL (DUONEB) 0.5-2.5 (3) MG/3ML neb solution, Take 1 vial (3 mLs) by nebulization every 6 hours as needed for shortness of breath / dyspnea or wheezing, Disp: 90 mL, Rfl: 1     [START ON 12/19/2022] nicotine (NICODERM CQ) 14 MG/24HR 24 hr patch, Place 1 patch onto the skin every 24 hours for 14 days, Disp: 14 patch, Rfl: 0     nicotine (NICODERM CQ) 21 MG/24HR 24 hr patch, Place 1 patch onto the skin every 24 hours for 42 days, Disp: 42 patch, Rfl: 0     [START ON 1/3/2023] nicotine (NICODERM CQ) 7 MG/24HR 24 hr patch, Place 1 patch onto the skin every 24 hours for 14 days, Disp: 14 patch, Rfl: 0     nicotine (NICORETTE) 2 MG gum, Place 1 each (2 mg) inside cheek every hour as needed for smoking cessation, Disp: 40 each, Rfl: 0     nicotine (NICORETTE) 2 MG gum, Place 1 each (2 mg) inside cheek as needed for smoking cessation, Disp: 50 each, Rfl: 11     rizatriptan (MAXALT) 10 MG tablet, Take 1 tablet (10 mg) by mouth at onset of headache for migraine, Disp: 10 tablet, Rfl: 2     terbinafine (LAMISIL) 1 % external cream, Apply topically 2 times daily, Disp: 30 g, Rfl: 0     tiZANidine (ZANAFLEX) 4 MG tablet, Take 1-2 tablets (4-8 mg) by mouth 3 times daily as needed for muscle spasms, Disp: 60 tablet, Rfl: 3     triamcinolone (KENALOG) 0.1 % external cream, Apply  topically 2 times daily, Disp: 80 g, Rfl: 1    Allergies -   Allergies   Allergen Reactions     Amoxicillin Anaphylaxis     Morphine Other (See Comments) and Nausea and Vomiting     Penicillins Unknown       Social History -   Social History     Socioeconomic History     Marital status:      Spouse name: Not on file     Number of children: Not on file     Years of education: Not on file     Highest education level: Not on file   Occupational History     Not on file   Social Needs     Financial resource strain: Not on file     Food insecurity     Worry: Not on file     Inability: Not on file     Transportation needs     Medical: Not on file     Non-medical: Not on file   Tobacco Use     Smoking status: Current Every Day Smoker     Packs/day: 0.50     Types: Cigarettes     Smokeless tobacco: Never Used     Tobacco comment: 5 cigarettes/day   Substance and Sexual Activity     Alcohol use: Yes     Comment: 3-4 beers a night      Drug use: No     Sexual activity: Yes   Lifestyle     Physical activity     Days per week: Not on file     Minutes per session: Not on file     Stress: Not on file   Relationships     Social connections     Talks on phone: Not on file     Gets together: Not on file     Attends Sikh service: Not on file     Active member of club or organization: Not on file     Attends meetings of clubs or organizations: Not on file     Relationship status: Not on file     Intimate partner violence     Fear of current or ex partner: Not on file     Emotionally abused: Not on file     Physically abused: Not on file     Forced sexual activity: Not on file   Other Topics Concern     Parent/sibling w/ CABG, MI or angioplasty before 65F 55M? Not Asked   Social History Narrative     Not on file       Family History -   Family History   Problem Relation Age of Onset     Heart Disease No family hx of      Diabetes No family hx of      Cancer No family hx of        Review of Systems - As per HPI and PMHx,  otherwise 10+ system review of the head and neck, and general constitution is negative.    Physical Exam  BP (!) 140/95   Pulse 73   Wt 115.7 kg (255 lb)   BMI 39.94 kg/m      General - The patient is well nourished and well developed, and appears to have good nutritional status.  Alert and oriented to person and place, answers questions and cooperates with examination appropriately.   Head and Face - Normocephalic and atraumatic, with no gross asymmetry noted of the contour of the facial features.  The facial nerve is intact, with strong symmetric movements.  Voice and Breathing - The patient was breathing comfortably without the use of accessory muscles. There was no wheezing, stridor, or stertor.  The patients voice was clear and strong, and had appropriate pitch and quality.  Ears - The tympanic membranes are normal in appearance, bony landmarks are intact.  No retraction, perforation, or masses.  No fluid or purulence was seen in the external canal or the middle ear. No evidence of infection of the middle ear or external canal, cerumen was normal in appearance.  Eyes - Extraocular movements intact, and the pupils were reactive to light.  Sclera were not icteric or injected, conjunctiva were pink and moist.  Mouth - Examination of the oral cavity showed pink, healthy oral mucosa. No lesions or ulcerations noted.  The tongue was mobile and midline, and the dentition were in good condition.    Throat - The walls of the oropharynx were smooth, pink, moist, symmetric, and had no lesions or ulcerations.  The tonsillar pillars and soft palate were symmetric.  The uvula was midline on elevation.    Neck - Normal midline excursion of the laryngotracheal complex during swallowing.  Full range of motion on passive movement.  Palpation of the occipital, submental, submandibular, internal jugular chain, and supraclavicular nodes did not demonstrate any abnormal lymph nodes or masses.  The carotid pulse was palpable  bilaterally.  Palpation of the thyroid was soft and smooth, with no nodules or goiter appreciated.  The trachea was mobile and midline.  Nose - External contour is symmetric, no gross deflection or scars.  Nasal mucosa is pink and moist with no abnormal mucus.  The septum was midline and non-obstructive, turbinates of normal size and position.  No polyps, masses, or purulence noted on examination.    Previous examination endoscopy          A/P - Autumn M Darshan is a 40 year old female  (J32.4) Chronic pansinusitis  (primary encounter diagnosis)  (R51.9) Facial pain  (primary encounter diagnosis)    I am still not totally confident that the sinus disease seen on CT is the cause of these LEFT hemifacial paresthesias.    Therefore I am going to get an MRI of the brain to make sure we are not missing something else.    As a last resort we could do revision sinus surgery, but at this point I am not extremely confident that would fix things.    I am open to the possibility of trigeminal neuralgia.        Again, thank you for allowing me to participate in the care of your patient.        Sincerely,        Maikol Miguel MD

## 2022-12-16 ENCOUNTER — ANCILLARY PROCEDURE (OUTPATIENT)
Dept: MRI IMAGING | Facility: CLINIC | Age: 42
End: 2022-12-16
Attending: OTOLARYNGOLOGY
Payer: COMMERCIAL

## 2022-12-16 DIAGNOSIS — R51.9 FACIAL PAIN: ICD-10-CM

## 2022-12-16 DIAGNOSIS — J32.4 CHRONIC PANSINUSITIS: ICD-10-CM

## 2022-12-16 PROCEDURE — 70553 MRI BRAIN STEM W/O & W/DYE: CPT | Mod: TC | Performed by: RADIOLOGY

## 2022-12-16 PROCEDURE — A9585 GADOBUTROL INJECTION: HCPCS | Performed by: RADIOLOGY

## 2022-12-16 RX ORDER — GADOBUTROL 604.72 MG/ML
15 INJECTION INTRAVENOUS ONCE
Status: COMPLETED | OUTPATIENT
Start: 2022-12-16 | End: 2022-12-16

## 2022-12-16 RX ADMIN — GADOBUTROL 11.5 ML: 604.72 INJECTION INTRAVENOUS at 13:17

## 2022-12-19 ENCOUNTER — THERAPY VISIT (OUTPATIENT)
Dept: PHYSICAL THERAPY | Facility: CLINIC | Age: 42
End: 2022-12-19
Attending: ORTHOPAEDIC SURGERY
Payer: COMMERCIAL

## 2022-12-19 ENCOUNTER — TELEPHONE (OUTPATIENT)
Dept: OTOLARYNGOLOGY | Facility: CLINIC | Age: 42
End: 2022-12-19

## 2022-12-19 DIAGNOSIS — R51.9 FACIAL PAIN: Primary | ICD-10-CM

## 2022-12-19 DIAGNOSIS — R26.9 ABNORMAL GAIT: ICD-10-CM

## 2022-12-19 DIAGNOSIS — S82.65XD CLOSED NONDISPLACED FRACTURE OF LATERAL MALLEOLUS OF LEFT FIBULA WITH ROUTINE HEALING, SUBSEQUENT ENCOUNTER: Primary | ICD-10-CM

## 2022-12-19 PROCEDURE — 97112 NEUROMUSCULAR REEDUCATION: CPT | Mod: GP | Performed by: PHYSICAL THERAPIST

## 2022-12-19 PROCEDURE — 97530 THERAPEUTIC ACTIVITIES: CPT | Mod: GP | Performed by: PHYSICAL THERAPIST

## 2022-12-19 PROCEDURE — 97116 GAIT TRAINING THERAPY: CPT | Mod: GP | Performed by: PHYSICAL THERAPIST

## 2022-12-19 RX ORDER — GABAPENTIN 300 MG/1
CAPSULE ORAL
Qty: 90 CAPSULE | Refills: 3 | Status: SHIPPED | OUTPATIENT
Start: 2022-12-19 | End: 2022-12-20

## 2022-12-19 NOTE — PROGRESS NOTES
"SUBJECTIVE:  Daniella Sexton is here in follow up of her 10/28/22 left ankle non displaced lateral malleolar fracture.       Present symptoms: feeling much better   discontinued boot, and crutches.   Using an air cast in her shoe    Treatments tried to this point:    physical therapy ordered  Desensitization ordered to help avoid CRPS  Continue boot as needed for ambulation.     Has been to physical therapy  Doing home exercise program? yes    Orthopedic PMH: history of a \"chipped bone on the outer part of my ankle a few years ago\"    Review of Systems:  Constitutional/General: Negative for fever, chills, change in weight  Integumentary/Skin: Negative for worrisome rashes, moles, or lesions  Neuro: Negative for weakness, dizziness, or paresthesias   Psychiatric: negative for changes in mood or affect    OBJECTIVE:  Physical Exam:  BP (!) 143/79 (BP Location: Left arm, Patient Position: Sitting, Cuff Size: Adult Regular)   Pulse 110   Ht 1.702 m (5' 7\")   Wt 115.7 kg (255 lb)   SpO2 97%   BMI 39.94 kg/m    General Appearance: healthy, alert and no distress   Skin: no suspicious lesions or rashes  Neuro: Normal strength and tone, mentation intact and speech normal  Vascular: good pulses, and capillary refill   Lymph: no lymphadenopathy   Psych:  mentation appears normal and affect normal/bright  Resp: no increased work of breathing     OBJECTIVE:   There were no vitals taken for this visit.   Patient appears to be alert and in no apparent distress.  Skin intact.    Neurovascularly Intact.    ROM: full  Still pain with inversion    Tenderness: non-tender over distal fibula.      ASSESSMENT:     ICD-10-CM    1. Closed nondisplaced fracture of lateral malleolus of left fibula with routine healing, subsequent encounter  S82.65XD          Doing well    PLAN:   Weight Bearing: full  Rehab: continue to work on exercises on own.  Work: full duty 4 hrs /day x 1 week, then full time.    Return to clinic as needed     J. " Tamra Melton MD  Dept. Orthopedic Surgery  [facility]

## 2022-12-19 NOTE — TELEPHONE ENCOUNTER
Called patient, all results normal.   Was already aware of polypoid disease  We re discussed my thoughts about sinus disease vs trigeminal neuralgia.  I am going to start her on a Neurontin titration and see if that helps, as both a treatment and a test.  Let me know how it is working electronically via [employer]

## 2022-12-19 NOTE — RESULT ENCOUNTER NOTE
Called patient, all results normal.   Was already aware of polypoid disease  We re discussed my thoughts about sinus disease vs trigeminal neuralgia.  I am going to start her on a Neurontin titration and see if that helps, as both a treatment and a test.  Let me know how it is working electronically via [employer]

## 2022-12-20 ENCOUNTER — OFFICE VISIT (OUTPATIENT)
Dept: ORTHOPEDICS | Facility: CLINIC | Age: 42
End: 2022-12-20
Payer: COMMERCIAL

## 2022-12-20 VITALS
DIASTOLIC BLOOD PRESSURE: 79 MMHG | OXYGEN SATURATION: 97 % | WEIGHT: 255 LBS | BODY MASS INDEX: 40.02 KG/M2 | HEIGHT: 67 IN | HEART RATE: 110 BPM | SYSTOLIC BLOOD PRESSURE: 143 MMHG

## 2022-12-20 DIAGNOSIS — S82.65XD CLOSED NONDISPLACED FRACTURE OF LATERAL MALLEOLUS OF LEFT FIBULA WITH ROUTINE HEALING, SUBSEQUENT ENCOUNTER: Primary | ICD-10-CM

## 2022-12-20 PROCEDURE — 99207 PR FRACTURE CARE IN GLOBAL PERIOD: CPT | Performed by: ORTHOPAEDIC SURGERY

## 2022-12-20 ASSESSMENT — PAIN SCALES - GENERAL: PAINLEVEL: MODERATE PAIN (4)

## 2022-12-20 NOTE — LETTER
"    12/20/2022         RE: Daniella Sexton  [address]  [facility] [address]        Dear Colleague,    Thank you for referring your patient, Daniella Sexton, to the [facility]. Please see a copy of my visit note below.    SUBJECTIVE:  Daniella Sexton is here in follow up of her 10/28/22 left ankle non displaced lateral malleolar fracture.       Present symptoms: feeling much better   discontinued boot, and crutches.   Using an air cast in her shoe    Treatments tried to this point:    physical therapy ordered  Desensitization ordered to help avoid CRPS  Continue boot as needed for ambulation.     Has been to physical therapy  Doing home exercise program? yes    Orthopedic PMH: history of a \"chipped bone on the outer part of my ankle a few years ago\"    Review of Systems:  Constitutional/General: Negative for fever, chills, change in weight  Integumentary/Skin: Negative for worrisome rashes, moles, or lesions  Neuro: Negative for weakness, dizziness, or paresthesias   Psychiatric: negative for changes in mood or affect    OBJECTIVE:  Physical Exam:  BP (!) 143/79 (BP Location: Left arm, Patient Position: Sitting, Cuff Size: Adult Regular)   Pulse 110   Ht 1.702 m (5' 7\")   Wt 115.7 kg (255 lb)   SpO2 97%   BMI 39.94 kg/m    General Appearance: healthy, alert and no distress   Skin: no suspicious lesions or rashes  Neuro: Normal strength and tone, mentation intact and speech normal  Vascular: good pulses, and capillary refill   Lymph: no lymphadenopathy   Psych:  mentation appears normal and affect normal/bright  Resp: no increased work of breathing     OBJECTIVE:   There were no vitals taken for this visit.   Patient appears to be alert and in no apparent distress.  Skin intact.    Neurovascularly Intact.    ROM: full  Still pain with inversion    Tenderness: non-tender over distal fibula.      ASSESSMENT:     ICD-10-CM    1. Closed nondisplaced fracture of lateral malleolus of left " fibula with routine healing, subsequent encounter  S82.65XD          Doing well    PLAN:   Weight Bearing: full  Rehab: continue to work on exercises on own.  Work: full duty 4 hrs /day x 1 week, then full time.    Return to clinic as needed     KARISSA Melton MD  Dept. Orthopedic Surgery  [facility]        Again, thank you for allowing me to participate in the care of your patient.        Sincerely,        Edgar Melton MD

## 2022-12-26 NOTE — PROGRESS NOTES
[facility]    OUTPATIENT Physical Therapy ORTHOPEDIC EVALUATION  PLAN OF TREATMENT FOR OUTPATIENT REHABILITATION  (COMPLETE FOR INITIAL CLAIMS ONLY)  Patient's Last Name, First Name, M.I.  YOB: 1980  Daniella Sexton    Provider s Name:  [facility]   Medical Record No.  [id number]   Start of Care Date:      Onset Date:   10/29/22   Treatment Diagnosis:  Left ankle sprain/avuls Medical Diagnosis:  Closed nondisplaced fracture of lateral malleolus of left fibula with routine healing, subsequent encounter       Goals:     12/06/22 0500   Body Part   Goals listed below are for Left ankle   Goal #1   Goal #1 ambulation   Previous Functional Level No restrictions   Current Functional Level Minutes patient can walk   Performance Level only in boot 5/10   STG Target Performance Minutes patient will be able to walk   Performance Level 2mins w/o boot 3/10   Rationale for safe household ambulation;for safe community ambulation   Due Date 12/27/22    LTG Target Performance Minutes patient will be able to  walk   Performance Level 10' no boot 1/10   Rationale for safe household ambulation;for safe community ambulation;for safe work place ambulation   Due Date 03/03/23         Therapy Frequency:     Predicted Duration of Therapy Intervention:       Abdoulaye Daugherty, PT                 I CERTIFY THE NEED FOR THESE SERVICES FURNISHED UNDER        THIS PLAN OF TREATMENT AND WHILE UNDER MY CARE     (Physician attestation of this document indicates review and certification of the therapy plan).                     Certification Date From:      Certification Date To:       Referring Provider:  Edgar Melton    Initial Assessment        See Epic Evaluation

## 2022-12-27 DIAGNOSIS — M54.41 CHRONIC BILATERAL LOW BACK PAIN WITH BILATERAL SCIATICA: ICD-10-CM

## 2022-12-27 DIAGNOSIS — M54.42 CHRONIC BILATERAL LOW BACK PAIN WITH BILATERAL SCIATICA: ICD-10-CM

## 2022-12-27 DIAGNOSIS — G89.29 CHRONIC BILATERAL LOW BACK PAIN WITH BILATERAL SCIATICA: ICD-10-CM

## 2022-12-27 RX ORDER — GABAPENTIN 300 MG/1
900 CAPSULE ORAL 3 TIMES DAILY
Qty: 270 CAPSULE | Refills: 3 | OUTPATIENT
Start: 2022-12-27

## 2022-12-27 NOTE — TELEPHONE ENCOUNTER
Called to notify patient. Patient was very frustrated she states that she tried to get this filled by prescriber and he hasn't gotten back to her. Patient states that she has an appointment in January to establish care and talk about this medication. Patient wants this filled and states she needs to talk to someone who can resolve this. Transferred her to the RN's.    
Patient calling to inquire the status of refill request- stating she needs it asap and that she has already called in regarding this over 2 hours ago.   
Prescription is given through pain management; forwarded request to pre scriber and patient should contact pain clinic for this refill  
Pt requesting call back when prescription has been sent to pharmacy.   
Reason for call:  Medication   If this is a refill request, has the caller requested the refill from the pharmacy already? Yes  Will the patient be using a [facility]? No  Name of the pharmacy and phone number for the current request:    [id number] - [facility] [address] AT [address] - [address]      Name of the medication requested: gabapentin (NEURONTIN) 300 MG capsule    Other request: patient has contact the pharmacy and they have told her that they can't reach the provider.     She is now out of medications and she needs this sent to her pharmacy ASAP    Phone number to reach patient:  Cell number on file:    Telephone Information:   Mobile [phone]       Best Time:      Can we leave a detailed message on this number?  YES    Travel screening: Not Applicable  
Spoke with patient and relayed provider's message as written, as medication has been prescribed through pain management team. RN advised message and request as been routed to prescribing provider, therefore unable to further assist. Advised patient to contact pain management clinic as provider advised.    Patient escalated in frustration and began to yell profanity at writer. Advised for patient to speak with pain management clinic, and transferred patient call to pain clinic per request.    SID Chang RN  [facility]  
None

## 2022-12-27 NOTE — TELEPHONE ENCOUNTER
[facility] Call Center    Phone Message    May a detailed message be left on voicemail: yes     Reason for Call: Medication Refill Request    Has the patient contacted the pharmacy for the refill? Yes   Name of medication being requested: gabapentin (NEURONTIN) 300 MG capsule  Provider who prescribed the medication: Isa  Pharmacy:    [id number] - [facility] [address] AT [facility] [address]    Date medication is needed: ASAP today patient is out.          Action Taken: Other: Pain    Travel Screening: Not Applicable

## 2022-12-27 NOTE — TELEPHONE ENCOUNTER
Pt has not been seen for over 1 year (11/2021).     Gabapentin prescribed last on 11/21/21 for a 30 day supply with 3 aditional refills.

## 2022-12-28 ENCOUNTER — THERAPY VISIT (OUTPATIENT)
Dept: PHYSICAL THERAPY | Facility: CLINIC | Age: 42
End: 2022-12-28
Payer: COMMERCIAL

## 2022-12-28 DIAGNOSIS — S82.65XD CLOSED NONDISPLACED FRACTURE OF LATERAL MALLEOLUS OF LEFT FIBULA WITH ROUTINE HEALING, SUBSEQUENT ENCOUNTER: Primary | ICD-10-CM

## 2022-12-28 PROCEDURE — 97140 MANUAL THERAPY 1/> REGIONS: CPT | Mod: GP | Performed by: PHYSICAL THERAPIST

## 2022-12-28 PROCEDURE — 97112 NEUROMUSCULAR REEDUCATION: CPT | Mod: GP | Performed by: PHYSICAL THERAPIST

## 2022-12-28 RX ORDER — GABAPENTIN 300 MG/1
900 CAPSULE ORAL 3 TIMES DAILY
Qty: 270 CAPSULE | Refills: 0 | Status: SHIPPED | OUTPATIENT
Start: 2022-12-28 | End: 2023-07-07

## 2022-12-28 NOTE — TELEPHONE ENCOUNTER
Pt has not been seen for over 1 year (11/2021). A new referral would be needed if she wants to see pain clinic again.    Gabapentin prescribed last on 11/21/21 for a 30 day supply with 3 aditional refills. should've been due earlier if she was taking this as prescribed.    Called pt.  Her primary care provider, Dr. Morrow, is no longer w/ [facility].  She has an upcoming appointment with a new primary care provider on 1/18/23.  She would just need enough to get her to that appointment and then this provider would take over management.  Writer informed her that a month supply would be prepped for Dr. Moss to review and sign.      MCKALYA Boyce-BSN  [facility]

## 2022-12-28 NOTE — TELEPHONE ENCOUNTER
Received a fax from [facility] in [place] requesting refills for Gabapentin 300 mg capsules. Staff is already working on this refill.

## 2023-01-02 DIAGNOSIS — J43.9 PULMONARY EMPHYSEMA, UNSPECIFIED EMPHYSEMA TYPE (H): ICD-10-CM

## 2023-01-02 DIAGNOSIS — J45.20 MILD INTERMITTENT ASTHMA WITHOUT COMPLICATION: ICD-10-CM

## 2023-01-02 DIAGNOSIS — J30.2 SEASONAL ALLERGIC RHINITIS, UNSPECIFIED TRIGGER: ICD-10-CM

## 2023-01-03 RX ORDER — ALBUTEROL SULFATE 90 UG/1
AEROSOL, METERED RESPIRATORY (INHALATION)
Qty: 18 G | Refills: 0 | Status: SHIPPED | OUTPATIENT
Start: 2023-01-03 | End: 2023-01-19

## 2023-01-03 RX ORDER — FLUTICASONE PROPIONATE 50 MCG
SPRAY, SUSPENSION (ML) NASAL
Qty: 16 G | Refills: 0 | Status: SHIPPED | OUTPATIENT
Start: 2023-01-03 | End: 2023-01-19

## 2023-01-05 ENCOUNTER — TELEPHONE (OUTPATIENT)
Dept: FAMILY MEDICINE | Facility: CLINIC | Age: 43
End: 2023-01-05

## 2023-01-05 DIAGNOSIS — J30.2 SEASONAL ALLERGIC RHINITIS, UNSPECIFIED TRIGGER: ICD-10-CM

## 2023-01-05 NOTE — TELEPHONE ENCOUNTER
Refill Request (Fluticasone 50 mcg)  (Newest Message First)  Evelyn Singleton routed conversation to [facility] Refill 4 minutes ago (3:33 PM)     Evelyn Singleton 4 minutes ago (3:33 PM)     PB  Requesting fluticasone 50 mcg 90 day supply         Note

## 2023-01-09 ENCOUNTER — THERAPY VISIT (OUTPATIENT)
Dept: PHYSICAL THERAPY | Facility: CLINIC | Age: 43
End: 2023-01-09
Payer: COMMERCIAL

## 2023-01-09 DIAGNOSIS — S82.65XD CLOSED NONDISPLACED FRACTURE OF LATERAL MALLEOLUS OF LEFT FIBULA WITH ROUTINE HEALING, SUBSEQUENT ENCOUNTER: Primary | ICD-10-CM

## 2023-01-09 DIAGNOSIS — R26.9 ABNORMAL GAIT: ICD-10-CM

## 2023-01-09 PROCEDURE — 97140 MANUAL THERAPY 1/> REGIONS: CPT | Mod: GP | Performed by: PHYSICAL THERAPIST

## 2023-01-09 PROCEDURE — 97110 THERAPEUTIC EXERCISES: CPT | Mod: GP | Performed by: PHYSICAL THERAPIST

## 2023-01-09 RX ORDER — FLUTICASONE PROPIONATE 50 MCG
1 SPRAY, SUSPENSION (ML) NASAL DAILY
Qty: 16 G | Refills: 0 | OUTPATIENT
Start: 2023-01-09

## 2023-01-19 ENCOUNTER — OFFICE VISIT (OUTPATIENT)
Dept: FAMILY MEDICINE | Facility: CLINIC | Age: 43
End: 2023-01-19
Payer: COMMERCIAL

## 2023-01-19 VITALS
BODY MASS INDEX: 40.02 KG/M2 | OXYGEN SATURATION: 99 % | HEIGHT: 67 IN | TEMPERATURE: 98.4 F | RESPIRATION RATE: 18 BRPM | SYSTOLIC BLOOD PRESSURE: 132 MMHG | DIASTOLIC BLOOD PRESSURE: 88 MMHG | WEIGHT: 255 LBS | HEART RATE: 121 BPM

## 2023-01-19 DIAGNOSIS — E53.8 VITAMIN B12 DEFICIENCY (NON ANEMIC): ICD-10-CM

## 2023-01-19 DIAGNOSIS — J43.9 PULMONARY EMPHYSEMA, UNSPECIFIED EMPHYSEMA TYPE (H): ICD-10-CM

## 2023-01-19 DIAGNOSIS — Z79.899 ENCOUNTER FOR LONG-TERM (CURRENT) USE OF MEDICATIONS: Primary | ICD-10-CM

## 2023-01-19 DIAGNOSIS — M54.41 CHRONIC BILATERAL LOW BACK PAIN WITH BILATERAL SCIATICA: ICD-10-CM

## 2023-01-19 DIAGNOSIS — M54.50 CHRONIC BILATERAL LOW BACK PAIN WITHOUT SCIATICA: ICD-10-CM

## 2023-01-19 DIAGNOSIS — G89.29 CHRONIC BILATERAL LOW BACK PAIN WITHOUT SCIATICA: ICD-10-CM

## 2023-01-19 DIAGNOSIS — E66.01 MORBID OBESITY (H): ICD-10-CM

## 2023-01-19 DIAGNOSIS — R51.9 FACIAL PAIN: ICD-10-CM

## 2023-01-19 DIAGNOSIS — G50.0 TRIGEMINAL NEURALGIA: ICD-10-CM

## 2023-01-19 DIAGNOSIS — M54.42 CHRONIC BILATERAL LOW BACK PAIN WITH BILATERAL SCIATICA: ICD-10-CM

## 2023-01-19 DIAGNOSIS — G89.29 CHRONIC BILATERAL LOW BACK PAIN WITH BILATERAL SCIATICA: ICD-10-CM

## 2023-01-19 DIAGNOSIS — J30.2 SEASONAL ALLERGIC RHINITIS, UNSPECIFIED TRIGGER: ICD-10-CM

## 2023-01-19 DIAGNOSIS — F15.11 HISTORY OF METHAMPHETAMINE ABUSE (H): ICD-10-CM

## 2023-01-19 DIAGNOSIS — J45.20 MILD INTERMITTENT ASTHMA WITHOUT COMPLICATION: ICD-10-CM

## 2023-01-19 LAB
BASOPHILS # BLD AUTO: 0 10E3/UL (ref 0–0.2)
BASOPHILS NFR BLD AUTO: 0 %
EOSINOPHIL # BLD AUTO: 0.4 10E3/UL (ref 0–0.7)
EOSINOPHIL NFR BLD AUTO: 3 %
ERYTHROCYTE [DISTWIDTH] IN BLOOD BY AUTOMATED COUNT: 13.3 % (ref 10–15)
ERYTHROCYTE [SEDIMENTATION RATE] IN BLOOD BY WESTERGREN METHOD: 9 MM/HR (ref 0–20)
HCT VFR BLD AUTO: 42.4 % (ref 35–47)
HGB BLD-MCNC: 14 G/DL (ref 11.7–15.7)
LYMPHOCYTES # BLD AUTO: 3.4 10E3/UL (ref 0.8–5.3)
LYMPHOCYTES NFR BLD AUTO: 30 %
MCH RBC QN AUTO: 31.5 PG (ref 26.5–33)
MCHC RBC AUTO-ENTMCNC: 33 G/DL (ref 31.5–36.5)
MCV RBC AUTO: 96 FL (ref 78–100)
MONOCYTES # BLD AUTO: 1.1 10E3/UL (ref 0–1.3)
MONOCYTES NFR BLD AUTO: 10 %
NEUTROPHILS # BLD AUTO: 6.3 10E3/UL (ref 1.6–8.3)
NEUTROPHILS NFR BLD AUTO: 56 %
PLATELET # BLD AUTO: 266 10E3/UL (ref 150–450)
RBC # BLD AUTO: 4.44 10E6/UL (ref 3.8–5.2)
TSH SERPL DL<=0.005 MIU/L-ACNC: 1.43 MU/L (ref 0.4–4)
VIT B12 SERPL-MCNC: 415 PG/ML (ref 232–1245)
WBC # BLD AUTO: 11.2 10E3/UL (ref 4–11)

## 2023-01-19 PROCEDURE — 86618 LYME DISEASE ANTIBODY: CPT | Performed by: INTERNAL MEDICINE

## 2023-01-19 PROCEDURE — 36415 COLL VENOUS BLD VENIPUNCTURE: CPT | Performed by: INTERNAL MEDICINE

## 2023-01-19 PROCEDURE — 84443 ASSAY THYROID STIM HORMONE: CPT | Performed by: INTERNAL MEDICINE

## 2023-01-19 PROCEDURE — 85652 RBC SED RATE AUTOMATED: CPT | Performed by: INTERNAL MEDICINE

## 2023-01-19 PROCEDURE — 99214 OFFICE O/P EST MOD 30 MIN: CPT | Performed by: INTERNAL MEDICINE

## 2023-01-19 PROCEDURE — 85025 COMPLETE CBC W/AUTO DIFF WBC: CPT | Performed by: INTERNAL MEDICINE

## 2023-01-19 PROCEDURE — 82607 VITAMIN B-12: CPT | Performed by: INTERNAL MEDICINE

## 2023-01-19 RX ORDER — IPRATROPIUM BROMIDE AND ALBUTEROL SULFATE 2.5; .5 MG/3ML; MG/3ML
1 SOLUTION RESPIRATORY (INHALATION) EVERY 6 HOURS PRN
Qty: 90 ML | Refills: 1 | Status: SHIPPED | OUTPATIENT
Start: 2023-01-19 | End: 2023-10-25

## 2023-01-19 RX ORDER — ALBUTEROL SULFATE 90 UG/1
2 AEROSOL, METERED RESPIRATORY (INHALATION) EVERY 4 HOURS PRN
Qty: 18 G | Refills: 0 | Status: SHIPPED | OUTPATIENT
Start: 2023-01-19 | End: 2023-07-07

## 2023-01-19 RX ORDER — PREGABALIN 150 MG/1
150 CAPSULE ORAL 3 TIMES DAILY
Qty: 90 CAPSULE | Refills: 0 | Status: SHIPPED | OUTPATIENT
Start: 2023-01-19 | End: 2023-03-07

## 2023-01-19 RX ORDER — FLUTICASONE PROPIONATE 50 MCG
1 SPRAY, SUSPENSION (ML) NASAL DAILY
Qty: 16 G | Refills: 4 | Status: SHIPPED | OUTPATIENT
Start: 2023-01-19 | End: 2023-06-07

## 2023-01-19 RX ORDER — GABAPENTIN 300 MG/1
900 CAPSULE ORAL 3 TIMES DAILY
Qty: 270 CAPSULE | Refills: 0 | Status: CANCELLED | OUTPATIENT
Start: 2023-01-19

## 2023-01-19 RX ORDER — BACLOFEN 10 MG/1
10 TABLET ORAL 3 TIMES DAILY
Qty: 40 TABLET | Refills: 1 | Status: SHIPPED | OUTPATIENT
Start: 2023-01-19 | End: 2023-04-03

## 2023-01-19 NOTE — PROGRESS NOTES
Assessment & Plan   Problem List Items Addressed This Visit     Chronic bilateral low back pain with bilateral sciatica    Relevant Medications    baclofen (LIORESAL) 10 MG tablet    pregabalin (LYRICA) 150 MG capsule    History of methamphetamine abuse (H)    Lumbago    Relevant Medications    baclofen (LIORESAL) 10 MG tablet    pregabalin (LYRICA) 150 MG capsule    Other Relevant Orders    Physical Therapy Referral    Mild intermittent asthma without complication    Relevant Medications    fluticasone (FLONASE) 50 MCG/ACT nasal spray    ipratropium - albuterol 0.5 mg/2.5 mg/3 mL (DUONEB) 0.5-2.5 (3) MG/3ML neb solution    albuterol (VENTOLIN HFA) 108 (90 Base) MCG/ACT inhaler    Obesity (BMI 35.0-39.9) with comorbidity (H)    Pulmonary emphysema, unspecified emphysema type (H)    Relevant Medications    fluticasone (FLONASE) 50 MCG/ACT nasal spray    ipratropium - albuterol 0.5 mg/2.5 mg/3 mL (DUONEB) 0.5-2.5 (3) MG/3ML neb solution    albuterol (VENTOLIN HFA) 108 (90 Base) MCG/ACT inhaler    Seasonal allergic rhinitis    Relevant Medications    fluticasone (FLONASE) 50 MCG/ACT nasal spray    ipratropium - albuterol 0.5 mg/2.5 mg/3 mL (DUONEB) 0.5-2.5 (3) MG/3ML neb solution    albuterol (VENTOLIN HFA) 108 (90 Base) MCG/ACT inhaler   Other Visit Diagnoses     Encounter for long-term (current) use of medications    -  Primary    Facial pain        Relevant Medications    baclofen (LIORESAL) 10 MG tablet    Trigeminal neuralgia        Relevant Medications    baclofen (LIORESAL) 10 MG tablet    pregabalin (LYRICA) 150 MG capsule    Other Relevant Orders    CBC with platelets and differential (Completed)    TSH with free T4 reflex (Completed)    ESR: Erythrocyte sedimentation rate (Completed)    Lyme Disease Total Abs Bld with Reflex to Confirm CLIA (Completed)    Vitamin B12 deficiency (non anemic)        Relevant Orders    Vitamin B12 (Completed)                BMI:   Estimated body mass index is 39.94 kg/m  as  "calculated from the following:    Height as of this encounter: 1.702 m (5' 7\").    Weight as of this encounter: 115.7 kg (255 lb).   Weight management plan: Discussed healthy diet and exercise guidelines    Work on weight loss  Regular exercise    Return in about 3 months (around 4/19/2023) for medication management, follow up of condition.    Santos Quintanilla MD  [facility] DONI Brewer is a 42 year old, presenting for the following health issues:  Establish Care      History of Present Illness       Reason for visit:  Establishing Care    She eats 0-1 servings of fruits and vegetables daily.She consumes 1 sweetened beverage(s) daily.She exercises with enough effort to increase her heart rate 9 or less minutes per day.  She exercises with enough effort to increase her heart rate 3 or less days per week. She is missing 1 dose(s) of medications per week.  She is not taking prescribed medications regularly due to remembering to take.     Half of face is numb   Sinu and ct and MRI   Just on the left whole   Numb   Tingling itching   Gentamycin   Restarted inhaler .  ADVAIR   Sinus rinse - antibotic pil    Trigeminal neuralgia  carbempezapine  2700 mg         Review of Systems   Constitutional, HEENT, cardiovascular, pulmonary, gi and gu systems are negative, except as otherwise noted.      Objective    /88 (BP Location: Right arm, Patient Position: Chair, Cuff Size: Adult Large)   Pulse (!) 121   Temp 98.4  F (36.9  C)   Resp 18   Ht 1.702 m (5' 7\")   Wt 115.7 kg (255 lb)   LMP 01/03/2023 (Approximate)   SpO2 99%   BMI 39.94 kg/m    Body mass index is 39.94 kg/m .  Physical Exam   GENERAL: healthy, alert and no distress  EYES: Eyes grossly normal to inspection, PERRL and conjunctivae and sclerae normal  HENT: ear canals and TM's normal, nose and mouth without ulcers or lesions  NECK: no adenopathy, no asymmetry, masses, or scars and thyroid normal to palpation  RESP: lungs " clear to auscultation - no rales, rhonchi or wheezes  CV: regular rate and rhythm, normal S1 S2, no S3 or S4, no murmur, click or rub, no peripheral edema and peripheral pulses strong  ABDOMEN: soft, nontender, no hepatosplenomegaly, no masses and bowel sounds normal  MS: no gross musculoskeletal defects noted, no edema  SKIN: no suspicious lesions or rashes  NEURO: Normal strength and tone, mentation intact and speech normal  BACK: no CVA tenderness, no paralumbar tenderness  PSYCH: mentation appears normal, affect normal/bright  LYMPH: no cervical, supraclavicular, axillary, or inguinal adenopathy    Results for orders placed or performed in visit on 01/19/23   Vitamin B12     Status: Normal   Result Value Ref Range    Vitamin B12 415 232 - 1,245 pg/mL   TSH with free T4 reflex     Status: Normal   Result Value Ref Range    TSH 1.43 0.40 - 4.00 mU/L   ESR: Erythrocyte sedimentation rate     Status: Normal   Result Value Ref Range    Erythrocyte Sedimentation Rate 9 0 - 20 mm/hr   Lyme Disease Total Abs Bld with Reflex to Confirm CLIA     Status: Normal   Result Value Ref Range    Lyme Disease Antibodies Total 0.04 <0.90   CBC with platelets and differential     Status: Abnormal   Result Value Ref Range    WBC Count 11.2 (H) 4.0 - 11.0 10e3/uL    RBC Count 4.44 3.80 - 5.20 10e6/uL    Hemoglobin 14.0 11.7 - 15.7 g/dL    Hematocrit 42.4 35.0 - 47.0 %    MCV 96 78 - 100 fL    MCH 31.5 26.5 - 33.0 pg    MCHC 33.0 31.5 - 36.5 g/dL    RDW 13.3 10.0 - 15.0 %    Platelet Count 266 150 - 450 10e3/uL    % Neutrophils 56 %    % Lymphocytes 30 %    % Monocytes 10 %    % Eosinophils 3 %    % Basophils 0 %    Absolute Neutrophils 6.3 1.6 - 8.3 10e3/uL    Absolute Lymphocytes 3.4 0.8 - 5.3 10e3/uL    Absolute Monocytes 1.1 0.0 - 1.3 10e3/uL    Absolute Eosinophils 0.4 0.0 - 0.7 10e3/uL    Absolute Basophils 0.0 0.0 - 0.2 10e3/uL   CBC with platelets and differential     Status: Abnormal    Narrative    The following orders were  created for panel order CBC with platelets and differential.  Procedure                               Abnormality         Status                     ---------                               -----------         ------                     CBC with platelets and d...[[id number]]  Abnormal            Final result                 Please view results for these tests on the individual orders.

## 2023-01-20 LAB — B BURGDOR IGG+IGM SER QL: 0.04

## 2023-01-29 ENCOUNTER — MYC MEDICAL ADVICE (OUTPATIENT)
Dept: OTOLARYNGOLOGY | Facility: CLINIC | Age: 43
End: 2023-01-29
Payer: COMMERCIAL

## 2023-01-29 DIAGNOSIS — J32.4 CHRONIC PANSINUSITIS: Primary | ICD-10-CM

## 2023-01-30 DIAGNOSIS — M47.816 SPONDYLOSIS OF LUMBAR REGION WITHOUT MYELOPATHY OR RADICULOPATHY: ICD-10-CM

## 2023-01-30 RX ORDER — PREDNISONE 10 MG/1
10 TABLET ORAL 2 TIMES DAILY
Qty: 10 TABLET | Refills: 2 | Status: SHIPPED | OUTPATIENT
Start: 2023-01-30 | End: 2023-03-17

## 2023-01-30 RX ORDER — DICLOFENAC SODIUM 75 MG/1
TABLET, DELAYED RELEASE ORAL
Qty: 60 TABLET | Refills: 3 | Status: SHIPPED | OUTPATIENT
Start: 2023-01-30 | End: 2023-07-07

## 2023-01-30 RX ORDER — CLARITHROMYCIN 500 MG
500 TABLET ORAL 2 TIMES DAILY
Qty: 28 TABLET | Refills: 1 | Status: SHIPPED | OUTPATIENT
Start: 2023-01-30 | End: 2023-02-13

## 2023-02-01 ENCOUNTER — THERAPY VISIT (OUTPATIENT)
Dept: PHYSICAL THERAPY | Facility: CLINIC | Age: 43
End: 2023-02-01
Payer: COMMERCIAL

## 2023-02-01 DIAGNOSIS — S82.65XD CLOSED NONDISPLACED FRACTURE OF LATERAL MALLEOLUS OF LEFT FIBULA WITH ROUTINE HEALING, SUBSEQUENT ENCOUNTER: Primary | ICD-10-CM

## 2023-02-01 PROCEDURE — 97140 MANUAL THERAPY 1/> REGIONS: CPT | Mod: GP | Performed by: PHYSICAL THERAPIST

## 2023-02-01 PROCEDURE — 97110 THERAPEUTIC EXERCISES: CPT | Mod: GP | Performed by: PHYSICAL THERAPIST

## 2023-02-22 ENCOUNTER — MYC MEDICAL ADVICE (OUTPATIENT)
Dept: OTOLARYNGOLOGY | Facility: CLINIC | Age: 43
End: 2023-02-22
Payer: COMMERCIAL

## 2023-03-07 ENCOUNTER — MYC REFILL (OUTPATIENT)
Dept: FAMILY MEDICINE | Facility: CLINIC | Age: 43
End: 2023-03-07
Payer: COMMERCIAL

## 2023-03-07 DIAGNOSIS — M54.50 CHRONIC BILATERAL LOW BACK PAIN WITHOUT SCIATICA: ICD-10-CM

## 2023-03-07 DIAGNOSIS — G89.29 CHRONIC BILATERAL LOW BACK PAIN WITHOUT SCIATICA: ICD-10-CM

## 2023-03-08 RX ORDER — PREGABALIN 150 MG/1
150 CAPSULE ORAL 3 TIMES DAILY
Qty: 90 CAPSULE | Refills: 0 | Status: SHIPPED | OUTPATIENT
Start: 2023-03-08 | End: 2023-07-07

## 2023-03-17 ENCOUNTER — MYC MEDICAL ADVICE (OUTPATIENT)
Dept: OTOLARYNGOLOGY | Facility: CLINIC | Age: 43
End: 2023-03-17
Payer: COMMERCIAL

## 2023-03-17 ENCOUNTER — TELEPHONE (OUTPATIENT)
Dept: OTOLARYNGOLOGY | Facility: CLINIC | Age: 43
End: 2023-03-17
Payer: COMMERCIAL

## 2023-03-17 DIAGNOSIS — J32.4 CHRONIC PANSINUSITIS: ICD-10-CM

## 2023-03-17 RX ORDER — CIPROFLOXACIN 500 MG/1
500 TABLET, FILM COATED ORAL 2 TIMES DAILY
Qty: 28 TABLET | Refills: 0 | Status: SHIPPED | OUTPATIENT
Start: 2023-03-17 | End: 2023-06-07

## 2023-03-17 RX ORDER — PREDNISONE 10 MG/1
10 TABLET ORAL 2 TIMES DAILY
Qty: 10 TABLET | Refills: 2 | Status: SHIPPED | OUTPATIENT
Start: 2023-03-17 | End: 2023-06-07

## 2023-03-17 NOTE — TELEPHONE ENCOUNTER
RN attempted again to call pharmacy. On hold for 15 minutes. Called again and left message attempting to clarify both orders and what writer believes may be the portion of the prednisone order that was needing clarification as there was no end date in the order, informed is for 5 days. Left call back number until 4:15 today.     Trudy AMIN, Specialty RN 3/17/2023 3:55 PM

## 2023-03-17 NOTE — TELEPHONE ENCOUNTER
"[facility]    Phone Message    May a detailed message be left on voicemail: yes     Reason for Call: Medication Question or concern regarding medication   Prescription Clarification  Name of Medication: Prednisone, ciproflaxin  Prescribing Provider: Dr hernandes    Pharmacy: [facility]      Has some questions on medication , please call [phone] ( says to press for the providers line ) , thank you           Action Taken: Message routed to:  Other: [facility]\"    Travel Screening: Not Applicable                                                                      "

## 2023-03-17 NOTE — TELEPHONE ENCOUNTER
RN attempted to contact pharmacy. Was on hold for 15 minutes and had to disconnect call.    Trudy AMIN, Specialty RN 3/17/2023 2:29 PM

## 2023-03-21 ENCOUNTER — TELEPHONE (OUTPATIENT)
Dept: FAMILY MEDICINE | Facility: CLINIC | Age: 43
End: 2023-03-21
Payer: COMMERCIAL

## 2023-03-21 DIAGNOSIS — M54.41 CHRONIC BILATERAL LOW BACK PAIN WITH BILATERAL SCIATICA: Primary | ICD-10-CM

## 2023-03-21 DIAGNOSIS — G89.29 CHRONIC BILATERAL LOW BACK PAIN WITH BILATERAL SCIATICA: Primary | ICD-10-CM

## 2023-03-21 DIAGNOSIS — M54.42 CHRONIC BILATERAL LOW BACK PAIN WITH BILATERAL SCIATICA: Primary | ICD-10-CM

## 2023-03-21 RX ORDER — PREGABALIN 150 MG/1
150 CAPSULE ORAL 2 TIMES DAILY
Qty: 60 CAPSULE | Refills: 0 | Status: SHIPPED | OUTPATIENT
Start: 2023-03-21 | End: 2023-05-05

## 2023-03-21 NOTE — TELEPHONE ENCOUNTER
PA needed as they will only allow 150mg BID     Per a mychart message sent on 3/7/23 patient had reported that the 150mg were making her too tired and wants to go down to 150mg BID instead of TID.     Ok to change to twice per day?    Order cued     Routing to covering pool as patient is at the pharmacy and PCP is ROD Hunter RN  [facility]

## 2023-03-22 ENCOUNTER — THERAPY VISIT (OUTPATIENT)
Dept: PHYSICAL THERAPY | Facility: CLINIC | Age: 43
End: 2023-03-22
Payer: COMMERCIAL

## 2023-03-22 DIAGNOSIS — M54.42 CHRONIC BILATERAL LOW BACK PAIN WITH LEFT-SIDED SCIATICA: ICD-10-CM

## 2023-03-22 DIAGNOSIS — M54.50 CHRONIC BILATERAL LOW BACK PAIN WITHOUT SCIATICA: ICD-10-CM

## 2023-03-22 DIAGNOSIS — G89.29 CHRONIC BILATERAL LOW BACK PAIN WITH LEFT-SIDED SCIATICA: ICD-10-CM

## 2023-03-22 DIAGNOSIS — G89.29 CHRONIC BILATERAL LOW BACK PAIN WITHOUT SCIATICA: ICD-10-CM

## 2023-03-22 PROCEDURE — 97161 PT EVAL LOW COMPLEX 20 MIN: CPT | Mod: GP | Performed by: PHYSICAL THERAPIST

## 2023-03-22 PROCEDURE — 97112 NEUROMUSCULAR REEDUCATION: CPT | Mod: GP | Performed by: PHYSICAL THERAPIST

## 2023-03-22 NOTE — TELEPHONE ENCOUNTER
Pharmacy calling again    [facility] is requiring PA for pregabalin 150 mg BID      Helena RN    Triage Nurse  [facility]  Appointment line: [phone]  [facility], 24 hour nurse line, available by calling clinic at [phone] and following prompts.

## 2023-03-22 NOTE — PROGRESS NOTES
[facility] Physical Therapy Initial Evaluation  3/22/2023     Patient's Name: Daniella Sexton  Referring Provider: Edgar Melton  Visit Diagnosis:   1. Chronic bilateral low back pain without sciatica      Payor: MEDICA / Plan: MEDICA CHOICE / Product Type: Indemnity /     Precautions/Restrictions/MD instructions: 01/19/23 evaluate and treat    Therapist ASSESSMENT  Daniella Sexton is a 42 year old female patient presenting to Physical Therapy with low back pain, insidious onset after ambulating in a CAM boot.   Key history/exam findings:  1. Extension painful; positive active SLR  2. Negative SI tests but pain relief with overpressure SI approximation  Signs and symptoms are consistent with chronic low back pain with movement coordination impairments.   These impairments limit their ability to lift objects at work, change positions in bed.   Skilled PT services are necessary in order to reduce impairments and improve independent function.      SUBJECTIVE   Daniella Sexton is a 42 year old female who presents to outpatient physical therapy for evaluation. Her symptoms consist of:  1. Low back pain      Patient Comments (HPI): She reports that her symptoms began after she fx'd her ankle and was walking with crutches. Mostly hurts on the left lower lumbar over the right. Sometimes goes down the legs laterally to the knees. They are worse after a long shift but always a nagging pain. She rates pain as 4/10 on average (at worst 5/10, at best 1/10). Overall, she reports her status is getting better.    She denies red flags, including Numbness, Sudden changes in bowel/bladder function, Saddle anaesthesia, Sudden changes in balance, Sudden and/or progressive weakness and Bilateral LE paresthesias.  She reports the following red flags: none.    Aggravating Factors: standing (long shift), bending , twisting and lifting/carrying  Relieving Factors: stretches (short period), ice and heat, aspercreme,     Previous  Treatments: None    General health as reported by patient: excellent.    PMH/Review of Systems: I briefly reviewed the review of systems as noted on the health history form.  I am only responding to those symptoms which are directly relevant the specific indication for my consultation. I recommended the patient follow up with their primary care referring provider to pursue any other symptoms which may be of concern.     Surgical history/trauma: See EMR. She denies any significant current illness or recent hospital admissions. She denies any regional implanted devices.  Imaging:  Results for orders placed or performed in visit on 12/16/22   MR Brain w/o & w Contrast    Narrative    MRI BRAIN WITHOUT AND WITH CONTRAST  12/16/2022 1:47 PM     HISTORY: Persistent new onset paresthesias of the left trigeminal  nerve on the face. Facial pain. Chronic pansinusitis.    TECHNIQUE: Multiplanar, multisequence MRI of the brain without and  with 11.5 mL Gadavist.     COMPARISON: Sinus CT 11/28/2022. Brain MR 12/16/2020.     FINDINGS: No abnormal signal or enhancement along the cisternal course  of the trigeminal nerves. Meckel's cave are clear bilaterally without  abnormal enhancement or mass. No significant compression along the  cisternal course of the trigeminal nerve. A small vessel appears to  abut the superior aspect of the right trigeminal nerve.    There is no evidence of acute infarct, hemorrhage, mass, or  herniation. The brain parenchyma, ventricles and subarachnoid spaces  appear normal. There are no gadolinium enhancing lesions.     Polypoid mucous retention cyst or mucosal polyp in the inferior left  maxillary sinus. Mild mucosal thickening in the inferior right  maxillary sinus. The arteries at the base of the brain and the dural  venous sinuses appear patent.       Impression    IMPRESSION:    1. Unremarkable MR appearance of the trigeminal nerves. No vascular  compression appreciated, particularly of the left  trigeminal nerve.  2. Normal MR of the brain parenchyma.  3. Polypoid mucous retention cysts or mucosal polyp in the inferior  left maxillary sinus. Mild mucosal thickening in the inferior right  maxillary sinus.    DONTAE GREY MD         SYSTEM ID:  [id number]     Medications: See EMR    PERTINENT MEDICAL / SURGICAL HISTORY:   Patient Active Problem List   Diagnosis     Anxiety     Depressive disorder     Duodenal ulcer     History of alcohol abuse     History of methamphetamine abuse (H)     Migraines     Seasonal allergic rhinitis     Sleep disturbance     Tobacco abuse     CARDIOVASCULAR SCREENING; LDL GOAL LESS THAN 160     Chronic bilateral low back pain with bilateral sciatica     Pure hypercholesterolemia     Mild intermittent asthma without complication     Chronic pansinusitis     Obesity (BMI 35.0-39.9) with comorbidity (H)     Lumbago     Fatty liver     Lung nodule     Pulmonary emphysema, unspecified emphysema type (H)     Medical marijuana use     Past Surgical History:   Procedure Laterality Date     BACK SURGERY  2005    L 3-4, L 4-5     LITHOTRIPSY  2016     OPTICAL TRACKING SYSTEM ENDOSCOPIC SINUS SURGERY Bilateral 1/10/2019    Procedure: BILATERAL IMAGE GUIDED ENDOSCOPIC SINUS SURGERY, BILATERAL TURBINATE REDUCTION;  Surgeon: Maikol Miguel MD;  Location: [facility] OR     RELEASE CARPAL TUNNEL Left      TONSILLECTOMY       TUBAL LIGATION         Occupation: works 2 jobs, on feet most of the day  Current exercise regimen/Recreational activities: none currently  Possible barriers impacting outcomes: none    Patient Self-identified Goal: Daniella M Shold would like to decrease pain with lifting/bending.        OBJECTIVE  Observation: rounded shoulders and normal spinal curvature.   Gait: normal, no asymmetries or obvious deviations and Able to heel and toe walk without difficulty  Transfers: normal, no assist required; pain with supine to sit and supine to sidelying  Screening (regional  interdependence): Hip screen negative (ROM, overpressure, SL stance, MARCELLE) - noncontributory  Range of Motion: affected side: bilateral  Lumbar AROM full and painFUL (extension)  Neuro Screen:    Myotomes: L3-S1 intact   Dermatomes: not assessed   Reflexes: not tested  Other: n/a  Strength: Intact along LE myotomes (L2-S1)  Able to heel walk and toe raise  hip abductors, adductors, internal rotators, external rotators 5/5  Functional movement: not assessed  Palpation: Tender to palpation at: not assessed  Segmental Assessment: not assessed  Special Tests:  Positive: Active Straight Leg Raise left   Negative: SLR bilateral  SI joint provocation - Gapping and Compression       ASSESSMENT/PLAN  Patient is a 42 year old female with lumbar complaints.    Patient has the following significant findings with corresponding treatment plan.                Diagnosis 1:  Low back pain with movement coordination impairments    Pain -  hot/cold therapy, manual therapy, splint/taping/bracing/orthotics, self management and education  Impaired muscle performance - neuro re-education and home program  Decreased function - therapeutic activities and home program    Therapy Evaluation Codes:   Cumulative Therapy Evaluation is: Low complexity.   Previous and current functional limitations:  (See Goal Flow Sheet for this information)    Short term and Long term goals: (See Goal Flow Sheet for this information)     Communication ability:  Patient appears to be able to clearly communicate and understand verbal and written communication and follow directions correctly.    Treatment Explanation - The following has been discussed with the patient:   RX ordered/plan of care  Anticipated outcomes  Possible risks and side effects    This patient would benefit from PT intervention to resume normal activities.   Rehab potential is excellent.    Frequency:  1 X week, once daily  Duration:  for 3 months  Discharge Plan:  Achieve all  LTG.  Independent in home treatment program.  Reach maximal therapeutic benefit.    Please refer to the daily flowsheet for treatment today, total treatment time and time spent performing 1:1 timed codes.     *Text entry may be via Dragon Dictation software in real-time. Efforts are made to edit for clarity.     Aileen Bianchi, PT, DPT, [facility], [place]

## 2023-03-23 NOTE — TELEPHONE ENCOUNTER
Central Prior Authorization Team  Phone: [phone]    PA Initiation    Medication: pregabalin (LYRICA) 150 MG capsule  Insurance Company: [employer] - Phone [phone] Fax [phone]  Pharmacy Filling the Rx: [id number] - [place], [place] - [address] AT [facility] [address]  Filling Pharmacy Phone: [phone]  Filling Pharmacy Fax:    Start Date: 3/23/2023

## 2023-03-23 NOTE — TELEPHONE ENCOUNTER
Prior Authorization Retail Medication Request    Medication/Dose: pregabalin (LYRICA) 150 MG capsule  ICD code (if different than what is on RX):    Previously Tried and Failed:    Rationale:  PA Needed    Insurance Name:    Insurance ID:        Pharmacy Information (if different than what is on RX)  Name:    Phone:    Kirsten Jeter CMA

## 2023-03-23 NOTE — PROGRESS NOTES
Discharge Note    Progress reporting period is from last progress note on ANKLE eval 12/6/22 to Mar 22, 2023.    Daniella failed to follow up and current status is unknown.  Please see information below for last relevant information on current status.  Patient seen for 5 visits.    SUBJECTIVE  Subjective changes noted by patient:  Doing well but notices she is walking more on the lateral aspect of foot without her brace. Wearing the brace to work and doing fine with full days. Not wearing it at home and starting to wean out of it for errands etc. .  Current pain level is 0/10.     Previous pain level was  5/10.   Changes in function:  Yes (See Goal flowsheet attached for changes in current functional level)  Adverse reaction to treatment or activity: None    OBJECTIVE  Changes noted in objective findings: Gait normalized during session today, no cuing needed    ASSESSMENT/PLAN  Diagnosis: L ankle sprain/avulsion fx  Updated problem list and treatment plan:   Impaired gait - HEP  STG/LTGs have been met or progress has been made towards goals:  Yes, please see goal flowsheet for most current information  Assessment of Progress: current status is unknown.    Last current status: Pt is progressing well   Self Management Plans:  HEP  I have re-evaluated this patient and find that the nature, scope, duration and intensity of the therapy is appropriate for the medical condition of the patient.  Daniella continues to require the following intervention to meet STG and LTG's:  HEP.    Recommendations:  Discharge with current home program.  Patient to follow up with MD as needed.    Please refer to the daily flowsheet for treatment today, total treatment time and time spent performing 1:1 timed codes.

## 2023-03-23 NOTE — PROGRESS NOTES
[facility]    OUTPATIENT Physical Therapy ORTHOPEDIC EVALUATION  PLAN OF TREATMENT FOR OUTPATIENT REHABILITATION  (COMPLETE FOR INITIAL CLAIMS ONLY)  Patient's Last Name, First Name, M.I.  YOB: 1980  Daniella Sexton    Provider s Name:  [facility]   Medical Record No.  [id number]   Start of Care Date:  03/22/23   Onset Date:   01/19/23 (provider referral)   Treatment Diagnosis:  low back pain with movement coordination impairments Medical Diagnosis:  Chronic bilateral low back pain without sciatica       Goals:     03/22/23 0500   Body Part   Goals listed below are for Left ankle/low back   Goal #1   Goal #1 ambulation   Previous Functional Level No restrictions   Current Functional Level Minutes patient can walk   Performance Level no boot, normalized gait x 200'   STG Target Performance Minutes patient will be able to walk   Performance Level 2mins w/o boot 3/10   Rationale for safe household ambulation;for safe community ambulation   Due Date 12/27/22   Date Goal Met 02/01/23    LTG Target Performance Minutes patient will be able to  walk   Performance Level 10' no boot 1/10   Rationale for safe household ambulation;for safe community ambulation;for safe work place ambulation   Due Date 03/03/23   Date Goal Met 03/22/23   Goal #2   Goal #2 lifting/carrying   Previous Functional Level No restrictions   Performance level regularly lifts 40#   Current Functional Level Can lift   Performance level hip hinge x 10# no increase in pain; has to regularly lift 40# and has pain   STG Target Performance Lift an item off floor to seat height weighing   Performance level 25# x 5 reps no increase in pain   Rationale to perform job duties at work   Due date 04/12/23   LTG Target Performance Lift an item off floor to seat height weighing   Performance Level 50# x 5 reps no  increase in pain   Rationale to perform job duties at work   Due date 06/20/23         Therapy Frequency:  1x/week (rehab delayed)  Predicted Duration of Therapy Intervention:  3 months    PUSHPA WANG, PT                 I CERTIFY THE NEED FOR THESE SERVICES FURNISHED UNDER        THIS PLAN OF TREATMENT AND WHILE UNDER MY CARE     (Physician attestation of this document indicates review and certification of the therapy plan).                     Certification Date From:  03/22/23   Certification Date To:  06/20/23    Referring Provider:  Santos Quintanilla    Initial Assessment        See Epic Evaluation SOC Date: 03/22/23

## 2023-03-24 NOTE — TELEPHONE ENCOUNTER
Central Prior Authorization Team  Phone: [phone]    Prior Authorization Not Needed per Insurance    Medication: pregabalin (LYRICA) 150 MG capsule  Insurance Company: [employer] - Phone [phone] Fax [phone]  Expected CoPay:      Pharmacy Filling the Rx: [id number] [place] [address] AT [facility] [address]  Pharmacy Notified: Yes  Patient Notified:      ONE TIME OVERRIDE IN PLACE FOR TODAY FOR A QTY OF 60 FOR 30 DAYS.

## 2023-04-02 DIAGNOSIS — R51.9 FACIAL PAIN: ICD-10-CM

## 2023-04-03 RX ORDER — BACLOFEN 10 MG/1
TABLET ORAL
Qty: 40 TABLET | Refills: 1 | Status: SHIPPED | OUTPATIENT
Start: 2023-04-03 | End: 2023-06-21

## 2023-04-21 ENCOUNTER — THERAPY VISIT (OUTPATIENT)
Dept: PHYSICAL THERAPY | Facility: CLINIC | Age: 43
End: 2023-04-21
Payer: COMMERCIAL

## 2023-04-21 DIAGNOSIS — G89.29 CHRONIC BILATERAL LOW BACK PAIN WITH LEFT-SIDED SCIATICA: Primary | ICD-10-CM

## 2023-04-21 DIAGNOSIS — M54.42 CHRONIC BILATERAL LOW BACK PAIN WITH LEFT-SIDED SCIATICA: Primary | ICD-10-CM

## 2023-04-21 PROCEDURE — 97530 THERAPEUTIC ACTIVITIES: CPT | Mod: GP | Performed by: PHYSICAL THERAPIST

## 2023-04-21 PROCEDURE — 97110 THERAPEUTIC EXERCISES: CPT | Mod: GP | Performed by: PHYSICAL THERAPIST

## 2023-04-27 ENCOUNTER — THERAPY VISIT (OUTPATIENT)
Dept: PHYSICAL THERAPY | Facility: CLINIC | Age: 43
End: 2023-04-27
Payer: COMMERCIAL

## 2023-04-27 DIAGNOSIS — M54.42 CHRONIC BILATERAL LOW BACK PAIN WITH LEFT-SIDED SCIATICA: Primary | ICD-10-CM

## 2023-04-27 DIAGNOSIS — G89.29 CHRONIC BILATERAL LOW BACK PAIN WITH LEFT-SIDED SCIATICA: Primary | ICD-10-CM

## 2023-04-27 PROCEDURE — 97140 MANUAL THERAPY 1/> REGIONS: CPT | Mod: GP | Performed by: PHYSICAL THERAPIST

## 2023-04-27 PROCEDURE — 97110 THERAPEUTIC EXERCISES: CPT | Mod: GP | Performed by: PHYSICAL THERAPIST

## 2023-05-03 ENCOUNTER — THERAPY VISIT (OUTPATIENT)
Dept: PHYSICAL THERAPY | Facility: CLINIC | Age: 43
End: 2023-05-03
Payer: COMMERCIAL

## 2023-05-03 DIAGNOSIS — G89.29 CHRONIC BILATERAL LOW BACK PAIN WITH LEFT-SIDED SCIATICA: Primary | ICD-10-CM

## 2023-05-03 DIAGNOSIS — M54.42 CHRONIC BILATERAL LOW BACK PAIN WITH LEFT-SIDED SCIATICA: Primary | ICD-10-CM

## 2023-05-03 PROCEDURE — 97140 MANUAL THERAPY 1/> REGIONS: CPT | Mod: GP | Performed by: PHYSICAL THERAPIST

## 2023-05-03 PROCEDURE — 97112 NEUROMUSCULAR REEDUCATION: CPT | Mod: GP | Performed by: PHYSICAL THERAPIST

## 2023-05-03 PROCEDURE — 97110 THERAPEUTIC EXERCISES: CPT | Mod: GP | Performed by: PHYSICAL THERAPIST

## 2023-05-05 DIAGNOSIS — G89.29 CHRONIC BILATERAL LOW BACK PAIN WITH BILATERAL SCIATICA: ICD-10-CM

## 2023-05-05 DIAGNOSIS — M54.42 CHRONIC BILATERAL LOW BACK PAIN WITH BILATERAL SCIATICA: ICD-10-CM

## 2023-05-05 DIAGNOSIS — M54.41 CHRONIC BILATERAL LOW BACK PAIN WITH BILATERAL SCIATICA: ICD-10-CM

## 2023-05-05 RX ORDER — PREGABALIN 150 MG/1
CAPSULE ORAL
Qty: 60 CAPSULE | Refills: 0 | Status: SHIPPED | OUTPATIENT
Start: 2023-05-05 | End: 2023-07-07

## 2023-05-10 ENCOUNTER — THERAPY VISIT (OUTPATIENT)
Dept: PHYSICAL THERAPY | Facility: CLINIC | Age: 43
End: 2023-05-10
Payer: COMMERCIAL

## 2023-05-10 DIAGNOSIS — G89.29 CHRONIC BILATERAL LOW BACK PAIN WITH LEFT-SIDED SCIATICA: Primary | ICD-10-CM

## 2023-05-10 DIAGNOSIS — M54.42 CHRONIC BILATERAL LOW BACK PAIN WITH LEFT-SIDED SCIATICA: Primary | ICD-10-CM

## 2023-05-10 PROCEDURE — 97110 THERAPEUTIC EXERCISES: CPT | Mod: GP | Performed by: PHYSICAL THERAPIST

## 2023-05-10 PROCEDURE — 97140 MANUAL THERAPY 1/> REGIONS: CPT | Mod: GP | Performed by: PHYSICAL THERAPIST

## 2023-05-24 ENCOUNTER — THERAPY VISIT (OUTPATIENT)
Dept: PHYSICAL THERAPY | Facility: CLINIC | Age: 43
End: 2023-05-24
Payer: COMMERCIAL

## 2023-05-24 DIAGNOSIS — M54.42 CHRONIC BILATERAL LOW BACK PAIN WITH LEFT-SIDED SCIATICA: Primary | ICD-10-CM

## 2023-05-24 DIAGNOSIS — G89.29 CHRONIC BILATERAL LOW BACK PAIN WITH LEFT-SIDED SCIATICA: Primary | ICD-10-CM

## 2023-05-24 PROCEDURE — 99207 PR NO CHARGE LOS: CPT | Mod: GP | Performed by: PHYSICAL THERAPIST

## 2023-05-24 NOTE — PROGRESS NOTES
05/24/23 0500   Appointment Info   Signing clinician's name / credentials Aileen Bianchi, PT, DPT, OCS   Total/Authorized Visits 10   Visits Used 6   Medical Diagnosis Chronic bilateral low back pain with bilateral sciatica   PT Tx Diagnosis low back pain with movement coordination impairments   Quick Adds Certification   Progress Note/Certification   Start of Care Date 03/22/23   Onset of illness/injury or Date of Surgery 01/19/23  (provider referral)   Therapy Frequency 1x/week (rehab delayed)   Predicted Duration 3 months   Certification date from 03/22/23   Certification date to 06/20/23   Progress Note Due Date 05/21/23   Progress Note Completed Date 05/24/23   PT Goal 1   Goal Identifier lifting   Goal Description Patient will report ability to deadlift 50# at work 5 reps with no increase in pain to perform job duties at work.   Goal Progress 50# x 5 reps last visit   Target Date 06/20/23   Date Met 05/24/23   Subjective Report   Subjective Report Patient is feeling very good; no back pain and feeling confident to self manage. Asked if there is anything else that she would like addressed or answered and she reports none. No charge for brief closing visit.   Objective Measures   Objective Measures Objective Measure 1   Objective Measure 1   Objective Measure JUAN DAVID   Details 8% (improved from 20% initial)   Plan   Home program continue several times/week   Updates to plan of care none   Plan for next session d/c PT         DISCHARGE  Reason for Discharge: Patient has met all goals.    Equipment Issued: none    Discharge Plan: Patient to continue home program.    Referring Provider:  Santos Quintanilla

## 2023-06-07 ENCOUNTER — TELEPHONE (OUTPATIENT)
Dept: FAMILY MEDICINE | Facility: CLINIC | Age: 43
End: 2023-06-07

## 2023-06-07 ENCOUNTER — OFFICE VISIT (OUTPATIENT)
Dept: FAMILY MEDICINE | Facility: CLINIC | Age: 43
End: 2023-06-07
Payer: COMMERCIAL

## 2023-06-07 VITALS
DIASTOLIC BLOOD PRESSURE: 76 MMHG | OXYGEN SATURATION: 98 % | TEMPERATURE: 98.5 F | HEIGHT: 68 IN | SYSTOLIC BLOOD PRESSURE: 109 MMHG | BODY MASS INDEX: 38.52 KG/M2 | RESPIRATION RATE: 16 BRPM | WEIGHT: 254.2 LBS | HEART RATE: 102 BPM

## 2023-06-07 DIAGNOSIS — J30.2 SEASONAL ALLERGIC RHINITIS, UNSPECIFIED TRIGGER: ICD-10-CM

## 2023-06-07 DIAGNOSIS — R31.29 MICROSCOPIC HEMATURIA: ICD-10-CM

## 2023-06-07 DIAGNOSIS — R10.84 ABDOMINAL PAIN, GENERALIZED: Primary | ICD-10-CM

## 2023-06-07 DIAGNOSIS — R82.90 ABNORMAL FINDING ON URINALYSIS: ICD-10-CM

## 2023-06-07 DIAGNOSIS — J34.89 SINUS PAIN: ICD-10-CM

## 2023-06-07 DIAGNOSIS — R74.8 ELEVATED LIVER ENZYMES: ICD-10-CM

## 2023-06-07 DIAGNOSIS — J32.4 CHRONIC PANSINUSITIS: ICD-10-CM

## 2023-06-07 LAB
ALBUMIN SERPL BCG-MCNC: 4.4 G/DL (ref 3.5–5.2)
ALBUMIN UR-MCNC: NEGATIVE MG/DL
ALP SERPL-CCNC: 136 U/L (ref 35–104)
ALT SERPL W P-5'-P-CCNC: 64 U/L (ref 10–35)
ANION GAP SERPL CALCULATED.3IONS-SCNC: 14 MMOL/L (ref 7–15)
APPEARANCE UR: CLEAR
AST SERPL W P-5'-P-CCNC: 104 U/L (ref 10–35)
BACTERIA #/AREA URNS HPF: ABNORMAL /HPF
BASOPHILS # BLD AUTO: 0 10E3/UL (ref 0–0.2)
BASOPHILS NFR BLD AUTO: 0 %
BILIRUB SERPL-MCNC: 0.3 MG/DL
BILIRUB UR QL STRIP: NEGATIVE
BUN SERPL-MCNC: 11.3 MG/DL (ref 6–20)
CALCIUM SERPL-MCNC: 9.7 MG/DL (ref 8.6–10)
CHLORIDE SERPL-SCNC: 101 MMOL/L (ref 98–107)
COLOR UR AUTO: YELLOW
CREAT SERPL-MCNC: 0.61 MG/DL (ref 0.51–0.95)
DEPRECATED HCO3 PLAS-SCNC: 24 MMOL/L (ref 22–29)
EOSINOPHIL # BLD AUTO: 0.3 10E3/UL (ref 0–0.7)
EOSINOPHIL NFR BLD AUTO: 2 %
ERYTHROCYTE [DISTWIDTH] IN BLOOD BY AUTOMATED COUNT: 12.8 % (ref 10–15)
GFR SERPL CREATININE-BSD FRML MDRD: >90 ML/MIN/1.73M2
GLUCOSE SERPL-MCNC: 133 MG/DL (ref 70–99)
GLUCOSE UR STRIP-MCNC: NEGATIVE MG/DL
HCG UR QL: NEGATIVE
HCT VFR BLD AUTO: 43.8 % (ref 35–47)
HGB BLD-MCNC: 14.6 G/DL (ref 11.7–15.7)
HGB UR QL STRIP: ABNORMAL
IMM GRANULOCYTES # BLD: 0 10E3/UL
IMM GRANULOCYTES NFR BLD: 0 %
KETONES UR STRIP-MCNC: NEGATIVE MG/DL
LEUKOCYTE ESTERASE UR QL STRIP: NEGATIVE
LIPASE SERPL-CCNC: 21 U/L (ref 13–60)
LYMPHOCYTES # BLD AUTO: 3 10E3/UL (ref 0.8–5.3)
LYMPHOCYTES NFR BLD AUTO: 23 %
MCH RBC QN AUTO: 31.5 PG (ref 26.5–33)
MCHC RBC AUTO-ENTMCNC: 33.3 G/DL (ref 31.5–36.5)
MCV RBC AUTO: 94 FL (ref 78–100)
MONOCYTES # BLD AUTO: 0.9 10E3/UL (ref 0–1.3)
MONOCYTES NFR BLD AUTO: 7 %
NEUTROPHILS # BLD AUTO: 9 10E3/UL (ref 1.6–8.3)
NEUTROPHILS NFR BLD AUTO: 68 %
NITRATE UR QL: NEGATIVE
PH UR STRIP: 6 [PH] (ref 5–7)
PLATELET # BLD AUTO: 283 10E3/UL (ref 150–450)
POTASSIUM SERPL-SCNC: 4 MMOL/L (ref 3.4–5.3)
PROT SERPL-MCNC: 7.3 G/DL (ref 6.4–8.3)
RBC # BLD AUTO: 4.64 10E6/UL (ref 3.8–5.2)
RBC #/AREA URNS AUTO: ABNORMAL /HPF
SODIUM SERPL-SCNC: 139 MMOL/L (ref 136–145)
SP GR UR STRIP: 1.02 (ref 1–1.03)
SQUAMOUS #/AREA URNS AUTO: ABNORMAL /LPF
UROBILINOGEN UR STRIP-ACNC: 0.2 E.U./DL
WBC # BLD AUTO: 13.3 10E3/UL (ref 4–11)
WBC #/AREA URNS AUTO: ABNORMAL /HPF

## 2023-06-07 PROCEDURE — 87635 SARS-COV-2 COVID-19 AMP PRB: CPT | Performed by: PHYSICIAN ASSISTANT

## 2023-06-07 PROCEDURE — 83690 ASSAY OF LIPASE: CPT | Performed by: PHYSICIAN ASSISTANT

## 2023-06-07 PROCEDURE — 86706 HEP B SURFACE ANTIBODY: CPT | Performed by: PHYSICIAN ASSISTANT

## 2023-06-07 PROCEDURE — 87340 HEPATITIS B SURFACE AG IA: CPT | Performed by: PHYSICIAN ASSISTANT

## 2023-06-07 PROCEDURE — 36415 COLL VENOUS BLD VENIPUNCTURE: CPT | Performed by: PHYSICIAN ASSISTANT

## 2023-06-07 PROCEDURE — 82977 ASSAY OF GGT: CPT | Performed by: PHYSICIAN ASSISTANT

## 2023-06-07 PROCEDURE — 83540 ASSAY OF IRON: CPT | Performed by: PHYSICIAN ASSISTANT

## 2023-06-07 PROCEDURE — 87086 URINE CULTURE/COLONY COUNT: CPT | Performed by: PHYSICIAN ASSISTANT

## 2023-06-07 PROCEDURE — 83550 IRON BINDING TEST: CPT | Performed by: PHYSICIAN ASSISTANT

## 2023-06-07 PROCEDURE — 99215 OFFICE O/P EST HI 40 MIN: CPT | Performed by: PHYSICIAN ASSISTANT

## 2023-06-07 PROCEDURE — 85025 COMPLETE CBC W/AUTO DIFF WBC: CPT | Performed by: PHYSICIAN ASSISTANT

## 2023-06-07 PROCEDURE — 80053 COMPREHEN METABOLIC PANEL: CPT | Performed by: PHYSICIAN ASSISTANT

## 2023-06-07 PROCEDURE — 86803 HEPATITIS C AB TEST: CPT | Performed by: PHYSICIAN ASSISTANT

## 2023-06-07 PROCEDURE — 86709 HEPATITIS A IGM ANTIBODY: CPT | Performed by: PHYSICIAN ASSISTANT

## 2023-06-07 PROCEDURE — 81025 URINE PREGNANCY TEST: CPT | Performed by: PHYSICIAN ASSISTANT

## 2023-06-07 PROCEDURE — 82728 ASSAY OF FERRITIN: CPT | Performed by: PHYSICIAN ASSISTANT

## 2023-06-07 PROCEDURE — 81001 URINALYSIS AUTO W/SCOPE: CPT | Performed by: PHYSICIAN ASSISTANT

## 2023-06-07 RX ORDER — CIPROFLOXACIN 500 MG/1
500 TABLET, FILM COATED ORAL 2 TIMES DAILY
Qty: 28 TABLET | Refills: 0 | Status: SHIPPED | OUTPATIENT
Start: 2023-06-07 | End: 2023-07-07

## 2023-06-07 RX ORDER — FLUTICASONE PROPIONATE 50 MCG
1 SPRAY, SUSPENSION (ML) NASAL DAILY
Qty: 16 G | Refills: 1 | Status: SHIPPED | OUTPATIENT
Start: 2023-06-07 | End: 2023-07-07

## 2023-06-07 RX ORDER — PREDNISONE 10 MG/1
10 TABLET ORAL 2 TIMES DAILY
Qty: 10 TABLET | Refills: 0 | Status: SHIPPED | OUTPATIENT
Start: 2023-06-07 | End: 2023-07-07

## 2023-06-07 ASSESSMENT — PAIN SCALES - GENERAL: PAINLEVEL: SEVERE PAIN (6)

## 2023-06-07 NOTE — TELEPHONE ENCOUNTER
Phone call to patient - CBC with elevated WBC with left shift with generalized abdominal pain. Recommended emergency department tonight to evaluate and rule out appendicitis or diverticulitis.  She will go to [place] .

## 2023-06-07 NOTE — PATIENT INSTRUCTIONS
Go to emergency department if develop increasing abdominal pain, fever, vomiting or other change in symptoms  Schedule kidney ultrasound at [facility] ([phone]) formerly called [facility] to look for kidney stone   Start cipro for sinus infection along with prednisone for sinus infection  Follow up with us if no improvement in symptoms over the next 5 days

## 2023-06-07 NOTE — PROGRESS NOTES
Assessment & Plan     Abdominal pain, generalized  Initially complains of right upper quadrant pain with pain radiating around to back but generalized abdominal tenderness and elevated CBC with left shift.   Sent to emergency department after elevated CBC and had US of gallbladder normal.  Liver enzymes elevated.      FIB-4 Calculation: 1.93 at 6/7/2023  3:45 PM  Calculated from:  SGOT/AST: 104 U/L at 6/7/2023  3:45 PM  SGPT/ALT: 64 U/L at 6/7/2023  3:45 PM  Platelets: 283 10e3/uL at 6/7/2023  3:45 PM  Age: 42 years  Given microscopic hematuria had renal ultrasound and normal - follow up with urology    - CBC with platelets and differential  - Comprehensive metabolic panel (BMP + Alb, Alk Phos, ALT, AST, Total. Bili, TP)  - UA with Microscopic reflex to Culture - lab collect  - Lipase  - HCG Qual, Urine ([id number])  - CBC with platelets and differential  - Comprehensive metabolic panel (BMP + Alb, Alk Phos, ALT, AST, Total. Bili, TP)  - UA with Microscopic reflex to Culture - lab collect  - Lipase  - HCG Qual, Urine ([id number])  - UA Microscopic with Reflex to Culture  - US Renal Complete Non-Vascular  - Adult GI  Referral - Consult Only    Chronic pansinusitis  Has seen ENT- previous effective treatments restarted    - predniSONE (DELTASONE) 10 MG tablet  Dispense: 10 tablet; Refill: 0  - ciprofloxacin (CIPRO) 500 MG tablet  Dispense: 28 tablet; Refill: 0  - Symptomatic COVID-19 Virus (Coronavirus) by PCR Nose    Microscopic hematuria  Negative renal ultrasound- follow up with urology   - Adult Urology  Referral    Elevated liver enzymes  As above  - Hepatitis A Antibody IgG  - Hepatitis A antibody IgM  - Hepatitis B Surface Antibody  - Hepatitis B surface antigen  - Hepatitis C antibody  - INR  - Iron & Iron Binding Capacity  - Ferritin  - GGT  - US Abdomen Complete  - US Elastography with Abdomen Complete  - Adult GI  Referral - Consult Only  - Ferritin  - GGT  - Hepatitis A antibody  IgM  - Hepatitis B Surface Antibody  - Hepatitis B surface antigen  - Hepatitis C antibody  - Iron & Iron Binding Capacity    Sinus pain  Negative covid testing   - Symptomatic COVID-19 Virus (Coronavirus) by PCR Nose    Seasonal allergic rhinitis, unspecified trigger  Refilled flonase   - fluticasone (FLONASE) 50 MCG/ACT nasal spray  Dispense: 16 g; Refill: 1    Abnormal finding on urinalysis  Negative urine culture   - Urine Culture Aerobic Bacterial - lab collect  - US Renal Complete Non-Vascular  - Urine Culture Aerobic Bacterial - lab collect      Review of the result(s) of each unique test - cbc,bmp, hepatic function, lipase, cbc, pregnancy test, ua, urine culture, renal ultrsound   Ordering of each unique test  Prescription drug management         Nicotine/Tobacco Cessation:  She reports that she has been smoking cigarettes. She has been smoking an average of .5 packs per day. She has been exposed to tobacco smoke. She has never used smokeless tobacco.  Nicotine/Tobacco Cessation Plan:   Information offered: Patient not interested at this time      Patient Instructions   Go to emergency department if develop increasing abdominal pain, fever, vomiting or other change in symptoms  Schedule kidney ultrasound at [facility] ([phone]) formerly called [facility] to look for kidney stone   Start cipro for sinus infection along with prednisone for sinus infection  Follow up with us if no improvement in symptoms over the next 5 days         ROGER [facility]   Daniella is a 42 year old, presenting for the following health issues:  No chief complaint on file.         View : No data to display.              History of Present Illness       Headaches:   Since the patient's last clinic visit, headaches are: worsened  The patient is getting headaches:  Couple times monthly  She is not able to do normal daily activities when she  "has a migraine.  The patient is taking the following rescue/relief medications:  Naproxyn (Aleve), Tylenol and Maxalt   Patient states \"I get only a small amount of relief\" from the rescue/relief medications.   The patient is taking the following medications to prevent migraines:  Amitriptyline  In the past 4 weeks, the patient has gone to an Urgent Care or Emergency Room 0 times times due to headaches.    Reason for visit:  Heagache stimach ache cough with phlem  Symptom onset:  1-3 days ago  Symptoms include:  Stomache headache ear pain cough with phlem  Symptom intensity:  Moderate  Symptom progression:  Worsening  Had these symptoms before:  Yes  Has tried/received treatment for these symptoms:  Yes  Previous treatment was successful:  Yes  Prior treatment description:  Antiobiotics  What makes it worse:  Eating  What makes it better:  Nothing really    She eats 0-1 servings of fruits and vegetables daily.She consumes 1 sweetened beverage(s) daily.She exercises with enough effort to increase her heart rate 9 or less minutes per day.  She exercises with enough effort to increase her heart rate 3 or less days per week. She is missing 1 dose(s) of medications per week.  She is not taking prescribed medications regularly due to other.     Headache since Monday ( 3 days) .  No fever. Complains of Sinus pain and pressure and ears and eye sockets are painful  In middle of night wake up because can't breathe up my nose.   See Dr. Miguel regularly for sinus issues. Been awhile since seen him  Sinus symptoms for a week- thoiught just allergies. Have a rinse but not helpful   Last round 3 months   Stomach upset since Monday am but intense sharp pain intermittently periumbical area  and right upper quadrant. Lies in Fetal position with heating pad   Nauseated constantly.  Every time eat makes nausea worse  Last drink Saturday- couple drinks five days ago   Periods normal - last 3 days.  Daughter with irregular periods  Stools " "runny and explosive since Monday. No melena or hematochezia.   No fever, sweats, chills. No blood in urine. No dysuria           Review of Systems   Constitutional, HEENT, cardiovascular, pulmonary, gi and gu systems are negative, except as otherwise noted.      Objective    /76 (BP Location: Right arm, Patient Position: Sitting, Cuff Size: Adult Large)   Pulse 102   Temp 98.5  F (36.9  C) (Tympanic)   Resp 16   Ht 1.715 m (5' 7.5\")   Wt 115.3 kg (254 lb 3.2 oz)   LMP 05/24/2023 (Approximate)   SpO2 98%   BMI 39.23 kg/m    Body mass index is 39.23 kg/m .  Physical Exam   GENERAL: alert, no distress and obese  EYES: Eyes grossly normal to inspection, PERRL and conjunctivae and sclerae normal  HENT: right ear: normal: no effusions, no erythema, normal landmarks, left ear: normal: no effusions, no erythema, normal landmarks, nose and mouth without ulcers or lesions, oropharynx clear, oral mucous membranes moist and sinuses: maxillary, frontal tenderness on bilaterally  NECK: no adenopathy, no asymmetry, masses, or scars and thyroid normal to palpation  RESP: lungs clear to auscultation - no rales, rhonchi or wheezes  CV: regular rate and rhythm, normal S1 S2, no S3 or S4, no murmur, click or rub, no peripheral edema and peripheral pulses strong  ABDOMEN: tenderness epigastric, RUQ, LLQ and general abdomen, no organomegaly or masses, liver span normal to percussion, bowel sounds normal, no palpable or pulsatile masses and no palpable renal abnormalities   MS: no gross musculoskeletal defects noted, no edema  SKIN: no suspicious lesions or rashes  PSYCH: mentation appears normal, affect normal/bright  LYMPH: no cervical, supraclavicular, axillary, or inguinal adenopathy    Results for orders placed or performed in visit on 06/07/23   Comprehensive metabolic panel (BMP + Alb, Alk Phos, ALT, AST, Total. Bili, TP)     Status: Abnormal   Result Value Ref Range    Sodium 139 136 - 145 mmol/L    Potassium 4.0 3.4 " - 5.3 mmol/L    Chloride 101 98 - 107 mmol/L    Carbon Dioxide (CO2) 24 22 - 29 mmol/L    Anion Gap 14 7 - 15 mmol/L    Urea Nitrogen 11.3 6.0 - 20.0 mg/dL    Creatinine 0.61 0.51 - 0.95 mg/dL    Calcium 9.7 8.6 - 10.0 mg/dL    Glucose 133 (H) 70 - 99 mg/dL    Alkaline Phosphatase 136 (H) 35 - 104 U/L     (H) 10 - 35 U/L    ALT 64 (H) 10 - 35 U/L    Protein Total 7.3 6.4 - 8.3 g/dL    Albumin 4.4 3.5 - 5.2 g/dL    Bilirubin Total 0.3 <=1.2 mg/dL    GFR Estimate >90 >60 mL/min/1.73m2   UA with Microscopic reflex to Culture - lab collect     Status: Abnormal    Specimen: Urine, Clean Catch   Result Value Ref Range    Color Urine Yellow Colorless, Straw, Light Yellow, Yellow    Appearance Urine Clear Clear    Glucose Urine Negative Negative mg/dL    Bilirubin Urine Negative Negative    Ketones Urine Negative Negative mg/dL    Specific Gravity Urine 1.025 1.003 - 1.035    Blood Urine Moderate (A) Negative    pH Urine 6.0 5.0 - 7.0    Protein Albumin Urine Negative Negative mg/dL    Urobilinogen Urine 0.2 0.2, 1.0 E.U./dL    Nitrite Urine Negative Negative    Leukocyte Esterase Urine Negative Negative   Lipase     Status: Normal   Result Value Ref Range    Lipase 21 13 - 60 U/L   HCG Qual, Urine ([id number])     Status: Normal   Result Value Ref Range    hCG Urine Qualitative Negative Negative   CBC with platelets and differential     Status: Abnormal   Result Value Ref Range    WBC Count 13.3 (H) 4.0 - 11.0 10e3/uL    RBC Count 4.64 3.80 - 5.20 10e6/uL    Hemoglobin 14.6 11.7 - 15.7 g/dL    Hematocrit 43.8 35.0 - 47.0 %    MCV 94 78 - 100 fL    MCH 31.5 26.5 - 33.0 pg    MCHC 33.3 31.5 - 36.5 g/dL    RDW 12.8 10.0 - 15.0 %    Platelet Count 283 150 - 450 10e3/uL    % Neutrophils 68 %    % Lymphocytes 23 %    % Monocytes 7 %    % Eosinophils 2 %    % Basophils 0 %    % Immature Granulocytes 0 %    Absolute Neutrophils 9.0 (H) 1.6 - 8.3 10e3/uL    Absolute Lymphocytes 3.0 0.8 - 5.3 10e3/uL    Absolute Monocytes 0.9  0.0 - 1.3 10e3/uL    Absolute Eosinophils 0.3 0.0 - 0.7 10e3/uL    Absolute Basophils 0.0 0.0 - 0.2 10e3/uL    Absolute Immature Granulocytes 0.0 <=0.4 10e3/uL   UA Microscopic with Reflex to Culture     Status: Abnormal   Result Value Ref Range    Bacteria Urine Few (A) None Seen /HPF    RBC Urine 5-10 (A) 0-2 /HPF /HPF    WBC Urine 0-5 0-5 /HPF /HPF    Squamous Epithelials Urine Few (A) None Seen /LPF    Narrative    Urine Culture not indicated   Symptomatic COVID-19 Virus (Coronavirus) by PCR Nose     Status: Normal    Specimen: Nose; Swab   Result Value Ref Range    SARS CoV2 PCR Negative Negative    Narrative    Testing was performed using the [employer] SARS-CoV-2 Assay on the  [employer] Instrument System. Additional information about this  Emergency Use Authorization (EUA) assay can be found via the Lab  Guide. This test should be ordered for the detection of SARS-CoV-2 in  individuals who meet SARS-CoV-2 clinical and/or epidemiological  criteria. Test performance is unknown in asymptomatic patients. This  test is for in vitro diagnostic use under the FDA EUA for  laboratories certified under CLIA to perform high complexity testing.  This test has not been FDA cleared or approved. A negative result  does not rule out the presence of PCR inhibitors in the specimen or  target RNA in concentration below the limit of detection for the  assay. The possibility of a false negative should be considered if  the patient's recent exposure or clinical presentation suggests  COVID-19. This test was validated by the [facility]. This laboratory is certified under  the Clinical Laboratory Improvement Amendments of 1988 (CLIA-88) as  qualified to perform high complexity laboratory testing.   Ferritin     Status: Abnormal   Result Value Ref Range    Ferritin 281 (H) 6 - 175 ng/mL   GGT     Status: Abnormal   Result Value Ref Range     (H) 5 - 36 U/L   Hepatitis A antibody IgM      Status: Normal   Result Value Ref Range    Hepatitis A Antibody IgM Nonreactive Nonreactive   Hepatitis B Surface Antibody     Status: None   Result Value Ref Range    Hepatitis B Surface Antibody Instrument Value 8.54 <8.00 m[IU]/mL    Hepatitis B Surface Antibody Indeterminate    Hepatitis B surface antigen     Status: Normal   Result Value Ref Range    Hepatitis B Surface Antigen Nonreactive Nonreactive   Hepatitis C antibody     Status: Normal   Result Value Ref Range    Hepatitis C Antibody Nonreactive Nonreactive    Narrative    Assay performance characteristics have not been established for newborns, infants, and children.   Iron & Iron Binding Capacity     Status: Abnormal   Result Value Ref Range    Iron 43 37 - 145 ug/dL    Iron Binding Capacity 405 240 - 430 ug/dL    Iron Sat Index 11 (L) 15 - 46 %   Urine Culture Aerobic Bacterial - lab collect     Status: None    Specimen: Urine, Clean Catch   Result Value Ref Range    Culture 10,000-50,000 CFU/mL Mixture of urogenital joseph    CBC with platelets and differential     Status: Abnormal    Narrative    The following orders were created for panel order CBC with platelets and differential.  Procedure                               Abnormality         Status                     ---------                               -----------         ------                     CBC with platelets and d...[[id number]]  Abnormal            Final result                 Please view results for these tests on the individual orders.

## 2023-06-08 ENCOUNTER — PATIENT OUTREACH (OUTPATIENT)
Dept: CARE COORDINATION | Facility: CLINIC | Age: 43
End: 2023-06-08

## 2023-06-08 ENCOUNTER — ANCILLARY PROCEDURE (OUTPATIENT)
Dept: ULTRASOUND IMAGING | Facility: CLINIC | Age: 43
End: 2023-06-08
Attending: PHYSICIAN ASSISTANT
Payer: COMMERCIAL

## 2023-06-08 ENCOUNTER — MYC MEDICAL ADVICE (OUTPATIENT)
Dept: FAMILY MEDICINE | Facility: CLINIC | Age: 43
End: 2023-06-08

## 2023-06-08 DIAGNOSIS — R10.84 ABDOMINAL PAIN, GENERALIZED: ICD-10-CM

## 2023-06-08 DIAGNOSIS — R82.90 ABNORMAL FINDING ON URINALYSIS: ICD-10-CM

## 2023-06-08 LAB
FERRITIN SERPL-MCNC: 281 NG/ML (ref 6–175)
GGT SERPL-CCNC: 299 U/L (ref 5–36)
IRON BINDING CAPACITY (ROCHE): 405 UG/DL (ref 240–430)
IRON SATN MFR SERPL: 11 % (ref 15–46)
IRON SERPL-MCNC: 43 UG/DL (ref 37–145)
SARS-COV-2 RNA RESP QL NAA+PROBE: NEGATIVE

## 2023-06-08 PROCEDURE — 76770 US EXAM ABDO BACK WALL COMP: CPT | Performed by: STUDENT IN AN ORGANIZED HEALTH CARE EDUCATION/TRAINING PROGRAM

## 2023-06-08 NOTE — RESULT ENCOUNTER NOTE
Dear Daniella  I hope you made it to the EMERGENCY DEPARTMENT last night   Your liver tests were all elevated.  You do not have pancreatitis.  Please call or MyChart my office with any questions or concerns.   Bobbi Joshi, PAC

## 2023-06-08 NOTE — TELEPHONE ENCOUNTER
Patient seen at [facility] on 6/7/23. Routing to provider as an update.     Lay Walters RN

## 2023-06-08 NOTE — RESULT ENCOUNTER NOTE
Dear Daniella  Your liver tests are quite high.  Continue with the plan outlined.   Please call or MyChart my office with any questions or concerns.   Bobbi Joshi, PAC

## 2023-06-08 NOTE — RESULT ENCOUNTER NOTE
Dear Daniella  Your covid test was negative.  Please call or MyChart my office with any questions or concerns.   Bobbi Joshi, PAC

## 2023-06-08 NOTE — RESULT ENCOUNTER NOTE
Dear Daniella  You do not have any kidney stones.  You will need to see the urologist for the blood in your urine.  They should be reaching out to you to schedule   Did you go to the emergency department last night?  You probably have something going on with your liver.  All of your liver tests were elevated and you have abnormal imaging of the liver.   Please schedule an abdominal ultrasound and more specific abdominal ultrasound to look at the liver and schedule additional labs to evaluate the liver.  Avoid all tylenol and alcohol.  Schedule follow up with gastroenterologist for abnormal lab results, abdominal pain and abnormal liver tests.  Please call or MyChart my office with any questions or concerns.   Bobbi Joshi, PAC

## 2023-06-09 LAB
BACTERIA UR CULT: NORMAL
HAV IGM SERPL QL IA: NONREACTIVE
HBV SURFACE AB SERPL IA-ACNC: 8.54 M[IU]/ML
HBV SURFACE AB SERPL IA-ACNC: NORMAL M[IU]/ML
HBV SURFACE AG SERPL QL IA: NONREACTIVE
HCV AB SERPL QL IA: NONREACTIVE

## 2023-06-09 NOTE — RESULT ENCOUNTER NOTE
Dear Daniella  Your urine did not show any infection.  You were to schedule ultrasounds of your abdomen and a special abdomen to specifically look at the liver and I don't see that is scheduled yet.   We also need you to schedule a nonfasting lab only appointment to look at a couple of the liver tests.   You do not have hepatitis C or B.   You should be immunized for hepatitis B.  Please call or MyChart my office with any questions or concerns.   Bobbi Joshi, PAC

## 2023-06-19 ENCOUNTER — LAB (OUTPATIENT)
Dept: LAB | Facility: CLINIC | Age: 43
End: 2023-06-19
Payer: COMMERCIAL

## 2023-06-19 ENCOUNTER — ANCILLARY PROCEDURE (OUTPATIENT)
Dept: ULTRASOUND IMAGING | Facility: CLINIC | Age: 43
End: 2023-06-19
Attending: PHYSICIAN ASSISTANT
Payer: COMMERCIAL

## 2023-06-19 DIAGNOSIS — R74.8 ELEVATED LIVER ENZYMES: ICD-10-CM

## 2023-06-19 LAB
HAV IGG SER QL IA: NONREACTIVE
INR PPP: 0.96 (ref 0.85–1.15)

## 2023-06-19 PROCEDURE — 85610 PROTHROMBIN TIME: CPT | Performed by: PATHOLOGY

## 2023-06-19 PROCEDURE — 36415 COLL VENOUS BLD VENIPUNCTURE: CPT | Performed by: PATHOLOGY

## 2023-06-19 PROCEDURE — 99000 SPECIMEN HANDLING OFFICE-LAB: CPT | Performed by: PATHOLOGY

## 2023-06-19 PROCEDURE — 76981 USE PARENCHYMA: CPT | Mod: GC | Performed by: STUDENT IN AN ORGANIZED HEALTH CARE EDUCATION/TRAINING PROGRAM

## 2023-06-19 PROCEDURE — 86708 HEPATITIS A ANTIBODY: CPT | Mod: 90 | Performed by: PATHOLOGY

## 2023-06-19 NOTE — RESULT ENCOUNTER NOTE
Dear Daniella  Your hepatitis A and INR test were normal.   Please call or MyChart my office with any questions or concerns.   Bobbi Joshi, PAC          
Quality 110: Preventive Care And Screening: Influenza Immunization: Influenza Immunization Administered during Influenza season
Quality 131: Pain Assessment And Follow-Up: Pain assessment using a standardized tool is documented as negative, no follow-up plan required
Quality 130: Documentation Of Current Medications In The Medical Record: Current Medications Documented
Detail Level: Detailed
Quality 431: Preventive Care And Screening: Unhealthy Alcohol Use - Screening: Patient screened for unhealthy alcohol use using a single question and scores less than 2 times per year
Quality 226: Preventive Care And Screening: Tobacco Use: Screening And Cessation Intervention: Patient screened for tobacco use and is an ex/non-smoker

## 2023-06-20 DIAGNOSIS — R51.9 FACIAL PAIN: ICD-10-CM

## 2023-06-20 DIAGNOSIS — R10.84 ABDOMINAL PAIN, GENERALIZED: Primary | ICD-10-CM

## 2023-06-20 DIAGNOSIS — R74.8 ELEVATED LIVER ENZYMES: ICD-10-CM

## 2023-06-21 RX ORDER — BACLOFEN 10 MG/1
TABLET ORAL
Qty: 40 TABLET | Refills: 0 | Status: SHIPPED | OUTPATIENT
Start: 2023-06-21 | End: 2023-07-07

## 2023-06-22 ENCOUNTER — PATIENT OUTREACH (OUTPATIENT)
Dept: CARE COORDINATION | Facility: CLINIC | Age: 43
End: 2023-06-22
Payer: COMMERCIAL

## 2023-06-28 ENCOUNTER — OFFICE VISIT (OUTPATIENT)
Dept: UROLOGY | Facility: CLINIC | Age: 43
End: 2023-06-28
Payer: COMMERCIAL

## 2023-06-28 VITALS — HEART RATE: 94 BPM | DIASTOLIC BLOOD PRESSURE: 87 MMHG | OXYGEN SATURATION: 94 % | SYSTOLIC BLOOD PRESSURE: 138 MMHG

## 2023-06-28 DIAGNOSIS — R31.29 MICROSCOPIC HEMATURIA: ICD-10-CM

## 2023-06-28 LAB
ALBUMIN UR-MCNC: NEGATIVE MG/DL
APPEARANCE UR: CLEAR
BILIRUB UR QL STRIP: NEGATIVE
COLOR UR AUTO: YELLOW
GLUCOSE UR STRIP-MCNC: NEGATIVE MG/DL
HGB UR QL STRIP: ABNORMAL
KETONES UR STRIP-MCNC: NEGATIVE MG/DL
LEUKOCYTE ESTERASE UR QL STRIP: NEGATIVE
NITRATE UR QL: NEGATIVE
PH UR STRIP: 7 [PH] (ref 5–7)
RBC #/AREA URNS AUTO: NORMAL /HPF
SP GR UR STRIP: 1.02 (ref 1–1.03)
UROBILINOGEN UR STRIP-ACNC: 0.2 E.U./DL
WBC #/AREA URNS AUTO: NORMAL /HPF

## 2023-06-28 PROCEDURE — 81001 URINALYSIS AUTO W/SCOPE: CPT | Performed by: UROLOGY

## 2023-06-28 PROCEDURE — 52000 CYSTOURETHROSCOPY: CPT | Performed by: UROLOGY

## 2023-06-28 PROCEDURE — 99205 OFFICE O/P NEW HI 60 MIN: CPT | Mod: 25 | Performed by: UROLOGY

## 2023-06-28 NOTE — PROGRESS NOTES
"Daniella Sexton is a 42 year old female seen in consultation for microhematuria. Consult from Santos Quintanilla.      Pt developed \"stomach pain\" about one month ago, underwent eval including UA, some microhematuria identified. Cause of abd pain not identified but it has subsequently resolved.    Pt has hx of nephrolithiasis x 2 in the past, both rx with \"laser,\" most recently 4 yrs ago, none since then.    Smokes half PPD, started smoking at age 13. Dx of COPD noted.      Denies dysuria, gross hematuria, frequency. Nocturia x 3.     Denies prior bladder eval, hx bladder surgery, use of bladder meds.    Hx 2 vag deliveries, tubal.     States that she has \"about 10\" BM's per day.    Drinks 1 coffee, one soda, 4 bottles of [place] per day. (Did not complete voiding diary).      Reviewed PMH in detail including anxiety, depression, duodenal ulcer, alchohol abuse, methamphetamine use, tobacco, migranes, back pain/sciatica, fatty liver, obesity, pulmonary emphysema, arthritis, abd pain, pulmonary nodules, COPD        Past Medical History:   Diagnosis Date     Alcohol abuse      Arthritis      C. 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)        Past Surgical History:   Procedure Laterality Date     BACK SURGERY  2005    L 3-4, L 4-5     LITHOTRIPSY  2016     OPTICAL TRACKING SYSTEM ENDOSCOPIC SINUS SURGERY Bilateral 1/10/2019    Procedure: BILATERAL IMAGE GUIDED ENDOSCOPIC SINUS SURGERY, BILATERAL TURBINATE REDUCTION;  Surgeon: Maikol Miguel MD;  Location: [facility] OR     RELEASE CARPAL TUNNEL Left      TONSILLECTOMY       TUBAL LIGATION         Social History     Socioeconomic History     Marital status:      Spouse name: Not on file     Number of children: Not on file     Years of education: Not on file     Highest education level: Not on file   Occupational History     Not on file   Tobacco Use     Smoking status: Every Day     Packs/day: 0.50     Types: Cigarettes     Passive exposure: Current "     Smokeless tobacco: Never     Tobacco comments:     5 cigarettes/day   Vaping Use     Vaping Use: Every day     Substances: THC   Substance and Sexual Activity     Alcohol use: Yes     Comment: 3-4 beers a night      Drug use: Yes     Types: Marijuana     Sexual activity: Yes     Partners: Male     Birth control/protection: Condom   Other Topics Concern     Parent/sibling w/ CABG, MI or angioplasty before 65F 55M? Not Asked   Social History Narrative     Not on file     Social Determinants of Health     Financial Resource Strain: Not on file   Food Insecurity: Not on file   Transportation Needs: Not on file   Physical Activity: Not on file   Stress: Not on file   Social Connections: Not on file   Intimate Partner Violence: Not on file   Housing Stability: Not on file       Current Outpatient Medications   Medication Sig Dispense Refill     albuterol (VENTOLIN HFA) 108 (90 Base) MCG/ACT inhaler Inhale 2 puffs into the lungs every 4 hours as needed for wheezing or shortness of breath 18 g 0     amitriptyline (ELAVIL) 50 MG tablet Take 1 tablet (50 mg) by mouth At Bedtime 90 tablet 3     baclofen (LIORESAL) 10 MG tablet TAKE 1 TABLET(10 MG) BY MOUTH THREE TIMES DAILY 40 tablet 0     cetirizine (ZYRTEC) 10 MG tablet Take 1 tablet (10 mg) by mouth daily 90 tablet 3     ciprofloxacin (CIPRO) 500 MG tablet Take 1 tablet (500 mg) by mouth 2 times daily 28 tablet 0     diclofenac (VOLTAREN) 75 MG EC tablet TAKE 1 TABLET(75 MG) BY MOUTH TWICE DAILY 60 tablet 3     fluticasone (FLONASE) 50 MCG/ACT nasal spray Spray 1 spray into both nostrils daily 16 g 1     fluticasone-salmeterol (ADVAIR) 100-50 MCG/ACT inhaler Inhale 1 puff into the lungs every 12 hours 60 each 5     gabapentin (NEURONTIN) 300 MG capsule Take 3 capsules (900 mg) by mouth 3 times daily Last refill from pain clinic. 270 capsule 0     ipratropium - albuterol 0.5 mg/2.5 mg/3 mL (DUONEB) 0.5-2.5 (3) MG/3ML neb solution Take 1 vial (3 mLs) by nebulization every  6 hours as needed for shortness of breath or wheezing 90 mL 1     predniSONE (DELTASONE) 10 MG tablet Take 1 tablet (10 mg) by mouth 2 times daily 10 tablet 0     pregabalin (LYRICA) 150 MG capsule TAKE 1 CAPSULE(150 MG) BY MOUTH TWICE DAILY 60 capsule 0     pregabalin (LYRICA) 150 MG capsule Take 1 capsule (150 mg) by mouth 3 times daily 90 capsule 0     rizatriptan (MAXALT) 10 MG tablet Take 1 tablet (10 mg) by mouth at onset of headache for migraine 10 tablet 2     terbinafine (LAMISIL) 1 % external cream Apply topically 2 times daily 30 g 0     triamcinolone (KENALOG) 0.1 % external cream Apply topically 2 times daily 80 g 1       Physical Exam:    GENL: NAD.    ABD: Soft, non-tender, no masses.    EG: Well-estrogenized, no masses.    VAGINA: Well-estrogenized, no masses.    BN HYPERMOBILITY: None.    CYSTOCELE: None.    APICAL PROLAPSE: None.    RECTOCELE: None.    BIMANUAL: No mass or tenderness.    Cysto:    (Informed consent obtained. Pause for cause performed)   Sterile prep.    17 Fr scope inserted through urethra. Systematic examination w 70 degree lens.   PVR: 5 cc   MUCOSA: Normal without lesion   ORIFICES: Normal location and morphology   Scope withdrawn without untoward effect.    (Pt tolerated procedure without difficulty).                    Today:  Results for orders placed or performed in visit on 06/28/23   UA reflex to Microscopic     Status: Abnormal   Result Value Ref Range    Color Urine Yellow Colorless, Straw, Light Yellow, Yellow    Appearance Urine Clear Clear    Glucose Urine Negative Negative mg/dL    Bilirubin Urine Negative Negative    Ketones Urine Negative Negative mg/dL    Specific Gravity Urine 1.020 1.003 - 1.035    Blood Urine Trace (A) Negative    pH Urine 7.0 5.0 - 7.0    Protein Albumin Urine Negative Negative mg/dL    Urobilinogen Urine 0.2 0.2, 1.0 E.U./dL    Nitrite Urine Negative Negative    Leukocyte Esterase Urine Negative Negative   Urine Microscopic Exam     Status:  Normal   Result Value Ref Range    RBC Urine 0-2 0-2 /HPF /HPF    WBC Urine 0-5 0-5 /HPF /HPF   .                  IMP:  1. Microhematuria, strong tobacco hx, positive stone hx  2. Other medical issues      PLAN:  1. Discussed situation with patient in detail.  2. CT urogram >> virtual visit to discuss  3. Counseled to consider tobacco cessation  4. Total time spent in reviewing patient records, discussing history, performing exam, discussing diagnosis, outlining treatment plan and documentation: 60 minutes

## 2023-06-28 NOTE — PATIENT INSTRUCTIONS
Please call [facility] to schedule a CT scan. [phone]/   Please call our office to arrange a virtual/telephone visit to discuss results once you have the CT scheduled, [phone].    Please contact your insurance company to make sure the CT scan is covered under your insurance plan.     Patient Education   Resources to Help You Quit Smoking  If you have quit smoking or are thinking about quitting, congratulations! It can be hard to quit smoking, but the benefits are well worth it. To help you quit and stay smoke-free, there are many resources that can help.  Your health plan  If you have health insurance, call them for more details about their phone coaching programs.  [facility]:  0-225-241-BLUE ([phone])  [facility]:  3-987-082-QUIT ([phone])  [facility]:  1-182-771-BLUE ([phone])  [facility]:  [phone]  [facility]:  [phone]  [facility] Association members:  [phone]  [facility] Plan:   [phone]  [facility]:  [phone]  [facility]:  [phone]  Other resources  American Cancer Society: 1-789.813.1446  The American Cancer Society can help you find local resources to quit smoking.  QUITPLAN: 2-502-669-PLAN (1-874.205.1750)  Offers a telephone helpline, gum, patches and lozenges. These services are free for the uninsured and those without coverage. The online program is free to everyone at www.quitplan.com.  American Lung Association: 8-624-LUNG-USA (1-446.977.1309)  Provides a lung helpline as well as an online program, self-help book and group clinic support for quitting smoking. www.lung.org/stop-smoking  National Cancer [place]:  8-142-921-QUIT (1-690.302.5704)  Offers a telephone hotline, online text chat and a website with tools, information and support for smokers who want to quit. www.smokefree.gov  Medication Therapy Management  (MTM):  082-403-2812  [phone] (toll free)  [email or web address]  This is a clinic program to help you quit smoking. It offers one-on-one sessions with a pharmacist. Call or email to find out if your insurance covers MTM and to schedule an appointment at all locations.  For informational purposes only. Not to replace the advice of your health care provider. Copyright   2013 [facility]. All rights reserved. Clinically reviewed by Aysha Simpson MD, [facility] Lung Cancer Screening Program. [employer] 369521 - Rev 06/19.

## 2023-07-03 ENCOUNTER — ANCILLARY PROCEDURE (OUTPATIENT)
Dept: CT IMAGING | Facility: CLINIC | Age: 43
End: 2023-07-03
Attending: UROLOGY
Payer: COMMERCIAL

## 2023-07-03 DIAGNOSIS — R31.29 MICROSCOPIC HEMATURIA: ICD-10-CM

## 2023-07-03 PROCEDURE — 74178 CT ABD&PLV WO CNTR FLWD CNTR: CPT | Mod: GC | Performed by: STUDENT IN AN ORGANIZED HEALTH CARE EDUCATION/TRAINING PROGRAM

## 2023-07-03 RX ORDER — IOPAMIDOL 755 MG/ML
122 INJECTION, SOLUTION INTRAVASCULAR ONCE
Status: COMPLETED | OUTPATIENT
Start: 2023-07-03 | End: 2023-07-03

## 2023-07-03 RX ADMIN — IOPAMIDOL 122 ML: 755 INJECTION, SOLUTION INTRAVASCULAR at 11:19

## 2023-07-03 NOTE — CONFIDENTIAL NOTE
DIAGNOSIS: Abdominal pain, generalized [R10.84]  Elevated liver enzymes    Appt Date: 08.16.2023   NOTES STATUS DETAILS   OFFICE NOTE from referring provider Internal 06.20.2023 Bobbi Joshi PA-C   OFFICE NOTES from other specialists     DISCHARGE SUMMARY from hospital     MEDICATION LIST Internal    LIVER BIOSPY (IF APPLICABLE)      PATHOLOGY REPORTS      IMAGING     ENDOSCOPY (IF AVAILABLE)     COLONOSCOPY (IF AVAILABLE)     ULTRASOUND LIVER     CT OF ABDOMEN     MRI OF LIVER     FIBROSCAN, US ELASTOGRAPHY, FIBROSIS SCAN, MR ELASTOGRAPHY Internal 06.19.2023 US Elastography    LABS     HEPATIC PANEL (LIVER PANEL) Internal 06.07.2023   BASIC METABOLIC PANEL Internal 06.07.2023   COMPLETE METABOLIC PANEL Internal 06.07.2023   COMPLETE BLOOD COUNT (CBC) Internal 06.07.2023   INTERNATIONAL NORMALIZED RATIO (INR)     HEPATITIS C ANTIBODY     HEPATITIS C VIRAL LOAD/PCR     HEPATITIS C GENOTYPE     HEPATITIS B SURFACE ANTIGEN     HEPATITIS B SURFACE ANTIBODY     HEPATITIS B DNA QUANT LEVEL     HEPATITIS B CORE ANTIBODY

## 2023-07-07 ENCOUNTER — OFFICE VISIT (OUTPATIENT)
Dept: FAMILY MEDICINE | Facility: CLINIC | Age: 43
End: 2023-07-07
Payer: COMMERCIAL

## 2023-07-07 VITALS
SYSTOLIC BLOOD PRESSURE: 130 MMHG | OXYGEN SATURATION: 97 % | TEMPERATURE: 98.6 F | HEART RATE: 88 BPM | WEIGHT: 260.8 LBS | DIASTOLIC BLOOD PRESSURE: 85 MMHG | BODY MASS INDEX: 40.24 KG/M2

## 2023-07-07 DIAGNOSIS — J43.9 PULMONARY EMPHYSEMA, UNSPECIFIED EMPHYSEMA TYPE (H): ICD-10-CM

## 2023-07-07 DIAGNOSIS — M54.42 CHRONIC BILATERAL LOW BACK PAIN WITH BILATERAL SCIATICA: ICD-10-CM

## 2023-07-07 DIAGNOSIS — G89.29 CHRONIC BILATERAL LOW BACK PAIN WITH BILATERAL SCIATICA: ICD-10-CM

## 2023-07-07 DIAGNOSIS — G89.29 CHRONIC BILATERAL LOW BACK PAIN WITHOUT SCIATICA: ICD-10-CM

## 2023-07-07 DIAGNOSIS — R09.82 POST-NASAL DRIP: ICD-10-CM

## 2023-07-07 DIAGNOSIS — R51.9 FACIAL PAIN: ICD-10-CM

## 2023-07-07 DIAGNOSIS — G43.909 MIGRAINE WITHOUT STATUS MIGRAINOSUS, NOT INTRACTABLE, UNSPECIFIED MIGRAINE TYPE: ICD-10-CM

## 2023-07-07 DIAGNOSIS — M54.16 LUMBAR RADICULOPATHY: ICD-10-CM

## 2023-07-07 DIAGNOSIS — M47.816 SPONDYLOSIS OF LUMBAR REGION WITHOUT MYELOPATHY OR RADICULOPATHY: ICD-10-CM

## 2023-07-07 DIAGNOSIS — M54.41 CHRONIC BILATERAL LOW BACK PAIN WITH BILATERAL SCIATICA: ICD-10-CM

## 2023-07-07 DIAGNOSIS — J30.2 SEASONAL ALLERGIC RHINITIS, UNSPECIFIED TRIGGER: ICD-10-CM

## 2023-07-07 DIAGNOSIS — M54.50 CHRONIC BILATERAL LOW BACK PAIN WITHOUT SCIATICA: ICD-10-CM

## 2023-07-07 DIAGNOSIS — J45.40 MODERATE PERSISTENT ASTHMA WITHOUT COMPLICATION: ICD-10-CM

## 2023-07-07 DIAGNOSIS — J45.20 MILD INTERMITTENT ASTHMA WITHOUT COMPLICATION: ICD-10-CM

## 2023-07-07 DIAGNOSIS — M79.18 MYOFASCIAL MUSCLE PAIN: ICD-10-CM

## 2023-07-07 DIAGNOSIS — R09.82 POST-NASAL DRIP: Primary | ICD-10-CM

## 2023-07-07 DIAGNOSIS — J32.4 CHRONIC PANSINUSITIS: ICD-10-CM

## 2023-07-07 PROCEDURE — 99214 OFFICE O/P EST MOD 30 MIN: CPT | Performed by: FAMILY MEDICINE

## 2023-07-07 RX ORDER — AMITRIPTYLINE HYDROCHLORIDE 50 MG/1
50 TABLET ORAL AT BEDTIME
Qty: 90 TABLET | Refills: 3 | Status: SHIPPED | OUTPATIENT
Start: 2023-07-07 | End: 2023-10-25

## 2023-07-07 RX ORDER — CETIRIZINE HYDROCHLORIDE 10 MG/1
10 TABLET ORAL DAILY
Qty: 90 TABLET | Refills: 3 | Status: SHIPPED | OUTPATIENT
Start: 2023-07-07 | End: 2023-10-25

## 2023-07-07 RX ORDER — RIZATRIPTAN BENZOATE 10 MG/1
10 TABLET ORAL
Qty: 10 TABLET | Refills: 3 | Status: SHIPPED | OUTPATIENT
Start: 2023-07-07 | End: 2023-10-25

## 2023-07-07 RX ORDER — PREGABALIN 150 MG/1
CAPSULE ORAL
Qty: 60 CAPSULE | Refills: 3 | Status: SHIPPED | OUTPATIENT
Start: 2023-07-07 | End: 2023-10-25

## 2023-07-07 RX ORDER — AZELASTINE 1 MG/ML
SPRAY, METERED NASAL
Qty: 90 ML | Refills: 0 | Status: SHIPPED | OUTPATIENT
Start: 2023-07-07 | End: 2023-10-25

## 2023-07-07 RX ORDER — FLUTICASONE PROPIONATE AND SALMETEROL 100; 50 UG/1; UG/1
1 POWDER RESPIRATORY (INHALATION) EVERY 12 HOURS
Qty: 60 EACH | Refills: 11 | Status: SHIPPED | OUTPATIENT
Start: 2023-07-07 | End: 2023-10-25

## 2023-07-07 RX ORDER — BACLOFEN 10 MG/1
10 TABLET ORAL 2 TIMES DAILY
Qty: 180 TABLET | Refills: 1 | Status: SHIPPED | OUTPATIENT
Start: 2023-07-07 | End: 2023-10-05

## 2023-07-07 RX ORDER — ALBUTEROL SULFATE 90 UG/1
2 AEROSOL, METERED RESPIRATORY (INHALATION) EVERY 4 HOURS PRN
Qty: 18 G | Refills: 11 | Status: SHIPPED | OUTPATIENT
Start: 2023-07-07 | End: 2023-10-25

## 2023-07-07 RX ORDER — FLUTICASONE PROPIONATE 50 MCG
1 SPRAY, SUSPENSION (ML) NASAL DAILY
Qty: 16 G | Refills: 11 | Status: SHIPPED | OUTPATIENT
Start: 2023-07-07 | End: 2023-10-25

## 2023-07-07 RX ORDER — DICLOFENAC SODIUM 75 MG/1
75 TABLET, DELAYED RELEASE ORAL 2 TIMES DAILY
Qty: 60 TABLET | Refills: 11 | Status: SHIPPED | OUTPATIENT
Start: 2023-07-07 | End: 2023-10-25

## 2023-07-07 RX ORDER — AZELASTINE 1 MG/ML
1 SPRAY, METERED NASAL 2 TIMES DAILY
Qty: 30 ML | Refills: 2 | Status: SHIPPED | OUTPATIENT
Start: 2023-07-07 | End: 2023-07-07

## 2023-07-07 NOTE — PROGRESS NOTES
"  Assessment & Plan       ICD-10-CM    1. Post-nasal drip  R09.82 DISCONTINUED: azelastine (ASTELIN) 0.1 % nasal spray      2. Mild intermittent asthma without complication  J45.20 albuterol (VENTOLIN HFA) 108 (90 Base) MCG/ACT inhaler      3. Pulmonary emphysema, unspecified emphysema type (H)  J43.9 albuterol (VENTOLIN HFA) 108 (90 Base) MCG/ACT inhaler     fluticasone-salmeterol (ADVAIR) 100-50 MCG/ACT inhaler      4. Spondylosis of lumbar region without myelopathy or radiculopathy  M47.816 amitriptyline (ELAVIL) 50 MG tablet     diclofenac (VOLTAREN) 75 MG EC tablet      5. Myofascial muscle pain  M79.18 amitriptyline (ELAVIL) 50 MG tablet      6. Lumbar radiculopathy  M54.16 amitriptyline (ELAVIL) 50 MG tablet      7. Facial pain  R51.9 baclofen (LIORESAL) 10 MG tablet      8. Chronic pansinusitis  J32.4 cetirizine (ZYRTEC) 10 MG tablet      9. Moderate persistent asthma without complication  J45.40 cetirizine (ZYRTEC) 10 MG tablet     fluticasone-salmeterol (ADVAIR) 100-50 MCG/ACT inhaler      10. Chronic bilateral low back pain without sciatica  M54.50     G89.29       11. Chronic bilateral low back pain with bilateral sciatica  M54.42 pregabalin (LYRICA) 150 MG capsule    M54.41     G89.29       12. Migraine without status migrainosus, not intractable, unspecified migraine type  G43.909 rizatriptan (MAXALT) 10 MG tablet      13. Seasonal allergic rhinitis, unspecified trigger  J30.2 fluticasone (FLONASE) 50 MCG/ACT nasal spray            Review of external notes as documented elsewhere in note         BMI:   Estimated body mass index is 40.24 kg/m  as calculated from the following:    Height as of 6/7/23: 1.715 m (5' 7.5\").    Weight as of this encounter: 118.3 kg (260 lb 12.8 oz).   Weight management plan: Discussed healthy diet and exercise guidelines    There are no Patient Instructions on file for this visit.    Roni Mobley MD  [facility] Subject[facility] Autumn is a 42 year " old, presenting for the following health issues:  Recheck Medication        7/7/2023     1:41 PM   Additional Questions   Roomed by Beth   Accompanied by andrews         7/7/2023     1:41 PM   Patient Reported Additional Medications   Patient reports taking the following new medications none     History of Present Illness       Reason for visit:  Meds    She eats 0-1 servings of fruits and vegetables daily.She consumes 1 sweetened beverage(s) daily.She exercises with enough effort to increase her heart rate 9 or less minutes per day.  She exercises with enough effort to increase her heart rate 3 or less days per week. She is missing 1 dose(s) of medications per week.  She is not taking prescribed medications regularly due to remembering to take.     eylid numbness  1 week  Right upper eyelid              Review of Systems   Constitutional, HEENT, cardiovascular, pulmonary, gi and gu systems are negative, except as otherwise noted.      Objective    /85   Pulse 88   Temp 98.6  F (37  C) (Oral)   Wt 118.3 kg (260 lb 12.8 oz)   LMP 05/24/2023 (Approximate)   SpO2 97%   BMI 40.24 kg/m    Body mass index is 40.24 kg/m .  Physical Exam  Vitals reviewed.   Constitutional:       General: She is not in acute distress.     Appearance: Normal appearance. She is well-developed.   HENT:      Head: Normocephalic and atraumatic.      Right Ear: External ear normal.      Left Ear: External ear normal.      Nose: Nose normal.   Eyes:      General: No scleral icterus.     Extraocular Movements: Extraocular movements intact.      Conjunctiva/sclera: Conjunctivae normal.   Cardiovascular:      Rate and Rhythm: Normal rate.   Pulmonary:      Effort: Pulmonary effort is normal.   Musculoskeletal:         General: No deformity. Normal range of motion.      Cervical back: Normal range of motion.   Skin:     General: Skin is warm and dry.      Findings: No rash.   Neurological:      Mental Status: She is alert and oriented to  person, place, and time. Mental status is at baseline.      Gait: Gait normal.   Psychiatric:         Behavior: Behavior normal.         Thought Content: Thought content normal.         Judgment: Judgment normal.

## 2023-07-12 ENCOUNTER — VIRTUAL VISIT (OUTPATIENT)
Dept: UROLOGY | Facility: CLINIC | Age: 43
End: 2023-07-12
Payer: COMMERCIAL

## 2023-07-12 DIAGNOSIS — N20.0 NEPHROLITHIASIS: Primary | ICD-10-CM

## 2023-07-12 PROCEDURE — 99213 OFFICE O/P EST LOW 20 MIN: CPT | Mod: VID | Performed by: UROLOGY

## 2023-07-12 NOTE — PATIENT INSTRUCTIONS
Patient Education   Resources to Help You Quit Smoking  If you have quit smoking or are thinking about quitting, congratulations! It can be hard to quit smoking, but the benefits are well worth it. To help you quit and stay smoke-free, there are many resources that can help.  Your health plan  If you have health insurance, call them for more details about their phone coaching programs.  [facility] [place]:  9-321-334-BLUE ([phone])  [facility]:  1-500-032-QUIT ([phone])  [facility]:  0-727-025-BLUE ([phone])  [facility]:  [phone]  [facility]:  [phone]  [facility] Association members:  [phone]  [facility]:   [phone]  [facility]:  [phone]  [facility]:  [phone]  Other resources  American Cancer Society: 1-981.530.7284  The American Cancer Society can help you find local resources to quit smoking.  QUITPLAN: 0-029-493-PLAN (1-733.418.3848)  Offers a telephone helpline, gum, patches and lozenges. These services are free for the uninsured and those without coverage. The online program is free to everyone at www.quitplan.com.  American Lung Association: 8-706-LUNG-USA (1-370.749.1199)  Provides a lung helpline as well as an online program, self-help book and group clinic support for quitting smoking. www.lung.org/stop-smoking  National Cancer [place]:  8-500-673-QUIT (1-351.514.5675)  Offers a telephone hotline, online text chat and a website with tools, information and support for smokers who want to quit. www.smokefree.gov  Medication Therapy Management (MTM):  [phone] (toll free)  [email or web address]  This is a clinic program to help you quit smoking. It offers one-on-one sessions with a pharmacist. Call or email to find out if your insurance covers MTM and to schedule an appointment at all locations.  For informational purposes only. Not to replace the  advice of your health care provider. Copyright   2013 [facility]. All rights reserved. Clinically reviewed by Aysha Simpson MD, [facility] Lung Cancer Screening Program. [employer] 772056 - Rev 06/19.

## 2023-07-12 NOTE — PROGRESS NOTES
F/u microhematuria, strong tobacco hx/COPD, positive stone hx      Pt feels fine. Denies dysuria, gross hematuria, frequency, recent stone passage. Last stone passage was several years ago.    Drinks about 6 [place] per day. Does get some salt from fast food and chips.      Reviewed PMH in detail including anxiety, depression, duodenal ulcer, alchohol abuse, methamphetamine use, tobacco, migranes, back pain/sciatica, fatty liver, obesity, pulmonary emphysema, arthritis, abd pain, pulmonary nodules, COPD        Reviewed recent CT results with pt in detail:    7/3/23: CT urogram:               IMP:  1. Bilateral, assymptomatic nephrolithiasis  2. COPD, other medical issues      PLAN:  1. Continue with fair bit of water, try to limit salt  2. Watch conservatively for now; will intervene only if pt becomes symptomatic  3. RTC only PRN  4. Total time spent in reviewing patient's previous records, discussion with patient and documentation: 20 minutes

## 2023-07-24 NOTE — TELEPHONE ENCOUNTER
Discharge when able  Office visit in 4 weeks Prior Authorization Retail Medication Request    Medication/Dose: beclomethasone HFA (QVAR REDIHALER) 80 MCG/ACT inhaler  ICD code (if different than what is on RX):  J45.20      Insurance Name:  [facility]  Insurance ID:  [id number]       Pharmacy Information (if different than what is on RX)  Name:  [facility]  Phone:  [phone]

## 2023-08-08 DIAGNOSIS — R79.89 ELEVATED FERRITIN: ICD-10-CM

## 2023-08-08 DIAGNOSIS — R74.8 ELEVATED SERUM GGT LEVEL: ICD-10-CM

## 2023-08-08 DIAGNOSIS — R74.8 ABNORMAL LIVER ENZYMES: Primary | ICD-10-CM

## 2023-08-09 ENCOUNTER — TELEPHONE (OUTPATIENT)
Dept: GASTROENTEROLOGY | Facility: CLINIC | Age: 43
End: 2023-08-09
Payer: COMMERCIAL

## 2023-08-09 NOTE — TELEPHONE ENCOUNTER
Spoke with pt about requested lab appt, pt will call to schedule prior to 8.16.23 appt, KB 8.9.23 //

## 2023-08-11 ENCOUNTER — LAB (OUTPATIENT)
Dept: LAB | Facility: CLINIC | Age: 43
End: 2023-08-11
Payer: COMMERCIAL

## 2023-08-11 DIAGNOSIS — R74.8 ELEVATED SERUM GGT LEVEL: ICD-10-CM

## 2023-08-11 DIAGNOSIS — R74.8 ELEVATED LIVER ENZYMES: ICD-10-CM

## 2023-08-11 DIAGNOSIS — R74.8 ABNORMAL LIVER ENZYMES: ICD-10-CM

## 2023-08-11 DIAGNOSIS — R79.89 ELEVATED FERRITIN: ICD-10-CM

## 2023-08-11 LAB
ALBUMIN SERPL BCG-MCNC: 4.5 G/DL (ref 3.5–5.2)
ALP SERPL-CCNC: 107 U/L (ref 35–104)
ALT SERPL W P-5'-P-CCNC: 32 U/L (ref 0–50)
ANION GAP SERPL CALCULATED.3IONS-SCNC: 12 MMOL/L (ref 7–15)
AST SERPL W P-5'-P-CCNC: 43 U/L (ref 0–45)
BILIRUB DIRECT SERPL-MCNC: <0.2 MG/DL (ref 0–0.3)
BILIRUB SERPL-MCNC: 0.3 MG/DL
BUN SERPL-MCNC: 13.9 MG/DL (ref 6–20)
CALCIUM SERPL-MCNC: 9.5 MG/DL (ref 8.6–10)
CHLORIDE SERPL-SCNC: 102 MMOL/L (ref 98–107)
CREAT SERPL-MCNC: 0.64 MG/DL (ref 0.51–0.95)
DEPRECATED HCO3 PLAS-SCNC: 23 MMOL/L (ref 22–29)
ERYTHROCYTE [DISTWIDTH] IN BLOOD BY AUTOMATED COUNT: 12.6 % (ref 10–15)
GFR SERPL CREATININE-BSD FRML MDRD: >90 ML/MIN/1.73M2
GLUCOSE SERPL-MCNC: 93 MG/DL (ref 70–99)
HCT VFR BLD AUTO: 38.9 % (ref 35–47)
HGB BLD-MCNC: 13.2 G/DL (ref 11.7–15.7)
INR PPP: 0.98 (ref 0.85–1.15)
MCH RBC QN AUTO: 30.9 PG (ref 26.5–33)
MCHC RBC AUTO-ENTMCNC: 33.9 G/DL (ref 31.5–36.5)
MCV RBC AUTO: 91 FL (ref 78–100)
PLATELET # BLD AUTO: 300 10E3/UL (ref 150–450)
POTASSIUM SERPL-SCNC: 3.9 MMOL/L (ref 3.4–5.3)
PROT SERPL-MCNC: 7.1 G/DL (ref 6.4–8.3)
RBC # BLD AUTO: 4.27 10E6/UL (ref 3.8–5.2)
SODIUM SERPL-SCNC: 137 MMOL/L (ref 136–145)
WBC # BLD AUTO: 11 10E3/UL (ref 4–11)

## 2023-08-11 PROCEDURE — 85610 PROTHROMBIN TIME: CPT

## 2023-08-11 PROCEDURE — 80053 COMPREHEN METABOLIC PANEL: CPT

## 2023-08-11 PROCEDURE — 36415 COLL VENOUS BLD VENIPUNCTURE: CPT

## 2023-08-11 PROCEDURE — 86704 HEP B CORE ANTIBODY TOTAL: CPT

## 2023-08-11 PROCEDURE — 85027 COMPLETE CBC AUTOMATED: CPT

## 2023-08-11 PROCEDURE — 83036 HEMOGLOBIN GLYCOSYLATED A1C: CPT

## 2023-08-11 PROCEDURE — 82248 BILIRUBIN DIRECT: CPT

## 2023-08-14 LAB — HBV CORE AB SERPL QL IA: NONREACTIVE

## 2023-08-16 ENCOUNTER — VIRTUAL VISIT (OUTPATIENT)
Dept: GASTROENTEROLOGY | Facility: CLINIC | Age: 43
End: 2023-08-16
Attending: PHYSICIAN ASSISTANT
Payer: COMMERCIAL

## 2023-08-16 ENCOUNTER — PRE VISIT (OUTPATIENT)
Dept: GASTROENTEROLOGY | Facility: CLINIC | Age: 43
End: 2023-08-16
Payer: COMMERCIAL

## 2023-08-16 VITALS — HEIGHT: 67 IN | BODY MASS INDEX: 40.02 KG/M2 | WEIGHT: 255 LBS

## 2023-08-16 DIAGNOSIS — R10.84 ABDOMINAL PAIN, GENERALIZED: ICD-10-CM

## 2023-08-16 DIAGNOSIS — K76.0 HEPATIC STEATOSIS: ICD-10-CM

## 2023-08-16 DIAGNOSIS — R74.8 ELEVATED LIVER ENZYMES: ICD-10-CM

## 2023-08-16 DIAGNOSIS — E66.01 MORBID OBESITY (H): ICD-10-CM

## 2023-08-16 DIAGNOSIS — R73.03 PREDIABETES: ICD-10-CM

## 2023-08-16 PROCEDURE — 99204 OFFICE O/P NEW MOD 45 MIN: CPT | Mod: VID | Performed by: PHYSICIAN ASSISTANT

## 2023-08-16 ASSESSMENT — PAIN SCALES - GENERAL: PAINLEVEL: NO PAIN (0)

## 2023-08-16 NOTE — LETTER
8/16/2023         RE: Daniella Sxeton  [address]  [place] [address]        Dear Colleague,    Thank you for referring your patient, Daniella Sexton, to the [facility] [place]. Please see a copy of my visit note below.    Hepatology Clinic note  Daniella Sexton   Date of Birth 1980  Date of Service 8/16/2023    REASON FOR CONSULTATION: Elevated LFTS  REFERRING PROVIDER:Bobbi Joshi PA-C          Assessment/plan:   Daniella Sexton is a 43 year old female with history of elevated ALT/AST and incidental imaging findings of hepatic steatosis.  Elevated LFTs noted at the time of emergency room visit for diffuse abdominal pain, that has since resolved.  Unclear cause of elevations, differential includes viral gastroenteritis.  Transaminases have now come back down to baseline.     Risk factors for fatty liver disease includes: obesity, moderate alcohol use (nine drinks a week). Transaminases have been historically normal. Liver function also normal without stigmata of advanced liver disease. US elastography noting normal fibrosis levels, which is reassuring.     #Metabolic associated fatty liver disease (MALFD):   - Patient has few risk factors making her lower on-going fibrosis. We discussed the pathophysiology and natural history of nonalcoholic fatty liver disease   - Recommend FIB-4 yearly with PCP. FIB-4 Calculation: 1.09   - Recommend slow gradual weight loss.   - Maintain good control of cholesterol (No contraindication to starting a statin with LFT elevations)   - Optimize blood glucose as needed. Could consider GLP-1 inhibitor for both insulin resistance and help with weight loss  - Improve nutrition: continue limiting carbohydrates, especially simple carbohydrates, and following Mediterranean eating patten  - Nutrition referral per preference  - Increase physical activity as able  - Limit alcohol intake to not more than 3-7 drinks a week    #Abdominal pain,  generalized:   - Improved    # Will also rule out genetic and autoimmune causes of hepatobiliary disease.   Labs: iron panel, alpha-1-antitrypsin deficiency, YEHUDA, F-actin, TTG/IgA     #Consider/recommend follow up with non-invasive elastography or FibroScan in 5 years     - Follow up with Hepatology as needed if above work-up abnormal     Vandana Galarza PA-C   [facility]at[facility]     -----------------------------------------------------       HPI:   Daniella Sexton is a 43 year old female  presenting for the evaluation of elevated LFT's.     Patient was found to have mildly elevated transaminases at the time of ED visit for diffuse abdominal pain.  At that time her ALT was 64,  and alk phos 136.  Abdominal pain started and got progressively worse over few days and eventually led to ED visit.  Pain resolved slowly.Could only lay down with a heating pad at the time of pain. This the first time had pain like that.     Repeat labs on 8/11/2023 showing alk phos 107, ALT 32 and AST 43    Appetite is cut down during acute pain.  No particular diet but she has cut down on sodium due to kidney stones. Typically numerous bowel movements a day.  No changes with bowel movements at the time of acute pain    Patient denies jaundice, lower extremity edema, abdominal distension or confusion.  Patient also denies melena, hematochezia or hematemesis.    Patient denies weight loss, fevers, sweats or chills.    PMH:    has a past medical history of Alcohol abuse, Arthritis, C. 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     SMH:    has a past surgical history that includes back surgery (2005); tubal ligation; tonsillectomy; Release carpal tunnel (Left); lithotripsy (2016); and Optical tracking system endoscopic sinus surgery (Bilateral, 1/10/2019).     Medications:   albuterol  amitriptyline  azelastine  baclofen  cetirizine  diclofenac  fluticasone  fluticasone-salmeterol  ipratropium - albuterol  0.5 mg/2.5 mg/3 mL  pregabalin  rizatriptan     Currently smoke 10 cigarettes. 3 days a week, may have two of mixed drinks (Captain winnie/Coke) at which time each drink has 1.5 shots each. No previous IV/IN drug use.  Work for computers. Patient currently lives with daughter and her boyfriend. No known family history of liver disease or liver cancer.     Previous work-up:   Lab Results   Component Value Date    HEPBANG Nonreactive 06/07/2023    HBCAB Nonreactive 08/11/2023    AUSAB 8.54 06/07/2023    HCVAB Nonreactive 06/07/2023    VIKRAM 281 (H) 06/07/2023    IRONSAT 11 (L) 06/07/2023    TSH 1.43 01/19/2023    CHOL 249 (H) 07/08/2022    HDL 68 07/08/2022     (H) 07/08/2022    TRIG 202 (H) 07/08/2022    A1C 5.4 05/08/2019      No results found for: SPECDES, LDRESULTS     Recent Labs   Lab Test 08/11/23  1638 06/07/23  1545 07/08/22  1438   ALKPHOS 107* 136* 108   ALT 32 64* 37   AST 43 104* 26             Allergies:     Allergies   Allergen Reactions    Amoxicillin Anaphylaxis    Morphine Other (See Comments) and Nausea and Vomiting    Penicillins Unknown            Social History:     Social History     Socioeconomic History    Marital status:      Spouse name: Not on file    Number of children: Not on file    Years of education: Not on file    Highest education level: Not on file   Occupational History    Not on file   Tobacco Use    Smoking status: Every Day     Packs/day: 0.50     Types: Cigarettes     Passive exposure: Current    Smokeless tobacco: Never    Tobacco comments:     5 cigarettes/day   Vaping Use    Vaping Use: Every day    Substances: THC   Substance and Sexual Activity    Alcohol use: Yes     Comment: 3-4 beers a night     Drug use: Yes     Types: Marijuana    Sexual activity: Yes     Partners: Male     Birth control/protection: Condom   Other Topics Concern    Parent/sibling w/ CABG, MI or angioplasty before 65F 55M? Not Asked   Social History Narrative    Not on file     Social  Determinants of Health     Financial Resource Strain: Not on file   Food Insecurity: Not on file   Transportation Needs: Not on file   Physical Activity: Not on file   Stress: Not on file   Social Connections: Not on file   Intimate Partner Violence: Not on file   Housing Stability: Not on file            Family History:     Family History   Problem Relation Age of Onset    No Known Problems Mother     Unknown/Adopted Father     Heart Disease No family hx of     Diabetes No family hx of     Cancer No family hx of     Colon Cancer No family hx of             Review of Systems:   Gen: See HPI     HEENT: No change in vision or hearing, mouth sores, dysphagia, lymph nodes  Resp: No shortness of breath, coughing, hx of asthma  CV: No chest pain, palpitations, syncope   GI: See HPI  : No dysuria, history of stones, urine color    Skin: No rash; no pruritus or psoriasis  MS: No arthralgias, myalgias, joint swelling  Neuro: No memory changes, confusion, numbness    Heme: No difficulty clotting, bruising, bleeding  Psych:  No anxiety, depression, agitation          Physical Exam:   GENERAL: healthy, alert and no distress  EYES: Eyes grossly normal to inspection, conjunctivae and sclerae normal  RESP: no audible wheeze, cough, or visible cyanosis.  No visible retractions or increased work of breathing.  Able to speak fully in complete sentences  NEURO: Cranial nerves grossly intact, mentation intact and speech normal  PSYCH: mentation appears normal, affect normal/bright, judgement and insight intact, normal speech and appearance well-groomed         Data:   Reviewed in person and significant for:    Lab Results   Component Value Date     08/11/2023      Lab Results   Component Value Date    POTASSIUM 3.9 08/11/2023     Lab Results   Component Value Date    CHLORIDE 102 08/11/2023     Lab Results   Component Value Date    CO2 23 08/11/2023     Lab Results   Component Value Date    BUN 13.9 08/11/2023     Lab Results    Component Value Date    CR 0.64 08/11/2023       Lab Results   Component Value Date    WBC 11.0 08/11/2023    WBC 11.8 03/18/2020     Lab Results   Component Value Date    HGB 13.2 08/11/2023    HGB 13.4 03/18/2020     Lab Results   Component Value Date    HCT 38.9 08/11/2023    HCT 41.6 03/18/2020     Lab Results   Component Value Date    MCV 91 08/11/2023    MCV 95 03/18/2020     Lab Results   Component Value Date     08/11/2023     03/18/2020       Lab Results   Component Value Date    AST 43 08/11/2023     Lab Results   Component Value Date    ALT 32 08/11/2023     No results found for: BILICONJ   Lab Results   Component Value Date    BILITOTAL 0.3 08/11/2023       Lab Results   Component Value Date    ALBUMIN 4.5 08/11/2023    ALBUMIN 4.0 07/08/2022     Lab Results   Component Value Date    PROTTOTAL 7.1 08/11/2023      Lab Results   Component Value Date    ALKPHOS 107 08/11/2023       Lab Results   Component Value Date    INR 0.98 08/11/2023         Imaging:        XAMINATION: US ABDOMEN COMPLETE,  6/19/2023 6:38 AM      COMPARISON: Renal ultrasound 6/8/2023     HISTORY: Elevated liver enzymes     TECHNIQUE: The abdomen was scanned in standard fashion with  specialized ultrasound transducer(s) using both gray-scale and limited  color Doppler techniques.     Ultrasound elastography of the liver was performed using a [employer]agan  ultrasound machine using 10 separate measurements of the liver  parenchyma with patient in supine position. Measurements were obtained  approximately 2 cm beneath Mendoza's capsule, perpendicular to the  liver capsule. Images are of satisfactory quality.     FINDINGS:  Liver: The liver demonstrates diffusely increased echogenicity.  Geometric hypoechogenicity in the right hepatic lobe likely represents  focal fatty sparing. The liver measures 20.9 cm. The main portal vein  is patent with antegrade flow, measuring 1.0 cm.     Gallbladder:  There is no wall thickening,  pericholecystic fluid,  positive sonographic Snyder's sign or evidence for cholelithiasis.     Bile Ducts: Both the intra- and extrahepatic biliary system are of  normal caliber.  The common bile duct measures 3 mm in diameter.     Pancreas: Visualized portions of the head and body of the pancreas are  unremarkable. Pancreas is partially obscured.     Kidneys: Kidneys are not well seen. Grossly no hydronephrosis. The  craniocaudal dimensions are: right- 13.1 cm, left- 11.4 cm.     Spleen: The spleen is normal in size,  measuring 10.2 cm in sagittal  dimension.     Aorta and IVC: The visualized portions of the aorta and IVC are  unremarkable. The proximal aorta measures 2.0 cm in diameter.     Fluid: No evidence of ascites or pleural effusions.     Elastography:     The median liver stiffness is: 1.09 m/sec  The mean liver stiffness is: 1.09 m/sec  The interquartile range is: 0.09  IQR/median: 0.08.  (IQR/median value of greater than 0.15 indicates  that a dataset is unreliable)                                                                      IMPRESSION:     1. The median liver stiffness is 1.09 m/sec and the IQR/median value  is 0.08 . This is a normal elastography value with low likelihood of  severe fibrosis or cirrhosis.  2. Increased echogenicity of the hepatic parenchyma which may  represent parenchymal liver disease including hepatic steatosis.  Geographic area of hypoechogenicity seen along the right lobe of  liver, favored to represent focal fatty sparing. May consider  attention on follow-up.            Again, thank you for allowing me to participate in the care of your patient.        Sincerely,        Vandana Galarza PA-C

## 2023-08-16 NOTE — PROGRESS NOTES
Virtual Visit Details    Type of service:  Video Visit   Video Start Time:   Joined the call at 8/16/2023, 2:00:48?pm.  Left the call at 8/16/2023, 2:17:21?pm.    Originating Location (pt. Location): Home  Distant Location (provider location):  Off-site  Platform used for Video Visit: [facility]    Hepatology Clinic note  Daniella Sexton   Date of Birth 1980  Date of Service 8/16/2023    REASON FOR CONSULTATION: Elevated LFTS  REFERRING PROVIDER:Bobbi Joshi PA-C          Assessment/plan:   Daniella Sexton is a 43 year old female with history of elevated ALT/AST and incidental imaging findings of hepatic steatosis.  Elevated LFTs noted at the time of emergency room visit for diffuse abdominal pain, that has since resolved.  Unclear cause of elevations, differential includes viral gastroenteritis.  Transaminases have now come back down to baseline.     Risk factors for fatty liver disease includes: obesity, moderate alcohol use (nine drinks a week). Transaminases have been historically normal. Liver function also normal without stigmata of advanced liver disease. US elastography noting normal fibrosis levels, which is reassuring.     #Metabolic associated fatty liver disease (MALFD):   - Patient has few risk factors making her lower on-going fibrosis. We discussed the pathophysiology and natural history of nonalcoholic fatty liver disease   - Recommend FIB-4 yearly with PCP. FIB-4 Calculation: 1.09   - Recommend slow gradual weight loss.   - Maintain good control of cholesterol (No contraindication to starting a statin with LFT elevations)   - Optimize blood glucose as needed. Could consider GLP-1 inhibitor for both insulin resistance and help with weight loss  - Improve nutrition: continue limiting carbohydrates, especially simple carbohydrates, and following Mediterranean eating patten  - Nutrition referral per preference  - Increase physical activity as able  - Limit alcohol intake to not more than 3-7  drinks a week    #Abdominal pain, generalized:   - Improved    # Will also rule out genetic and autoimmune causes of hepatobiliary disease.   Labs: iron panel, alpha-1-antitrypsin deficiency, YEHUDA, F-actin, TTG/IgA     #Consider/recommend follow up with non-invasive elastography or FibroScan in 5 years     - Follow up with Hepatology as needed if above work-up abnormal     Vandana Galarza PA-C   [facility]     -----------------------------------------------------       HPI:   Daniella Sexton is a 43 year old female  presenting for the evaluation of elevated LFT's.     Patient was found to have mildly elevated transaminases at the time of ED visit for diffuse abdominal pain.  At that time her ALT was 64,  and alk phos 136.  Abdominal pain started and got progressively worse over few days and eventually led to ED visit.  Pain resolved slowly.Could only lay down with a heating pad at the time of pain. This the first time had pain like that.     Repeat labs on 8/11/2023 showing alk phos 107, ALT 32 and AST 43    Appetite is cut down during acute pain.  No particular diet but she has cut down on sodium due to kidney stones. Typically numerous bowel movements a day.  No changes with bowel movements at the time of acute pain    Patient denies jaundice, lower extremity edema, abdominal distension or confusion.  Patient also denies melena, hematochezia or hematemesis.    Patient denies weight loss, fevers, sweats or chills.    PMH:    has a past medical history of Alcohol abuse, Arthritis, C. 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     SMH:    has a past surgical history that includes back surgery (2005); tubal ligation; tonsillectomy; Release carpal tunnel (Left); lithotripsy (2016); and Optical tracking system endoscopic sinus surgery (Bilateral, 1/10/2019).     Medications:    albuterol  amitriptyline  azelastine  baclofen  cetirizine  diclofenac  fluticasone  fluticasone-salmeterol  ipratropium - albuterol 0.5 mg/2.5 mg/3 mL  pregabalin  rizatriptan     Currently smoke 10 cigarettes. 3 days a week, may have two of mixed drinks (Captain winnie/Coke) at which time each drink has 1.5 shots each. No previous IV/IN drug use.  Work for computers. Patient currently lives with daughter and her boyfriend. No known family history of liver disease or liver cancer.     Previous work-up:   Lab Results   Component Value Date    HEPBANG Nonreactive 06/07/2023    HBCAB Nonreactive 08/11/2023    AUSAB 8.54 06/07/2023    HCVAB Nonreactive 06/07/2023    VIKRAM 281 (H) 06/07/2023    IRONSAT 11 (L) 06/07/2023    TSH 1.43 01/19/2023    CHOL 249 (H) 07/08/2022    HDL 68 07/08/2022     (H) 07/08/2022    TRIG 202 (H) 07/08/2022    A1C 5.4 05/08/2019      No results found for: SPECDES, LDRESULTS     Recent Labs   Lab Test 08/11/23  1638 06/07/23  1545 07/08/22  1438   ALKPHOS 107* 136* 108   ALT 32 64* 37   AST 43 104* 26             Allergies:     Allergies   Allergen Reactions    Amoxicillin Anaphylaxis    Morphine Other (See Comments) and Nausea and Vomiting    Penicillins Unknown            Social History:     Social History     Socioeconomic History    Marital status:      Spouse name: Not on file    Number of children: Not on file    Years of education: Not on file    Highest education level: Not on file   Occupational History    Not on file   Tobacco Use    Smoking status: Every Day     Packs/day: 0.50     Types: Cigarettes     Passive exposure: Current    Smokeless tobacco: Never    Tobacco comments:     5 cigarettes/day   Vaping Use    Vaping Use: Every day    Substances: THC   Substance and Sexual Activity    Alcohol use: Yes     Comment: 3-4 beers a night     Drug use: Yes     Types: Marijuana    Sexual activity: Yes     Partners: Male     Birth control/protection: Condom   Other Topics  Concern    Parent/sibling w/ CABG, MI or angioplasty before 65F 55M? Not Asked   Social History Narrative    Not on file     Social Determinants of Health     Financial Resource Strain: Not on file   Food Insecurity: Not on file   Transportation Needs: Not on file   Physical Activity: Not on file   Stress: Not on file   Social Connections: Not on file   Intimate Partner Violence: Not on file   Housing Stability: Not on file            Family History:     Family History   Problem Relation Age of Onset    No Known Problems Mother     Unknown/Adopted Father     Heart Disease No family hx of     Diabetes No family hx of     Cancer No family hx of     Colon Cancer No family hx of             Review of Systems:   Gen: See HPI     HEENT: No change in vision or hearing, mouth sores, dysphagia, lymph nodes  Resp: No shortness of breath, coughing, hx of asthma  CV: No chest pain, palpitations, syncope   GI: See HPI  : No dysuria, history of stones, urine color    Skin: No rash; no pruritus or psoriasis  MS: No arthralgias, myalgias, joint swelling  Neuro: No memory changes, confusion, numbness    Heme: No difficulty clotting, bruising, bleeding  Psych:  No anxiety, depression, agitation          Physical Exam:   GENERAL: healthy, alert and no distress  EYES: Eyes grossly normal to inspection, conjunctivae and sclerae normal  RESP: no audible wheeze, cough, or visible cyanosis.  No visible retractions or increased work of breathing.  Able to speak fully in complete sentences  NEURO: Cranial nerves grossly intact, mentation intact and speech normal  PSYCH: mentation appears normal, affect normal/bright, judgement and insight intact, normal speech and appearance well-groomed         Data:   Reviewed in person and significant for:    Lab Results   Component Value Date     08/11/2023      Lab Results   Component Value Date    POTASSIUM 3.9 08/11/2023     Lab Results   Component Value Date    CHLORIDE 102 08/11/2023     Lab  Results   Component Value Date    CO2 23 08/11/2023     Lab Results   Component Value Date    BUN 13.9 08/11/2023     Lab Results   Component Value Date    CR 0.64 08/11/2023       Lab Results   Component Value Date    WBC 11.0 08/11/2023    WBC 11.8 03/18/2020     Lab Results   Component Value Date    HGB 13.2 08/11/2023    HGB 13.4 03/18/2020     Lab Results   Component Value Date    HCT 38.9 08/11/2023    HCT 41.6 03/18/2020     Lab Results   Component Value Date    MCV 91 08/11/2023    MCV 95 03/18/2020     Lab Results   Component Value Date     08/11/2023     03/18/2020       Lab Results   Component Value Date    AST 43 08/11/2023     Lab Results   Component Value Date    ALT 32 08/11/2023     No results found for: BILICONJ   Lab Results   Component Value Date    BILITOTAL 0.3 08/11/2023       Lab Results   Component Value Date    ALBUMIN 4.5 08/11/2023    ALBUMIN 4.0 07/08/2022     Lab Results   Component Value Date    PROTTOTAL 7.1 08/11/2023      Lab Results   Component Value Date    ALKPHOS 107 08/11/2023       Lab Results   Component Value Date    INR 0.98 08/11/2023         Imaging:        XAMINATION: US ABDOMEN COMPLETE,  6/19/2023 6:38 AM      COMPARISON: Renal ultrasound 6/8/2023     HISTORY: Elevated liver enzymes     TECHNIQUE: The abdomen was scanned in standard fashion with  specialized ultrasound transducer(s) using both gray-scale and limited  color Doppler techniques.     Ultrasound elastography of the liver was performed using a Barragan  ultrasound machine using 10 separate measurements of the liver  parenchyma with patient in supine position. Measurements were obtained  approximately 2 cm beneath Mendoza's capsule, perpendicular to the  liver capsule. Images are of satisfactory quality.     FINDINGS:  Liver: The liver demonstrates diffusely increased echogenicity.  Geometric hypoechogenicity in the right hepatic lobe likely represents  focal fatty sparing. The liver measures  20.9 cm. The main portal vein  is patent with antegrade flow, measuring 1.0 cm.     Gallbladder:  There is no wall thickening, pericholecystic fluid,  positive sonographic Snyder's sign or evidence for cholelithiasis.     Bile Ducts: Both the intra- and extrahepatic biliary system are of  normal caliber.  The common bile duct measures 3 mm in diameter.     Pancreas: Visualized portions of the head and body of the pancreas are  unremarkable. Pancreas is partially obscured.     Kidneys: Kidneys are not well seen. Grossly no hydronephrosis. The  craniocaudal dimensions are: right- 13.1 cm, left- 11.4 cm.     Spleen: The spleen is normal in size,  measuring 10.2 cm in sagittal  dimension.     Aorta and IVC: The visualized portions of the aorta and IVC are  unremarkable. The proximal aorta measures 2.0 cm in diameter.     Fluid: No evidence of ascites or pleural effusions.     Elastography:     The median liver stiffness is: 1.09 m/sec  The mean liver stiffness is: 1.09 m/sec  The interquartile range is: 0.09  IQR/median: 0.08.  (IQR/median value of greater than 0.15 indicates  that a dataset is unreliable)                                                                      IMPRESSION:     1. The median liver stiffness is 1.09 m/sec and the IQR/median value  is 0.08 . This is a normal elastography value with low likelihood of  severe fibrosis or cirrhosis.  2. Increased echogenicity of the hepatic parenchyma which may  represent parenchymal liver disease including hepatic steatosis.  Geographic area of hypoechogenicity seen along the right lobe of  liver, favored to represent focal fatty sparing. May consider  attention on follow-up.

## 2023-08-16 NOTE — NURSING NOTE
Is the patient currently in the state of [place]? YES    Visit mode:VIDEO    If the visit is dropped, the patient can be reconnected by: VIDEO VISIT: Text to cell phone: [phone]    Will anyone else be joining the visit? NO      How would you like to obtain your AVS? MyChart    Are changes needed to the allergy or medication list? NO    Reason for visit: Consult

## 2023-08-17 LAB — HBA1C MFR BLD: 5.8 % (ref 0–5.6)

## 2023-08-21 ENCOUNTER — TELEPHONE (OUTPATIENT)
Dept: GASTROENTEROLOGY | Facility: CLINIC | Age: 43
End: 2023-08-21
Payer: COMMERCIAL

## 2023-09-17 ENCOUNTER — HEALTH MAINTENANCE LETTER (OUTPATIENT)
Age: 43
End: 2023-09-17

## 2023-09-19 ENCOUNTER — NURSE TRIAGE (OUTPATIENT)
Dept: FAMILY MEDICINE | Facility: CLINIC | Age: 43
End: 2023-09-19

## 2023-09-19 ENCOUNTER — OFFICE VISIT (OUTPATIENT)
Dept: FAMILY MEDICINE | Facility: CLINIC | Age: 43
End: 2023-09-19
Payer: COMMERCIAL

## 2023-09-19 VITALS
BODY MASS INDEX: 38.83 KG/M2 | HEIGHT: 68 IN | DIASTOLIC BLOOD PRESSURE: 76 MMHG | SYSTOLIC BLOOD PRESSURE: 112 MMHG | WEIGHT: 256.2 LBS | OXYGEN SATURATION: 98 % | TEMPERATURE: 97 F | HEART RATE: 99 BPM | RESPIRATION RATE: 22 BRPM

## 2023-09-19 DIAGNOSIS — R20.2 PARESTHESIA OF BOTH HANDS: ICD-10-CM

## 2023-09-19 DIAGNOSIS — Z23 ENCOUNTER FOR VACCINATION: Primary | ICD-10-CM

## 2023-09-19 DIAGNOSIS — G56.23 CUBITAL TUNNEL SYNDROME, BILATERAL: ICD-10-CM

## 2023-09-19 DIAGNOSIS — G56.03 BILATERAL CARPAL TUNNEL SYNDROME: ICD-10-CM

## 2023-09-19 PROCEDURE — 90472 IMMUNIZATION ADMIN EACH ADD: CPT | Performed by: PHYSICIAN ASSISTANT

## 2023-09-19 PROCEDURE — 90471 IMMUNIZATION ADMIN: CPT | Performed by: PHYSICIAN ASSISTANT

## 2023-09-19 PROCEDURE — 99214 OFFICE O/P EST MOD 30 MIN: CPT | Mod: 25 | Performed by: PHYSICIAN ASSISTANT

## 2023-09-19 PROCEDURE — 90746 HEPB VACCINE 3 DOSE ADULT IM: CPT | Performed by: PHYSICIAN ASSISTANT

## 2023-09-19 PROCEDURE — 90686 IIV4 VACC NO PRSV 0.5 ML IM: CPT | Performed by: PHYSICIAN ASSISTANT

## 2023-09-19 ASSESSMENT — ENCOUNTER SYMPTOMS
PARESTHESIAS: 1
WEAKNESS: 0
NUMBNESS: 1

## 2023-09-19 ASSESSMENT — PAIN SCALES - GENERAL: PAINLEVEL: MODERATE PAIN (5)

## 2023-09-19 NOTE — TELEPHONE ENCOUNTER
Reason for Call:  Appointment Request    Patient requesting this type of appt:  hands and arms keep going numb     Requested provider: Santos Quintanilla    Reason patient unable to be scheduled: Not within requested timeframe    When does patient want to be seen/preferred time: Same day    Comments: patient would like to be seen today.     [facility] or would you prefer to receive a phone call?:   Patient would prefer a phone call   Okay to leave a detailed message?: Yes at Cell number on file:    Telephone Information:   Mobile [phone]       Call taken on 9/19/2023 at 8:01 AM by Veda Walker

## 2023-09-19 NOTE — TELEPHONE ENCOUNTER
Routing to triage; provider is completely booked this week; unable to add on. Provider will also be out of the office until 10/9.     Please triage and advise. If needs to be seen will need to be with another provider.    Adelina Emerson MA

## 2023-09-19 NOTE — TELEPHONE ENCOUNTER
Spoke with pt. Both hands have been going numb for a couple months. Numbness is in her hands and and forearms. This am couldn't stop right hand from getting numb for 3 hours. 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 Sometime the pain is so bad it will wake her up in the middle of the night. It will come out of nowhere. Right hand is worse. Can grasp objects. Numbness and tingling is starting to go away now. Had carpal tunnel surgery in left wrist 14-15 years ago. States her symptoms feel worse than this. Hand never used to be tingling. Has tried a brace with no relief. Has tried ice with no relief. Not sure if related, but her ears have been ringing off and on for months.No cardiac symptoms.  No neurologic symptoms. No openings at  today. Assisted with scheduling at  for today.    Danna Payne RN  [facility]      Reason for Disposition   Patient wants to be seen    Additional Information   Negative: Difficult to awaken or acting confused (e.g., disoriented, slurred speech)   Negative: New neurologic deficit that is present NOW, sudden onset of ANY of the following: * Weakness of the face, arm, or leg on one side of the body* Numbness of the face, arm, or leg on one side of the body* Loss of speech or garbled speech   Negative: Sounds like a life-threatening emergency to the triager   Negative: SEVERE weakness (i.e., unable to walk or barely able to walk, requires support) and new-onset or worsening   Negative: Confusion, disorientation, or hallucinations is main symptom   Negative: Dizziness is main symptom   Negative: Followed a head injury within last 3 days   Negative: Headache (with neurologic deficit)   Negative: Unable to urinate (or only a few drops) and bladder feels very full   Negative: Loss of bladder or bowel control (urine or bowel incontinence; wetting self, leaking stool) of new-onset   Negative: Back pain with numbness (loss of sensation) in groin or rectal area   Negative:  Neurologic deficit that was brief (now gone), ANY of the following: * Weakness of the face, arm, or leg on one side of the body * Numbness of the face, arm, or leg on one side of the body * Loss of speech or garbled speech   Negative: Patient sounds very sick or weak to the triager   Negative: Neurologic deficit of gradual onset (e.g., days to weeks), ANY of the following: * Weakness of the face, arm, or leg on one side of the body* Numbness of the face, arm, or leg on one side of the body* Loss of speech or garbled speech   Negative: [place] palsy suspected (i.e., weakness only one side of the face, developing over hours to days, no other symptoms)   Negative: Tingling (e.g., pins and needles) of the face, arm or leg on one side of the body, that is present now (Exceptions: Chronic or recurrent symptom lasting > 4 weeks; or tingling from known cause, such as: bumped elbow, carpal tunnel syndrome, pinched nerve, frostbite.)   Negative: Neck pain (with neurologic deficit)   Negative: Back pain (with neurologic deficit)    Protocols used: Neurologic Deficit-A-OH

## 2023-09-19 NOTE — PROGRESS NOTES
Assessment & Plan     Encounter for vaccination  Patient due for vaccinations and is agreeable to receiving these today.  - HEPATITIS B, ADULT 20+ (ENGERIX-B/RECOMBIVAX HB)  - INFLUENZA VACCINE IM > 6 MONTHS VALENT IIV4 (AFLURIA/FLUZONE)  Prior to immunization administration, verified patients identity using patient s name and date of birth. Please see Immunization Activity for additional information.     Screening Questionnaire for Adult Immunization    Are you sick today?   No   Do you have allergies to medications, food, a vaccine component or latex?   No   Have you ever had a serious reaction after receiving a vaccination?   No   Do you have a long-term health problem with heart, lung, kidney, or metabolic disease (e.g., diabetes), asthma, a blood disorder, no spleen, complement component deficiency, a cochlear implant, or a spinal fluid leak?  Are you on long-term aspirin therapy?   No   Do you have cancer, leukemia, HIV/AIDS, or any other immune system problem?   No   Do you have a parent, brother, or sister with an immune system problem?   No   In the past 3 months, have you taken medications that affect  your immune system, such as prednisone, other steroids, or anticancer drugs; drugs for the treatment of rheumatoid arthritis, Crohn s disease, or psoriasis; or have you had radiation treatments?   No   Have you had a seizure, or a brain or other nervous system problem?   No   During the past year, have you received a transfusion of blood or blood    products, or been given immune (gamma) globulin or antiviral drug?   No   For women: Are you pregnant or is there a chance you could become       pregnant during the next month?   No   Have you received any vaccinations in the past 4 weeks?   No     Immunization questionnaire answers were all negative.      Patient instructed to remain in clinic for 15 minutes afterwards, and to report any adverse reactions.     Screening performed by Jess Mcpherson MA on  9/19/2023 at 2:59 PM.       Paresthesia of both hands  Cubital tunnel syndrome, bilateral  Bilateral carpal tunnel syndrome  Patient is a 43-year-old female with past medical history significant for carpal tunnel syndrome-surgery performed on the left, who presents to clinic due to bilateral hand numbness and tingling that occurs intermittently and improves with shaking hands out.  Vital signs normal.  Physical exam findings are consistent with intermittent carpal tunnel as well as cubital tunnel syndrome.  Patient sleeps with her elbows fully flexed as well as wrists.  This is likely contributing significantly to symptoms.  Recommended sleeping with elbows at 90 degrees or greater and using wrist splints for carpal tunnel to prevent wrist flexion.  Tylenol and ice recommended for pain management.  Home exercises provided.  Return precautions provided.       See Patient Instructions    Deana Montiel PA-C  [facility] CATY Brewer is a 43 year old, presenting for the following health issues:  Numbness  Patient started it started about 3 months ago. Only getting worse. This morning Rt lasted 3 hours. Left doesn't seem as bad but still feeling it in the left sometime. Patient did have surgery on Lt Hand about 15 years ago.       9/19/2023     2:00 PM   Additional Questions   Roomed by Jess DENNIS MA   Accompanied by No one         9/19/2023     2:00 PM   Patient Reported Additional Medications   Patient reports taking the following new medications None       History of Present Illness       Reason for visit:  Hands and forearm numb and tingly  Symptom onset:  More than a month  Symptoms include:  Hands arms numb tinlgy  Symptom intensity:  Moderate  Symptom progression:  Worsening  Had these symptoms before:  No  What makes it worse:  Na  What makes it better:  Na    She eats 2-3 servings of fruits and vegetables daily.She consumes 2 sweetened beverage(s) daily.She exercises with enough  "effort to increase her heart rate 9 or less minutes per day.  She exercises with enough effort to increase her heart rate 3 or less days per week. She is missing 1 dose(s) of medications per week.  She is not taking prescribed medications regularly due to remembering to take.       Patient notes 3 months of intermittent hand tingling and numbness to the entire hand and on forearm to proximal area that occurs in both hands, but not at the same time. History of carpal tunnel surgery 15 years ago on left. Patient works refurbishing computers. Symptoms typically improve with shaking hands out for around 30 minutes, but this morning it lasted 3 hours for right hand.       Review of Systems   Neurological:  Positive for numbness and paresthesias. Negative for weakness.            Objective    /76   Pulse 99   Temp 97  F (36.1  C) (Temporal)   Resp 22   Ht 1.72 m (5' 7.72\")   Wt 116.2 kg (256 lb 3.2 oz)   LMP 09/11/2023 (Exact Date)   SpO2 98%   Breastfeeding No   BMI 39.28 kg/m    Body mass index is 39.28 kg/m .  Physical Exam  Vitals and nursing note reviewed.   Constitutional:       General: She is not in acute distress.     Appearance: Normal appearance.   HENT:      Head: Normocephalic and atraumatic.   Eyes:      Extraocular Movements: Extraocular movements intact.      Pupils: Pupils are equal, round, and reactive to light.   Cardiovascular:      Rate and Rhythm: Normal rate.   Pulmonary:      Effort: Pulmonary effort is normal.   Musculoskeletal:         General: Normal range of motion.      Cervical back: Normal range of motion.      Comments: Bilateral upper extremities: Normal range of motion.  Symptoms reproduced with palpation over bilateral carpal and cubital tunnels.   and interosseous strength 5/5 bilaterally.  Sensation intact and equal bilaterally.   Skin:     General: Skin is warm and dry.   Neurological:      General: No focal deficit present.      Mental Status: She is alert. "   Psychiatric:         Mood and Affect: Mood normal.         Behavior: Behavior normal.

## 2023-10-17 ENCOUNTER — VIRTUAL VISIT (OUTPATIENT)
Dept: INTERNAL MEDICINE | Facility: CLINIC | Age: 43
End: 2023-10-17
Payer: COMMERCIAL

## 2023-10-17 DIAGNOSIS — R11.0 NAUSEA: ICD-10-CM

## 2023-10-17 DIAGNOSIS — J43.9 PULMONARY EMPHYSEMA, UNSPECIFIED EMPHYSEMA TYPE (H): ICD-10-CM

## 2023-10-17 DIAGNOSIS — J32.4 CHRONIC PANSINUSITIS: ICD-10-CM

## 2023-10-17 DIAGNOSIS — J06.9 VIRAL UPPER RESPIRATORY TRACT INFECTION: Primary | ICD-10-CM

## 2023-10-17 DIAGNOSIS — Z72.0 TOBACCO ABUSE: ICD-10-CM

## 2023-10-17 PROCEDURE — 99214 OFFICE O/P EST MOD 30 MIN: CPT | Mod: VID | Performed by: INTERNAL MEDICINE

## 2023-10-17 RX ORDER — DOXYCYCLINE HYCLATE 100 MG
100 TABLET ORAL 2 TIMES DAILY
Qty: 20 TABLET | Refills: 0 | Status: SHIPPED | OUTPATIENT
Start: 2023-10-17 | End: 2023-10-25

## 2023-10-17 NOTE — PROGRESS NOTES
Daniella is a 43 year old who is being evaluated via a billable video visit.      How would you like to obtain your AVS? MyChart  If the video visit is dropped, the invitation should be resent by: Text to cell phone: [phone]  Will anyone else be joining your video visit? No          1. Viral upper respiratory tract infection  She has probably passed the time morning we would treat her with Paxlovid but we should make sure that she does not have COVID given its prevalence in the community.  - Symptomatic COVID-19 Virus (Coronavirus) by PCR Nose; Future    2. Chronic pansinusitis  Given her smoking history and sinus infection history we will treat with doxycycline twice daily.  - doxycycline hyclate (VIBRA-TABS) 100 MG tablet; Take 1 tablet (100 mg) by mouth 2 times daily for 10 days  Dispense: 20 tablet; Refill: 0    3. Nausea  Likely due to the postnasal drip of purulent nasal drainage.    4. Pulmonary emphysema, unspecified emphysema type (H)  No respiratory distress at this time.  Continue inhalers.    5. Tobacco abuse  I have urged smoking cessation.  She tells me she is working on it 1 day at a time  - doxycycline hyclate (VIBRA-TABS) 100 MG tablet; Take 1 tablet (100 mg) by mouth 2 times daily for 10 days  Dispense: 20 tablet; Refill: 0     Subjective   Daniella is a 43 year old, presenting for the following health issues:  URI (Cough, sinus pressure, nausea, etc.)      URI    History of Present Illness       Reason for visit:  URI  Symptom onset:  3-7 days ago  Symptoms include:  Sinus pressure, cough, headaches, and nausea  Symptom intensity:  Moderate  Symptom progression:  Worsening  Had these symptoms before:  No  What makes it worse:  N/A  What makes it better:  Dyquil and Nyquil    She eats 2-3 servings of fruits and vegetables daily.She consumes 0 sweetened beverage(s) daily.She exercises with enough effort to increase her heart rate 20 to 29 minutes per day.  She exercises with enough effort to  "increase her heart rate 3 or less days per week.   She is taking medications regularly.         Patient of the [facility] joins me on an urgent video visit for evaluation of upper respiratory infection symptoms she has had now for about 5 days.  Started with a lot of nasal congestion and cough.  She is a smoker.  She has a history of chronic sinusitis so has had these symptoms before.  She is feeling a lot of postnasal drip and that causes nausea.  She feels like she can \"taste the sick\" in her throat and mouth.  She is using over-the-counter DayQuil and NyQuil type medicines without any improvement.  No sick contacts.  She is not tested for COVID.      Review of Systems         Objective           Vitals:  No vitals were obtained today due to virtual visit.    Physical Exam           She is in no respiratory distress on video.        Video-Visit Details    Type of service:  Video Visit   Video Start Time: 1707  Video End Time:1713    Originating Location (pt. Location): Home    Distant Location (provider location):  On-site  Platform used for Video Visit: Yesy      "

## 2023-10-19 ENCOUNTER — LAB (OUTPATIENT)
Dept: LAB | Facility: CLINIC | Age: 43
End: 2023-10-19
Attending: INTERNAL MEDICINE
Payer: COMMERCIAL

## 2023-10-19 DIAGNOSIS — J06.9 VIRAL UPPER RESPIRATORY TRACT INFECTION: ICD-10-CM

## 2023-10-19 LAB — SARS-COV-2 RNA RESP QL NAA+PROBE: NEGATIVE

## 2023-10-19 PROCEDURE — 87635 SARS-COV-2 COVID-19 AMP PRB: CPT

## 2023-10-25 DIAGNOSIS — Z72.0 TOBACCO ABUSE: ICD-10-CM

## 2023-10-25 DIAGNOSIS — M54.16 LUMBAR RADICULOPATHY: ICD-10-CM

## 2023-10-25 DIAGNOSIS — M54.42 CHRONIC BILATERAL LOW BACK PAIN WITH BILATERAL SCIATICA: ICD-10-CM

## 2023-10-25 DIAGNOSIS — M79.18 MYOFASCIAL MUSCLE PAIN: ICD-10-CM

## 2023-10-25 DIAGNOSIS — J45.40 MODERATE PERSISTENT ASTHMA WITHOUT COMPLICATION: ICD-10-CM

## 2023-10-25 DIAGNOSIS — J32.4 CHRONIC PANSINUSITIS: ICD-10-CM

## 2023-10-25 DIAGNOSIS — R09.82 POST-NASAL DRIP: ICD-10-CM

## 2023-10-25 DIAGNOSIS — G43.909 MIGRAINE WITHOUT STATUS MIGRAINOSUS, NOT INTRACTABLE, UNSPECIFIED MIGRAINE TYPE: ICD-10-CM

## 2023-10-25 DIAGNOSIS — J43.9 PULMONARY EMPHYSEMA, UNSPECIFIED EMPHYSEMA TYPE (H): ICD-10-CM

## 2023-10-25 DIAGNOSIS — M47.816 SPONDYLOSIS OF LUMBAR REGION WITHOUT MYELOPATHY OR RADICULOPATHY: ICD-10-CM

## 2023-10-25 DIAGNOSIS — G89.29 CHRONIC BILATERAL LOW BACK PAIN WITH BILATERAL SCIATICA: ICD-10-CM

## 2023-10-25 DIAGNOSIS — J30.2 SEASONAL ALLERGIC RHINITIS, UNSPECIFIED TRIGGER: ICD-10-CM

## 2023-10-25 DIAGNOSIS — J45.20 MILD INTERMITTENT ASTHMA WITHOUT COMPLICATION: ICD-10-CM

## 2023-10-25 DIAGNOSIS — M54.41 CHRONIC BILATERAL LOW BACK PAIN WITH BILATERAL SCIATICA: ICD-10-CM

## 2023-10-25 NOTE — TELEPHONE ENCOUNTER
Patient has requested a transfer from [facility] but they haven't been answering their phones. Could we just get scripts, she says that she'll need her baclofen, wixela, and pregabalin by tomorrow

## 2023-10-26 RX ORDER — DICLOFENAC SODIUM 75 MG/1
75 TABLET, DELAYED RELEASE ORAL 2 TIMES DAILY
Qty: 60 TABLET | Refills: 11 | Status: SHIPPED | OUTPATIENT
Start: 2023-10-26

## 2023-10-26 RX ORDER — RIZATRIPTAN BENZOATE 10 MG/1
10 TABLET ORAL
Qty: 10 TABLET | Refills: 3 | Status: SHIPPED | OUTPATIENT
Start: 2023-10-26

## 2023-10-26 RX ORDER — PREGABALIN 150 MG/1
CAPSULE ORAL
Qty: 60 CAPSULE | Refills: 3 | Status: SHIPPED | OUTPATIENT
Start: 2023-10-26 | End: 2024-03-12

## 2023-10-26 RX ORDER — DOXYCYCLINE HYCLATE 100 MG
100 TABLET ORAL 2 TIMES DAILY
Qty: 20 TABLET | Refills: 0 | Status: SHIPPED | OUTPATIENT
Start: 2023-10-26 | End: 2024-01-15

## 2023-10-26 RX ORDER — AZELASTINE 1 MG/ML
1 SPRAY, METERED NASAL 2 TIMES DAILY
Qty: 90 ML | Refills: 0 | Status: SHIPPED | OUTPATIENT
Start: 2023-10-26 | End: 2023-11-07

## 2023-10-26 RX ORDER — ALBUTEROL SULFATE 90 UG/1
2 AEROSOL, METERED RESPIRATORY (INHALATION) EVERY 4 HOURS PRN
Qty: 18 G | Refills: 11 | Status: SHIPPED | OUTPATIENT
Start: 2023-10-26 | End: 2024-01-30

## 2023-10-26 RX ORDER — FLUTICASONE PROPIONATE AND SALMETEROL 100; 50 UG/1; UG/1
1 POWDER RESPIRATORY (INHALATION) EVERY 12 HOURS
Qty: 60 EACH | Refills: 11 | Status: SHIPPED | OUTPATIENT
Start: 2023-10-26 | End: 2024-10-04

## 2023-10-26 RX ORDER — IPRATROPIUM BROMIDE AND ALBUTEROL SULFATE 2.5; .5 MG/3ML; MG/3ML
1 SOLUTION RESPIRATORY (INHALATION) EVERY 6 HOURS PRN
Qty: 90 ML | Refills: 1 | Status: SHIPPED | OUTPATIENT
Start: 2023-10-26

## 2023-10-26 RX ORDER — FLUTICASONE PROPIONATE 50 MCG
1 SPRAY, SUSPENSION (ML) NASAL DAILY
Qty: 16 G | Refills: 11 | Status: SHIPPED | OUTPATIENT
Start: 2023-10-26 | End: 2024-02-20 | Stop reason: ALTCHOICE

## 2023-10-26 RX ORDER — AMITRIPTYLINE HYDROCHLORIDE 50 MG/1
50 TABLET ORAL AT BEDTIME
Qty: 90 TABLET | Refills: 3 | Status: SHIPPED | OUTPATIENT
Start: 2023-10-26

## 2023-10-26 RX ORDER — CETIRIZINE HYDROCHLORIDE 10 MG/1
10 TABLET ORAL DAILY
Qty: 90 TABLET | Refills: 3 | Status: SHIPPED | OUTPATIENT
Start: 2023-10-26 | End: 2024-01-30

## 2023-11-06 ENCOUNTER — E-VISIT (OUTPATIENT)
Dept: URGENT CARE | Facility: CLINIC | Age: 43
End: 2023-11-06
Payer: COMMERCIAL

## 2023-11-06 DIAGNOSIS — R05.1 ACUTE COUGH: Primary | ICD-10-CM

## 2023-11-06 PROCEDURE — 99207 PR NON-BILLABLE SERV PER CHARTING: CPT | Performed by: EMERGENCY MEDICINE

## 2023-11-07 ENCOUNTER — OFFICE VISIT (OUTPATIENT)
Dept: URGENT CARE | Facility: URGENT CARE | Age: 43
End: 2023-11-07
Payer: COMMERCIAL

## 2023-11-07 VITALS
RESPIRATION RATE: 17 BRPM | TEMPERATURE: 98.3 F | OXYGEN SATURATION: 97 % | WEIGHT: 254 LBS | BODY MASS INDEX: 38.94 KG/M2 | HEART RATE: 98 BPM | DIASTOLIC BLOOD PRESSURE: 84 MMHG | SYSTOLIC BLOOD PRESSURE: 125 MMHG

## 2023-11-07 DIAGNOSIS — Z20.822 EXPOSURE TO COVID-19 VIRUS: ICD-10-CM

## 2023-11-07 DIAGNOSIS — J32.4 CHRONIC PANSINUSITIS: Primary | ICD-10-CM

## 2023-11-07 PROCEDURE — 99213 OFFICE O/P EST LOW 20 MIN: CPT

## 2023-11-07 RX ORDER — METRONIDAZOLE 500 MG/1
500 TABLET ORAL 2 TIMES DAILY
Qty: 14 TABLET | Refills: 0 | Status: SHIPPED | OUTPATIENT
Start: 2023-11-07 | End: 2023-11-14

## 2023-11-07 RX ORDER — CEFIXIME 400 MG/1
400 CAPSULE ORAL DAILY
Qty: 7 CAPSULE | Refills: 0 | Status: SHIPPED | OUTPATIENT
Start: 2023-11-07 | End: 2023-11-07 | Stop reason: SINTOL

## 2023-11-07 RX ORDER — SULFAMETHOXAZOLE/TRIMETHOPRIM 800-160 MG
1 TABLET ORAL 2 TIMES DAILY
Qty: 14 TABLET | Refills: 0 | Status: SHIPPED | OUTPATIENT
Start: 2023-11-07 | End: 2023-11-14

## 2023-11-07 NOTE — PROGRESS NOTES
ASSESSMENT:  (J32.4) Chronic pansinusitis  (primary encounter diagnosis)  Plan: sulfamethoxazole-trimethoprim (BACTRIM DS)         800-160 MG tablet, metroNIDAZOLE (FLAGYL) 500     (Z20.822) Exposure to COVID-19 virus    PLAN:  The patient and I discussed that given her chronic sinus infections and completion of doxycycline last week coupled with her allergies and current medications; there were limited antibiotics to prescribe for her current symptoms.  We discussed an alternative course of therapy utilizing Bactrim with Flagyl for 7 days as a regimen indicated for chronic sinusitis and one that is safe given her allergies and current medications.  Patient was agreeable to this plan considering she will have close follow-up with ENT for further direction regarding neck steps with her chronic sinus infections.  We discussed the need to take the antibiotics as prescribed and finish the full course even if symptoms improve based on recommendations from ENT.  We also discussed trying yogurt with active cultures or probiotic such as Culturelle daily to help prevent diarrhea will take the antibiotics.  Instructed the patient to follow-up with ENT for further evaluation and treatment.  Patient acknowledged her understanding of the above plan.    The use of Dragon/Foremost dictation services may have been used to construct the content in this note; any grammatical or spelling errors are non-intentional. Please contact the author of this note directly if you are in need of any clarification.      SUBJECTIVE:   Daniella Sexton is a 43 year old female presenting with a chief complaint of runny nose, stuffy nose, ear pain, cough, facial pain/pressure, and headache.  Onset of symptoms was 10 day(s) ago.  Course of illness is worsening.    Patient denies: fever, sore throat, and vomiting  Treatment measures tried include doxycycline completed last week, Theraflu and DayQuil/NyQuil.  Predisposing factors include history of chronic  sinus infections and currently under the care of ENT.    The patient did not do an at home COVID test and her son tested positive last week.     ROS:  Negative except noted above.    OBJECTIVE:  /84   Pulse 98   Temp 98.3  F (36.8  C) (Tympanic)   Resp 17   Wt 115.2 kg (254 lb)   LMP 09/11/2023 (Exact Date)   SpO2 97%   BMI 38.94 kg/m    GENERAL APPEARANCE: healthy, alert and no distress  EYES: EOMI,  PERRL, conjunctiva clear  HENT: TM's normal bilaterally, nasal turbinates erythematous, swollen, and frontal sinus tenderness   NECK: supple, nontender, no lymphadenopathy  RESP: lungs clear to auscultation - no rales, rhonchi or wheezes  CV: regular rates and rhythm, normal S1 S2, no murmur noted  SKIN: no suspicious lesions or rashes

## 2023-11-07 NOTE — PATIENT INSTRUCTIONS
Take the antibiotic as prescribed and finish the full course even if symptoms improve.  Try yogurt with active cultures or probiotics such as Culturelle daily to help prevent diarrhea while using antibiotics.  Follow up with ENT for further evaluation and treatment.

## 2023-11-19 ENCOUNTER — OFFICE VISIT (OUTPATIENT)
Dept: URGENT CARE | Facility: URGENT CARE | Age: 43
End: 2023-11-19
Payer: COMMERCIAL

## 2023-11-19 VITALS
BODY MASS INDEX: 39.07 KG/M2 | TEMPERATURE: 98.6 F | RESPIRATION RATE: 16 BRPM | WEIGHT: 254.8 LBS | OXYGEN SATURATION: 96 % | SYSTOLIC BLOOD PRESSURE: 118 MMHG | HEART RATE: 87 BPM | DIASTOLIC BLOOD PRESSURE: 80 MMHG

## 2023-11-19 DIAGNOSIS — J11.1 INFLUENZA-LIKE ILLNESS: Primary | ICD-10-CM

## 2023-11-19 DIAGNOSIS — R07.0 THROAT PAIN: ICD-10-CM

## 2023-11-19 LAB
DEPRECATED S PYO AG THROAT QL EIA: NEGATIVE
FLUAV AG SPEC QL IA: NEGATIVE
FLUBV AG SPEC QL IA: NEGATIVE
GROUP A STREP BY PCR: NOT DETECTED

## 2023-11-19 PROCEDURE — 87804 INFLUENZA ASSAY W/OPTIC: CPT | Performed by: PHYSICIAN ASSISTANT

## 2023-11-19 PROCEDURE — 87651 STREP A DNA AMP PROBE: CPT | Performed by: PHYSICIAN ASSISTANT

## 2023-11-19 PROCEDURE — 87635 SARS-COV-2 COVID-19 AMP PRB: CPT | Performed by: PHYSICIAN ASSISTANT

## 2023-11-19 PROCEDURE — 99213 OFFICE O/P EST LOW 20 MIN: CPT | Performed by: PHYSICIAN ASSISTANT

## 2023-11-19 RX ORDER — LIDOCAINE HYDROCHLORIDE 20 MG/ML
15 SOLUTION OROPHARYNGEAL
Qty: 100 ML | Refills: 0 | Status: SHIPPED | OUTPATIENT
Start: 2023-11-19 | End: 2024-01-30

## 2023-11-19 RX ORDER — BENZONATATE 200 MG/1
200 CAPSULE ORAL 3 TIMES DAILY PRN
Qty: 30 CAPSULE | Refills: 0 | Status: SHIPPED | OUTPATIENT
Start: 2023-11-19 | End: 2023-11-29

## 2023-11-19 RX ORDER — ALBUTEROL SULFATE 90 UG/1
2 AEROSOL, METERED RESPIRATORY (INHALATION) EVERY 4 HOURS PRN
Qty: 18 G | Refills: 0 | Status: SHIPPED | OUTPATIENT
Start: 2023-11-19

## 2023-11-19 ASSESSMENT — ENCOUNTER SYMPTOMS
GASTROINTESTINAL NEGATIVE: 1
CARDIOVASCULAR NEGATIVE: 1
SINUS PAIN: 1
CHILLS: 0
DIAPHORESIS: 1
RHINORRHEA: 1
FATIGUE: 0
COUGH: 1
SINUS PRESSURE: 1
WHEEZING: 0
PALPITATIONS: 0
SHORTNESS OF BREATH: 1
FEVER: 0
SORE THROAT: 1

## 2023-11-19 ASSESSMENT — PAIN SCALES - GENERAL: PAINLEVEL: WORST PAIN (10)

## 2023-11-19 NOTE — LETTER
November 19, 2023      Daniella Sexton  [address]  [facility] [address]        To Whom It May Concern:    Daniella Sexton was seen in urgent care clinic today and I would recommend self quarantine until further testing comes back which may take 1-2days.  Please feel free to contact me via phone if you have any questions or concerns.        Sincerely,      Yaneth See ROGER Nguyễn

## 2023-11-19 NOTE — PROGRESS NOTES
Deniz Brewer is a 43 year old, presenting for the following health issues:  Generalized Body Aches, Pharyngitis, Cough, Back Pain, Headache, Covid Concern (Sx onset- today (Sunday)), and Patient Request for Note/Letter    HPI   Acute Illness  Acute illness concerns:   Onset/Duration: today  Symptoms:  Fever: No  Chills/Sweats: YES  Headache (location?): YES  Sinus Pressure: YES  Conjunctivitis:  No  Ear Pain: no  Rhinorrhea: YES  Congestion: YES  Sore Throat: YES  Cough: YES-productive of yellow sputum, with shortness of breath  Wheeze: No  Decreased Appetite: No  Nausea: No  Vomiting: No  Diarrhea: YES  Dysuria/Freq.: No  Dysuria or Hematuria: No  Fatigue/Achiness: YES  Sick/Strep Exposure: YES- at home  Therapies tried and outcome: rest,fluids,dayquil,theraflu with minimal relief    Patient Active Problem List   Diagnosis    Anxiety    Depressive disorder    Duodenal ulcer    History of alcohol abuse    History of methamphetamine abuse (H)    Migraines    Seasonal allergic rhinitis    Sleep disturbance    Tobacco abuse    CARDIOVASCULAR SCREENING; LDL GOAL LESS THAN 160    Chronic bilateral low back pain with bilateral sciatica    Pure hypercholesterolemia    Mild intermittent asthma without complication    Chronic pansinusitis    Obesity (BMI 35.0-39.9) with comorbidity (H)    Lumbago    Fatty liver    Lung nodule    Pulmonary emphysema, unspecified emphysema type (H)    Medical marijuana use     Current Outpatient Medications   Medication    albuterol (VENTOLIN HFA) 108 (90 Base) MCG/ACT inhaler    amitriptyline (ELAVIL) 50 MG tablet    cetirizine (ZYRTEC) 10 MG tablet    diclofenac (VOLTAREN) 75 MG EC tablet    doxycycline hyclate (VIBRA-TABS) 100 MG tablet    fluticasone (FLONASE) 50 MCG/ACT nasal spray    fluticasone-salmeterol (ADVAIR) 100-50 MCG/ACT inhaler    ipratropium - albuterol 0.5 mg/2.5 mg/3 mL (DUONEB) 0.5-2.5 (3) MG/3ML neb solution    pregabalin (LYRICA) 150 MG capsule    rizatriptan  (MAXALT) 10 MG tablet     No current facility-administered medications for this visit.        Allergies   Allergen Reactions    Amoxicillin Anaphylaxis    Morphine Other (See Comments) and Nausea and Vomiting    Penicillins Unknown     Review of Systems   Constitutional:  Positive for diaphoresis. Negative for chills, fatigue and fever.   HENT:  Positive for congestion, rhinorrhea, sinus pressure, sinus pain and sore throat. Negative for ear discharge, ear pain and hearing loss.    Respiratory:  Positive for cough and shortness of breath. Negative for wheezing.    Cardiovascular: Negative.  Negative for chest pain, palpitations and peripheral edema.   Gastrointestinal: Negative.    Allergic/Immunologic: Negative for environmental allergies.   All other systems reviewed and are negative.           Objective    /80 (BP Location: Left arm, Patient Position: Sitting, Cuff Size: Adult Large)   Pulse 87   Temp 98.6  F (37  C) (Tympanic)   Resp 16   Wt 115.6 kg (254 lb 12.8 oz)   SpO2 96%   BMI 39.07 kg/m    Body mass index is 39.07 kg/m .  Physical Exam  Vitals and nursing note reviewed.   Constitutional:       General: She is not in acute distress.     Appearance: Normal appearance. She is well-developed and normal weight. She is not ill-appearing.   HENT:      Head: Normocephalic and atraumatic.      Comments: TMs are intact without any erythema or bulging bilaterally.  Airway is patent.     Nose: Congestion and rhinorrhea present.      Mouth/Throat:      Lips: Pink.      Mouth: Mucous membranes are moist.      Pharynx: Oropharynx is clear. Uvula midline. No pharyngeal swelling, oropharyngeal exudate or posterior oropharyngeal erythema.      Tonsils: No tonsillar exudate.   Eyes:      General: No scleral icterus.     Extraocular Movements: Extraocular movements intact.      Conjunctiva/sclera: Conjunctivae normal.      Pupils: Pupils are equal, round, and reactive to light.   Neck:      Thyroid: No  thyromegaly.   Cardiovascular:      Rate and Rhythm: Normal rate and regular rhythm.      Pulses: Normal pulses.      Heart sounds: Normal heart sounds, S1 normal and S2 normal. No murmur heard.     No friction rub. No gallop.   Pulmonary:      Effort: Pulmonary effort is normal. No accessory muscle usage, respiratory distress or retractions.      Breath sounds: Normal breath sounds and air entry. No stridor. No decreased breath sounds, wheezing, rhonchi or rales.   Musculoskeletal:      Cervical back: Normal range of motion and neck supple.   Lymphadenopathy:      Cervical: No cervical adenopathy.   Skin:     General: Skin is warm and dry.      Nails: There is no clubbing.   Neurological:      Mental Status: She is alert and oriented to person, place, and time.   Psychiatric:         Mood and Affect: Mood normal.         Behavior: Behavior normal.         Thought Content: Thought content normal.         Judgment: Judgment normal.       Results for orders placed or performed in visit on 11/19/23 (from the past 24 hour(s))   Streptococcus A Rapid Screen w/Reflex to PCR - Clinic Collect    Specimen: Throat; Swab   Result Value Ref Range    Group A Strep antigen Negative Negative   Influenza A & B Antigen - Clinic Collect    Specimen: Nose; Swab   Result Value Ref Range    Influenza A antigen Negative Negative    Influenza B antigen Negative Negative    Narrative    Test results must be correlated with clinical data. If necessary, results should be confirmed by a molecular assay or viral culture.         Assessment/Plan:  Influenza-like illness:  Rapid influenza and RST were negative, will send for strep and covid PCR.  Will give tessalon perles and albuterol inh as needed for symptoms.  Recommend tylenol/ibuprofen prn pain/fever.  Rest, fluids, chicken soup.  Recheck in clinic if symptoms worsen or if symptoms do not improve.  To the ER  she develops hemoptysis, shortness of breath/wheezing, chest pain, stiff neck or  fevers >102.  -     Influenza A & B Antigen - Clinic Collect  -     Symptomatic COVID-19 Virus (Coronavirus) by PCR Nose  -     benzonatate (TESSALON) 200 MG capsule; Take 1 capsule (200 mg) by mouth 3 times daily as needed for cough  -     albuterol (PROAIR HFA/PROVENTIL HFA/VENTOLIN HFA) 108 (90 Base) MCG/ACT inhaler; Inhale 2 puffs into the lungs every 4 hours as needed for wheezing, cough or shortness of breath Use with spacer    Throat pain: RST is negative, will send for strep PCR testing.  Will give lidocaine oral viscous as needed for sore throat.  Recommend tylenol/ibuprofen prn pain/fever, warm salt water gargles, lozenges or cough drops.   -     Streptococcus A Rapid Screen w/Reflex to PCR - Clinic Collect  -     Symptomatic COVID-19 Virus (Coronavirus) by PCR Nose  -     Group A Streptococcus PCR Throat Swab  -     lidocaine, viscous, (XYLOCAINE) 2 % solution; Swish and spit 15 mLs in mouth every 3 hours as needed for moderate pain ; Max 8 doses/24 hour period.        Yaneth Nguyễn PA-C

## 2023-11-20 ENCOUNTER — MYC MEDICAL ADVICE (OUTPATIENT)
Dept: OTOLARYNGOLOGY | Facility: CLINIC | Age: 43
End: 2023-11-20
Payer: COMMERCIAL

## 2023-11-20 DIAGNOSIS — J32.4 CHRONIC PANSINUSITIS: Primary | ICD-10-CM

## 2023-11-20 DIAGNOSIS — R51.9 FACIAL PAIN: ICD-10-CM

## 2023-11-20 LAB — SARS-COV-2 RNA RESP QL NAA+PROBE: NEGATIVE

## 2023-11-21 ENCOUNTER — OFFICE VISIT (OUTPATIENT)
Dept: ALLERGY | Facility: CLINIC | Age: 43
End: 2023-11-21
Payer: COMMERCIAL

## 2023-11-21 ENCOUNTER — LAB (OUTPATIENT)
Dept: LAB | Facility: CLINIC | Age: 43
End: 2023-11-21
Payer: COMMERCIAL

## 2023-11-21 ENCOUNTER — ANCILLARY PROCEDURE (OUTPATIENT)
Dept: CT IMAGING | Facility: CLINIC | Age: 43
End: 2023-11-21
Attending: OTOLARYNGOLOGY
Payer: COMMERCIAL

## 2023-11-21 DIAGNOSIS — J32.9 CHRONIC RHINOSINUSITIS: ICD-10-CM

## 2023-11-21 DIAGNOSIS — J32.4 CHRONIC PANSINUSITIS: ICD-10-CM

## 2023-11-21 DIAGNOSIS — J30.2 SEASONAL ALLERGIC RHINITIS, UNSPECIFIED TRIGGER: Primary | ICD-10-CM

## 2023-11-21 DIAGNOSIS — R51.9 FACIAL PAIN: ICD-10-CM

## 2023-11-21 DIAGNOSIS — J30.2 SEASONAL ALLERGIC RHINITIS, UNSPECIFIED TRIGGER: ICD-10-CM

## 2023-11-21 DIAGNOSIS — Z91.09 HOUSE DUST MITE ALLERGY: ICD-10-CM

## 2023-11-21 PROCEDURE — 95024 IQ TESTS W/ALLERGENIC XTRCS: CPT | Performed by: DERMATOLOGY

## 2023-11-21 PROCEDURE — 86003 ALLG SPEC IGE CRUDE XTRC EA: CPT | Performed by: DERMATOLOGY

## 2023-11-21 PROCEDURE — 36415 COLL VENOUS BLD VENIPUNCTURE: CPT | Performed by: PATHOLOGY

## 2023-11-21 PROCEDURE — 99204 OFFICE O/P NEW MOD 45 MIN: CPT | Mod: 25 | Performed by: DERMATOLOGY

## 2023-11-21 PROCEDURE — 99000 SPECIMEN HANDLING OFFICE-LAB: CPT | Performed by: PATHOLOGY

## 2023-11-21 PROCEDURE — 82785 ASSAY OF IGE: CPT | Performed by: DERMATOLOGY

## 2023-11-21 PROCEDURE — 70486 CT MAXILLOFACIAL W/O DYE: CPT | Mod: GC | Performed by: RADIOLOGY

## 2023-11-21 PROCEDURE — 95004 PERQ TESTS W/ALRGNC XTRCS: CPT | Performed by: DERMATOLOGY

## 2023-11-21 RX ORDER — BACLOFEN 10 MG/1
10 TABLET ORAL 3 TIMES DAILY
COMMUNITY
End: 2024-01-25

## 2023-11-21 RX ORDER — MOMETASONE FUROATE MONOHYDRATE 50 UG/1
2 SPRAY, METERED NASAL 2 TIMES DAILY
Qty: 17 G | Refills: 3 | Status: SHIPPED | OUTPATIENT
Start: 2023-11-21 | End: 2024-01-02

## 2023-11-21 RX ORDER — AZELASTINE 1 MG/ML
1 SPRAY, METERED NASAL 2 TIMES DAILY
Qty: 30 ML | Refills: 3 | Status: SHIPPED | OUTPATIENT
Start: 2023-11-21 | End: 2024-01-02

## 2023-11-21 RX ORDER — FEXOFENADINE HCL 180 MG/1
180 TABLET ORAL 2 TIMES DAILY
Qty: 60 TABLET | Refills: 3 | Status: SHIPPED | OUTPATIENT
Start: 2023-11-21 | End: 2024-01-02

## 2023-11-21 NOTE — NURSING NOTE
Chief Complaint   Patient presents with    Allergy Consult     Chronic sinusitis, about to start round 3 of ABX, worst in spring/fall. Last antihistamine this AM.     Marcelo Blandon RN

## 2023-11-21 NOTE — LETTER
"    11/21/2023         RE: Daniella Sexton  [address]  [facility] [address]        Dear Colleague,    Thank you for referring your patient, Daniella Sexton, to the [facility]. Please see a copy of my visit note below.    [facility] Dermato-[facility]ergology Note  Office visit  Encounter Date: Nov 21, 2023  ____________________________________________    CC: No chief complaint on file.      HPI:  (Nov 21, 2023)  Ms. Daniella Sexton is a(n) 43 year old female who presents today as new patient for allergy consultation    Duration: 10 years  \"Sinus infections\"  Switched [email or web address] to [place]. Sees Dr. Ayon.  Got sinus surgery.   Before the surgery, was able to detect her sinus symptoms.   Symptoms include: headache, neck ache, sinus pressure, ears, and throat.   Now the symptoms tend to cumulate before they reach a peak.     Currently using Nyquil and Dayquil daily to manage her symptoms  She uses Zyrtec also daily.   Using the Flonase every morning   She notes that these above interactions help, but not enough.     - otherwise feeling well in usual state of health    Physical exam:  General: In no acute distress, well-developed, well-nourished  Eyes: no conjunctivitis  ENT: no signs of rhinitis   Pulmonary: no wheezing or coughing  Skin: Focused examination of the skin on test sites was performed = see test results below  {Skin Exam:245389}    Past Medical History:   Patient Active Problem List   Diagnosis     Anxiety     Depressive disorder     Duodenal ulcer     History of alcohol abuse     History of methamphetamine abuse (H)     Migraines     Seasonal allergic rhinitis     Sleep disturbance     Tobacco abuse     CARDIOVASCULAR SCREENING; LDL GOAL LESS THAN 160     Chronic bilateral low back pain with bilateral sciatica     Pure hypercholesterolemia     Mild intermittent asthma without complication     Chronic pansinusitis     Obesity (BMI 35.0-39.9) with " comorbidity (H)     Lumbago     Fatty liver     Lung nodule     Pulmonary emphysema, unspecified emphysema type (H)     Medical marijuana use     Past Medical History:   Diagnosis Date     Alcohol abuse      Arthritis      C. 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)        Allergies:  Allergies   Allergen Reactions     Amoxicillin Anaphylaxis     Morphine Other (See Comments) and Nausea and Vomiting     Penicillins Unknown       Medications:  Current Outpatient Medications   Medication     albuterol (PROAIR HFA/PROVENTIL HFA/VENTOLIN HFA) 108 (90 Base) MCG/ACT inhaler     albuterol (VENTOLIN HFA) 108 (90 Base) MCG/ACT inhaler     amitriptyline (ELAVIL) 50 MG tablet     benzonatate (TESSALON) 200 MG capsule     cetirizine (ZYRTEC) 10 MG tablet     diclofenac (VOLTAREN) 75 MG EC tablet     doxycycline hyclate (VIBRA-TABS) 100 MG tablet     fluticasone (FLONASE) 50 MCG/ACT nasal spray     fluticasone-salmeterol (ADVAIR) 100-50 MCG/ACT inhaler     ipratropium - albuterol 0.5 mg/2.5 mg/3 mL (DUONEB) 0.5-2.5 (3) MG/3ML neb solution     lidocaine, viscous, (XYLOCAINE) 2 % solution     pregabalin (LYRICA) 150 MG capsule     rizatriptan (MAXALT) 10 MG tablet     No current facility-administered medications for this visit.       Social History:  The patient works in production. Patient has the following hobbies or non-occupational exposure (works in [employer]). Works in a corn field occasionally.     Family History:  Family History   Problem Relation Age of Onset     No Known Problems Mother      Unknown/Adopted Father      Heart Disease No family hx of      Diabetes No family hx of      Cancer No family hx of      Colon Cancer No family hx of        Previous Labs, Allergy Tests, Dermatopathology, Imaging:  As a child, doesn't remember the results.     Referred By: Referred Self, MD  No address on file     Allergy Tests:    Past Allergy Test    Order for Future Allergy Testing:    [x] Outpatient  []  Inpatient: [place]..../ Bed ....       Skin Atopy (atopic dermatitis) [] Yes   [x] No .........  Contact allergies:   [] Yes   [x] No ..........  Hand eczema:   [x] Yes   [] No           Leading hand:   [] R   [] L       [] Ambidextrous         Drug allergies:        [x] Yes   [] No  which?...... Anaphylaxis to amoxicillin.     Urticaria/Angioedema  [] Yes   [x] No .........  Food Allergy:  [] Yes   [x] No  which?......  Pets :  [] Yes   [x] No  which?......         [x]  Rhinitis   [x] Conjunctivitis   [x] Sinusitis   [] Polyposis   [x] Otitis   [x] Pharyngitis         [x]  Postnasal drip    []  none  Operations:   [] Tonsils   [] Septum   [] Sinus   [] Polyposis        [x] Asthma bronchiale   [] Coughing      []  None (diagnosed early teens)  Symptoms (mostly Rhinoconjunctivitis and Asthma) aggravated by:  Season   [] I   [] II   [x] III   [x] IV   []V   []VI   []VII   []VIII   [x]IX   [x]X   []XI   []XII     [] perennial (spring and fall)  Day time      [x] morning   [] noon      [] evening        [x] night    [] whole day........  []  none  Location/changes    [] inside        [] outside   [] mountains    [] sea     [] others.............   [x]  none  Triggers, specific     [] animals     [] plants     [] dust              [] others ...........................    []  None (corn field)   Triggers, others       [] work          [] psyche    [] sport            [] others .............................  [x]  none  Irritant                [] phys efforts [x] smoke    [] heat/cold     [] odors  []others............... []  None (air quality; heat/humidity, or very cold temp)    Order for PATCH TESTS  Reason for tests (suspected allergy): ***  Known previous allergies: ***  Standardized panels  [] Standard panel (40 tests)  [] Preservatives & Antimicrobials (31 tests)  [] Emulsifiers & Additives (25 tests)   [] Perfumes/Flavours & Plants (25 tests)  [] Hairdresser panel (12 tests)  [] Rubber Chemicals (22 tests)  [] Plastics  (26 tests)  [] Colorants/Dyes/Food additives (20 tests)  [] Metals (implants/dental) (24 tests)  [] Local anaesthetics/NSAIDs (13 tests)  [] Antibiotics & Antimycotics (14 tests)   [] Corticosteroids (15 tests)   [] Photopatch test (62 tests)   [] others: ...      [] Patient's own products: ...  DO NOT test if chemical or biological identity is unknown!   always ask from patient the product information and safety sheets (MSDS)       Order for PRICK TESTS    Reason for tests (suspected allergy): ***  Known previous allergies: ***    Standardized prick panels  [] Atopic panel (20 tests)  [] Pediatric Panel (12 tests)  [] Milk, Meat, Eggs, Grains (20 tests)   [] Dust, Epithelia, Feathers (10 tests)  [] Fish, Seafood, Shellfish (17 tests)  [] Nuts, Beans (14 tests)  [] Spice, Vegetable, Fruit (17 tests)  [] Pollen Panel = Tree, Grass, Weed (24 tests)  [] Others: ...      [] Patient's own products: ...  DO NOT test if chemical or biological identity is unknown!   always ask from patient the product information and safety sheets (MSDS)     Standardized intradermal tests  [] Penicillium notatum [] Aspergillus fumigatus [] House dust mites D.far & D. pteron  [] Cat    [] dog  [] Others: ...  [] Bee venom   [] Wasp venom  !!Specific protocol with dilutions!!       Order for Drug allergy tests (prick & Intradermal &  patch tests)    [] Penicillin G  [] Ampicillin [] Cefazolin   [] Ceftriaxone   [] Ceftazidime  [] Bactrim    [] Others: ...  Order for ... as test date      Atopy Screen (Placed Nov 21, 2023)  No Substance Readings (15 min) Evaluation   POS Histamine 1mg/ml - On antihistamines   NEG NaCl 0.9% -      No Substance Readings (15 min) Evaluation   1 Alternaria alternata (tenuis)  +    2 Cladosporium herbarum +    3 Aspergillus fumigatus -    4 Penicillium notatum -    5 Dermatophagoides pteronyssinus ++    6 Dermatophagoides farinae +/++    7 Dog epithelium (canis spp) +/++    8 Cat hair (roger catus) +    9 Cockroach    (Blatella americana & germanica) -    10 Grass mix midwest   (Jacquelyn, Orchard, Redtop, Kishor) -    11 Pedro grass (sorghum halepense) -    12 Weed mix   (common Cocklebur, Lamb s quarters, rough redroot Pigweed, Dock/Sorrel) -    13 Mug wort (artemisia vulgare) -    14 Ragweed giant/short (ambrosia spp) -    15 White birch (Betula papyrifera) -    16 Tree mix 1 (Pecan, Maple BHR, Oak RVW, american [place], black [place]) -    17 Red cedar (juniperus virginia) -    18 Tree mix 2   (white Jason, river/red Birch, black [place], common [place], american Elm) -    19 Box elder/Maple mix (acer spp) -    20 [place] shagbark (carya ovata) -           Conclusion       Intradermal Testing (Placed Nov 21, 2023)  No Substance Conc.  Reading (15min)  immediate Papule [mm] / Erythema [mm] Reading   (... days)  delayed Papule [mm] / Erythema [mm] Remarks   DF Standard Dust Mite - D. Farinae 1:10 -   -    DP Standard Dust Mite - D. Pteronyssinus 1:10 -   -    A Aspergillus fumigatus  1:10 -   -    P Penicillium notatum 1:10 -   -     Dog epithelium 1:10 + 4/7  -      1:10 -   -    Conclusions:     ________________________________    Assessment & Plan:    ==> Final Diagnosis:     # chronic pansinusitis with Rhinitis  Partially allergic with proven sensitization to house dust mites > dog > seasonal molds > cat  * chronic illness with exacerbation, progression, side effects from treatment      These conclusions are made at the best of one's knowledge and belief based on the provided evidence such as patient's history and allergy test results and they can change over time or can be incomplete because of missing information's.    ==> Treatment Plan:    Nasonex nasal spray at bedtime  Azelastin nasal spray 2-3 times daily  2xdaily Allegra  HOUSE DUST MITES reduction info given    Procedures Performed: Allergy tests, including prick, intradermal tests     Staff:  Provider    Follow-up in Derm-Allergy clinic if delayed reactions and to  see how ttx works (in about 6 weeks)    I spent a total of 40minutes with Daniella Soloriowestley. This time was spent counseling the patient and/or coordinating care, explaining the allergy tests , performing allergy tests and assessing the clinical relevance.         Again, thank you for allowing me to participate in the care of your patient.        Sincerely,        Tom Napier MD

## 2023-11-21 NOTE — PROGRESS NOTES
"[facility] Dermato-allergology Note  Office visit  Encounter Date: Nov 21, 2023  ____________________________________________    CC: No chief complaint on file.      HPI:  (Nov 21, 2023)  Ms. Daniella Sexton is a(n) 43 year old female who presents today as new patient for allergy consultation    Duration: 10 years  \"Sinus infections\"  Switched health systems to [place]. Sees Dr. Ayon.  Got sinus surgery.   Before the surgery, was able to detect her sinus symptoms.   Symptoms include: headache, neck ache, sinus pressure, ears, and throat.   Now the symptoms tend to cumulate before they reach a peak.     Currently using Nyquil and Dayquil daily to manage her symptoms  She uses Zyrtec also daily.   Using the Flonase every morning   She notes that these above interactions help, but not enough.     - otherwise feeling well in usual state of health    Physical exam:  General: In no acute distress, well-developed, well-nourished  Eyes: no conjunctivitis  ENT: no signs of rhinitis   Pulmonary: no wheezing or coughing  Skin: Focused examination of the skin on test sites was performed = see test results below      Past Medical History:   Patient Active Problem List   Diagnosis    Anxiety    Depressive disorder    Duodenal ulcer    History of alcohol abuse    History of methamphetamine abuse (H)    Migraines    Seasonal allergic rhinitis    Sleep disturbance    Tobacco abuse    CARDIOVASCULAR SCREENING; LDL GOAL LESS THAN 160    Chronic bilateral low back pain with bilateral sciatica    Pure hypercholesterolemia    Mild intermittent asthma without complication    Chronic pansinusitis    Obesity (BMI 35.0-39.9) with comorbidity (H)    Lumbago    Fatty liver    Lung nodule    Pulmonary emphysema, unspecified emphysema type (H)    Medical marijuana use     Past Medical History:   Diagnosis Date    Alcohol abuse     Arthritis     C. difficile colitis     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)  "       Allergies:  Allergies   Allergen Reactions    Amoxicillin Anaphylaxis    Morphine Other (See Comments) and Nausea and Vomiting    Penicillins Unknown       Medications:  Current Outpatient Medications   Medication    albuterol (PROAIR HFA/PROVENTIL HFA/VENTOLIN HFA) 108 (90 Base) MCG/ACT inhaler    albuterol (VENTOLIN HFA) 108 (90 Base) MCG/ACT inhaler    amitriptyline (ELAVIL) 50 MG tablet    benzonatate (TESSALON) 200 MG capsule    cetirizine (ZYRTEC) 10 MG tablet    diclofenac (VOLTAREN) 75 MG EC tablet    doxycycline hyclate (VIBRA-TABS) 100 MG tablet    fluticasone (FLONASE) 50 MCG/ACT nasal spray    fluticasone-salmeterol (ADVAIR) 100-50 MCG/ACT inhaler    ipratropium - albuterol 0.5 mg/2.5 mg/3 mL (DUONEB) 0.5-2.5 (3) MG/3ML neb solution    lidocaine, viscous, (XYLOCAINE) 2 % solution    pregabalin (LYRICA) 150 MG capsule    rizatriptan (MAXALT) 10 MG tablet     No current facility-administered medications for this visit.       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 Works in a corn field occasionally.     Family History:  Family History   Problem Relation Age of Onset    No Known Problems Mother     Unknown/Adopted Father     Heart Disease No family hx of     Diabetes No family hx of     Cancer No family hx of     Colon Cancer No family hx of        Previous Labs, Allergy Tests, Dermatopathology, Imaging:  As a child, doesn't remember the results.     Referred By: Referred Self, MD  No address on file     Allergy Tests:    Past Allergy Test    Order for Future Allergy Testing:    [x] Outpatient  [] Inpatient: [place]..../ Bed ....       Skin Atopy (atopic dermatitis) [] Yes   [x] No .........  Contact allergies:   [] Yes   [x] No ..........  Hand eczema:   [x] Yes   [] No           Leading hand:   [] R   [] L       [] Ambidextrous         Drug allergies:        [x] Yes   [] No  which?...... Anaphylaxis to  amoxicillin.     Urticaria/Angioedema  [] Yes   [x] No .........  Food Allergy:  [] Yes   [x] No  which?......  Pets :  [] Yes   [x] No  which?......         [x]  Rhinitis   [x] Conjunctivitis   [x] Sinusitis   [] Polyposis   [x] Otitis   [x] Pharyngitis         [x]  Postnasal drip    []  none  Operations:   [] Tonsils   [] Septum   [] Sinus   [] Polyposis        [x] Asthma bronchiale   [] Coughing      []  None (diagnosed early teens)  Symptoms (mostly Rhinoconjunctivitis and Asthma) aggravated by:  Season   [] I   [] II   [x] III   [x] IV   []V   []VI   []VII   []VIII   [x]IX   [x]X   []XI   []XII     [] perennial (spring and fall)  Day time      [x] morning   [] noon      [] evening        [x] night    [] whole day........  []  none  Location/changes    [] inside        [] outside   [] mountains    [] sea     [] others.............   [x]  none  Triggers, specific     [] animals     [] plants     [] dust              [] others ...........................    []  None (corn field)   Triggers, others       [] work          [] psyche    [] sport            [] others .............................  [x]  none  Irritant                [] phys efforts [x] smoke    [] heat/cold     [] odors  []others............... []  None (air quality; heat/humidity, or very cold temp)    Order for PATCH TESTS  Reason for tests (suspected allergy): not necessary  Known previous allergies: n/a  Standardized panels  [] Standard panel (40 tests)  [] Preservatives & Antimicrobials (31 tests)  [] Emulsifiers & Additives (25 tests)   [] Perfumes/Flavours & Plants (25 tests)  [] Hairdresser panel (12 tests)  [] Rubber Chemicals (22 tests)  [] Plastics (26 tests)  [] Colorants/Dyes/Food additives (20 tests)  [] Metals (implants/dental) (24 tests)  [] Local anaesthetics/NSAIDs (13 tests)  [] Antibiotics & Antimycotics (14 tests)   [] Corticosteroids (15 tests)   [] Photopatch test (62 tests)   [] others: ...      [] Patient's own products: ...  DO  NOT test if chemical or biological identity is unknown!   always ask from patient the product information and safety sheets (MSDS)       Order for PRICK TESTS    Reason for tests (suspected allergy): pansinusitis perennial  Known previous allergies: none    Standardized prick panels  [] Atopic panel (20 tests)  [] Pediatric Panel (12 tests)  [] Milk, Meat, Eggs, Grains (20 tests)   [] Dust, Epithelia, Feathers (10 tests)  [] Fish, Seafood, Shellfish (17 tests)  [] Nuts, Beans (14 tests)  [] Spice, Vegetable, Fruit (17 tests)  [] Pollen Panel = Tree, Grass, Weed (24 tests)  [] Others: ...      [] Patient's own products: ...  DO NOT test if chemical or biological identity is unknown!   always ask from patient the product information and safety sheets (MSDS)     Standardized intradermal tests  [] Penicillium notatum [] Aspergillus fumigatus [] House dust mites D.far & D. pteron  [] Cat    [] dog  [] Others: ...  [] Bee venom   [] Wasp venom  !!Specific protocol with dilutions!!       Order for Drug allergy tests (prick & Intradermal &  patch tests)    [] Penicillin G  [] Ampicillin [] Cefazolin   [] Ceftriaxone   [] Ceftazidime  [] Bactrim    [] Others: ...  Order for ... as test date      Atopy Screen (Placed Nov 21, 2023)  No Substance Readings (15 min) Evaluation   POS Histamine 1mg/ml - On antihistamines   NEG NaCl 0.9% -      No Substance Readings (15 min) Evaluation   1 Alternaria alternata (tenuis)  +    2 Cladosporium herbarum +    3 Aspergillus fumigatus -    4 Penicillium notatum -    5 Dermatophagoides pteronyssinus ++    6 Dermatophagoides farinae +/++    7 Dog epithelium (canis spp) +/++    8 Cat hair (roger catus) +    9 Cockroach   (Blatella americana & germanica) -    10 Grass mix midwest   (Jacuqelyn, Orchard, Redtop, Kishor) -    11 Pedro grass (sorghum halepense) -    12 Weed mix   (common Cocklebur, Lamb s quarters, rough redroot Pigweed, Dock/Sorrel) -    13 Mug wort (artemisia vulgare) -    14  Ragweed giant/short (ambrosia spp) -    15 White birch (Betula papyrifera) -    16 Tree mix 1 (Pecan, Maple BHR, Oak RVW, american [place], black [place]) -    17 Red cedar (juniperus virginia) -    18 Tree mix 2   (white Jason, river/red Birch, black [place], common [place], american Elm) -    19 Box elder/Maple mix (acer spp) -    20 [place] shagbark (carya ovata) -           Conclusion       Intradermal Testing (Placed Nov 21, 2023)  No Substance Conc.  Reading (15min)  immediate Papule [mm] / Erythema [mm] Reading   (... days)  delayed Papule [mm] / Erythema [mm] Remarks   DF Standard Dust Mite - D. Farinae 1:10 -   -    DP Standard Dust Mite - D. Pteronyssinus 1:10 -   -    A Aspergillus fumigatus  1:10 -   -    P Penicillium notatum 1:10 -   -     Dog epithelium 1:10 + 4/7  -      1:10 -   -    Conclusions:     ________________________________    Assessment & Plan:    ==> Final Diagnosis:     # chronic pansinusitis with Rhinitis  Partially allergic with proven sensitization to house dust mites > dog > seasonal molds > cat  * chronic illness with exacerbation, progression, side effects from treatment      These conclusions are made at the best of one's knowledge and belief based on the provided evidence such as patient's history and allergy test results and they can change over time or can be incomplete because of missing information's.    ==> Treatment Plan:    Nasonex nasal spray at bedtime  Azelastin nasal spray 2-3 times daily  2xdaily Allegra  HOUSE DUST MITES reduction info given    Procedures Performed: Allergy tests, including prick, intradermal tests     Staff:  Provider    Follow-up in Derm-Allergy clinic if delayed reactions and to see how ttx works (in about 6 weeks)    I spent a total of 40minutes with Daniella Sexton. This time was spent counseling the patient and/or coordinating care, explaining the allergy tests , performing allergy tests and assessing the clinical relevance.

## 2023-11-21 NOTE — PATIENT INSTRUCTIONS
House Dust Mite Allergy        The house dust mite is an arachnid about 0.3 mm in size and not visible to the naked eye. There are around 150 species of house dust mites in the world. One mite produces up to 40 fecal droppings a day. One teaspoonful of bedroom dust contains an average of nearly 1000 mites and 250,000 minute droppings.    Causes and triggers of house dust mite allergy  The house dust mite requires a warm, moist environment without light in order to live and reproduce. Our beds are ideal. The mite feeds on human and animal skin scales. The allergen is mainly contained in the mite's feces. The feces contain allergy-triggering constituents which are spread in fine dust, are breathed in and can cause an allergic reaction.    Symptoms  When the allergens come into contact with the mucous membranes in the eyes, nose, mouth and throat, sufferers develop symptoms typical of an allergic cold (allergic rhinitis) or an allergic inflammation of the conjunctiva (allergic conjunctivitis): blocked or runny nose, sneezing, red, itchy eyes. If all of these symptoms are present, then the condition is also known as rhinoconjunctivitis. Often, the upper respiratory tract becomes chronically inflamed, primarily because house dust mites are present all year round.  The symptoms of house dust mite allergy typically occur in the morning and are more frequent in the cold months of the year.    Therapy and treatment  As a first step, mattress, pillows and duvet/comforter should be placed in mite-proof or anti-mite covers, sometimes known as encasings. Alternatively you can use pillows or comforter that can be washed at over 130 F monthly. At the same time, house dust should be minimized. If necessary, the symptoms can be treated with medication, for example antihistamines in the form of nasal sprays, eye drops and tablets. Desensitization/specific immunotherapy (SIT) is recommended for house dust allergy if all the measures  "above are not sufficient.    Tips and tricks:  Keep room temperature at 66-70 F and relative air humidity at a maximum of 50%.  Ideally, thoroughly air your home two to three times a day for 5 to 10 minutes each time.  Wash bed linens in at least 130 F every week.  Remove stuffed animals or freeze them every other week.  Keep ceiling fans off in the bedroom as they can stir up dust mite allergens.  Remove dust from furniture with a damp cloth and regularly wet mop floors.  Do not put pot and hydroponic plants in the bedroom and also avoid putting too many in living areas, as they increase room humidity.  When staying overnight in other accommodations, we recommend taking your own bed linen and the above anti-mite mattress covers with you.  Remove upholstered furniture from the bedroom and consider removing the carpets. Ideally, use sealed parquet or laminate miriam, cork tiles or miriam made of wood, novilon or PVC.  Maybe additionally reduce dust mites in mattress, upholstery, or miriam using hot steam .      Modified from \"House Dust Mite Allergy\" by [employer] [facility].   "

## 2023-11-21 NOTE — RESULT ENCOUNTER NOTE
Al Brewer  Thanks for getting the CT scan done so promptly.  It is very useful, and clearly shows a lot of chronic infection has returned. But interestingly, it is almost all in the LEFT cheek (maxilary) sinus.  The rest of the sinuses are clear, and all the openings we made in surgery are still open. So in other words, there is no need for another operation as far as I can tell.    The best possible course of treatment would be to do the antibiotic nasal rinses again, like we did a few years ago.  That way we can flood that left cheek sinus with concentrated antibiotics, and hopefully that will do the trick.    I have sent that into the [facility], and they will call you soon to arrange delivery    Hopefully a month, maybe two months, and everything will be back to normal    Abdulaziz Miguel

## 2023-11-24 LAB
A FUMIGATUS IGE QN: <0.1 KU(A)/L
D FARINAE IGE QN: 12.6 KU(A)/L
D PTERONYSS IGE QN: 11.6 KU(A)/L
DOG DANDER+EPITH IGE QN: <0.1 KU(A)/L
IGE SERPL-ACNC: 65 KU/L (ref 0–114)
P NOTATUM IGE QN: <0.1 KU(A)/L

## 2023-12-12 ENCOUNTER — APPOINTMENT (OUTPATIENT)
Dept: OCCUPATIONAL MEDICINE | Facility: CLINIC | Age: 43
End: 2023-12-12

## 2023-12-12 PROCEDURE — 99000 SPECIMEN HANDLING OFFICE-LAB: CPT | Performed by: NURSE PRACTITIONER

## 2023-12-30 NOTE — PROGRESS NOTES
"[facility] Dermato-allergology Note  Office visit  Encounter Date: Jan 2, 2024  ____________________________________________    CC: No chief complaint on file.      HPI:  (Jan 2, 2024)  Ms. Daniella Sexton is a(n) 43 year old female who presents today as a return patient for allergy consultation  - Follow-up in Derm-Allergy clinic if delayed reactions and to see how ttx works (in about 6 weeks)  - patient got really improvement of the sinus problems after reduction of HOUSE DUST MITES and with the antihistamines/nasal sprays (Azelastin and Nasonex)  - otherwise feeling well in usual state of health    Physical exam:  General: In no acute distress, well-developed, well-nourished  Eyes: no conjunctivitis  ENT: no signs of rhinitis   Pulmonary: no wheezing or coughing  Skin:no active skin lesions    Earlier History and Allergy exams:  (Nov 21, 2023)  Duration: 10 years  \"Sinus infections\"  Switched health systems to [place]. Sees Dr. Ayon.  Got sinus surgery.   Before the surgery, was able to detect her sinus symptoms.   Symptoms include: headache, neck ache, sinus pressure, ears, and throat.   Now the symptoms tend to cumulate before they reach a peak.     Currently using Nyquil and Dayquil daily to manage her symptoms  She uses Zyrtec also daily.   Using the Flonase every morning   She notes that these above interactions help, but not enough.       Past Medical History:   Patient Active Problem List   Diagnosis    Anxiety    Depressive disorder    Duodenal ulcer    History of alcohol abuse    History of methamphetamine abuse (H)    Migraines    Seasonal allergic rhinitis    Sleep disturbance    Tobacco abuse    CARDIOVASCULAR SCREENING; LDL GOAL LESS THAN 160    Chronic bilateral low back pain with bilateral sciatica    Pure hypercholesterolemia    Mild intermittent asthma without complication    Chronic pansinusitis    Obesity (BMI 35.0-39.9) with comorbidity (H)    Lumbago    Fatty liver    Lung " nodule    Pulmonary emphysema, unspecified emphysema type (H)    Medical marijuana use     Past Medical History:   Diagnosis Date    Alcohol abuse     Arthritis     C. difficile colitis     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)        Allergies:  Allergies   Allergen Reactions    Amoxicillin Anaphylaxis    Morphine Other (See Comments) and Nausea and Vomiting    Penicillins Unknown       Medications:  Current Outpatient Medications   Medication    albuterol (PROAIR HFA/PROVENTIL HFA/VENTOLIN HFA) 108 (90 Base) MCG/ACT inhaler    albuterol (VENTOLIN HFA) 108 (90 Base) MCG/ACT inhaler    amitriptyline (ELAVIL) 50 MG tablet    azelastine (ASTELIN) 0.1 % nasal spray    baclofen (LIORESAL) 10 MG tablet    cetirizine (ZYRTEC) 10 MG tablet    COMPOUNDED NON-CONTROLLED SUBSTANCE (CMPD RX) - PHARMACY TO MIX COMPOUNDED MEDICATION    diclofenac (VOLTAREN) 75 MG EC tablet    doxycycline hyclate (VIBRA-TABS) 100 MG tablet    fexofenadine (ALLEGRA) 180 MG tablet    fluticasone (FLONASE) 50 MCG/ACT nasal spray    fluticasone-salmeterol (ADVAIR) 100-50 MCG/ACT inhaler    ipratropium - albuterol 0.5 mg/2.5 mg/3 mL (DUONEB) 0.5-2.5 (3) MG/3ML neb solution    lidocaine, viscous, (XYLOCAINE) 2 % solution    mometasone (NASONEX) 50 MCG/ACT nasal spray    pregabalin (LYRICA) 150 MG capsule    rizatriptan (MAXALT) 10 MG tablet     No current facility-administered medications for this visit.       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 Works in a corn field occasionally.     Family History:  Family History   Problem Relation Age of Onset    No Known Problems Mother     Unknown/Adopted Father     Heart Disease No family hx of     Diabetes No family hx of     Cancer No family hx of     Colon Cancer No family hx of        Previous Labs, Allergy Tests, Dermatopathology, Imaging:  As a child, doesn't remember the results.     Referred By: Referred  MD Gonzalo  No address on file     Allergy Tests:    Past Allergy Test    Order for Future Allergy Testing:    [x] Outpatient  [] Inpatient: [place]..../ Bed ....       Skin Atopy (atopic dermatitis) [] Yes   [x] No .........  Contact allergies:   [] Yes   [x] No ..........  Hand eczema:   [x] Yes   [] No           Leading hand:   [] R   [] L       [] Ambidextrous         Drug allergies:        [x] Yes   [] No  which?...... Anaphylaxis to amoxicillin.     Urticaria/Angioedema  [] Yes   [x] No .........  Food Allergy:  [] Yes   [x] No  which?......  Pets :  [] Yes   [x] No  which?......         [x]  Rhinitis   [x] Conjunctivitis   [x] Sinusitis   [] Polyposis   [x] Otitis   [x] Pharyngitis         [x]  Postnasal drip    []  none  Operations:   [] Tonsils   [] Septum   [] Sinus   [] Polyposis        [x] Asthma bronchiale   [] Coughing      []  None (diagnosed early teens)  Symptoms (mostly Rhinoconjunctivitis and Asthma) aggravated by:  Season   [] I   [] II   [x] III   [x] IV   []V   []VI   []VII   []VIII   [x]IX   [x]X   []XI   []XII     [] perennial (spring and fall)  Day time      [x] morning   [] noon      [] evening        [x] night    [] whole day........  []  none  Location/changes    [] inside        [] outside   [] mountains    [] sea     [] others.............   [x]  none  Triggers, specific     [] animals     [] plants     [] dust              [] others ...........................    []  None (corn field)   Triggers, others       [] work          [] psyche    [] sport            [] others .............................  [x]  none  Irritant                [] phys efforts [x] smoke    [] heat/cold     [] odors  []others............... []  None (air quality; heat/humidity, or very cold temp)    Order for PATCH TESTS  Reason for tests (suspected allergy): not necessary  Known previous allergies: n/a  Standardized panels  [] Standard panel (40 tests)  [] Preservatives & Antimicrobials (31 tests)  [] Emulsifiers &  Additives (25 tests)   [] Perfumes/Flavours & Plants (25 tests)  [] Hairdresser panel (12 tests)  [] Rubber Chemicals (22 tests)  [] Plastics (26 tests)  [] Colorants/Dyes/Food additives (20 tests)  [] Metals (implants/dental) (24 tests)  [] Local anaesthetics/NSAIDs (13 tests)  [] Antibiotics & Antimycotics (14 tests)   [] Corticosteroids (15 tests)   [] Photopatch test (62 tests)   [] others: ...      [] Patient's own products: ...  DO NOT test if chemical or biological identity is unknown!   always ask from patient the product information and safety sheets (MSDS)       Order for PRICK TESTS    Reason for tests (suspected allergy): pansinusitis perennial  Known previous allergies: none    Standardized prick panels  [] Atopic panel (20 tests)  [] Pediatric Panel (12 tests)  [] Milk, Meat, Eggs, Grains (20 tests)   [] Dust, Epithelia, Feathers (10 tests)  [] Fish, Seafood, Shellfish (17 tests)  [] Nuts, Beans (14 tests)  [] Spice, Vegetable, Fruit (17 tests)  [] Pollen Panel = Tree, Grass, Weed (24 tests)  [] Others: ...      [] Patient's own products: ...  DO NOT test if chemical or biological identity is unknown!   always ask from patient the product information and safety sheets (MSDS)     Standardized intradermal tests  [] Penicillium notatum [] Aspergillus fumigatus [] House dust mites D.far & D. pteron  [] Cat    [] dog  [] Others: ...  [] Bee venom   [] Wasp venom  !!Specific protocol with dilutions!!       Order for Drug allergy tests (prick & Intradermal &  patch tests)    [] Penicillin G  [] Ampicillin [] Cefazolin   [] Ceftriaxone   [] Ceftazidime  [] Bactrim    [] Others: ...  Order for ... as test date      Atopy Screen (Placed Nov 21, 2023)  No Substance Readings (15 min) Evaluation   POS Histamine 1mg/ml - On antihistamines   NEG NaCl 0.9% -      No Substance Readings (15 min) Evaluation   1 Alternaria alternata (tenuis)  +    2 Cladosporium herbarum +    3 Aspergillus fumigatus -    4 Penicillium  notatum -    5 Dermatophagoides pteronyssinus ++    6 Dermatophagoides farinae +/++    7 Dog epithelium (canis spp) +/++    8 Cat hair (roger catus) +    9 Cockroach   (Blatella americana & germanica) -    10 Grass mix midwest   (Jacquelyn, Orchard, Redtop, Kishor) -    11 Pedro grass (sorghum halepense) -    12 Weed mix   (common Cocklebur, Lamb s quarters, rough redroot Pigweed, Dock/Sorrel) -    13 Mug wort (artemisia vulgare) -    14 Ragweed giant/short (ambrosia spp) -    15 White birch (Betula papyrifera) -    16 Tree mix 1 (Pecan, Maple BHR, Oak RVW, american [place], black [place]) -    17 Red cedar (juniperus virginia) -    18 Tree mix 2   (white Jason, river/red Birch, black [place], common [place], american Elm) -    19 Box elder/Maple mix (acer spp) -    20 [place] shagbark (carya ovata) -           Conclusion       Intradermal Testing (Placed Nov 21, 2023)  No Substance Conc.  Reading (15min)  immediate Papule [mm] / Erythema [mm] Reading   (... days)  delayed Papule [mm] / Erythema [mm] Remarks   A Aspergillus fumigatus  1:10 -   -    P Penicillium notatum 1:10 -   -     Dog epithelium 1:10 + 4/7  -      1:10 -   -    Conclusions: no delayed reaction to the intradermal tests    ________________________________    Assessment & Plan:    ==> Final Diagnosis:     # chronic pansinusitis with Rhinitis  Partially allergic with proven sensitization to house dust mites > dog > seasonal molds > cat  * chronic illness with exacerbation, progression, side effects from treatment      These conclusions are made at the best of one's knowledge and belief based on the provided evidence such as patient's history and allergy test results and they can change over time or can be incomplete because of missing information's.    ==> Treatment Plan:    Continue Nasonex nasal spray at bedtime  Continue Allegra 180 at bedtime   Continue prn Azelastin nasal spray during the day  HOUSE DUST MITES reduction info given     Write  symptoms diary    --> see how this works. Could discuss in the future Immunotherapy (allergy shots)      Staff: : provider    Follow-up in Derm-Allergy clinic if necessary  ___________________________    I spent a total of 20 minutes with Daniella Sexton during today s  visit. This time was spent discussing all the individual test results, correlating them to the clinical relevance, counseling the patient

## 2024-01-02 ENCOUNTER — OFFICE VISIT (OUTPATIENT)
Dept: ALLERGY | Facility: CLINIC | Age: 44
End: 2024-01-02
Payer: COMMERCIAL

## 2024-01-02 DIAGNOSIS — J30.2 SEASONAL ALLERGIC RHINITIS, UNSPECIFIED TRIGGER: ICD-10-CM

## 2024-01-02 DIAGNOSIS — J32.4 CHRONIC PANSINUSITIS: ICD-10-CM

## 2024-01-02 DIAGNOSIS — J32.9 CHRONIC RHINOSINUSITIS: ICD-10-CM

## 2024-01-02 DIAGNOSIS — Z91.09 HOUSE DUST MITE ALLERGY: ICD-10-CM

## 2024-01-02 PROCEDURE — 99213 OFFICE O/P EST LOW 20 MIN: CPT | Performed by: DERMATOLOGY

## 2024-01-02 RX ORDER — FEXOFENADINE HCL 180 MG/1
180 TABLET ORAL AT BEDTIME
Qty: 60 TABLET | Refills: 5 | Status: SHIPPED | OUTPATIENT
Start: 2024-01-02 | End: 2025-01-01

## 2024-01-02 RX ORDER — AZELASTINE 1 MG/ML
1 SPRAY, METERED NASAL 2 TIMES DAILY PRN
Qty: 30 ML | Refills: 8 | Status: SHIPPED | OUTPATIENT
Start: 2024-01-02 | End: 2025-01-01

## 2024-01-02 RX ORDER — MOMETASONE FUROATE MONOHYDRATE 50 UG/1
2 SPRAY, METERED NASAL AT BEDTIME
Qty: 17 G | Refills: 5 | Status: SHIPPED | OUTPATIENT
Start: 2024-01-02 | End: 2024-06-25

## 2024-01-02 ASSESSMENT — PAIN SCALES - GENERAL: PAINLEVEL: NO PAIN (0)

## 2024-01-02 NOTE — LETTER
"    1/2/2024         RE: Daniella Sexton  [address]  [facility] [address]        Dear Colleague,    Thank you for referring your patient, Daniella Sexton, to the [facility] [place]. Please see a copy of my visit note below.    [facility] Dermato-allergology Note  Office visit  Encounter Date: Jan 2, 2024  ____________________________________________    CC: No chief complaint on file.      HPI:  (Jan 2, 2024)  Ms. Daniella Sexton is a(n) 43 year old female who presents today as a return patient for allergy consultation  - Follow-up in Derm-Allergy clinic if delayed reactions and to see how ttx works (in about 6 weeks)  - patient got really improvement of the sinus problems after reduction of HOUSE DUST MITES and with the antihistamines/nasal sprays (Azelastin and Nasonex)  - otherwise feeling well in usual state of health    Physical exam:  General: In no acute distress, well-developed, well-nourished  Eyes: no conjunctivitis  ENT: no signs of rhinitis   Pulmonary: no wheezing or coughing  Skin:no active skin lesions    Earlier History and Allergy exams:  (Nov 21, 2023)  Duration: 10 years  \"Sinus infections\"  Switched health systems to [place]. Sees Dr. Ayon.  Got sinus surgery.   Before the surgery, was able to detect her sinus symptoms.   Symptoms include: headache, neck ache, sinus pressure, ears, and throat.   Now the symptoms tend to cumulate before they reach a peak.     Currently using Nyquil and Dayquil daily to manage her symptoms  She uses Zyrtec also daily.   Using the Flonase every morning   She notes that these above interactions help, but not enough.       Past Medical History:   Patient Active Problem List   Diagnosis     Anxiety     Depressive disorder     Duodenal ulcer     History of alcohol abuse     History of methamphetamine abuse (H)     Migraines     Seasonal allergic rhinitis     Sleep disturbance     Tobacco abuse     CARDIOVASCULAR " SCREENING; LDL GOAL LESS THAN 160     Chronic bilateral low back pain with bilateral sciatica     Pure hypercholesterolemia     Mild intermittent asthma without complication     Chronic pansinusitis     Obesity (BMI 35.0-39.9) with comorbidity (H)     Lumbago     Fatty liver     Lung nodule     Pulmonary emphysema, unspecified emphysema type (H)     Medical marijuana use     Past Medical History:   Diagnosis Date     Alcohol abuse      Arthritis      C. 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)        Allergies:  Allergies   Allergen Reactions     Amoxicillin Anaphylaxis     Morphine Other (See Comments) and Nausea and Vomiting     Penicillins Unknown       Medications:  Current Outpatient Medications   Medication     albuterol (PROAIR HFA/PROVENTIL HFA/VENTOLIN HFA) 108 (90 Base) MCG/ACT inhaler     albuterol (VENTOLIN HFA) 108 (90 Base) MCG/ACT inhaler     amitriptyline (ELAVIL) 50 MG tablet     azelastine (ASTELIN) 0.1 % nasal spray     baclofen (LIORESAL) 10 MG tablet     cetirizine (ZYRTEC) 10 MG tablet     COMPOUNDED NON-CONTROLLED SUBSTANCE (CMPD RX) - PHARMACY TO MIX COMPOUNDED MEDICATION     diclofenac (VOLTAREN) 75 MG EC tablet     doxycycline hyclate (VIBRA-TABS) 100 MG tablet     fexofenadine (ALLEGRA) 180 MG tablet     fluticasone (FLONASE) 50 MCG/ACT nasal spray     fluticasone-salmeterol (ADVAIR) 100-50 MCG/ACT inhaler     ipratropium - albuterol 0.5 mg/2.5 mg/3 mL (DUONEB) 0.5-2.5 (3) MG/3ML neb solution     lidocaine, viscous, (XYLOCAINE) 2 % solution     mometasone (NASONEX) 50 MCG/ACT nasal spray     pregabalin (LYRICA) 150 MG capsule     rizatriptan (MAXALT) 10 MG tablet     No current facility-administered medications for this visit.       Social History:  The patient works in production. Patient has the following hobbies or non-occupational exposure (works in [employer]). Works in a corn field occasionally.     Family History:  Family History   Problem Relation Age of Onset      No Known Problems Mother      Unknown/Adopted Father      Heart Disease No family hx of      Diabetes No family hx of      Cancer No family hx of      Colon Cancer No family hx of        Previous Labs, Allergy Tests, Dermatopathology, Imaging:  As a child, doesn't remember the results.     Referred By: Referred Self, MD  No address on file     Allergy Tests:    Past Allergy Test    Order for Future Allergy Testing:    [x] Outpatient  [] Inpatient: [place]..../ Bed ....       Skin Atopy (atopic dermatitis) [] Yes   [x] No .........  Contact allergies:   [] Yes   [x] No ..........  Hand eczema:   [x] Yes   [] No           Leading hand:   [] R   [] L       [] Ambidextrous         Drug allergies:        [x] Yes   [] No  which?...... Anaphylaxis to amoxicillin.     Urticaria/Angioedema  [] Yes   [x] No .........  Food Allergy:  [] Yes   [x] No  which?......  Pets :  [] Yes   [x] No  which?......         [x]  Rhinitis   [x] Conjunctivitis   [x] Sinusitis   [] Polyposis   [x] Otitis   [x] Pharyngitis         [x]  Postnasal drip    []  none  Operations:   [] Tonsils   [] Septum   [] Sinus   [] Polyposis        [x] Asthma bronchiale   [] Coughing      []  None (diagnosed early teens)  Symptoms (mostly Rhinoconjunctivitis and Asthma) aggravated by:  Season   [] I   [] II   [x] III   [x] IV   []V   []VI   []VII   []VIII   [x]IX   [x]X   []XI   []XII     [] perennial (spring and fall)  Day time      [x] morning   [] noon      [] evening        [x] night    [] whole day........  []  none  Location/changes    [] inside        [] outside   [] mountains    [] sea     [] others.............   [x]  none  Triggers, specific     [] animals     [] plants     [] dust              [] others ...........................    []  None (corn field)   Triggers, others       [] work          [] psyche    [] sport            [] others .............................  [x]  none  Irritant                [] phys efforts [x] smoke    [] heat/cold      [] odors  []others............... []  None (air quality; heat/humidity, or very cold temp)    Order for PATCH TESTS  Reason for tests (suspected allergy): not necessary  Known previous allergies: n/a  Standardized panels  [] Standard panel (40 tests)  [] Preservatives & Antimicrobials (31 tests)  [] Emulsifiers & Additives (25 tests)   [] Perfumes/Flavours & Plants (25 tests)  [] Hairdresser panel (12 tests)  [] Rubber Chemicals (22 tests)  [] Plastics (26 tests)  [] Colorants/Dyes/Food additives (20 tests)  [] Metals (implants/dental) (24 tests)  [] Local anaesthetics/NSAIDs (13 tests)  [] Antibiotics & Antimycotics (14 tests)   [] Corticosteroids (15 tests)   [] Photopatch test (62 tests)   [] others: ...      [] Patient's own products: ...  DO NOT test if chemical or biological identity is unknown!   always ask from patient the product information and safety sheets (MSDS)       Order for PRICK TESTS    Reason for tests (suspected allergy): pansinusitis perennial  Known previous allergies: none    Standardized prick panels  [] Atopic panel (20 tests)  [] Pediatric Panel (12 tests)  [] Milk, Meat, Eggs, Grains (20 tests)   [] Dust, Epithelia, Feathers (10 tests)  [] Fish, Seafood, Shellfish (17 tests)  [] Nuts, Beans (14 tests)  [] Spice, Vegetable, Fruit (17 tests)  [] Pollen Panel = Tree, Grass, Weed (24 tests)  [] Others: ...      [] Patient's own products: ...  DO NOT test if chemical or biological identity is unknown!   always ask from patient the product information and safety sheets (MSDS)     Standardized intradermal tests  [] Penicillium notatum [] Aspergillus fumigatus [] House dust mites D.far & D. pteron  [] Cat    [] dog  [] Others: ...  [] Bee venom   [] Wasp venom  !!Specific protocol with dilutions!!       Order for Drug allergy tests (prick & Intradermal &  patch tests)    [] Penicillin G  [] Ampicillin [] Cefazolin   [] Ceftriaxone   [] Ceftazidime  [] Bactrim    [] Others: ...  Order for ... as  test date      Atopy Screen (Placed Nov 21, 2023)  No Substance Readings (15 min) Evaluation   POS Histamine 1mg/ml - On antihistamines   NEG NaCl 0.9% -      No Substance Readings (15 min) Evaluation   1 Alternaria alternata (tenuis)  +    2 Cladosporium herbarum +    3 Aspergillus fumigatus -    4 Penicillium notatum -    5 Dermatophagoides pteronyssinus ++    6 Dermatophagoides farinae +/++    7 Dog epithelium (canis spp) +/++    8 Cat hair (roger catus) +    9 Cockroach   (Blatella americana & germanica) -    10 Grass mix midwest   (Jacquelyn, Orchard, Redtop, Kishor) -    11 Pedro grass (sorghum halepense) -    12 Weed mix   (common Cocklebur, Lamb s quarters, rough redroot Pigweed, Dock/Sorrel) -    13 Mug wort (artemisia vulgare) -    14 Ragweed giant/short (ambrosia spp) -    15 White birch (Betula papyrifera) -    16 Tree mix 1 (Pecan, Maple BHR, Oak RVW, american [place], black [place]) -    17 Red cedar (juniperus virgin[place]) -    18 Tree mix 2   (white [place], river/red Birch, black [place], common [place], american Elm) -    19 Box elder/Maple mix (acer spp) -    20 [place] shagbark (carya ovata) -           Conclusion       Intradermal Testing (Placed Nov 21, 2023)  No Substance Conc.  Reading (15min)  immediate Papule [mm] / Erythema [mm] Reading   (... days)  delayed Papule [mm] / Erythema [mm] Remarks   A Aspergillus fumigatus  1:10 -   -    P Penicillium notatum 1:10 -   -     Dog epithelium 1:10 + 4/7  -      1:10 -   -    Conclusions: no delayed reaction to the intradermal tests    ________________________________    Assessment & Plan:    ==> Final Diagnosis:     # chronic pansinusitis with Rhinitis  Partially allergic with proven sensitization to house dust mites > dog > seasonal molds > cat  * chronic illness with exacerbation, progression, side effects from treatment      These conclusions are made at the best of one's knowledge and belief based on the provided evidence such as patient's history  and allergy test results and they can change over time or can be incomplete because of missing information's.    ==> Treatment Plan:    Continue Nasonex nasal spray at bedtime  Continue Allegra 180 at bedtime   Continue prn Azelastin nasal spray during the day  HOUSE DUST MITES reduction info given     Write symptoms diary    --> see how this works. Could discuss in the future Immunotherapy (allergy shots)      Staff: : provider    Follow-up in Derm-Allergy clinic if necessary  ___________________________    I spent a total of 20 minutes with Daniella NETTA Sexton during today s  visit. This time was spent discussing all the individual test results, correlating them to the clinical relevance, counseling the patient           Again, thank you for allowing me to participate in the care of your patient.        Sincerely,        Tom Napier MD

## 2024-01-15 ENCOUNTER — OFFICE VISIT (OUTPATIENT)
Dept: FAMILY MEDICINE | Facility: CLINIC | Age: 44
End: 2024-01-15
Payer: COMMERCIAL

## 2024-01-15 VITALS
OXYGEN SATURATION: 97 % | RESPIRATION RATE: 20 BRPM | SYSTOLIC BLOOD PRESSURE: 117 MMHG | TEMPERATURE: 98.3 F | HEART RATE: 90 BPM | WEIGHT: 266 LBS | BODY MASS INDEX: 41.75 KG/M2 | HEIGHT: 67 IN | DIASTOLIC BLOOD PRESSURE: 80 MMHG

## 2024-01-15 DIAGNOSIS — J06.9 VIRAL URI: Primary | ICD-10-CM

## 2024-01-15 DIAGNOSIS — J30.2 SEASONAL ALLERGIC RHINITIS, UNSPECIFIED TRIGGER: ICD-10-CM

## 2024-01-15 DIAGNOSIS — E66.01 MORBID OBESITY (H): ICD-10-CM

## 2024-01-15 DIAGNOSIS — J43.9 PULMONARY EMPHYSEMA, UNSPECIFIED EMPHYSEMA TYPE (H): ICD-10-CM

## 2024-01-15 DIAGNOSIS — G43.909 MIGRAINE WITHOUT STATUS MIGRAINOSUS, NOT INTRACTABLE, UNSPECIFIED MIGRAINE TYPE: ICD-10-CM

## 2024-01-15 PROCEDURE — 99213 OFFICE O/P EST LOW 20 MIN: CPT | Performed by: PHYSICIAN ASSISTANT

## 2024-01-15 PROCEDURE — 87635 SARS-COV-2 COVID-19 AMP PRB: CPT | Performed by: PHYSICIAN ASSISTANT

## 2024-01-15 ASSESSMENT — PAIN SCALES - GENERAL: PAINLEVEL: SEVERE PAIN (6)

## 2024-01-15 NOTE — PATIENT INSTRUCTIONS
At [facility], we strive to deliver an exceptional experience to you, every time we see you. If you receive a survey, please complete it as we do value your feedback.  If you have MyChart, you can expect to receive results automatically within 24 hours of their completion.  Your provider will send a note interpreting your results as well.   If you do not have MyChart, you should receive your results in about a week by mail.    Your care team:                            Family Medicine Internal Medicine   MD Peter Hart MD Shantel Branch-Fleming, MD Srinivasa Vaka, MD Katya Belousova, PAROSA Martines, MD Pediatrics   Carlin Payne, PAMD Nely Wilson MD Amelia Massimini APRN CNP   JENAE Gutierrez CNP, MD Charanya Pasupathi, MD Kathleen Widmer, NP coming October 2023 Same-Day (No follow up visit)    ROGER Reed PA coming Oct 2023     Clinic hours: Monday - Thursday 7 am-6 pm; Fridays 7 am-5 pm.   Urgent care: Monday - Friday 10 am- 8 pm; Saturday and Sunday 9 am-5 pm.    Clinic: [phone]       [facility]: Monday - Thursday 8 am - 7 pm; Friday 8 am - 6 pm  [facility]: [phone]

## 2024-01-15 NOTE — LETTER
January 15, 2024      Daniella Sexton  [address]  [facility] [facility] [address]        To Whom It May Concern:    Daniella Sexton  was seen on 01/15/2024.  Please excuse associated absences.        Sincerely,        Fanny Nugent PA-C

## 2024-01-15 NOTE — PROGRESS NOTES
"  Assessment & Plan     Viral URI  Suspect headache, body aches is associated with new viral illness  With timing of onset URI symptoms.  Reviewed over-the-counter medications as well as prescription medications.  Unable to take over-the-counter NSAIDs due to her open ask.  Discussed Tylenol.  Caution on checking her over-the-counter cold medications to ensure no additional Tylenol.  - Symptomatic COVID-19 Virus (Coronavirus) by PCR Oropharynx    Migraine without status migrainosus, not intractable, unspecified migraine type  Suspect headache is more tension related to her URI, not consistent with her past migraines.  Continue baseline migraine preventative meds.    Seasonal allergic rhinitis, unspecified trigger  Continue baseline medications, likely symptoms are exacerbated with current illness    (E66.01) Morbid obesity (H)  Would make higher risk for COVID    (J43.9) Pulmonary emphysema, unspecified emphysema type (H)  Would make higher risk for COVID, lung exam reassuring currently               BMI:   Estimated body mass index is 41.66 kg/m  as calculated from the following:    Height as of this encounter: 1.702 m (5' 7\").    Weight as of this encounter: 120.7 kg (266 lb).           Fanny Nugent PA-C  [facility]    Deniz Brewer is a 43 year old, presenting for the following health issues:  Headache        1/15/2024     1:02 PM   Additional Questions   Roomed by Yas   Accompanied by Self       History of Present Illness       Headaches:   Since the patient's last clinic visit, headaches are: no change  The patient is getting headaches:  Couple days a month  She is not able to do normal daily activities when she has a migraine.  The patient is taking the following rescue/relief medications:  Tylenol   Patient states \"I get only a small amount of relief\" from the rescue/relief medications.   The patient is taking the following medications to prevent migraines:  " "Amitriptyline  In the past 4 weeks, the patient has gone to an Urgent Care or Emergency Room 0 times times due to headaches.    Reason for visit:  Cough nech and shoulder pain  Symptom onset:  1-3 days ago  Symptoms include:  Headache cough neck and shoulder pain  Symptom intensity:  Severe  Symptom progression:  Staying the same  Had these symptoms before:  Yes  Has tried/received treatment for these symptoms:  Yes  Previous treatment was successful:  Yes  Prior treatment description:  Na  What makes it worse:  Being upright and having to hold my head up  What makes it better:  Sleeping    She eats 2-3 servings of fruits and vegetables daily.She consumes 1 sweetened beverage(s) daily.She exercises with enough effort to increase her heart rate 9 or less minutes per day.  She exercises with enough effort to increase her heart rate 3 or less days per week. She is missing 1 dose(s) of medications per week.  She is not taking prescribed medications regularly due to remembering to take.       Patient notes that symptoms for started 1/10/2024.  Developed a headache throughout her head, initially thought it could be a migraine but in location than typical migraines.  Has been ongoing since then.  Associated neck pain bilaterally as well as body aches.  No numbness, tingling, weakness to her upper extremities.  Has some associated congestion.  No fevers.  Associated productive cough.  COVID going around work.  She does not have any COVID test, would like to be tested.          Review of Systems         Objective    /80 (BP Location: Left arm, Patient Position: Chair, Cuff Size: Adult Large)   Pulse 90   Temp 98.3  F (36.8  C) (Tympanic)   Resp 20   Ht 1.702 m (5' 7\")   Wt 120.7 kg (266 lb)   SpO2 97%   BMI 41.66 kg/m    Body mass index is 41.66 kg/m .  Physical Exam   GENERAL: healthy, alert and no distress  EYES: Eyes grossly normal to inspection, PERRL and conjunctivae and sclerae normal  HENT: normal " cephalic/atraumatic, ear canals and TM's normal, nasal mucosa edematous , oropharynx clear, and oral mucous membranes moist  NECK: No midline bony tenderness.  Range of motion appropriate.  No adenopathy, no asymmetry, masses  RESP: lungs clear to auscultation - no rales, rhonchi or wheezes  CV: regular rate and rhythm, normal S1 S2, no S3 or S4, no murmur  NEURO: Alert and oriented.  Cranial nerves grossly intact.

## 2024-01-16 LAB — SARS-COV-2 RNA RESP QL NAA+PROBE: NEGATIVE

## 2024-01-25 DIAGNOSIS — M54.42 CHRONIC BILATERAL LOW BACK PAIN WITH BILATERAL SCIATICA: Primary | ICD-10-CM

## 2024-01-25 DIAGNOSIS — M54.41 CHRONIC BILATERAL LOW BACK PAIN WITH BILATERAL SCIATICA: Primary | ICD-10-CM

## 2024-01-25 DIAGNOSIS — G89.29 CHRONIC BILATERAL LOW BACK PAIN WITH BILATERAL SCIATICA: Primary | ICD-10-CM

## 2024-01-25 RX ORDER — BACLOFEN 10 MG/1
10 TABLET ORAL 2 TIMES DAILY
Qty: 300 TABLET | Refills: 0 | Status: SHIPPED | OUTPATIENT
Start: 2024-01-25 | End: 2024-02-20

## 2024-01-30 ENCOUNTER — TELEPHONE (OUTPATIENT)
Dept: FAMILY MEDICINE | Facility: CLINIC | Age: 44
End: 2024-01-30

## 2024-01-30 ENCOUNTER — OFFICE VISIT (OUTPATIENT)
Dept: FAMILY MEDICINE | Facility: CLINIC | Age: 44
End: 2024-01-30
Payer: COMMERCIAL

## 2024-01-30 VITALS
DIASTOLIC BLOOD PRESSURE: 79 MMHG | OXYGEN SATURATION: 97 % | HEART RATE: 87 BPM | BODY MASS INDEX: 41.59 KG/M2 | RESPIRATION RATE: 18 BRPM | WEIGHT: 265 LBS | SYSTOLIC BLOOD PRESSURE: 121 MMHG | HEIGHT: 67 IN | TEMPERATURE: 99 F

## 2024-01-30 DIAGNOSIS — R91.1 LUNG NODULE: ICD-10-CM

## 2024-01-30 DIAGNOSIS — J43.9 PULMONARY EMPHYSEMA, UNSPECIFIED EMPHYSEMA TYPE (H): ICD-10-CM

## 2024-01-30 DIAGNOSIS — F15.11 HISTORY OF METHAMPHETAMINE ABUSE (H): ICD-10-CM

## 2024-01-30 DIAGNOSIS — J01.91 ACUTE RECURRENT SINUSITIS, UNSPECIFIED LOCATION: Primary | ICD-10-CM

## 2024-01-30 DIAGNOSIS — Z72.0 TOBACCO ABUSE: ICD-10-CM

## 2024-01-30 DIAGNOSIS — G43.909 MIGRAINE WITHOUT STATUS MIGRAINOSUS, NOT INTRACTABLE, UNSPECIFIED MIGRAINE TYPE: ICD-10-CM

## 2024-01-30 PROBLEM — J45.30 MILD PERSISTENT ASTHMA: Status: RESOLVED | Noted: 2019-01-02 | Resolved: 2024-01-30

## 2024-01-30 PROBLEM — M54.50 LUMBAGO: Status: RESOLVED | Noted: 2020-10-05 | Resolved: 2024-01-30

## 2024-01-30 PROBLEM — Z13.6 CARDIOVASCULAR SCREENING; LDL GOAL LESS THAN 160: Status: RESOLVED | Noted: 2018-07-05 | Resolved: 2024-01-30

## 2024-01-30 PROBLEM — Z79.899 MEDICAL MARIJUANA USE: Status: RESOLVED | Noted: 2022-09-12 | Resolved: 2024-01-30

## 2024-01-30 PROBLEM — J32.4 CHRONIC PANSINUSITIS: Status: RESOLVED | Noted: 2019-01-17 | Resolved: 2024-01-30

## 2024-01-30 PROBLEM — J45.30 MILD PERSISTENT ASTHMA: Status: ACTIVE | Noted: 2019-01-02

## 2024-01-30 PROCEDURE — 99214 OFFICE O/P EST MOD 30 MIN: CPT | Performed by: FAMILY MEDICINE

## 2024-01-30 PROCEDURE — 90480 ADMN SARSCOV2 VAC 1/ONLY CMP: CPT | Performed by: FAMILY MEDICINE

## 2024-01-30 PROCEDURE — 91320 SARSCV2 VAC 30MCG TRS-SUC IM: CPT | Performed by: FAMILY MEDICINE

## 2024-01-30 RX ORDER — DOXYCYCLINE 100 MG/1
100 TABLET ORAL 2 TIMES DAILY
Qty: 20 TABLET | Refills: 0 | Status: SHIPPED | OUTPATIENT
Start: 2024-01-30 | End: 2024-02-09

## 2024-01-30 NOTE — LETTER
My COPD Action Plan     Name: Daniella Sexton    YOB: 1980   Date: 1/30/2024    My doctor: Tosha Smith MD   [facility] [id number] [phone]  My Controller Medicine: Serevent/Fluticasone (Advair)   Dose: twice daily      My Rescue Medicine: Albuterol (Proair/Ventolin/Proventil) inhaler   Dose: 2 - 4 puffs every 4 hours as needed      My Flare Up Medicine: Prednisone   Dose: call the clinic      My COPD Severity: Mild = FeV1 > 80%      Use of Oxygen: Oxygen Not Prescribed      Make sure you've had your pneumonia   vaccines.          GREEN ZONE       Doing well today    Usual level of activity and exercise  Usual amount of cough and mucus  No shortness of breath  Usual level of health (thinking clearly, sleeping well, feel like eating) Actions:    Take daily medicines  Use oxygen as prescribed  Follow regular exercise and diet plan  Avoid cigarette smoke and other irritants that harm the lungs           YELLOW ZONE          Having a bad day or flare up    Short of breath more than usual  A lot more sputum (mucus) than usual  Sputum looks yellow, green, tan, brown or bloody  More coughing or wheezing  Fever or chills  Less energy; trouble completing activities  Trouble thinking or focusing  Using quick relief inhaler or nebulizer more often  Poor sleep; symptoms wake me up  Do not feel like eating Actions:    Get plenty of rest  Take daily medicines  Use quick relief inhaler every 4 hours  If you use oxygen, call you doctor to see if you should adjust your oxygen  Do breathing exercises or other things to help you relax  Let a loved one, friend or neighbor know you are feeling worse  Call your care team if you have 2 or more symptoms.  Start taking steroids or antibiotics if directed by your care team           RED ZONE       Need medical care now    Severe shortness of breath (feel you can't breathe)  Fever, chills  Not enough  breath to do any activity  Trouble coughing up mucus, walking or talking  Blood in mucus  Frequent coughing Rescue medicines are not working  Not able to sleep because of breathing  Feel confused or drowsy  Chest pain    Actions:    Call your health care team.  If you cannot reach your care team, call 911 or go to the emergency room.        Annual Reminders:  Meet with Care Team, Flu Shot every Fall  Pharmacy:    [facility] - [place], [place] - [address]  [facility] - [place], [place] - [facility] [address]

## 2024-01-30 NOTE — TELEPHONE ENCOUNTER
Patient reports headache, ear aches, stuffy nose and cough. This started 2 or 3 weeks ago. (Appears seen on 1/15 for this concern) Denies fever but gets hot flashes and chills. She has dry cough but once in a while has mucus production that can be brown in color. Has been using dayQuil and nyQuil and musinex. These are ineffective at treating her symptoms.   She is the only one in her household. Denies SOB / Chest pain. She stated she has frequent sinus infections. She wonders if this what is going on.   Patient scheduled with open provider today.     Patient will plan to use urgent care if symptoms worsen or call RN triage back at that time.     Pamela Katz RN on 1/30/2024 at 9:06 AM

## 2024-01-30 NOTE — LETTER
January 30, 2024      Daniella Sexton  [address]  [facility] [address]        To Whom It May Concern:    Daniella Sexton was seen on 1/30/24.           Sincerely,        Tosha Smith MD

## 2024-01-30 NOTE — PROGRESS NOTES
"Assessment & Plan     Acute recurrent sinusitis, unspecified location  - doxycycline monohydrate (ADOXA) 100 MG tablet; Take 1 tablet (100 mg) by mouth 2 times daily for 10 days    Lung nodule  - CT Chest w/o Contrast; Future    Pulmonary emphysema, unspecified emphysema type (H)  Tobacco abuse  -continue medications  -offered help with tobacco cessation     Migraine without status migrainosus, not intractable, unspecified migraine type  -Well controlled with medications without side effects.     History of methamphetamine abuse (H)  -follow     BMI  Estimated body mass index is 41.5 kg/m  as calculated from the following:    Height as of this encounter: 1.702 m (5' 7\").    Weight as of this encounter: 120.2 kg (265 lb).   Weight management plan: Patient was referred to their PCP to discuss a diet and exercise plan.    See Patient Instructions    Deniz Brewer is a 43 year old, presenting for the following health issues:  RECHECK (URI symptoms)        1/30/2024    10:35 AM   Additional Questions   Roomed by Yanelis CHENEY CMA   Accompanied by Self     History of Present Illness       Headaches:   Since the patient's last clinic visit, headaches are: worsened  The patient is getting headaches:  Monthly  She is not able to do normal daily activities when she has a migraine.  The patient is taking the following rescue/relief medications:  Tylenol, Maxalt and other   Patient states \"I get no relief\" from the rescue/relief medications.   The patient is taking the following medications to prevent migraines:  Amitriptyline  In the past 4 weeks, the patient has gone to an Urgent Care or Emergency Room 2 times times due to headaches.    Reason for visit:  Headache ears hurt and cough    She eats 2-3 servings of fruits and vegetables daily.She consumes 1 sweetened beverage(s) daily.She exercises with enough effort to increase her heart rate 9 or less minutes per day.  She exercises with enough effort to increase her heart rate " "3 or less days per week. She is missing 1 dose(s) of medications per week.     Patient reports sinus congestion, pressure, ear fullness, headache, and cough for 3 - 4 weeks. No wheezing. Hx of sinus surgery.               Objective    /79 (BP Location: Left arm, Patient Position: Sitting, Cuff Size: Adult Large)   Pulse 87   Temp 99  F (37.2  C) (Oral)   Resp 18   Ht 1.702 m (5' 7\")   Wt 120.2 kg (265 lb)   LMP 01/26/2024 (Exact Date)   SpO2 97%   BMI 41.50 kg/m    Body mass index is 41.5 kg/m .  Physical Exam   GENERAL: alert, no distress, and obese  EYES: Eyes grossly normal to inspection, PERRL and conjunctivae and sclerae normal  HENT: ear canals and TM's normal, nose and mouth without ulcers or lesions  NECK: no adenopathy, no asymmetry, masses, or scars  RESP: lungs clear to auscultation - no rales, rhonchi or wheezes  CV: regular rate and rhythm, normal S1 S2, no S3 or S4, no murmur, click or rub, no peripheral edema  MS: no gross musculoskeletal defects noted, no edema  PSYCH: mentation appears normal, affect flat, judgement and insight intact, and appearance disheveled    Office Visit on 01/15/2024   Component Date Value Ref Range Status    SARS CoV2 PCR 01/15/2024 Negative  Negative Final    NEGATIVE: SARS-CoV-2 (COVID-19) RNA not detected, presumed negative.     No results found for this or any previous visit (from the past 24 hour(s)).        Signed Electronically by: Tosha Smith MD      "

## 2024-02-06 ENCOUNTER — LAB (OUTPATIENT)
Dept: LAB | Facility: CLINIC | Age: 44
End: 2024-02-06
Payer: COMMERCIAL

## 2024-02-06 DIAGNOSIS — R74.8 ELEVATED LIVER ENZYMES: ICD-10-CM

## 2024-02-06 DIAGNOSIS — K76.0 HEPATIC STEATOSIS: ICD-10-CM

## 2024-02-06 DIAGNOSIS — R10.84 ABDOMINAL PAIN, GENERALIZED: ICD-10-CM

## 2024-02-06 PROCEDURE — 86038 ANTINUCLEAR ANTIBODIES: CPT

## 2024-02-06 PROCEDURE — 82103 ALPHA-1-ANTITRYPSIN TOTAL: CPT | Mod: 90

## 2024-02-06 PROCEDURE — 86364 TISS TRNSGLTMNASE EA IG CLAS: CPT

## 2024-02-06 PROCEDURE — 99000 SPECIMEN HANDLING OFFICE-LAB: CPT

## 2024-02-06 PROCEDURE — 82784 ASSAY IGA/IGD/IGG/IGM EACH: CPT

## 2024-02-06 PROCEDURE — 81332 SERPINA1 GENE: CPT | Mod: 90

## 2024-02-06 PROCEDURE — 80076 HEPATIC FUNCTION PANEL: CPT

## 2024-02-06 PROCEDURE — 83516 IMMUNOASSAY NONANTIBODY: CPT | Mod: 90

## 2024-02-06 PROCEDURE — 36415 COLL VENOUS BLD VENIPUNCTURE: CPT

## 2024-02-07 ENCOUNTER — VIRTUAL VISIT (OUTPATIENT)
Dept: GASTROENTEROLOGY | Facility: CLINIC | Age: 44
End: 2024-02-07
Attending: PHYSICIAN ASSISTANT
Payer: COMMERCIAL

## 2024-02-07 VITALS — HEIGHT: 67 IN | WEIGHT: 265 LBS | BODY MASS INDEX: 41.59 KG/M2

## 2024-02-07 DIAGNOSIS — K76.0 HEPATIC STEATOSIS: Primary | ICD-10-CM

## 2024-02-07 DIAGNOSIS — E66.01 MORBID OBESITY (H): ICD-10-CM

## 2024-02-07 DIAGNOSIS — R73.03 PREDIABETES: ICD-10-CM

## 2024-02-07 LAB
ALBUMIN SERPL BCG-MCNC: 4.6 G/DL (ref 3.5–5.2)
ALP SERPL-CCNC: 106 U/L (ref 40–150)
ALT SERPL W P-5'-P-CCNC: 29 U/L (ref 0–50)
ANA SER QL IF: NEGATIVE
AST SERPL W P-5'-P-CCNC: 28 U/L (ref 0–45)
BILIRUB DIRECT SERPL-MCNC: <0.2 MG/DL (ref 0–0.3)
BILIRUB SERPL-MCNC: 0.3 MG/DL
IGA SERPL-MCNC: 216 MG/DL (ref 84–499)
PROT SERPL-MCNC: 7.1 G/DL (ref 6.4–8.3)

## 2024-02-07 PROCEDURE — 99213 OFFICE O/P EST LOW 20 MIN: CPT | Mod: 95 | Performed by: PHYSICIAN ASSISTANT

## 2024-02-07 ASSESSMENT — PAIN SCALES - GENERAL: PAINLEVEL: NO PAIN (0)

## 2024-02-07 NOTE — NURSING NOTE
Is the patient currently in the state of [place]? YES    Visit mode:VIDEO    If the visit is dropped, the patient can be reconnected by: VIDEO VISIT: Text to cell phone:   Telephone Information:   Mobile [phone]       Will anyone else be joining the visit? NO  (If patient encounters technical issues they should call [phone]    How would you like to obtain your AVS? MyChart    Are changes needed to the allergy or medication list? No    Reason for visit: ISAC EDWARDS

## 2024-02-07 NOTE — LETTER
2/7/2024         RE: Daniella Sexton  [address]  [facility] [address]        Dear Colleague,    Thank you for referring your patient, Daniella Sexton, to the [facility] [place]. Please see a copy of my visit note below.    Hepatology Follow-up Clinic note  Daniella Sexton   Date of Birth 1980    Reason for follow-up: hepatic steatosis          Assessment/plan:   Daniella Sexton is a 43 year old female with imaging findings of hepatic steatosis. Liver function also normal without stigmata of advanced liver disease. US elastography in June 2023 showing normal elastography measurements.     #Metabolic/alcohol associated fatty liver disease (MetALD):   - Patient has risk factors making them at risk for fibrosis development. Fortunately, no current fibrosis on elastography. We reviewed the slow progression nonalcoholic fatty liver disease and cirrhosis.     - Recommend follow up with non-invasive elastography or FibroScan in 5 years   - Recommend FIB-4 yearly with PCP. FIB-4 Calculation: 1.09 at 8/11/2023  4:38 PM  - Recommend slow gradual weight loss.   - Maintain good control of cholesterol (No contraindication to starting a statin with LFT elevations)   - Optimize blood glucose as needed. Could consider GLP-1 inhibitor for both insulin resistance and help with weight loss  - Continue limiting carbohydrates, especially simple carbohydrates, and following Mediterranean eating patten  - Increase physical activity as able, ideally 150 minutes weekly  - Limit alcohol intake to not more than 3-5 drinks a week    # Labs to rule out overlapping genetic and autoimmune causes of hepatobiliary disease are pending today   Labs pending at the time of visit:  BMP, Hepatic panel, CBC, INR, YEHUDA, F-actin, TTG/IgA     - Follow up with Hepatology in future as needed if found to have advanced fibrosis     Vandana Galarza PA-C   [facility]  cl[facility]    -----------------------------------------------------       HPI:   Daniella Sexton is a 43 year old female presenting for follow-up.     Dx: MAFLD  Risk factors: obesity, HLD,  moderate alcohol use (previously nine drinks a week) and pre-diabetes.  Bx:  no   US elastography: median liver stiffness is: 1.09 m/sec, normal levels    Patient last seen by me on 8/16/2023. No recent hospitalizations or ER visits. Started allegra and two different nasal sprays and antibiotics.     Currently following 2000 mg sodium, limit carbohydrates. Per chart review, weight is up about 9 lbs.     Patient denies jaundice, lower extremity edema, abdominal distension or confusion.  Patient also denies melena, hematochezia or hematemesis.    Patient denies weight loss, fevers, sweats or chills.    Previous work-up:   Lab Results   Component Value Date    HEPBANG Nonreactive 06/07/2023    HBCAB Nonreactive 08/11/2023    AUSAB 8.54 06/07/2023    HCVAB Nonreactive 06/07/2023    VIKRAM 281 (H) 06/07/2023    IRONSAT 11 (L) 06/07/2023    TSH 1.43 01/19/2023    CHOL 249 (H) 07/08/2022    HDL 68 07/08/2022     (H) 07/08/2022    TRIG 202 (H) 07/08/2022    A1C 5.8 (H) 08/11/2023        Recent Labs   Lab Test 08/11/23  1638 06/07/23  1545 07/08/22  1438   ALKPHOS 107* 136* 108   ALT 32 64* 37   AST 43 104* 26   BILITOTAL 0.3 0.3 0.4        Medical hx Surgical hx   Past Medical History:   Diagnosis Date    Alcohol abuse     Anxiety 04/18/2014    Arthritis     C. difficile colitis     Chronic bilateral low back pain with bilateral sciatica 07/18/2018    Chronic pansinusitis 01/17/2019    Duodenal ulcer 03/01/1995    Fatty liver 12/16/2020    History of alcohol abuse 04/18/2014    History of methamphetamine abuse (H) 04/18/2014    Lung nodule 12/17/2020    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)     Migraines 04/18/2014    Mild intermittent asthma without complication 01/02/2019    Obesity (BMI 35.0-39.9) with comorbidity (H) 05/08/2019     "Pulmonary emphysema, unspecified emphysema type (H) 07/08/2022    Pure hypercholesterolemia 08/27/2018    Seasonal allergic rhinitis 04/18/2014    Past Surgical History:   Procedure Laterality Date    BACK SURGERY  2005    L 3-4, L 4-5    LITHOTRIPSY  2016    OPTICAL TRACKING SYSTEM ENDOSCOPIC SINUS SURGERY Bilateral 1/10/2019    Procedure: BILATERAL IMAGE GUIDED ENDOSCOPIC SINUS SURGERY, BILATERAL TURBINATE REDUCTION;  Surgeon: Maikol Miguel MD;  Location: [facility] OR    RELEASE CARPAL TUNNEL Left     TONSILLECTOMY      TUBAL LIGATION                   Medications:     Current Outpatient Medications   Medication    albuterol (PROAIR HFA/PROVENTIL HFA/VENTOLIN HFA) 108 (90 Base) MCG/ACT inhaler    amitriptyline (ELAVIL) 50 MG tablet    azelastine (ASTELIN) 0.1 % nasal spray    baclofen (LIORESAL) 10 MG tablet    diclofenac (VOLTAREN) 75 MG EC tablet    doxycycline monohydrate (ADOXA) 100 MG tablet    fexofenadine (ALLEGRA) 180 MG tablet    fluticasone (FLONASE) 50 MCG/ACT nasal spray    fluticasone-salmeterol (ADVAIR) 100-50 MCG/ACT inhaler    ipratropium - albuterol 0.5 mg/2.5 mg/3 mL (DUONEB) 0.5-2.5 (3) MG/3ML neb solution    mometasone (NASONEX) 50 MCG/ACT nasal spray    pregabalin (LYRICA) 150 MG capsule    rizatriptan (MAXALT) 10 MG tablet     No current facility-administered medications for this visit.            Allergies:     Allergies   Allergen Reactions    Amoxicillin Anaphylaxis    Morphine Other (See Comments) and Nausea and Vomiting    Penicillins Unknown            Review of Systems:   10 points ROS was obtained and highlighted in the HPI, otherwise negative.          Physical Exam:   Ht 1.702 m (5' 7\")   Wt 120.2 kg (265 lb)   LMP 01/26/2024 (Exact Date)   BMI 41.50 kg/m      Gen- well, NAD, A+Ox3, normal color  Lym- no palpable LAD  CVS- RRR  RS- CTA  Abd-   Extr- hands normal, no ROMANA  Skin- no rash or jaundice  Neuro- no asterixis  Psych- normal mood           Data:   Reviewed in person and " "significant for:    Lab Results   Component Value Date     08/11/2023      Lab Results   Component Value Date    POTASSIUM 3.9 08/11/2023     Lab Results   Component Value Date    CHLORIDE 102 08/11/2023     Lab Results   Component Value Date    CO2 23 08/11/2023     Lab Results   Component Value Date    BUN 13.9 08/11/2023     Lab Results   Component Value Date    CR 0.64 08/11/2023       Lab Results   Component Value Date    WBC 11.0 08/11/2023    WBC 11.8 03/18/2020     Lab Results   Component Value Date    HGB 13.2 08/11/2023    HGB 13.4 03/18/2020     Lab Results   Component Value Date    HCT 38.9 08/11/2023    HCT 41.6 03/18/2020     Lab Results   Component Value Date    MCV 91 08/11/2023    MCV 95 03/18/2020     Lab Results   Component Value Date     08/11/2023     03/18/2020       Lab Results   Component Value Date    AST 43 08/11/2023     Lab Results   Component Value Date    ALT 32 08/11/2023     No results found for: \"BILICONJ\"   Lab Results   Component Value Date    BILITOTAL 0.3 08/11/2023       Lab Results   Component Value Date    ALBUMIN 4.5 08/11/2023    ALBUMIN 4.0 07/08/2022     Lab Results   Component Value Date    PROTTOTAL 7.1 08/11/2023      Lab Results   Component Value Date    ALKPHOS 107 08/11/2023       Lab Results   Component Value Date    INR 0.98 08/11/2023       EXAMINATION: US ABDOMEN COMPLETE,  6/19/2023 6:38 AM      COMPARISON: Renal ultrasound 6/8/2023     HISTORY: Elevated liver enzymes     TECHNIQUE: The abdomen was scanned in standard fashion with  specialized ultrasound transducer(s) using both gray-scale and limited  color Doppler techniques.     Ultrasound elastography of the liver was performed using a Barragan  ultrasound machine using 10 separate measurements of the liver  parenchyma with patient in supine position. Measurements were obtained  approximately 2 cm beneath Mendoza's capsule, perpendicular to the  liver capsule. Images are of satisfactory " quality.     FINDINGS:  Liver: The liver demonstrates diffusely increased echogenicity.  Geometric hypoechogenicity in the right hepatic lobe likely represents  focal fatty sparing. The liver measures 20.9 cm. The main portal vein  is patent with antegrade flow, measuring 1.0 cm.     Gallbladder:  There is no wall thickening, pericholecystic fluid,  positive sonographic Snyder's sign or evidence for cholelithiasis.     Bile Ducts: Both the intra- and extrahepatic biliary system are of  normal caliber.  The common bile duct measures 3 mm in diameter.     Pancreas: Visualized portions of the head and body of the pancreas are  unremarkable. Pancreas is partially obscured.     Kidneys: Kidneys are not well seen. Grossly no hydronephrosis. The  craniocaudal dimensions are: right- 13.1 cm, left- 11.4 cm.     Spleen: The spleen is normal in size,  measuring 10.2 cm in sagittal  dimension.     Aorta and IVC: The visualized portions of the aorta and IVC are  unremarkable. The proximal aorta measures 2.0 cm in diameter.     Fluid: No evidence of ascites or pleural effusions.     Elastography:     The median liver stiffness is: 1.09 m/sec  The mean liver stiffness is: 1.09 m/sec  The interquartile range is: 0.09  IQR/median: 0.08.  (IQR/median value of greater than 0.15 indicates  that a dataset is unreliable)                                                                      IMPRESSION:     1. The median liver stiffness is 1.09 m/sec and the IQR/median value  is 0.08 . This is a normal elastography value with low likelihood of  severe fibrosis or cirrhosis.  2. Increased echogenicity of the hepatic parenchyma which may  represent parenchymal liver disease including hepatic steatosis.  Geographic area of hypoechogenicity seen along the right lobe of  liver, favored to represent focal fatty sparing. May consider  attention on follow-up.    Virtual Visit Details    Type of service:  Video Visit   Joined the call at 2/7/2024,  11:18:32 am.  Left the call at 2/7/2024, 11:28:26 am.    Originating Location (pt. Location): Home  Distant Location (provider location):  Off-site  Platform used for Video Visit: Yesy Galarza PA-C

## 2024-02-07 NOTE — PROGRESS NOTES
Hepatology Follow-up Clinic note  Daniella Sexton   Date of Birth 1980    Reason for follow-up: hepatic steatosis          Assessment/plan:   Daniella Sexton is a 43 year old female with imaging findings of hepatic steatosis. Liver function also normal without stigmata of advanced liver disease. US elastography in June 2023 showing normal elastography measurements.     #Metabolic/alcohol associated fatty liver disease (MetALD):   - Patient has risk factors making them at risk for fibrosis development. Fortunately, no current fibrosis on elastography. We reviewed the slow progression nonalcoholic fatty liver disease and cirrhosis.     - Recommend follow up with non-invasive elastography or FibroScan in 5 years   - Recommend FIB-4 yearly with PCP. FIB-4 Calculation: 1.09 at 8/11/2023  4:38 PM  - Recommend slow gradual weight loss.   - Maintain good control of cholesterol (No contraindication to starting a statin with LFT elevations)   - Optimize blood glucose as needed. Could consider GLP-1 inhibitor for both insulin resistance and help with weight loss  - Continue limiting carbohydrates, especially simple carbohydrates, and following Mediterranean eating patten  - Increase physical activity as able, ideally 150 minutes weekly  - Limit alcohol intake to not more than 3-5 drinks a week    # Labs to rule out overlapping genetic and autoimmune causes of hepatobiliary disease are pending today   Labs pending at the time of visit:  BMP, Hepatic panel, CBC, INR, YEHUDA, F-actin, TTG/IgA     - Follow up with Hepatology in future as needed if found to have advanced fibrosis     Vandana Galarza PA-C   [facility]    -----------------------------------------------------       HPI:   Daniella Sexton is a 43 year old female presenting for follow-up.     Dx: MAFLD  Risk factors: obesity, HLD,  moderate alcohol use (previously nine drinks a week) and pre-diabetes.  Bx:  no   US elastography: median liver  stiffness is: 1.09 m/sec, normal levels    Patient last seen by me on 8/16/2023. No recent hospitalizations or ER visits. Started allegra and two different nasal sprays and antibiotics.     Currently following 2000 mg sodium, limit carbohydrates. Per chart review, weight is up about 9 lbs.     Patient denies jaundice, lower extremity edema, abdominal distension or confusion.  Patient also denies melena, hematochezia or hematemesis.    Patient denies weight loss, fevers, sweats or chills.    Previous work-up:   Lab Results   Component Value Date    HEPBANG Nonreactive 06/07/2023    HBCAB Nonreactive 08/11/2023    AUSAB 8.54 06/07/2023    HCVAB Nonreactive 06/07/2023    VIKRAM 281 (H) 06/07/2023    IRONSAT 11 (L) 06/07/2023    TSH 1.43 01/19/2023    CHOL 249 (H) 07/08/2022    HDL 68 07/08/2022     (H) 07/08/2022    TRIG 202 (H) 07/08/2022    A1C 5.8 (H) 08/11/2023        Recent Labs   Lab Test 08/11/23  1638 06/07/23  1545 07/08/22  1438   ALKPHOS 107* 136* 108   ALT 32 64* 37   AST 43 104* 26   BILITOTAL 0.3 0.3 0.4        Medical hx Surgical hx   Past Medical History:   Diagnosis Date    Alcohol abuse     Anxiety 04/18/2014    Arthritis     C. difficile colitis     Chronic bilateral low back pain with bilateral sciatica 07/18/2018    Chronic pansinusitis 01/17/2019    Duodenal ulcer 03/01/1995    Fatty liver 12/16/2020    History of alcohol abuse 04/18/2014    History of methamphetamine abuse (H) 04/18/2014    Lung nodule 12/17/2020    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)     Migraines 04/18/2014    Mild intermittent asthma without complication 01/02/2019    Obesity (BMI 35.0-39.9) with comorbidity (H) 05/08/2019    Pulmonary emphysema, unspecified emphysema type (H) 07/08/2022    Pure hypercholesterolemia 08/27/2018    Seasonal allergic rhinitis 04/18/2014    Past Surgical History:   Procedure Laterality Date    BACK SURGERY  2005    L 3-4, L 4-5    LITHOTRIPSY  2016    OPTICAL TRACKING SYSTEM ENDOSCOPIC  "SINUS SURGERY Bilateral 1/10/2019    Procedure: BILATERAL IMAGE GUIDED ENDOSCOPIC SINUS SURGERY, BILATERAL TURBINATE REDUCTION;  Surgeon: Maikol Miguel MD;  Location: [facility] OR    RELEASE CARPAL TUNNEL Left     TONSILLECTOMY      TUBAL LIGATION                   Medications:     Current Outpatient Medications   Medication    albuterol (PROAIR HFA/PROVENTIL HFA/VENTOLIN HFA) 108 (90 Base) MCG/ACT inhaler    amitriptyline (ELAVIL) 50 MG tablet    azelastine (ASTELIN) 0.1 % nasal spray    baclofen (LIORESAL) 10 MG tablet    diclofenac (VOLTAREN) 75 MG EC tablet    doxycycline monohydrate (ADOXA) 100 MG tablet    fexofenadine (ALLEGRA) 180 MG tablet    fluticasone (FLONASE) 50 MCG/ACT nasal spray    fluticasone-salmeterol (ADVAIR) 100-50 MCG/ACT inhaler    ipratropium - albuterol 0.5 mg/2.5 mg/3 mL (DUONEB) 0.5-2.5 (3) MG/3ML neb solution    mometasone (NASONEX) 50 MCG/ACT nasal spray    pregabalin (LYRICA) 150 MG capsule    rizatriptan (MAXALT) 10 MG tablet     No current facility-administered medications for this visit.            Allergies:     Allergies   Allergen Reactions    Amoxicillin Anaphylaxis    Morphine Other (See Comments) and Nausea and Vomiting    Penicillins Unknown            Review of Systems:   10 points ROS was obtained and highlighted in the HPI, otherwise negative.          Physical Exam:   Ht 1.702 m (5' 7\")   Wt 120.2 kg (265 lb)   LMP 01/26/2024 (Exact Date)   BMI 41.50 kg/m      Gen- well, NAD, A+Ox3, normal color  Lym- no palpable LAD  CVS- RRR  RS- CTA  Abd-   Extr- hands normal, no ROMANA  Skin- no rash or jaundice  Neuro- no asterixis  Psych- normal mood           Data:   Reviewed in person and significant for:    Lab Results   Component Value Date     08/11/2023      Lab Results   Component Value Date    POTASSIUM 3.9 08/11/2023     Lab Results   Component Value Date    CHLORIDE 102 08/11/2023     Lab Results   Component Value Date    CO2 23 08/11/2023     Lab Results " "  Component Value Date    BUN 13.9 08/11/2023     Lab Results   Component Value Date    CR 0.64 08/11/2023       Lab Results   Component Value Date    WBC 11.0 08/11/2023    WBC 11.8 03/18/2020     Lab Results   Component Value Date    HGB 13.2 08/11/2023    HGB 13.4 03/18/2020     Lab Results   Component Value Date    HCT 38.9 08/11/2023    HCT 41.6 03/18/2020     Lab Results   Component Value Date    MCV 91 08/11/2023    MCV 95 03/18/2020     Lab Results   Component Value Date     08/11/2023     03/18/2020       Lab Results   Component Value Date    AST 43 08/11/2023     Lab Results   Component Value Date    ALT 32 08/11/2023     No results found for: \"BILICONJ\"   Lab Results   Component Value Date    BILITOTAL 0.3 08/11/2023       Lab Results   Component Value Date    ALBUMIN 4.5 08/11/2023    ALBUMIN 4.0 07/08/2022     Lab Results   Component Value Date    PROTTOTAL 7.1 08/11/2023      Lab Results   Component Value Date    ALKPHOS 107 08/11/2023       Lab Results   Component Value Date    INR 0.98 08/11/2023       EXAMINATION: US ABDOMEN COMPLETE,  6/19/2023 6:38 AM      COMPARISON: Renal ultrasound 6/8/2023     HISTORY: Elevated liver enzymes     TECHNIQUE: The abdomen was scanned in standard fashion with  specialized ultrasound transducer(s) using both gray-scale and limited  color Doppler techniques.     Ultrasound elastography of the liver was performed using a Barragan  ultrasound machine using 10 separate measurements of the liver  parenchyma with patient in supine position. Measurements were obtained  approximately 2 cm beneath Mendoza's capsule, perpendicular to the  liver capsule. Images are of satisfactory quality.     FINDINGS:  Liver: The liver demonstrates diffusely increased echogenicity.  Geometric hypoechogenicity in the right hepatic lobe likely represents  focal fatty sparing. The liver measures 20.9 cm. The main portal vein  is patent with antegrade flow, measuring 1.0 cm.   "   Gallbladder:  There is no wall thickening, pericholecystic fluid,  positive sonographic Snyder's sign or evidence for cholelithiasis.     Bile Ducts: Both the intra- and extrahepatic biliary system are of  normal caliber.  The common bile duct measures 3 mm in diameter.     Pancreas: Visualized portions of the head and body of the pancreas are  unremarkable. Pancreas is partially obscured.     Kidneys: Kidneys are not well seen. Grossly no hydronephrosis. The  craniocaudal dimensions are: right- 13.1 cm, left- 11.4 cm.     Spleen: The spleen is normal in size,  measuring 10.2 cm in sagittal  dimension.     Aorta and IVC: The visualized portions of the aorta and IVC are  unremarkable. The proximal aorta measures 2.0 cm in diameter.     Fluid: No evidence of ascites or pleural effusions.     Elastography:     The median liver stiffness is: 1.09 m/sec  The mean liver stiffness is: 1.09 m/sec  The interquartile range is: 0.09  IQR/median: 0.08.  (IQR/median value of greater than 0.15 indicates  that a dataset is unreliable)                                                                      IMPRESSION:     1. The median liver stiffness is 1.09 m/sec and the IQR/median value  is 0.08 . This is a normal elastography value with low likelihood of  severe fibrosis or cirrhosis.  2. Increased echogenicity of the hepatic parenchyma which may  represent parenchymal liver disease including hepatic steatosis.  Geographic area of hypoechogenicity seen along the right lobe of  liver, favored to represent focal fatty sparing. May consider  attention on follow-up.

## 2024-02-07 NOTE — PROGRESS NOTES
Virtual Visit Details    Type of service:  Video Visit   Joined the call at 2/7/2024, 11:18:32 am.  Left the call at 2/7/2024, 11:28:26 am.    Originating Location (pt. Location): Home  Distant Location (provider location):  Off-site  Platform used for Video Visit: [employer]

## 2024-02-08 LAB
TTG IGA SER-ACNC: 0.7 U/ML
TTG IGG SER-ACNC: 0.8 U/ML

## 2024-02-09 LAB — SMA IGG SER-ACNC: 6 UNITS

## 2024-02-13 LAB
A1AT PHENOTYP SERPL-IMP: NORMAL
A1AT SERPL-MCNC: 150 MG/DL
A1AT SS SERPL-MCNC: NEGATIVE G/L
A1AT SZ SERPL-MCNC: NORMAL G/L
A1AT ZZ SERPL-MCNC: NEGATIVE G/L
SPECIMEN SOURCE: NORMAL

## 2024-02-20 ENCOUNTER — OFFICE VISIT (OUTPATIENT)
Dept: FAMILY MEDICINE | Facility: CLINIC | Age: 44
End: 2024-02-20
Payer: COMMERCIAL

## 2024-02-20 VITALS
HEART RATE: 93 BPM | WEIGHT: 267 LBS | SYSTOLIC BLOOD PRESSURE: 125 MMHG | HEIGHT: 67 IN | TEMPERATURE: 97.7 F | DIASTOLIC BLOOD PRESSURE: 83 MMHG | OXYGEN SATURATION: 98 % | BODY MASS INDEX: 41.91 KG/M2

## 2024-02-20 DIAGNOSIS — M54.41 CHRONIC BILATERAL LOW BACK PAIN WITH BILATERAL SCIATICA: ICD-10-CM

## 2024-02-20 DIAGNOSIS — Z00.00 ROUTINE GENERAL MEDICAL EXAMINATION AT A HEALTH CARE FACILITY: Primary | ICD-10-CM

## 2024-02-20 DIAGNOSIS — F15.11 HISTORY OF METHAMPHETAMINE ABUSE (H): ICD-10-CM

## 2024-02-20 DIAGNOSIS — J32.9 CHRONIC SINUSITIS, UNSPECIFIED LOCATION: ICD-10-CM

## 2024-02-20 DIAGNOSIS — E78.00 PURE HYPERCHOLESTEROLEMIA: ICD-10-CM

## 2024-02-20 DIAGNOSIS — M54.42 CHRONIC BILATERAL LOW BACK PAIN WITH BILATERAL SCIATICA: ICD-10-CM

## 2024-02-20 DIAGNOSIS — G89.29 CHRONIC BILATERAL LOW BACK PAIN WITH BILATERAL SCIATICA: ICD-10-CM

## 2024-02-20 LAB — HBA1C MFR BLD: 5.7 % (ref 0–5.6)

## 2024-02-20 PROCEDURE — 83036 HEMOGLOBIN GLYCOSYLATED A1C: CPT

## 2024-02-20 PROCEDURE — 90471 IMMUNIZATION ADMIN: CPT

## 2024-02-20 PROCEDURE — 99213 OFFICE O/P EST LOW 20 MIN: CPT | Mod: 25

## 2024-02-20 PROCEDURE — 99396 PREV VISIT EST AGE 40-64: CPT | Mod: 25

## 2024-02-20 PROCEDURE — 80061 LIPID PANEL: CPT

## 2024-02-20 PROCEDURE — 90746 HEPB VACCINE 3 DOSE ADULT IM: CPT

## 2024-02-20 PROCEDURE — 36415 COLL VENOUS BLD VENIPUNCTURE: CPT

## 2024-02-20 RX ORDER — PREGABALIN 150 MG/1
CAPSULE ORAL
Qty: 60 CAPSULE | Refills: 3 | Status: CANCELLED | OUTPATIENT
Start: 2024-02-20

## 2024-02-20 RX ORDER — BACLOFEN 10 MG/1
10 TABLET ORAL 2 TIMES DAILY
Qty: 300 TABLET | Refills: 1 | Status: SHIPPED | OUTPATIENT
Start: 2024-02-20

## 2024-02-20 RX ORDER — BACLOFEN 10 MG/1
10 TABLET ORAL 2 TIMES DAILY
Qty: 300 TABLET | Refills: 0 | Status: SHIPPED | OUTPATIENT
Start: 2024-02-20 | End: 2024-02-20

## 2024-02-20 RX ORDER — PREDNISONE 20 MG/1
20 TABLET ORAL 2 TIMES DAILY
Qty: 10 TABLET | Refills: 0 | Status: SHIPPED | OUTPATIENT
Start: 2024-02-20 | End: 2024-02-25

## 2024-02-20 SDOH — HEALTH STABILITY: PHYSICAL HEALTH: ON AVERAGE, HOW MANY MINUTES DO YOU ENGAGE IN EXERCISE AT THIS LEVEL?: 10 MIN

## 2024-02-20 SDOH — HEALTH STABILITY: PHYSICAL HEALTH: ON AVERAGE, HOW MANY DAYS PER WEEK DO YOU ENGAGE IN MODERATE TO STRENUOUS EXERCISE (LIKE A BRISK WALK)?: 1 DAY

## 2024-02-20 ASSESSMENT — SOCIAL DETERMINANTS OF HEALTH (SDOH): HOW OFTEN DO YOU GET TOGETHER WITH FRIENDS OR RELATIVES?: ONCE A WEEK

## 2024-02-20 NOTE — PROGRESS NOTES
Preventive Care Visit  [facility]  JENAE Aiken CNP, Nurse Practitioner Primary Care  Feb 20, 2024    Assessment & Plan     Routine general medical examination at a health care facility  Care gaps addressed. Final Hep B vaccination given today. Lipids, hgbA1C ordered. Patient declined PAP today as she is on her menstrual cycle, will complete at a later date.   - Lipid panel reflex to direct LDL Non-fasting; Future  - Hemoglobin A1c; Future  - Lipid panel reflex to direct LDL Non-fasting  - Hemoglobin A1c    Pure hypercholesterolemia  Lipids ordered. Is not on medication at this point.   - Lipid panel reflex to direct LDL Non-fasting; Future  - Lipid panel reflex to direct LDL Non-fasting    Chronic bilateral low back pain with bilateral sciatica  Baclofen refilled. Has had issues with back pain since she was young. Has had back surgeries in the past.   - baclofen (LIORESAL) 10 MG tablet; Take 1 tablet (10 mg) by mouth 2 times daily    Chronic sinusitis, unspecified location  Patient treated with doxycyline (1/30) for chronic sinusitis and reports little improvement of symptoms. Plan for patient to take prednisone for five days.   - predniSONE (DELTASONE) 20 MG tablet; Take 1 tablet (20 mg) by mouth 2 times daily for 5 days              Counseling  Appropriate preventive services were discussed with this patient, including applicable screening as appropriate for fall prevention, nutrition, physical activity, Tobacco-use cessation, weight loss and cognition.  Checklist reviewing preventive services available has been given to the patient.  Reviewed patient's diet, addressing concerns and/or questions.   She is at risk for lack of exercise and has been provided with information to increase physical activity for the benefit of her well-being.   The patient was instructed to see the dentist every 6 months.   The patient reports drinking more than one alcoholic drink per day and sometimes  engages in binge or excessive drinking. The patient was counseled and given information about possible harmful effects of excessive alcohol intake as well as where to get help for alcohol problems.         Deniz Brewer is a 43 year old, presenting for the following:  Physical        2/20/2024     4:49 PM   Additional Questions   Roomed by alonso kelly        Health Care Directive  Patient does not have a Health Care Directive or Living Will: Discussed advance care planning with patient; however, patient declined at this time.    HPI              2/20/2024   General Health   How would you rate your overall physical health? (!) FAIR   Feel stress (tense, anxious, or unable to sleep) To some extent   (!) STRESS CONCERN      2/20/2024   Nutrition   Three or more servings of calcium each day? (!) I DON'T KNOW   Diet: Other   If other, please elaborate: try cut out sodium and carbs   How many servings of fruit and vegetables per day? (!) 2-3   How many sweetened beverages each day? 0-1         2/20/2024   Exercise   Days per week of moderate/strenous exercise 1 day   Average minutes spent exercising at this level 10 min   (!) EXERCISE CONCERN      2/20/2024   Social Factors   Frequency of gathering with friends or relatives Once a week   Worry food won't last until get money to buy more No   Food not last or not have enough money for food? No   Do you have housing?  Yes   Are you worried about losing your housing? No   Lack of transportation? No   Unable to get utilities (heat,electricity)? No         2/20/2024   Dental   Dentist two times every year? (!) NO         2/20/2024   TB Screening   Were you born outside of [place]?  (!) YES       Today's PHQ-2 Score:       2/20/2024     4:57 PM   PHQ-2 ( 1999 [employer])   Q1: Little interest or pleasure in doing things 1   Q2: Feeling down, depressed or hopeless 0   PHQ-2 Score 1         2/20/2024   Substance Use   Alcohol more than 3/day or more than 7/wk Yes   How often do you have  a drink containing alcohol 4 or more times a week   How many alcohol drinks on typical day 3 or 4   How often do you have 5+ drinks at one occasion Weekly   Audit 2/3 Score 4   How often not able to stop drinking once started Less than monthly   How often failed to do what normally expected Less than monthly   How often needed first drink in am after a heavy drinking session Never   How often feeling of guilt or remorse after drinking Less than monthly   How often unable to remember what happened the night before Less than monthly   Have you or someone else been injured because of your drinking No   Has anyone been concerned or suggested you cut down on drinking Yes, but not in the last year   TOTAL SCORE - AUDIT 14   Do you use any other substances recreationally? No     Social History     Tobacco Use    Smoking status: Every Day     Packs/day: .5     Types: Cigarettes     Passive exposure: Current    Smokeless tobacco: Never    Tobacco comments:     5 cigarettes/day   Vaping Use    Vaping Use: Every day    Substances: THC   Substance Use Topics    Alcohol use: Yes     Comment: 3-4 beers a night     Drug use: Yes     Types: Marijuana           2/20/2024   Breast Cancer Screening   Family history of breast, colon, or ovarian cancer? No / Unknown          No data to display                    2/20/2024   STI Screening   New sexual partner(s) since last STI/HIV test? (!) YES      Declined STI testing    History of abnormal Pap smear: NO - age 30-65 PAP every 5 years with negative HPV co-testing recommended        Latest Ref Rng & Units 5/8/2019     4:21 PM 5/8/2019     4:15 PM 4/14/2015    12:00 AM   PAP / HPV   PAP (Historical)  NIL      HPV 16 DNA NEG^Negative  Negative     HPV 18 DNA NEG^Negative  Negative     Other HR HPV NEG^Negative  Negative     PAP-ABSTRACT    See Scanned Document           This result is from an external source.     The 10-year ASCVD risk score (Ly MADDOX, et al., 2019) is: 2.5%    Values  "used to calculate the score:      Age: 43 years      Sex: Female      Is Non- : No      Diabetic: No      Tobacco smoker: Yes      Systolic Blood Pressure: 125 mmHg      Is BP treated: No      HDL Cholesterol: 68 mg/dL      Total Cholesterol: 249 mg/dL       Reviewed and updated as needed this visit by Provider   Tobacco  Allergies  Meds  Problems  Med Hx  Surg Hx  Fam Hx                     Objective    Exam  /83 (BP Location: Right arm, Patient Position: Sitting, Cuff Size: Adult Large)   Pulse 93   Temp 97.7  F (36.5  C) (Oral)   Ht 1.689 m (5' 6.5\")   Wt 121.1 kg (267 lb)   LMP 02/16/2024 (Exact Date)   SpO2 98%   BMI 42.45 kg/m     Estimated body mass index is 42.45 kg/m  as calculated from the following:    Height as of this encounter: 1.689 m (5' 6.5\").    Weight as of this encounter: 121.1 kg (267 lb).    Physical Exam  GENERAL: alert and no distress  EYES: Eyes grossly normal to inspection, PERRL and conjunctivae and sclerae normal  HENT: ear canals and TM's normal, nose and mouth without ulcers or lesions  NECK: no adenopathy, no asymmetry, masses, or scars  RESP: lungs clear to auscultation - no rales, rhonchi or wheezes  CV: regular rate and rhythm, normal S1 S2, no S3 or S4, no murmur, click or rub, no peripheral edema  ABDOMEN: soft, nontender, no hepatosplenomegaly, no masses and bowel sounds normal  MS: no gross musculoskeletal defects noted, no edema  SKIN: no suspicious lesions or rashes  NEURO: Normal strength and tone, mentation intact and speech normal  PSYCH: mentation appears normal, affect normal/bright      Signed Electronically by: JENAE Aiken CNP    "

## 2024-02-20 NOTE — PATIENT INSTRUCTIONS
Preventive Care Advice   This is general advice given by our system to help you stay healthy. However, your care team may have specific advice just for you. Please talk to your care team about your preventive care needs.  Nutrition  Eat 5 or more servings of fruits and vegetables each day.  Try wheat bread, brown rice and whole grain pasta (instead of white bread, rice, and pasta).  Get enough calcium and vitamin D. Check the label on foods and aim for 100% of the RDA (recommended daily allowance).  Lifestyle  Exercise at least 150 minutes each week  (30 minutes a day, 5 days a week).  Do muscle strengthening activities 2 days a week. These help control your weight and prevent disease.  No smoking.  Wear sunscreen to prevent skin cancer.  Have a dental exam and cleaning every 6 months.  Yearly exams  See your health care team every year to talk about:  Any changes in your health.  Any medicines your care team has prescribed.  Preventive care, family planning, and ways to prevent chronic diseases.  Shots (vaccines)   HPV shots (up to age 26), if you've never had them before.  Hepatitis B shots (up to age 59), if you've never had them before.  COVID-19 shot: Get this shot when it's due.  Flu shot: Get a flu shot every year.  Tetanus shot: Get a tetanus shot every 10 years.  Pneumococcal, hepatitis A, and RSV shots: Ask your care team if you need these based on your risk.  Shingles shot (for age 50 and up)  General health tests  Diabetes screening:  Starting at age 35, Get screened for diabetes at least every 3 years.  If you are younger than age 35, ask your care team if you should be screened for diabetes.  Cholesterol test: At age 39, start having a cholesterol test every 5 years, or more often if advised.  Bone density scan (DEXA): At age 50, ask your care team if you should have this scan for osteoporosis (brittle bones).  Hepatitis C: Get tested at least once in your life.  STIs (sexually transmitted  infections)  Before age 24: Ask your care team if you should be screened for STIs.  After age 24: Get screened for STIs if you're at risk. You are at risk for STIs (including HIV) if:  You are sexually active with more than one person.  You don't use condoms every time.  You or a partner was diagnosed with a sexually transmitted infection.  If you are at risk for HIV, ask about PrEP medicine to prevent HIV.  Get tested for HIV at least once in your life, whether you are at risk for HIV or not.  Cancer screening tests  Cervical cancer screening: If you have a cervix, begin getting regular cervical cancer screening tests starting at age 21.  Breast cancer scan (mammogram): If you've ever had breasts, begin having regular mammograms starting at age 40. This is a scan to check for breast cancer.  Colon cancer screening: It is important to start screening for colon cancer at age 45.  Have a colonoscopy test every 10 years (or more often if you're at risk) Or, ask your provider about stool tests like a FIT test every year or Cologuard test every 3 years.  To learn more about your testing options, visit:   https://www.[employer]/378351.pdf.  For help making a decision, visit:   https://bit.ly/yu73750.  Prostate cancer screening test: If you have a prostate, ask your care team if a prostate cancer screening test (PSA) at age 55 is right for you.  Lung cancer screening: If you are a current or former smoker ages 50 to 80, ask your care team if ongoing lung cancer screenings are right for you.  For informational purposes only. Not to replace the advice of your health care provider. Copyright   2023 [employer]. All rights reserved. Clinically reviewed by the [facility]. Other Machine [id number] - REV 01/24.

## 2024-02-21 LAB
CHOLEST SERPL-MCNC: 277 MG/DL
FASTING STATUS PATIENT QL REPORTED: ABNORMAL
HDLC SERPL-MCNC: 64 MG/DL
LDLC SERPL CALC-MCNC: 150 MG/DL
NONHDLC SERPL-MCNC: 213 MG/DL
TRIGL SERPL-MCNC: 317 MG/DL

## 2024-03-12 DIAGNOSIS — M54.41 CHRONIC BILATERAL LOW BACK PAIN WITH BILATERAL SCIATICA: ICD-10-CM

## 2024-03-12 DIAGNOSIS — G89.29 CHRONIC BILATERAL LOW BACK PAIN WITH BILATERAL SCIATICA: ICD-10-CM

## 2024-03-12 DIAGNOSIS — M54.42 CHRONIC BILATERAL LOW BACK PAIN WITH BILATERAL SCIATICA: ICD-10-CM

## 2024-03-12 RX ORDER — PREGABALIN 150 MG/1
CAPSULE ORAL
Qty: 60 CAPSULE | Refills: 0 | Status: SHIPPED | OUTPATIENT
Start: 2024-03-12 | End: 2024-04-16

## 2024-03-25 ENCOUNTER — OFFICE VISIT (OUTPATIENT)
Dept: FAMILY MEDICINE | Facility: CLINIC | Age: 44
End: 2024-03-25
Payer: COMMERCIAL

## 2024-03-25 VITALS
OXYGEN SATURATION: 95 % | HEIGHT: 67 IN | SYSTOLIC BLOOD PRESSURE: 123 MMHG | BODY MASS INDEX: 41.91 KG/M2 | DIASTOLIC BLOOD PRESSURE: 81 MMHG | HEART RATE: 106 BPM | RESPIRATION RATE: 18 BRPM | WEIGHT: 267 LBS | TEMPERATURE: 97 F

## 2024-03-25 DIAGNOSIS — J01.91 ACUTE RECURRENT SINUSITIS, UNSPECIFIED LOCATION: Primary | ICD-10-CM

## 2024-03-25 DIAGNOSIS — J45.901 MODERATE ASTHMA WITH EXACERBATION, UNSPECIFIED WHETHER PERSISTENT: ICD-10-CM

## 2024-03-25 PROCEDURE — 99214 OFFICE O/P EST MOD 30 MIN: CPT | Performed by: FAMILY MEDICINE

## 2024-03-25 RX ORDER — AZITHROMYCIN 250 MG/1
TABLET, FILM COATED ORAL
Qty: 6 TABLET | Refills: 0 | Status: SHIPPED | OUTPATIENT
Start: 2024-03-25 | End: 2024-03-30

## 2024-03-25 NOTE — LETTER
March 25, 2024      Daniella Sexton  [address]  [facility] [place] [facility] [address]        To Whom It May Concern:    Daniella Sexton  was seen on 3/25/24.  Please excuse her  until 3/25/24 due to illness. She may return without restriction.        Sincerely,        MAMTA NY, DO

## 2024-03-25 NOTE — PROGRESS NOTES
Assessment & Plan   Problem List Items Addressed This Visit    None  Visit Diagnoses       Acute recurrent sinusitis, unspecified location    -  Primary    Relevant Medications    azithromycin (ZITHROMAX) 250 MG tablet    Moderate asthma with exacerbation, unspecified whether persistent               Add one puff (TID) advair  Trial of azithromycin  She will try to cut cigarettes from 7/day to 3/day  She says she will also contact her allergist    Deniz Brewer is a 43 year old, presenting for the following health issues:  Sinus Problem (Chronic Sinus Infection for the past few months)        3/25/2024     7:59 AM   Additional Questions   Roomed by Gerda BELTRAN CMA   Accompanied by self     History of Present Illness       Reason for visit:  Possible Sinus infection has been continuous for the past few months  Symptom onset:  More than a month  Symptoms include:  Congestion, ears hurt,headache, body aches , blowing clots out of her nose  Symptom intensity:  Severe  Symptom progression:  Worsening  Had these symptoms before:  Yes  Has tried/received treatment for these symptoms:  Yes  Previous treatment was successful:  No  What makes it worse:  Nothing  What makes it better:  Mild releif with Vicks on feet and Muconex, had been doing her neb treatments as well    She eats 0-1 servings of fruits and vegetables daily.She consumes 1 sweetened beverage(s) daily.She exercises with enough effort to increase her heart rate 9 or less minutes per day.  She exercises with enough effort to increase her heart rate 3 or less days per week. She is missing 1 dose(s) of medications per week.  She is not taking prescribed medications regularly due to remembering to take.     H/o asthma/emphesema, recent oral antibiotics and oral steroids 1 month ago - course of doxycycline  She uses advair BID  Coughing fits worsening             Objective    /81 (BP Location: Right arm, Patient Position: Chair, Cuff Size: Adult Large)   " Pulse 106   Temp 97  F (36.1  C) (Temporal)   Resp 18   Ht 1.689 m (5' 6.5\")   Wt 121.1 kg (267 lb)   LMP 03/16/2024 (Approximate)   SpO2 95%   BMI 42.45 kg/m    Body mass index is 42.45 kg/m .  Physical Exam  Constitutional:       General: She is not in acute distress.  HENT:      Right Ear: Tympanic membrane normal.      Left Ear: Tympanic membrane normal.      Mouth/Throat:      Mouth: Mucous membranes are moist.      Pharynx: Oropharynx is clear.   Pulmonary:      Effort: Pulmonary effort is normal. No tachypnea.      Breath sounds: Examination of the right-middle field reveals wheezing. Examination of the left-middle field reveals wheezing. Wheezing present. No rhonchi or rales.   Neurological:      Mental Status: She is alert.                Signed Electronically by: MAMTA NY DO    "

## 2024-04-01 ENCOUNTER — MYC MEDICAL ADVICE (OUTPATIENT)
Dept: OTOLARYNGOLOGY | Facility: CLINIC | Age: 44
End: 2024-04-01
Payer: COMMERCIAL

## 2024-04-01 DIAGNOSIS — J32.4 CHRONIC PANSINUSITIS: Primary | ICD-10-CM

## 2024-04-01 DIAGNOSIS — J31.0 PURULENT RHINITIS: ICD-10-CM

## 2024-04-03 RX ORDER — BUDESONIDE 0.5 MG/2ML
INHALANT ORAL
Qty: 60 ML | Refills: 11 | Status: SHIPPED | OUTPATIENT
Start: 2024-04-03

## 2024-04-16 DIAGNOSIS — G89.29 CHRONIC BILATERAL LOW BACK PAIN WITH BILATERAL SCIATICA: ICD-10-CM

## 2024-04-16 DIAGNOSIS — M54.41 CHRONIC BILATERAL LOW BACK PAIN WITH BILATERAL SCIATICA: ICD-10-CM

## 2024-04-16 DIAGNOSIS — M54.42 CHRONIC BILATERAL LOW BACK PAIN WITH BILATERAL SCIATICA: ICD-10-CM

## 2024-04-16 RX ORDER — PREGABALIN 150 MG/1
CAPSULE ORAL
Qty: 60 CAPSULE | Refills: 1 | Status: SHIPPED | OUTPATIENT
Start: 2024-04-16 | End: 2024-07-01

## 2024-04-23 ENCOUNTER — ANCILLARY PROCEDURE (OUTPATIENT)
Dept: GENERAL RADIOLOGY | Facility: CLINIC | Age: 44
End: 2024-04-23
Attending: FAMILY MEDICINE
Payer: COMMERCIAL

## 2024-04-23 ENCOUNTER — OFFICE VISIT (OUTPATIENT)
Dept: FAMILY MEDICINE | Facility: CLINIC | Age: 44
End: 2024-04-23
Payer: COMMERCIAL

## 2024-04-23 VITALS
HEIGHT: 67 IN | WEIGHT: 267 LBS | OXYGEN SATURATION: 97 % | DIASTOLIC BLOOD PRESSURE: 77 MMHG | SYSTOLIC BLOOD PRESSURE: 116 MMHG | HEART RATE: 111 BPM | BODY MASS INDEX: 41.91 KG/M2 | TEMPERATURE: 97 F | RESPIRATION RATE: 18 BRPM

## 2024-04-23 DIAGNOSIS — M25.562 LEFT KNEE PAIN, UNSPECIFIED CHRONICITY: Primary | ICD-10-CM

## 2024-04-23 PROCEDURE — 99213 OFFICE O/P EST LOW 20 MIN: CPT | Performed by: FAMILY MEDICINE

## 2024-04-23 PROCEDURE — 73562 X-RAY EXAM OF KNEE 3: CPT | Mod: TC | Performed by: RADIOLOGY

## 2024-04-23 ASSESSMENT — PAIN SCALES - GENERAL: PAINLEVEL: SEVERE PAIN (7)

## 2024-04-23 NOTE — PROGRESS NOTES
"Deniz Brewer is a 43 year old, presenting for the following health issues:  Knee Pain (Left knee pain and locking up for the past week)        4/23/2024    10:08 AM   Additional Questions   Roomed by Cyndee Jeffries     Knee Pain    History of Present Illness       Reason for visit:  Left knee giveing out and locking up  Symptom onset:  3-7 days ago  Symptoms include:  Knee pain  Symptom intensity:  Severe  Symptom progression:  Worsening  Had these symptoms before:  No  What makes it worse:  Standing walking  What makes it better:  Na    She eats 2-3 servings of fruits and vegetables daily.She consumes 2 sweetened beverage(s) daily.She exercises with enough effort to increase her heart rate 9 or less minutes per day.  She exercises with enough effort to increase her heart rate 3 or less days per week. She is missing 1 dose(s) of medications per week.  She is not taking prescribed medications regularly due to other.       No significant history of knee problems    No trauma     Just left knee    Working production, standing or walking all day    Getting worse    Constant  pain    This  am  did  not go to work    Locked this am initially              Objective    /77 (BP Location: Left arm, Patient Position: Chair, Cuff Size: Adult Large)   Pulse 111   Temp 97  F (36.1  C) (Temporal)   Resp 18   Ht 1.689 m (5' 6.5\")   Wt 121.1 kg (267 lb)   LMP 03/23/2024 (Approximate)   SpO2 97%   BMI 42.45 kg/m    Body mass index is 42.45 kg/m .  Physical Exam    Full physical not done     Mentation and affect are fine    No tremor of speech or extremity    Patient has good passive range of motion of both knees    She does have some tenderness on anterior knee, more on anteromedial joint line on left than right  No posterior knee tenderness    No swelling present    Knees both seem very stable  Some pain on strength testing of knee  Equivocal result on mcmurrays    Pain with just a mild squat    Xray " normal to my reading     ASSESSMENT / PLAN:  (M20.105) Left knee pain, unspecified chronicity  (primary encounter diagnosis)  Comment: with the history of locking this am and some joint line tenderness, concerning for meniscus injury.  Discussed in detail.  Need to have patient see orthopedics.  She will schedule.  See AVS and letter we did for work.   Plan: XR Knee Left 3 Views, Orthopedic          Referral               I reviewed the patient's medications, allergies, medical history, family history, and social history.    Edenilson Trevino MD          Signed Electronically by: Edenilson Trveino MD

## 2024-04-23 NOTE — LETTER
2024    To whom it may concern:    Daniella [address] 8 was seen here in clinic  today.   She was unable to work today for medical reasons ( knee ) and will  be seeing  specialist soon.  She may need to be off all or part of this week depending  on symptoms.               Sincerely,                    Edenilson Trevino MD

## 2024-04-26 NOTE — TELEPHONE ENCOUNTER
DIAGNOSIS: L knee pain/Dr. Trevino/XR/[facility]/ortho con    APPOINTMENT DATE: 4/29/24    NOTES STATUS DETAILS   OFFICE NOTE from referring provider Internal 4/23/24 - Edenilson Trevino MD - [facility]    OFFICE NOTE from other specialist Internal 12/20/24 - Edgar Melton MD - [facility] [place]   MEDICATION LIST Internal    XRAYS (IMAGES & REPORTS) Internal 4/23/24 - XR knee left   11/19/22 -- XR ankle left

## 2024-04-29 ENCOUNTER — OFFICE VISIT (OUTPATIENT)
Dept: ORTHOPEDICS | Facility: CLINIC | Age: 44
End: 2024-04-29
Payer: COMMERCIAL

## 2024-04-29 ENCOUNTER — PRE VISIT (OUTPATIENT)
Dept: ORTHOPEDICS | Facility: CLINIC | Age: 44
End: 2024-04-29

## 2024-04-29 VITALS — WEIGHT: 267 LBS | BODY MASS INDEX: 41.91 KG/M2 | HEIGHT: 67 IN

## 2024-04-29 DIAGNOSIS — M25.562 CHRONIC PAIN OF LEFT KNEE: Primary | ICD-10-CM

## 2024-04-29 DIAGNOSIS — M25.562 LEFT KNEE PAIN, UNSPECIFIED CHRONICITY: ICD-10-CM

## 2024-04-29 DIAGNOSIS — G89.29 CHRONIC PAIN OF LEFT KNEE: Primary | ICD-10-CM

## 2024-04-29 PROCEDURE — 99213 OFFICE O/P EST LOW 20 MIN: CPT | Mod: GC | Performed by: ORTHOPAEDIC SURGERY

## 2024-04-29 NOTE — LETTER
4/29/2024         RE: Daniella Sexton  [address] [place]  [place] [address]        Dear Colleague,    Thank you for referring your patient, Daniella Sexton, to the [facility] [place]. Please see a copy of my visit note below.    CHIEF CONCERN: Left knee pain    HISTORY:   Daniella Sexton is a 43-year-old female with past medical history significant for COPD who presents for evaluation of left knee pain for 2 weeks duration.  Patient states her knee was treating her well with no issues but developed atraumatic onset of left knee pain.  Her knee was generally sore for 1 week but 6 days prior to arrival awoke with her knee in a locked position.  She massaged it until it loosened up, stood, and felt sensations of instability.    Since that time, states that the pain and instability have worsened.  Endorses 5/10 pain today at rest, 9/10 pain at its worst.  Localized about the anterior knee.  No aggravating factors; alleviated with rest and patellar stabilizing brace wear.    Takes Tylenol as needed for pain.  No further locking episodes.  No catching.    No physical therapy or injection therapy.    No history of injury or previous surgery to the left knee    PAST MEDICAL HISTORY: (Reviewed with the patient and in the [facility] medical record)  Past Medical History:   Diagnosis Date     Alcohol abuse      Anxiety 04/18/2014     Arthritis      C. difficile colitis      Chronic bilateral low back pain with bilateral sciatica 07/18/2018     Chronic pansinusitis 01/17/2019     Duodenal ulcer 03/01/1995     Fatty liver 12/16/2020     History of alcohol abuse 04/18/2014     History of methamphetamine abuse (H) 04/18/2014     Lung nodule 12/17/2020    Currently managed with follow up imaging by [facility].        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)      Migraines 04/18/2014     Mild intermittent asthma without complication 01/02/2019     Obesity (BMI 35.0-39.9) with  comorbidity (H) 05/08/2019     Pulmonary emphysema, unspecified emphysema type (H) 07/08/2022     Pure hypercholesterolemia 08/27/2018     Seasonal allergic rhinitis 04/18/2014     PAST SURGICAL HISTORY: (Reviewed with the patient and in the [facility] record)  Past Surgical History:   Procedure Laterality Date     BACK SURGERY  2005    L 3-4, L 4-5     LITHOTRIPSY  2016     OPTICAL TRACKING SYSTEM ENDOSCOPIC SINUS SURGERY Bilateral 1/10/2019    Procedure: BILATERAL IMAGE GUIDED ENDOSCOPIC SINUS SURGERY, BILATERAL TURBINATE REDUCTION;  Surgeon: Maikol Miguel MD;  Location: [facility] OR     RELEASE CARPAL TUNNEL Left      TONSILLECTOMY       TUBAL LIGATION       MEDICATIONS: (Reviewed with the patient and in the EPIC medical record)    Notable medications include: N/a    ALLERGIES: (Reviewed with the patient and in the EPIC medical record)     Allergies   Allergen Reactions     Amoxicillin Anaphylaxis     Morphine Other (See Comments) and Nausea and Vomiting     Penicillins Unknown     SOCIAL HISTORY: (Reviewed with the patient and in the medical record)  --Tobacco: Half pack per day, daily medical marijuana  --Occupation: Production  --Avocation/Sport: Walking    FAMILY HISTORY: (Reviewed with the patient and in the medical record)  -- No family history of bleeding, clotting, or difficulty with anesthesia    REVIEW OF SYSTEMS: (Reviewed with the patient and on the health intake form)  -- A comprehensive 10 point review of systems was conducted and is negative except as noted in the HPI    EXAM:     General: Awake, Alert and Oriented, No acute Distress. Articulate and Interactive    Body mass index is 42.45 kg/m .    Left lower extremity :  Skin is Warm and Well perfused, no suggestion of infection  No effusion  Tender to palpation about the lateral joint line, medial joint line, inferior pole patella  Tolerates knee range of motion from 0-115 limited by pain at terminal flexion compared to contralateral  0-125  Pain with double leg squat localized about the anterior knee  Positive Ara localized to the lateral joint line  Positive Thessaly localized lateral joint line  Stable Lachman and posterior drawer  Stable to varus and valgus stress at 0 and 30 degrees of knee flexion  EHL/FHL/TA/GS 5/5  Sensation intact L3-S1  2+ Dorsalis Pedis Pulse    IMAGING:    Radiographs of the left knee from today (4/29/2024) were independently reviewed by me and findings were discussed with the patient today. The imaging demonstrates no significant degenerative change.  No acute fractures or dislocations.    ASSESSMENT:  Left knee pain, atraumatic, x 2 weeks - likely secondary to early arthritis versus possible meniscus tear    Had a long conversation with patient regarding her symptoms and how they align with her imaging exam    The patient denies acute traumatic onset and generalized knee pain that localizes to the lateral > medial joint line with provocative meniscal testing.  She has no effusion and an otherwise unremarkable knee examination today    Counseled the patient that early arthritis and meniscal pathology are treated similarly from the outset.  This takes the form of simply living with her symptoms, activity modification, over-the-counter pain medications including topicals, physical therapy, possible injection therapy.    As the patient wishes to avoid any surgical intervention, advised her that it would be reasonable to start with this conservative management strategy.  If she has ongoing symptoms, next step would be to obtain an MRI to assess for any need of surgical intervention.    Patient acknowledged understanding and elected to proceed with the above-stated plan    PLAN:  Recommend weightbearing and activities as tolerated employing activity modifying behaviors  Recommend physical therapy focused on quad strengthening, range of motion, core strengthening  Okay for over-the-counter pain medications including  topicals  Follow-up with Dr. Aguilera as needed  If having ongoing symptoms despite the above, neck step would be MRI    Patient seen and discussed with Dr. Willy Anthony MD  Orthopaedic Surgery PGY-5      Patient seen and examined with the resident. I also personally reviewed the images and interpreted the imaging myself.      Assesment: 2-week history of left knee pain unclear origin  No fractures dislocations and well-preserved joint space    Plan: Physical therapy, strengthening, range of motion, functional return to activities if does not see lasting improvement will reach out and we will do MRI.  Follow-up clinic afterwards        I agree with history, physical and imaging as well as the assessment and plan as detailed by Dr. Anthony.       Again, thank you for allowing me to participate in the care of your patient.        Sincerely,        Arturo Aguilera MD

## 2024-04-29 NOTE — PROGRESS NOTES
CHIEF CONCERN: Left knee pain    HISTORY:   Daniella Sexton is a 43-year-old female with past medical history significant for COPD who presents for evaluation of left knee pain for 2 weeks duration.  Patient states her knee was treating her well with no issues but developed atraumatic onset of left knee pain.  Her knee was generally sore for 1 week but 6 days prior to arrival awoke with her knee in a locked position.  She massaged it until it loosened up, stood, and felt sensations of instability.    Since that time, states that the pain and instability have worsened.  Endorses 5/10 pain today at rest, 9/10 pain at its worst.  Localized about the anterior knee.  No aggravating factors; alleviated with rest and patellar stabilizing brace wear.    Takes Tylenol as needed for pain.  No further locking episodes.  No catching.    No physical therapy or injection therapy.    No history of injury or previous surgery to the left knee    PAST MEDICAL HISTORY: (Reviewed with the patient and in the [facility] medical record)  Past Medical History:   Diagnosis Date    Alcohol abuse     Anxiety 04/18/2014    Arthritis     C. difficile colitis     Chronic bilateral low back pain with bilateral sciatica 07/18/2018    Chronic pansinusitis 01/17/2019    Duodenal ulcer 03/01/1995    Fatty liver 12/16/2020    History of alcohol abuse 04/18/2014    History of methamphetamine abuse (H) 04/18/2014    Lung nodule 12/17/2020    Currently managed with [facility].       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)     Migraines 04/18/2014    Mild intermittent asthma without complication 01/02/2019    Obesity (BMI 35.0-39.9) with comorbidity (H) 05/08/2019    Pulmonary emphysema, unspecified emphysema type (H) 07/08/2022    Pure hypercholesterolemia 08/27/2018    Seasonal allergic rhinitis 04/18/2014     PAST SURGICAL HISTORY: (Reviewed with the patient and in the [facility] medical record)  Past Surgical History:    Procedure Laterality Date    BACK SURGERY  2005    L 3-4, L 4-5    LITHOTRIPSY  2016    OPTICAL TRACKING SYSTEM ENDOSCOPIC SINUS SURGERY Bilateral 1/10/2019    Procedure: BILATERAL IMAGE GUIDED ENDOSCOPIC SINUS SURGERY, BILATERAL TURBINATE REDUCTION;  Surgeon: Maikol Miguel MD;  Location: [facility] OR    RELEASE CARPAL TUNNEL Left     TONSILLECTOMY      TUBAL LIGATION       MEDICATIONS: (Reviewed with the patient and in the EPIC medical record)    Notable medications include: N/a    ALLERGIES: (Reviewed with the patient and in the EPIC medical record)     Allergies   Allergen Reactions    Amoxicillin Anaphylaxis    Morphine Other (See Comments) and Nausea and Vomiting    Penicillins Unknown     SOCIAL HISTORY: (Reviewed with the patient and in the medical record)  --Tobacco: Half pack per day, daily medical marijuana  --Occupation: Production  --Avocation/Sport: Walking    FAMILY HISTORY: (Reviewed with the patient and in the medical record)  -- No family history of bleeding, clotting, or difficulty with anesthesia    REVIEW OF SYSTEMS: (Reviewed with the patient and on the health intake form)  -- A comprehensive 10 point review of systems was conducted and is negative except as noted in the HPI    EXAM:     General: Awake, Alert and Oriented, No acute Distress. Articulate and Interactive    Body mass index is 42.45 kg/m .    Left lower extremity :  Skin is Warm and Well perfused, no suggestion of infection  No effusion  Tender to palpation about the lateral joint line, medial joint line, inferior pole patella  Tolerates knee range of motion from 0-115 limited by pain at terminal flexion compared to contralateral 0-125  Pain with double leg squat localized about the anterior knee  Positive Ara localized to the lateral joint line  Positive Thessaly localized lateral joint line  Stable Lachman and posterior drawer  Stable to varus and valgus stress at 0 and 30 degrees of knee flexion  EHL/FHL/TA/GS  5/5  Sensation intact L3-S1  2+ Dorsalis Pedis Pulse    IMAGING:    Radiographs of the left knee from today (4/29/2024) were independently reviewed by me and findings were discussed with the patient today. The imaging demonstrates no significant degenerative change.  No acute fractures or dislocations.    ASSESSMENT:  Left knee pain, atraumatic, x 2 weeks - likely secondary to early arthritis versus possible meniscus tear    Had a long conversation with patient regarding her symptoms and how they align with her imaging exam    The patient denies acute traumatic onset and generalized knee pain that localizes to the lateral > medial joint line with provocative meniscal testing.  She has no effusion and an otherwise unremarkable knee examination today    Counseled the patient that early arthritis and meniscal pathology are treated similarly from the outset.  This takes the form of simply living with her symptoms, activity modification, over-the-counter pain medications including topicals, physical therapy, possible injection therapy.    As the patient wishes to avoid any surgical intervention, advised her that it would be reasonable to start with this conservative management strategy.  If she has ongoing symptoms, next step would be to obtain an MRI to assess for any need of surgical intervention.    Patient acknowledged understanding and elected to proceed with the above-stated plan    PLAN:  Recommend weightbearing and activities as tolerated employing activity modifying behaviors  Recommend physical therapy focused on quad strengthening, range of motion, core strengthening  Okay for over-the-counter pain medications including topicals  Follow-up with Dr. Aguilera as needed  If having ongoing symptoms despite the above, neck step would be MRI    Patient seen and discussed with Dr. Willy Anthony MD  Orthopaedic Surgery PGY-5

## 2024-04-29 NOTE — NURSING NOTE
"Reason For Visit:   Chief Complaint   Patient presents with    Consult     Left knee       ?  No  Occupation: Production  Currently working? Yes.  Work status?  Full time.  Date of injury: NA  Type of injury: NA.  Date of surgery: NA  Type of surgery: NA.    She doesn't recall an injury, she started to have gradually worsening knee pain a few weeks. She reports catching, locking and instability in the knee. She has used tylenol, knee brace, ice, asper cream with lidocaine and elevation to help the pain.    SANE Score  Left Knee: 45-50  Right Knee: 95    Pain Assessment  Patient Currently in Pain: Yes  0-10 Pain Scale: 7  Primary Pain Location: Knee    Ht 1.689 m (5' 6.5\")   Wt 121.1 kg (267 lb)   LMP 03/23/2024 (Approximate)   BMI 42.45 kg/m           Allergies   Allergen Reactions    Amoxicillin Anaphylaxis    Morphine Other (See Comments) and Nausea and Vomiting    Penicillins Unknown       Current Outpatient Medications   Medication Sig Dispense Refill    albuterol (PROAIR HFA/PROVENTIL HFA/VENTOLIN HFA) 108 (90 Base) MCG/ACT inhaler Inhale 2 puffs into the lungs every 4 hours as needed for wheezing, cough or shortness of breath Use with spacer 18 g 0    amitriptyline (ELAVIL) 50 MG tablet Take 1 tablet (50 mg) by mouth at bedtime 90 tablet 3    azelastine (ASTELIN) 0.1 % nasal spray Spray 1 spray into both nostrils 2 times daily as needed for rhinitis (as additional ttx if necessary) 30 mL 8    baclofen (LIORESAL) 10 MG tablet Take 1 tablet (10 mg) by mouth 2 times daily 300 tablet 1    budesonide (PULMICORT) 0.5 MG/2ML neb solution Add one ampule into [facility] sinus rinse bottle with saline mixture and irrigate nose daily 60 mL 11    diclofenac (VOLTAREN) 75 MG EC tablet Take 1 tablet (75 mg) by mouth 2 times daily 60 tablet 11    fexofenadine (ALLEGRA) 180 MG tablet Take 1 tablet (180 mg) by mouth at bedtime 60 tablet 5    fluticasone-salmeterol (ADVAIR) 100-50 MCG/ACT inhaler Inhale 1 puff into " the lungs every 12 hours 60 each 11    ipratropium - albuterol 0.5 mg/2.5 mg/3 mL (DUONEB) 0.5-2.5 (3) MG/3ML neb solution Take 1 vial (3 mLs) by nebulization every 6 hours as needed for shortness of breath or wheezing 90 mL 1    mometasone (NASONEX) 50 MCG/ACT nasal spray Spray 2 sprays into both nostrils at bedtime 17 g 5    pregabalin (LYRICA) 150 MG capsule TAKE 1 CAPSULE(150 MG) BY MOUTH TWICE DAILY 60 capsule 1    rizatriptan (MAXALT) 10 MG tablet Take 1 tablet (10 mg) by mouth at onset of headache for migraine 10 tablet 3     No current facility-administered medications for this visit.         Aby Kay, ATC

## 2024-04-29 NOTE — PROGRESS NOTES
Patient seen and examined with the resident. I also personally reviewed the images and interpreted the imaging myself.      Assesment: 2-week history of left knee pain unclear origin  No fractures dislocations and well-preserved joint space    Plan: Physical therapy, strengthening, range of motion, functional return to activities if does not see lasting improvement will reach out and we will do MRI.  Follow-up clinic afterwards        I agree with history, physical and imaging as well as the assessment and plan as detailed by Dr. Anthony.

## 2024-05-01 ENCOUNTER — THERAPY VISIT (OUTPATIENT)
Dept: PHYSICAL THERAPY | Facility: CLINIC | Age: 44
End: 2024-05-01
Attending: ORTHOPAEDIC SURGERY
Payer: COMMERCIAL

## 2024-05-01 DIAGNOSIS — M25.562 LEFT KNEE PAIN, UNSPECIFIED CHRONICITY: ICD-10-CM

## 2024-05-01 PROCEDURE — 97140 MANUAL THERAPY 1/> REGIONS: CPT | Mod: GP | Performed by: PHYSICAL THERAPIST

## 2024-05-01 PROCEDURE — 97161 PT EVAL LOW COMPLEX 20 MIN: CPT | Mod: GP | Performed by: PHYSICAL THERAPIST

## 2024-05-01 PROCEDURE — 97110 THERAPEUTIC EXERCISES: CPT | Mod: GP | Performed by: PHYSICAL THERAPIST

## 2024-05-01 ASSESSMENT — ACTIVITIES OF DAILY LIVING (ADL)
RAW_SCORE: 39
RISE FROM A CHAIR: ACTIVITY IS SOMEWHAT DIFFICULT
LIMPING: THE SYMPTOM AFFECTS MY ACTIVITY SLIGHTLY
HOW_WOULD_YOU_RATE_THE_OVERALL_FUNCTION_OF_YOUR_KNEE_DURING_YOUR_USUAL_DAILY_ACTIVITIES?: ABNORMAL
STIFFNESS: THE SYMPTOM AFFECTS MY ACTIVITY SLIGHTLY
PAIN: THE SYMPTOM AFFECTS MY ACTIVITY MODERATELY
GIVING WAY, BUCKLING OR SHIFTING OF KNEE: THE SYMPTOM AFFECTS MY ACTIVITY SLIGHTLY
HOW_WOULD_YOU_RATE_THE_OVERALL_FUNCTION_OF_YOUR_KNEE_DURING_YOUR_USUAL_DAILY_ACTIVITIES?: ABNORMAL
KNEE_ACTIVITY_OF_DAILY_LIVING_SCORE: 55.71
SWELLING: I DO NOT HAVE THE SYMPTOM
WEAKNESS: THE SYMPTOM AFFECTS MY ACTIVITY MODERATELY
HOW_WOULD_YOU_RATE_THE_CURRENT_FUNCTION_OF_YOUR_KNEE_DURING_YOUR_USUAL_DAILY_ACTIVITIES_ON_A_SCALE_FROM_0_TO_100_WITH_100_BEING_YOUR_LEVEL_OF_KNEE_FUNCTION_PRIOR_TO_YOUR_INJURY_AND_0_BEING_THE_INABILITY_TO_PERFORM_ANY_OF_YOUR_USUAL_DAILY_ACTIVITIES?: 45
KNEEL ON THE FRONT OF YOUR KNEE: ACTIVITY IS FAIRLY DIFFICULT
WALK: ACTIVITY IS SOMEWHAT DIFFICULT
STIFFNESS: THE SYMPTOM AFFECTS MY ACTIVITY SLIGHTLY
SQUAT: ACTIVITY IS FAIRLY DIFFICULT
PLEASE_INDICATE_YOR_PRIMARY_REASON_FOR_REFERRAL_TO_THERAPY:: KNEE
GO UP STAIRS: ACTIVITY IS SOMEWHAT DIFFICULT
AS_A_RESULT_OF_YOUR_KNEE_INJURY,_HOW_WOULD_YOU_RATE_YOUR_CURRENT_LEVEL_OF_DAILY_ACTIVITY?: ABNORMAL
PAIN: THE SYMPTOM AFFECTS MY ACTIVITY MODERATELY
RISE FROM A CHAIR: ACTIVITY IS SOMEWHAT DIFFICULT
GO DOWN STAIRS: ACTIVITY IS SOMEWHAT DIFFICULT
SIT WITH YOUR KNEE BENT: ACTIVITY IS FAIRLY DIFFICULT
WEAKNESS: THE SYMPTOM AFFECTS MY ACTIVITY MODERATELY
STAND: ACTIVITY IS SOMEWHAT DIFFICULT
SIT WITH YOUR KNEE BENT: ACTIVITY IS FAIRLY DIFFICULT
SWELLING: I DO NOT HAVE THE SYMPTOM
STAND: ACTIVITY IS SOMEWHAT DIFFICULT
AS_A_RESULT_OF_YOUR_KNEE_INJURY,_HOW_WOULD_YOU_RATE_YOUR_CURRENT_LEVEL_OF_DAILY_ACTIVITY?: ABNORMAL
WALK: ACTIVITY IS SOMEWHAT DIFFICULT
GIVING WAY, BUCKLING OR SHIFTING OF KNEE: THE SYMPTOM AFFECTS MY ACTIVITY SLIGHTLY
GO DOWN STAIRS: ACTIVITY IS SOMEWHAT DIFFICULT
GO UP STAIRS: ACTIVITY IS SOMEWHAT DIFFICULT
HOW_WOULD_YOU_RATE_THE_CURRENT_FUNCTION_OF_YOUR_KNEE_DURING_YOUR_USUAL_DAILY_ACTIVITIES_ON_A_SCALE_FROM_0_TO_100_WITH_100_BEING_YOUR_LEVEL_OF_KNEE_FUNCTION_PRIOR_TO_YOUR_INJURY_AND_0_BEING_THE_INABILITY_TO_PERFORM_ANY_OF_YOUR_USUAL_DAILY_ACTIVITIES?: 45
KNEE_ACTIVITY_OF_DAILY_LIVING_SUM: 39
SQUAT: ACTIVITY IS FAIRLY DIFFICULT
LIMPING: THE SYMPTOM AFFECTS MY ACTIVITY SLIGHTLY
KNEEL ON THE FRONT OF YOUR KNEE: ACTIVITY IS FAIRLY DIFFICULT

## 2024-05-01 NOTE — PROGRESS NOTES
PHYSICAL THERAPY EVALUATION  Type of Visit: Evaluation    See electronic medical record for Abuse and Falls Screening details.    Subjective       Presenting condition or subjective complaint: knee locked up,then gave out on me with pain  Date of onset:      Relevant medical history:     Dates & types of surgery: back,tubil,carpiltunnel,sinus    Prior diagnostic imaging/testing results: X-ray     Prior therapy history for the same diagnosis, illness or injury: No      Prior Level of Function  Transfers: Independent  Ambulation: Independent  ADL: Independent  IADL: Driving, Finances, Housekeeping, Laundry, Meal preparation, Medication management, Work    Living Environment  Social support: Alone   Type of home: House; 2-story   Stairs to enter the home: Yes       Ramp: No   Stairs inside the home: Yes 60 Is there a railing: Yes   Help at home: None  Equipment owned:       Employment: Yes production  Hobbies/Interests: playing with my grandkids    Patient goals for therapy: not wear a knee brace    Pain assessment: Location: Left knee /Ratin/10      Objective   KNEE EVALUATION  PAIN: Pain is Exacerbated By: squatting/rising/standing   INTEGUMENTARY (edema, incisions):   POSTURE: WFL   GAIT: antalgic   Weightbearing Status: WBAT   Assistive Device(s): none wrap around knee brace   Gait Deviations:   BALANCE/PROPRIOCEPTION:   WEIGHTBEARING ALIGNMENT:   NON-WEIGHTBEARING ALIGNMENT:   ROM: tight HS, tight quad, POOR patellar mobility , Knee PROM 0-121    STRENGTH: WFL  FLEXIBILITY: WFL  SPECIAL TESTS:  +obers left   FUNCTIONAL TESTS:   PALPATION: +++++Itband, ++++inferior patellar/patellar tendon left   JOINT MOBILITY:  Poor patellar mob    Assessment & Plan   CLINICAL IMPRESSIONS  Medical Diagnosis: left knee pain    Treatment Diagnosis: left knee pain   Impression/Assessment: Patient is a 43 year old female with Left knee  complaints.  The following significant findings have been identified: Pain, Decreased  ROM/flexibility, Decreased joint mobility, Impaired muscle performance, and Decreased activity tolerance. These impairments interfere with their ability to perform self care tasks, work tasks, recreational activities, household chores, driving , and household mobility as compared to previous level of function.   Pt presents w/ left knee pain and will benefit from a course of PT to improve her functional levels     Clinical Decision Making (Complexity):  Clinical Presentation: Stable/Uncomplicated  Clinical Presentation Rationale: based on medical and personal factors listed in PT evaluation  Clinical Decision Making (Complexity): Low complexity    PLAN OF CARE  Treatment Interventions:  Modalities: Cryotherapy, Ultrasound  Interventions: Manual Therapy, Therapeutic Exercise, Self-Care/Home Management    Long Term Goals            Frequency of Treatment:    Duration of Treatment:      Recommended Referrals to Other Professionals: Physical Therapy  Education Assessment:        Risks and benefits of evaluation/treatment have been explained.   Patient/Family/caregiver agrees with Plan of Care.     Evaluation Time:             Signing Clinician: Abdoulaye Daugherty PT      [facility]                                                                                   OUTPATIENT PHYSICAL THERAPY      PLAN OF TREATMENT FOR OUTPATIENT REHABILITATION   Patient's Last Name, First Name, Daniella Manuel YOB: 1980   Provider's Name   [facility]   Medical Record No.  [id number]     Onset Date:    Start of Care Date:       Medical Diagnosis:  left knee pain      PT Treatment Diagnosis:  left knee pain Plan of Treatment  Frequency/Duration:  /      Certification date from   to           See note for plan of treatment details and functional goals     Abdoulaye Daugherty PT                         I CERTIFY THE NEED FOR THESE SERVICES FURNISHED UNDER         THIS PLAN OF TREATMENT AND WHILE UNDER MY CARE     (Physician attestation of this document indicates review and certification of the therapy plan).              Referring Provider:  Arturo Aguilera    Initial Assessment  See Epic Evaluation-

## 2024-05-13 ENCOUNTER — THERAPY VISIT (OUTPATIENT)
Dept: PHYSICAL THERAPY | Facility: CLINIC | Age: 44
End: 2024-05-13
Payer: COMMERCIAL

## 2024-05-13 DIAGNOSIS — M25.562 LEFT KNEE PAIN: Primary | ICD-10-CM

## 2024-05-13 PROCEDURE — 97530 THERAPEUTIC ACTIVITIES: CPT | Mod: GP | Performed by: PHYSICAL THERAPIST

## 2024-05-13 PROCEDURE — 97110 THERAPEUTIC EXERCISES: CPT | Mod: 59 | Performed by: PHYSICAL THERAPIST

## 2024-05-22 ENCOUNTER — THERAPY VISIT (OUTPATIENT)
Dept: PHYSICAL THERAPY | Facility: CLINIC | Age: 44
End: 2024-05-22
Payer: COMMERCIAL

## 2024-05-22 DIAGNOSIS — M25.562 LEFT KNEE PAIN: Primary | ICD-10-CM

## 2024-05-22 PROCEDURE — 97110 THERAPEUTIC EXERCISES: CPT | Mod: GP | Performed by: PHYSICAL THERAPIST

## 2024-06-04 ENCOUNTER — THERAPY VISIT (OUTPATIENT)
Dept: PHYSICAL THERAPY | Facility: CLINIC | Age: 44
End: 2024-06-04
Payer: COMMERCIAL

## 2024-06-04 DIAGNOSIS — M25.562 LEFT KNEE PAIN: Primary | ICD-10-CM

## 2024-06-04 PROCEDURE — 97110 THERAPEUTIC EXERCISES: CPT | Mod: GP | Performed by: PHYSICAL THERAPIST

## 2024-06-04 PROCEDURE — 97140 MANUAL THERAPY 1/> REGIONS: CPT | Mod: GP | Performed by: PHYSICAL THERAPIST

## 2024-06-20 DIAGNOSIS — J30.2 SEASONAL ALLERGIC RHINITIS, UNSPECIFIED TRIGGER: ICD-10-CM

## 2024-06-25 RX ORDER — MOMETASONE FUROATE MONOHYDRATE 50 UG/1
2 SPRAY, METERED NASAL AT BEDTIME
Qty: 17 G | Refills: 5 | Status: SHIPPED | OUTPATIENT
Start: 2024-06-25 | End: 2025-06-25

## 2024-07-01 DIAGNOSIS — M54.41 CHRONIC BILATERAL LOW BACK PAIN WITH BILATERAL SCIATICA: ICD-10-CM

## 2024-07-01 DIAGNOSIS — G89.29 CHRONIC BILATERAL LOW BACK PAIN WITH BILATERAL SCIATICA: ICD-10-CM

## 2024-07-01 DIAGNOSIS — M54.42 CHRONIC BILATERAL LOW BACK PAIN WITH BILATERAL SCIATICA: ICD-10-CM

## 2024-07-02 RX ORDER — PREGABALIN 150 MG/1
CAPSULE ORAL
Qty: 60 CAPSULE | Refills: 1 | Status: SHIPPED | OUTPATIENT
Start: 2024-07-02 | End: 2024-09-11

## 2024-07-08 ENCOUNTER — PATIENT OUTREACH (OUTPATIENT)
Dept: CARE COORDINATION | Facility: CLINIC | Age: 44
End: 2024-07-08
Payer: COMMERCIAL

## 2024-09-11 DIAGNOSIS — M54.42 CHRONIC BILATERAL LOW BACK PAIN WITH BILATERAL SCIATICA: ICD-10-CM

## 2024-09-11 DIAGNOSIS — G89.29 CHRONIC BILATERAL LOW BACK PAIN WITH BILATERAL SCIATICA: ICD-10-CM

## 2024-09-11 DIAGNOSIS — M54.41 CHRONIC BILATERAL LOW BACK PAIN WITH BILATERAL SCIATICA: ICD-10-CM

## 2024-09-11 RX ORDER — PREGABALIN 150 MG/1
CAPSULE ORAL
Qty: 60 CAPSULE | Refills: 1 | Status: SHIPPED | OUTPATIENT
Start: 2024-09-11

## 2024-09-29 ENCOUNTER — OFFICE VISIT (OUTPATIENT)
Dept: URGENT CARE | Facility: URGENT CARE | Age: 44
End: 2024-09-29
Payer: COMMERCIAL

## 2024-09-29 ENCOUNTER — ANCILLARY PROCEDURE (OUTPATIENT)
Dept: GENERAL RADIOLOGY | Facility: CLINIC | Age: 44
End: 2024-09-29
Attending: PHYSICIAN ASSISTANT
Payer: COMMERCIAL

## 2024-09-29 VITALS
HEART RATE: 104 BPM | BODY MASS INDEX: 42.61 KG/M2 | WEIGHT: 268 LBS | RESPIRATION RATE: 18 BRPM | OXYGEN SATURATION: 97 % | SYSTOLIC BLOOD PRESSURE: 119 MMHG | TEMPERATURE: 97.6 F | DIASTOLIC BLOOD PRESSURE: 81 MMHG

## 2024-09-29 DIAGNOSIS — M25.511 ACUTE PAIN OF RIGHT SHOULDER: ICD-10-CM

## 2024-09-29 DIAGNOSIS — M25.521 RIGHT ELBOW PAIN: ICD-10-CM

## 2024-09-29 DIAGNOSIS — Z87.898 HISTORY OF CHRONIC PAIN: ICD-10-CM

## 2024-09-29 DIAGNOSIS — M25.521 RIGHT ELBOW PAIN: Primary | ICD-10-CM

## 2024-09-29 PROCEDURE — 99214 OFFICE O/P EST MOD 30 MIN: CPT | Performed by: PHYSICIAN ASSISTANT

## 2024-09-29 PROCEDURE — 73030 X-RAY EXAM OF SHOULDER: CPT | Mod: TC | Performed by: RADIOLOGY

## 2024-09-29 PROCEDURE — 73080 X-RAY EXAM OF ELBOW: CPT | Mod: TC | Performed by: RADIOLOGY

## 2024-09-29 ASSESSMENT — PAIN SCALES - GENERAL: PAINLEVEL: WORST PAIN (10)

## 2024-09-29 NOTE — PROGRESS NOTES
Chief Complaint   Patient presents with    Pain     Right arm pain - ongoing for three weeks, shooting sharp pain in elbow, and down arm                ASSESSMENT:    ICD-10-CM    1. Right elbow pain  M25.521 Orthopedic  Referral     XR Shoulder Right G/E 3 Views     XR Elbow Right G/E 3 Views      2. Acute pain of right shoulder  M25.511 Orthopedic  Referral     XR Shoulder Right G/E 3 Views     XR Elbow Right G/E 3 Views      3. History of chronic pain  Z87.898               PLAN: Suspect elbow ulnar nerve entrapment and right shoulder bicipital tendinitis.  Continue tennis elbow strap during the day and wrist splints at night.  Continue Voltaren and Lyrica.  Referral to Ortho.    Advised about symptoms which might herald more serious problems.    Right shoulder and right elbow x-rays pending.        Dominga Kim PA-C      SUBJECTIVE:   Daniella Sexton is an 44 year old female who presents with right elbow pain for 1 month and some right shoulder pain in addition.  Using tennis elbow strap during the day and wrist splints at bedtime.  History of carpal tunnel release about 20 years ago left arm.  Does get zingers of pain at time.  No neck pain.  Some occasional numbness/tingling diffusely into all fingers.  History of chronic left knee pain.      Allergies   Allergen Reactions    Amoxicillin Anaphylaxis    Morphine Other (See Comments) and Nausea and Vomiting    Penicillins Unknown       Past Medical History:   Diagnosis Date    Alcohol abuse     Anxiety 04/18/2014    Arthritis     C. difficile colitis     Chronic bilateral low back pain with bilateral sciatica 07/18/2018    Chronic pansinusitis 01/17/2019    Duodenal ulcer 03/01/1995    Fatty liver 12/16/2020    History of alcohol abuse 04/18/2014    History of methamphetamine abuse (H) 04/18/2014    Lung nodule 12/17/2020    Currently managed with follow up imaging by [facility].       Methamphetamine abuse in  remission (H)     Migraines 04/18/2014    Mild intermittent asthma without complication 01/02/2019    Obesity (BMI 35.0-39.9) with comorbidity (H) 05/08/2019    Pulmonary emphysema, unspecified emphysema type (H) 07/08/2022    Pure hypercholesterolemia 08/27/2018    Seasonal allergic rhinitis 04/18/2014       Current Outpatient Medications   Medication Sig Dispense Refill    albuterol (PROAIR HFA/PROVENTIL HFA/VENTOLIN HFA) 108 (90 Base) MCG/ACT inhaler Inhale 2 puffs into the lungs every 4 hours as needed for wheezing, cough or shortness of breath Use with spacer 18 g 0    amitriptyline (ELAVIL) 50 MG tablet Take 1 tablet (50 mg) by mouth at bedtime 90 tablet 3    azelastine (ASTELIN) 0.1 % nasal spray Spray 1 spray into both nostrils 2 times daily as needed for rhinitis (as additional ttx if necessary) 30 mL 8    baclofen (LIORESAL) 10 MG tablet Take 1 tablet (10 mg) by mouth 2 times daily 300 tablet 1    budesonide (PULMICORT) 0.5 MG/2ML neb solution Add one ampule into [facility] sinus rinse bottle with saline mixture and irrigate nose daily 60 mL 11    diclofenac (VOLTAREN) 75 MG EC tablet Take 1 tablet (75 mg) by mouth 2 times daily 60 tablet 11    fexofenadine (ALLEGRA) 180 MG tablet Take 1 tablet (180 mg) by mouth at bedtime 60 tablet 5    fluticasone-salmeterol (ADVAIR) 100-50 MCG/ACT inhaler Inhale 1 puff into the lungs every 12 hours 60 each 11    ipratropium - albuterol 0.5 mg/2.5 mg/3 mL (DUONEB) 0.5-2.5 (3) MG/3ML neb solution Take 1 vial (3 mLs) by nebulization every 6 hours as needed for shortness of breath or wheezing 90 mL 1    mometasone (NASONEX) 50 MCG/ACT nasal spray SPRAY 2 SPRAYS INTO BOTH NOSTRILS AT BEDTIME 17 g 5    pregabalin (LYRICA) 150 MG capsule TAKE ONE CAPSULE BY MOUTH TWICE A DAY 60 capsule 1    rizatriptan (MAXALT) 10 MG tablet Take 1 tablet (10 mg) by mouth at onset of headache for migraine 10 tablet 3     No current facility-administered medications for this visit.       Social  History     Tobacco Use    Smoking status: Every Day     Current packs/day: 0.50     Types: Cigarettes     Passive exposure: Current    Smokeless tobacco: Never    Tobacco comments:     5 cigarettes/day   Vaping Use    Vaping status: Every Day    Substances: THC    Passive vaping exposure: Yes   Substance Use Topics    Alcohol use: Yes     Comment: 3-4 beers a night     Drug use: Yes     Types: Marijuana       ROS:  Gen: no fevers  Musculoskel: + as above  Skin: as above    OBJECTIVE:  /81 (BP Location: Left arm, Patient Position: Sitting, Cuff Size: Adult Large)   Pulse 104   Temp 97.6  F (36.4  C) (Tympanic)   Resp 18   Wt 121.6 kg (268 lb)   LMP 09/18/2024 (Approximate)   SpO2 97%   BMI 42.61 kg/m     General:   awake, alert, and cooperative.  NAD.   Head: Normocephalic, atraumatic.  Eyes: Conjunctiva clear,   MS: Right shoulder positive impingement sign.  Decreased range of motion all planes.  Tender over the right bicipital groove.  Right elbow tender over the ulnar nerve area.  Negative Tinel's at the wrist.  Increased elbow pain with resisted supination and pronation.  Good palpable radial pulse.  Sensation to soft touch intact.   Neuro: Alert and oriented - normal speech.      Dominga Kim PA-C

## 2024-09-30 ENCOUNTER — OFFICE VISIT (OUTPATIENT)
Dept: ORTHOPEDICS | Facility: CLINIC | Age: 44
End: 2024-09-30
Attending: PHYSICIAN ASSISTANT
Payer: COMMERCIAL

## 2024-09-30 VITALS — WEIGHT: 268 LBS | BODY MASS INDEX: 42.06 KG/M2 | HEIGHT: 67 IN

## 2024-09-30 DIAGNOSIS — M25.511 ACUTE PAIN OF RIGHT SHOULDER: ICD-10-CM

## 2024-09-30 DIAGNOSIS — M77.11 LATERAL EPICONDYLITIS OF RIGHT ELBOW: Primary | ICD-10-CM

## 2024-09-30 DIAGNOSIS — M25.521 RIGHT ELBOW PAIN: ICD-10-CM

## 2024-09-30 PROCEDURE — 99204 OFFICE O/P NEW MOD 45 MIN: CPT | Performed by: PEDIATRICS

## 2024-09-30 RX ORDER — PREDNISONE 20 MG/1
20 TABLET ORAL 2 TIMES DAILY
Qty: 10 TABLET | Refills: 0 | Status: SHIPPED | OUTPATIENT
Start: 2024-09-30 | End: 2024-10-05

## 2024-09-30 NOTE — LETTER
September 30, 2024 Autumn NETTA Sexton  [address] [address]        To Whom It May Concern,   Daniella was seen in clinic today due to injury. Please excuse any absence from work today. Follow-up is to occur in 1 month after completing a treatment plan of Physical Therapy.          Sincerely,        Dany Martinez DO/jhonny

## 2024-09-30 NOTE — PATIENT INSTRUCTIONS
Rather diffuse symptoms in the right upper extremity, though they are most consistent with lateral epicondylitis, also tennis elbow.      Discussed the etiology of lateral epicondyle tendinosis, also called tennis elbow. This condition is frequently caused by repetitive stress from gripping/grasping activities. Cause of this issue may include a change in level of activity that seems trivial.  May do icing, heat, over the counter medication as needed. Additionally, treatment options include activity modification to avoid painful activities.  This is often treated with rehabilitation (therapy exercises) to improve the pain and overall function at the elbow; hand therapy referral placed.   Use of device such as counter force bracing (forearm strap, or tennis elbow brace), compression sleeve, and/or wrist bracing was discussed; you can continue with forearm strap use, and fit reviewed today. Bracing may only temporarily help the symptoms.  Discussed use of medication for symptoms; with degree of symptoms currently, placed prescription for prednisone; take with food.    Monitor 1 month with therapy to begin.  Future considerations could include additional imaging (MRI; not likely to be required), steroid injection, EMG.    If you have any further questions for your physician or physician s care team you can contact them thru [employer] or by calling [phone].

## 2024-09-30 NOTE — PROGRESS NOTES
ASSESSMENT & PLAN    Daniella was seen today for pain.    Diagnoses and all orders for this visit:    Lateral epicondylitis of right elbow  -     predniSONE (DELTASONE) 20 MG tablet; Take 1 tablet (20 mg) by mouth 2 times daily for 5 days.  -     Hand Therapy Referral; Future    Right elbow pain  -     Orthopedic  Referral  -     predniSONE (DELTASONE) 20 MG tablet; Take 1 tablet (20 mg) by mouth 2 times daily for 5 days.  -     Hand Therapy Referral; Future    Acute pain of right shoulder  -     Orthopedic  Referral  -     predniSONE (DELTASONE) 20 MG tablet; Take 1 tablet (20 mg) by mouth 2 times daily for 5 days.  -     Hand Therapy Referral; Future        See Patient Instructions  Patient Instructions   Rather diffuse symptoms in the right upper extremity, though they are most consistent with lateral epicondylitis, also tennis elbow.      Discussed the etiology of lateral epicondyle tendinosis, also called tennis elbow. This condition is frequently caused by repetitive stress from gripping/grasping activities. Cause of this issue may include a change in level of activity that seems trivial.  May do icing, heat, over the counter medication as needed. Additionally, treatment options include activity modification to avoid painful activities.  This is often treated with rehabilitation (therapy exercises) to improve the pain and overall function at the elbow; hand therapy referral placed.   Use of device such as counter force bracing (forearm strap, or tennis elbow brace), compression sleeve, and/or wrist bracing was discussed; you can continue with forearm strap use, and fit reviewed today. Bracing may only temporarily help the symptoms.  Discussed use of medication for symptoms; with degree of symptoms currently, placed prescription for prednisone; take with food.    Monitor 1 month with therapy to begin.  Future considerations could include additional imaging (MRI; not likely to be required),  steroid injection, EMG.    If you have any further questions for your physician or physician s care team you can contact them thru [employer] or by calling [phone].      DO NETTA [facility]    -----  Chief Complaint   Patient presents with    Right Elbow - Pain       SUBJECTIVE  Daniella Sexton is a/an 44 year old female who is seen as an Urgent Care referral for evaluation of right elbow.     The patient is seen by themselves.  The patient is Right handed    Onset: 1 month(s) ago. Reports insidious onset without acute precipitating event.  Location of Pain: right elbow, diffuse elbow pain, mostly notes lateral epicondyle and posterior elbow  Worsened by: Gripping, lifting, pronation/supination, reaching behind  Better with:   Treatments tried: wrist splints, tennis elbow strap, tylenol, chronic pain medication, ice, diclofenac and lyrica; icyhot, aspercreme  Associated symptoms: numbness and tingling into all fingers but the 5th (currently not numb, comes and goes)    Orthopedic/Surgical history: NO for right UE  Social History/Occupation: [employer]    **  Above information per rooming staff.  Additional history:  No injury or change in activities.  Initially noted pain in dorsal forearm, and lateral elbow.  Has been using forearm strap. Helps a little with activities.  Now radiating upward to anterior right shoulder.  Gets some numbness/tingling to right UE. This morning, noted in thumb, index, long, ring fingers. Not in small finger today.  Wears braces to bed, not always helpful.        REVIEW OF SYSTEMS:  Review of Systems    OBJECTIVE:  [facility] 09/18/2024 (Approximate)          Cervical Spine Exam    Range of Motion:       forward flexion no change         extension no change        lateral flexion no change    Special Tests:     neg (-) Spurling for radicular symptoms; mild pulling right periscapular area with rightward testing           Skin:      well perfused       capillary refill brisk    Lymphatics:      no edema noted in the upper extremities         Right Shoulder exam    ROM:      forward flexion grossly intact, ~160, no change       abduction grossly intact, ~160, no change       internal rotation belly press position with puling right shoulder area       external rotation grossly intact, some posterior elbow pain        Right Elbow exam:    Inspection:     no ecchymosis       no edema or effusion    ROM:     flexion lacking few deg end range, stiffness and pain       extension lacking few deg end range, stiffness and pain       forearm supination mild limitation, with pain       forearm pronation grossly intact    Strength: pain with resisted forearm rotation, wrist extension, long finger extension    Tender: lateral epicondyle, dorsal wad  Posterior to lateral epicondyle         RADIOLOGY:  Final results and radiologist's interpretation, available in the [facility] health record.  Images were reviewed with the patient in the office today.  My personal interpretation of the performed imaging: no acute bony abnormality noted. Small enthesophyte lateral epicondyle.      XR Elbow Right G/E 3 Views    Narrative    EXAM: XR ELBOW RIGHT G/E 3 VIEWS  LOCATION: [facility]  DATE: 9/29/2024    INDICATION:  Right elbow pain, Acute pain of right shoulder  COMPARISON: None.      Impression    IMPRESSION: Normal joint spaces and alignment. No fracture or joint effusion.   XR Shoulder Right G/E 3 Views    Narrative    EXAM: XR SHOULDER RIGHT G/E 3 VIEWS  LOCATION: [facility]  DATE: 9/29/2024    INDICATION:  Right elbow pain, Acute pain of right shoulder  COMPARISON: None.      Impression    IMPRESSION: No fracture or dislocation. No degenerative changes. Small benign calcified granuloma in the right midlung.           Review of prior external note(s) from -   Review of the result(s) of each unique test -  imaging  Independent interpretation of a test performed by another physician/other qualified health care professional (not separately reported) - imaging  Prescription drug management

## 2024-09-30 NOTE — LETTER
9/30/2024      Daniella Sexton  [address] [place] [address]      Dear Colleague,    Thank you for referring your patient, Daniella Sexton, to the [facility]. Please see a copy of my visit note below.    ASSESSMENT & PLAN    Daniella was seen today for pain.    Diagnoses and all orders for this visit:    Lateral epicondylitis of right elbow  -     predniSONE (DELTASONE) 20 MG tablet; Take 1 tablet (20 mg) by mouth 2 times daily for 5 days.  -     Hand Therapy Referral; Future    Right elbow pain  -     Orthopedic  Referral  -     predniSONE (DELTASONE) 20 MG tablet; Take 1 tablet (20 mg) by mouth 2 times daily for 5 days.  -     Hand Therapy Referral; Future    Acute pain of right shoulder  -     Orthopedic  Referral  -     predniSONE (DELTASONE) 20 MG tablet; Take 1 tablet (20 mg) by mouth 2 times daily for 5 days.  -     Hand Therapy Referral; Future        See Patient Instructions  Patient Instructions   Rather diffuse symptoms in the right upper extremity, though they are most consistent with lateral epicondylitis, also tennis elbow.      Discussed the etiology of lateral epicondyle tendinosis, also called tennis elbow. This condition is frequently caused by repetitive stress from gripping/grasping activities. Cause of this issue may include a change in level of activity that seems trivial.  May do icing, heat, over the counter medication as needed. Additionally, treatment options include activity modification to avoid painful activities.  This is often treated with rehabilitation (therapy exercises) to improve the pain and overall function at the elbow; hand therapy referral placed.   Use of device such as counter force bracing (forearm strap, or tennis elbow brace), compression sleeve, and/or wrist bracing was discussed; you can continue with forearm strap use, and fit reviewed today. Bracing may only temporarily help the symptoms.  Discussed use of  medication for symptoms; with degree of symptoms currently, placed prescription for prednisone; take with food.    Monitor 1 month with therapy to begin.  Future considerations could include additional imaging (MRI; not likely to be required), steroid injection, EMG.    If you have any further questions for your physician or physician s care team you can contact them thru MyChart or by calling [phone].      DO NETTA [facility]    -----  Chief Complaint   Patient presents with     Right Elbow - Pain       SUBJECTIVE  Daniella Sexton is a/an 44 year old female who is seen as an Urgent Care referral for evaluation of right elbow.     The patient is seen by themselves.  The patient is Right handed    Onset: 1 month(s) ago. Reports insidious onset without acute precipitating event.  Location of Pain: right elbow, diffuse elbow pain, mostly notes lateral epicondyle and posterior elbow  Worsened by: Gripping, lifting, pronation/supination, reaching behind  Better with:   Treatments tried: wrist splints, tennis elbow strap, tylenol, chronic pain medication, ice, diclofenac and lyrica; icyhot, aspercreme  Associated symptoms: numbness and tingling into all fingers but the 5th (currently not numb, comes and goes)    Orthopedic/Surgical history: NO for right UE  Social History/Occupation: [employer]    **  Above information per rooming staff.  Additional history:  No injury or change in activities.  Initially noted pain in dorsal forearm, and lateral elbow.  Has been using forearm strap. Helps a little with activities.  Now radiating upward to anterior right shoulder.  Gets some numbness/tingling to right UE. This morning, noted in thumb, index, long, ring fingers. Not in small finger today.  Wears braces to bed, not always helpful.        REVIEW OF SYSTEMS:  Review of Systems    OBJECTIVE:  LMP 09/18/2024 (Approximate)          Cervical Spine Exam    Range  of Motion:       forward flexion no change         extension no change        lateral flexion no change    Special Tests:     neg (-) Spurling for radicular symptoms; mild pulling right periscapular area with rightward testing           Skin:     well perfused       capillary refill brisk    Lymphatics:      no edema noted in the upper extremities         Right Shoulder exam    ROM:      forward flexion grossly intact, ~160, no change       abduction grossly intact, ~160, no change       internal rotation belly press position with puling right shoulder area       external rotation grossly intact, some posterior elbow pain        Right Elbow exam:    Inspection:     no ecchymosis       no edema or effusion    ROM:     flexion lacking few deg end range, stiffness and pain       extension lacking few deg end range, stiffness and pain       forearm supination mild limitation, with pain       forearm pronation grossly intact    Strength: pain with resisted forearm rotation, wrist extension, long finger extension    Tender: lateral epicondyle, dorsal wad  Posterior to lateral epicondyle         RADIOLOGY:  Final results and radiologist's interpretation, available in the [facility] health record.  Images were reviewed with the patient in the office today.  My personal interpretation of the performed imaging: no acute bony abnormality noted. Small enthesophyte lateral epicondyle.      XR Elbow Right G/E 3 Views    Narrative    EXAM: XR ELBOW RIGHT G/E 3 VIEWS  LOCATION: [facility]  DATE: 9/29/2024    INDICATION:  Right elbow pain, Acute pain of right shoulder  COMPARISON: None.      Impression    IMPRESSION: Normal joint spaces and alignment. No fracture or joint effusion.   XR Shoulder Right G/E 3 Views    Narrative    EXAM: XR SHOULDER RIGHT G/E 3 VIEWS  LOCATION: [facility]  DATE: 9/29/2024    INDICATION:  Right elbow pain, Acute pain of right shoulder  COMPARISON:  None.      Impression    IMPRESSION: No fracture or dislocation. No degenerative changes. Small benign calcified granuloma in the right midlung.           Review of prior external note(s) from -   Review of the result(s) of each unique test - imaging  Independent interpretation of a test performed by another physician/other qualified health care professional (not separately reported) - imaging  Prescription drug management           Again, thank you for allowing me to participate in the care of your patient.        Sincerely,        Dany Martinez, DO

## 2024-10-04 ENCOUNTER — MYC MEDICAL ADVICE (OUTPATIENT)
Dept: FAMILY MEDICINE | Facility: CLINIC | Age: 44
End: 2024-10-04
Payer: COMMERCIAL

## 2024-10-04 DIAGNOSIS — J43.9 PULMONARY EMPHYSEMA, UNSPECIFIED EMPHYSEMA TYPE (H): ICD-10-CM

## 2024-10-04 DIAGNOSIS — J45.40 MODERATE PERSISTENT ASTHMA WITHOUT COMPLICATION: ICD-10-CM

## 2024-10-04 RX ORDER — FLUTICASONE PROPIONATE AND SALMETEROL 100; 50 UG/1; UG/1
1 POWDER RESPIRATORY (INHALATION) EVERY 12 HOURS
Qty: 60 EACH | Refills: 1 | Status: SHIPPED | OUTPATIENT
Start: 2024-10-04

## 2024-10-04 NOTE — TELEPHONE ENCOUNTER
I called and spoke to the patient and let him know her know that the prescription has been sent to her pharmacy.    Gerda Carvalho [facility]

## 2024-10-07 NOTE — TELEPHONE ENCOUNTER
Patient Quality Outreach    Patient is due for the following:   Breast Cancer Screening - Mammogram  Cervical Cancer Screening - PAP Needed    Next Steps:   Schedule a office visit for pap    Type of outreach:    Sent [employer] message.      Questions for provider review:    None           Rakesh Hollis MA

## 2024-10-11 ENCOUNTER — THERAPY VISIT (OUTPATIENT)
Dept: OCCUPATIONAL THERAPY | Facility: CLINIC | Age: 44
End: 2024-10-11
Attending: PEDIATRICS
Payer: COMMERCIAL

## 2024-10-11 DIAGNOSIS — M77.11 LATERAL EPICONDYLITIS OF RIGHT ELBOW: ICD-10-CM

## 2024-10-11 DIAGNOSIS — M25.511 ACUTE PAIN OF RIGHT SHOULDER: ICD-10-CM

## 2024-10-11 DIAGNOSIS — M25.521 RIGHT ELBOW PAIN: Primary | ICD-10-CM

## 2024-10-11 PROCEDURE — 97165 OT EVAL LOW COMPLEX 30 MIN: CPT | Mod: GO

## 2024-10-11 PROCEDURE — 97760 ORTHOTIC MGMT&TRAING 1ST ENC: CPT | Mod: GO

## 2024-10-11 PROCEDURE — 97530 THERAPEUTIC ACTIVITIES: CPT | Mod: GO

## 2024-10-11 NOTE — PROGRESS NOTES
OCCUPATIONAL THERAPY EVALUATION  Type of Visit: Evaluation     Fall Risk Screen:  Fall screen completed by: OT  Have you fallen 2 or more times in the past year?: No  Have you fallen and had an injury in the past year?: No  Is patient a fall risk?: No    Subjective      Presenting condition or subjective complaint: tennis elbow  Date of onset: 09/30/24 (DO Order Date)    Relevant medical history: Asthma; COPD; Emphysema; Migraines or headaches; Pain at night or rest; Smoking   Dates & types of surgery: tubal, tonsils, carpitunel, sinus, back    Prior diagnostic imaging/testing results: X-ray     Prior therapy history for the same diagnosis, illness or injury: No      Prior Level of Function  Transfers: Independent  Ambulation: Independent  ADL: Independent  IADL: Driving, Finances, Housekeeping, Laundry, Meal preparation, Medication management, Work, Yard work    Living Environment  Social support: Alone   Type of home: House; Multi-level   Ramp: No   Stairs inside the home: Yes 16 Is there a railing: Yes     Help at home: None    Employment: Yes production    Patient goals for therapy: use my arm without pain in my elbow and forearm    Pain assessment: Pain present  See objective evaluation for additional pain details     Objective   ADDITIONAL HISTORY:  Right hand dominant  Patient reports symptoms of pain, weakness/loss of strength, numbness, and tingling   Transportation: drives  Currently working in normal job without restrictions    Functional Outcome Measure:   Upper Extremity Functional Index Score:  SCORE:   Column Totals: /80: 39   (A lower score indicates greater disability.)    PAIN:  Pain Level at Rest: 5/10  Pain Level with Use: 10/10  Pain Location: Lateral forearm and upper arm, lateral and medial elbow  Pain Quality: Aching, Numb, Shooting, and Tingling  Pain Frequency: constant  Pain is Worst: daytime or nighttime  Pain is Exacerbated By: lifting arm, brushing hair, putting on bra  Pain is Relieved  By: counterbrace  Pain Progression: Worsened    POSTURE: Forward Neck Posture and Rounded Forward Shoulders     SENSATION: Decreased Radial Nerve distribution per pt report     ROM:   Wrist ROM  Left AROM Right AROM    Extension 60 55+   Flexion 58 51+   Radial Deviation (RD) 16 20+   Ulnar Deviation (UD) 36 38+   Supination 65 54+   Pronation 72 73+     NEURAL TENSION TESTING: RNT: Radial Neurodynamic Test (based on LELA Ram's ULNT)   10/11/2024   0-5 Scale 1   Position:   0/5: Arm across abdomen in coronal plane  1/5: Depress shoulder, ER to neutral ABD shoulder to 45 degrees  2/5: IR shoulder to end range, keep elbow at 90 degrees  3/5: Extend elbow to 0 degrees  4/5: Fully pronate forearm  5/5: Flex wrist and fingers with UD  Notes:  (+) indicates beyond grade level but less than half-way to next level  (-) indicates over half-way to level  S1 onset/change of patient's symptoms  S2 definite stop point based on patient's discomfort level    RESISTED TESTING: Resisted Testing (pain report)   Right   Elbow Extension 5+   Elbow Flexion 5+   Supination  5+   Pronation 5+   Wrist Ext with RD, Elbow at side 5+   Wrist Ext with UD, Elbow at side 5+   Wrist Flex with RD, Elbow at side 5+   Wrist Flex with UD, Elbow at side 5+   EDC with Elbow at side 5+   Long Finger Test 5+      STRENGTH:     Measured in pounds 10/11/2024 10/11/2024    Left Right   Elbow at side 75 36+     PALPATION:   Elbow Palpation    Spiral Groove -   Distal Triceps 2/10   Anconeus 8/10   ECRB Origin -   ECU at Origin -   EDC at Origin 8/10   Radial Head 7/10   Posterior Interosseous Nerve (PIN) 10/10   Extensor Wad 6/10     Assessment & Plan   CLINICAL IMPRESSIONS  Medical Diagnosis: Right elbow pain, Lateral epicondylitis of right elbow    Treatment Diagnosis: Right elbow pain, Lateral epicondylitis of right elbow    Impression/Assessment: Pt is a 44 year old female presenting to Occupational Therapy due to Right elbow pain, Lateral  epicondylitis of right elbow.  The following significant findings have been identified: Impaired ROM, Impaired sensation, Impaired strength, and Pain.  These identified deficits interfere with their ability to perform self care tasks, work tasks, recreational activities, household chores, driving ,  yard work, and meal planning and preparation as compared to previous level of function.   Patient's limitations or Problem List includes: Pain, Decreased ROM/motion, Increased edema, Weakness, Sensory disturbance, Decreased stability, Decreased , Decreased pinch, Tightness in musculature, and Adherence in connective tissue of the right elbow which interferes with the patient's ability to perform Self Care Tasks (dressing, hygiene/toileting), Work Tasks, Sleep Patterns, Recreational Activities, Household Chores, and Driving  as compared to previous level of function.    Clinical Decision Making (Complexity):  Assessment of Occupational Performance: 5 or more Performance Deficits  Occupational Performance Limitations: dressing, hygiene and grooming, driving and community mobility, health management and maintenance, home establishment and management, meal preparation and cleanup, shopping, sleep, work, and leisure activities  Clinical Decision Making (Complexity): Low complexity    PLAN OF CARE  Treatment Interventions:  Modalities:  US and Paraffin  Therapeutic Exercise:  AROM, AAROM, PROM, Tendon Gliding, Blocking, Reverse Blocking, Place and Hold, Contract Relax, Extensor Tracking, Isotonics, Isometrics, and Stabilization  Neuromuscular re-education:  Nerve Gliding, Posture, and Kinesiotaping  Manual Techniques:  Joint mobilization, Scar mobilization, Friction massage, Myofascial release, and Manual edema mobilization  Orthotic Fabrication:  Static and Forearm based  Self Care:  Self Care Tasks, Ergonomic Considerations, and Work Tasks    Long Term Goals   OT Goal 1  Goal Identifier: Lifting  Goal Description: Pt  will lift a shopping bag with 2/10 pain in order to maximize independence with I/ADLs.  Goal Progress: 10/10 pain  Target Date: 01/11/25      Frequency of Treatment: 1 x weekly for 4 weeks  Duration of Treatment: tapering to 2 x monthly for 2 months     Education Assessment: Learner/Method: Patient;Demonstration;Pictures/Video  Education Comments: PTRX via phone     Risks and benefits of evaluation/treatment have been explained.   Patient/Family/caregiver agrees with Plan of Care.     Evaluation Time:    OT Eval, Low Complexity Minutes (68938): 25  Signing Clinician: Danna Murguia OT

## 2024-10-16 ENCOUNTER — OFFICE VISIT (OUTPATIENT)
Dept: FAMILY MEDICINE | Facility: CLINIC | Age: 44
End: 2024-10-16
Payer: COMMERCIAL

## 2024-10-16 VITALS
DIASTOLIC BLOOD PRESSURE: 84 MMHG | SYSTOLIC BLOOD PRESSURE: 130 MMHG | RESPIRATION RATE: 16 BRPM | BODY MASS INDEX: 42.22 KG/M2 | HEIGHT: 67 IN | TEMPERATURE: 98.8 F | OXYGEN SATURATION: 96 % | WEIGHT: 269 LBS | HEART RATE: 101 BPM

## 2024-10-16 DIAGNOSIS — Z23 ENCOUNTER FOR IMMUNIZATION: ICD-10-CM

## 2024-10-16 DIAGNOSIS — J02.9 SORE THROAT: ICD-10-CM

## 2024-10-16 DIAGNOSIS — Z12.31 VISIT FOR SCREENING MAMMOGRAM: Primary | ICD-10-CM

## 2024-10-16 LAB
DEPRECATED S PYO AG THROAT QL EIA: NEGATIVE
GROUP A STREP BY PCR: NOT DETECTED

## 2024-10-16 PROCEDURE — 90480 ADMN SARSCOV2 VAC 1/ONLY CMP: CPT | Performed by: PHYSICIAN ASSISTANT

## 2024-10-16 PROCEDURE — 90656 IIV3 VACC NO PRSV 0.5 ML IM: CPT | Performed by: PHYSICIAN ASSISTANT

## 2024-10-16 PROCEDURE — 90746 HEPB VACCINE 3 DOSE ADULT IM: CPT | Performed by: PHYSICIAN ASSISTANT

## 2024-10-16 PROCEDURE — 87651 STREP A DNA AMP PROBE: CPT | Performed by: PHYSICIAN ASSISTANT

## 2024-10-16 PROCEDURE — 90472 IMMUNIZATION ADMIN EACH ADD: CPT | Performed by: PHYSICIAN ASSISTANT

## 2024-10-16 PROCEDURE — 99213 OFFICE O/P EST LOW 20 MIN: CPT | Mod: 25 | Performed by: PHYSICIAN ASSISTANT

## 2024-10-16 PROCEDURE — 91320 SARSCV2 VAC 30MCG TRS-SUC IM: CPT | Performed by: PHYSICIAN ASSISTANT

## 2024-10-16 PROCEDURE — 90471 IMMUNIZATION ADMIN: CPT | Performed by: PHYSICIAN ASSISTANT

## 2024-10-16 RX ORDER — DIPHENHYDRAMINE HYDROCHLORIDE AND LIDOCAINE HYDROCHLORIDE AND ALUMINUM HYDROXIDE AND MAGNESIUM HYDRO
5-10 KIT EVERY 6 HOURS PRN
Qty: 119 ML | Refills: 0 | Status: SHIPPED | OUTPATIENT
Start: 2024-10-16

## 2024-10-16 NOTE — PROGRESS NOTES
"  Assessment & Plan     Sore throat  No acute findings - strep was negative.  Likely viral.  Recommend supportive care -recheck if worsening, not improving in the next week or any new symptoms    - Streptococcus A Rapid Screen w/Reflex to PCR - Clinic Collect  - magic mouthwash suspension, diphenhydrAMINE, lidocaine, aluminum-magnesium & simethicone, (FIRST-MOUTHWASH BLM) compounding kit  Dispense: 119 mL; Refill: 0  - Group A Streptococcus PCR Throat Swab    Visit for screening mammogram    - MA Screening Bilateral w/ Mikel    Encounter for immunization    - HEPATITIS B, ADULT 20+ (ENGERIX-B/RECOMBIVAX HB)  - INFLUENZA VACCINE,SPLIT VIRUS,TRIVALENT,PF(FLUZONE)  - COVID-19 12+ (PFIZER)            Nicotine/Tobacco Cessation  She reports that she has been smoking cigarettes. She has been exposed to tobacco smoke. She has never used smokeless tobacco.  Nicotine/Tobacco Cessation Plan  Not addresses at today's visit      BMI  Estimated body mass index is 41.82 kg/m  as calculated from the following:    Height as of this encounter: 1.708 m (5' 7.25\").    Weight as of this encounter: 122 kg (269 lb).             Deniz Brewer is a 44 year old, presenting for the following health issues:  Pt describes feeling a soreness to the tip of the tongue for the last week and in the last 3 days there is soreness in the throat with swallowing.  No injury, no fever, no aching or malaise.  Tylenol not helping.      Mouth Lesions        10/16/2024    11:13 AM   Additional Questions   Roomed by Rajeev OSMAN,       Via the Health Maintenance questionnaire, the patient has reported the following services have been completed -Mammogram: [facility] 2022-[facility], this information has not been sent to the abstraction team.  History of Present Illness       Reason for visit:  Sour taste in mouth blister on tongue throat hurts  Symptom onset:  3-7 days ago  Symptoms include:  Same  Symptom intensity:  Moderate  Symptom progression:  " Worsening  Had these symptoms before:  No  What makes it worse:  Eating drinking  What makes it better:  No She is missing 1 dose(s) of medications per week.  She is not taking prescribed medications regularly due to remembering to take.         Concern - Blisters in mouth  Onset: 1 week   Description: Blisters in mouth causing pain   Intensity: 6/10  Progression of Symptoms:  worsening  Accompanying Signs & Symptoms: sour taste in mouth   Previous history of similar problem: n/a  Precipitating factors:        Worsened by: eating and drinking   Alleviating factors:        Improved by: none  Therapies tried and outcome: gargling salt water - did not help             Objective    LMP 09/18/2024 (Approximate)   There is no height or weight on file to calculate BMI.  Physical Exam   GENERAL: alert and no distress  HENT: nose and mouth without ulcers or lesions, oropharynx clear, and oral mucous membranes moist. Small papule to the tip of the tongue but not inflamed no erythema   NECK: no adenopathy, no asymmetry, masses, or scars            Signed Electronically by: Michelle Solano PA-C

## 2024-10-17 ENCOUNTER — PATIENT OUTREACH (OUTPATIENT)
Dept: CARE COORDINATION | Facility: CLINIC | Age: 44
End: 2024-10-17
Payer: COMMERCIAL

## 2024-10-17 ENCOUNTER — TELEPHONE (OUTPATIENT)
Dept: FAMILY MEDICINE | Facility: CLINIC | Age: 44
End: 2024-10-17
Payer: COMMERCIAL

## 2024-10-17 NOTE — TELEPHONE ENCOUNTER
Reason for Call:  Appointment Request    Patient requesting this type of appt:  Tennis elbow     Requested provider: Marielena Douglas or anyone     Reason patient unable to be scheduled: Not within requested timeframe    When does patient want to be seen/preferred time: Same day    Comments: Her elbow pain is getting worse     Could we send this information to you in [facility] or would you prefer to receive a phone call?:   Patient would prefer a phone call   Okay to leave a detailed message?: Yes at Cell number on file:    Telephone Information:   Mobile [phone]       Call taken on 10/17/2024 at 8:22 AM by Liliana Bolanos

## 2024-10-18 ENCOUNTER — THERAPY VISIT (OUTPATIENT)
Dept: OCCUPATIONAL THERAPY | Facility: CLINIC | Age: 44
End: 2024-10-18
Attending: PEDIATRICS
Payer: COMMERCIAL

## 2024-10-18 ENCOUNTER — OFFICE VISIT (OUTPATIENT)
Dept: FAMILY MEDICINE | Facility: CLINIC | Age: 44
End: 2024-10-18
Payer: COMMERCIAL

## 2024-10-18 VITALS
HEIGHT: 67 IN | HEART RATE: 111 BPM | SYSTOLIC BLOOD PRESSURE: 116 MMHG | TEMPERATURE: 97.5 F | RESPIRATION RATE: 20 BRPM | DIASTOLIC BLOOD PRESSURE: 77 MMHG | BODY MASS INDEX: 42.75 KG/M2 | WEIGHT: 272.4 LBS | OXYGEN SATURATION: 95 %

## 2024-10-18 DIAGNOSIS — M77.11 LATERAL EPICONDYLITIS OF RIGHT ELBOW: Primary | ICD-10-CM

## 2024-10-18 DIAGNOSIS — M77.11 LATERAL EPICONDYLITIS OF RIGHT ELBOW: ICD-10-CM

## 2024-10-18 DIAGNOSIS — M25.521 RIGHT ELBOW PAIN: Primary | ICD-10-CM

## 2024-10-18 PROCEDURE — 97763 ORTHC/PROSTC MGMT SBSQ ENC: CPT | Mod: GO

## 2024-10-18 PROCEDURE — 97112 NEUROMUSCULAR REEDUCATION: CPT | Mod: GO

## 2024-10-18 PROCEDURE — 97140 MANUAL THERAPY 1/> REGIONS: CPT | Mod: GO

## 2024-10-18 PROCEDURE — 99213 OFFICE O/P EST LOW 20 MIN: CPT

## 2024-10-18 RX ORDER — ACETAMINOPHEN AND CODEINE PHOSPHATE 300; 30 MG/1; MG/1
1 TABLET ORAL EVERY 6 HOURS PRN
Qty: 10 TABLET | Refills: 0 | Status: SHIPPED | OUTPATIENT
Start: 2024-10-18 | End: 2024-10-21

## 2024-10-18 NOTE — LETTER
October 18, 2024      Daniella Sexton  [address] [address]        To Whom It May Concern:    Daniella Sexton  was seen on 10/18/24.  Please excuse her  until November 4th due to injury.        Sincerely,        JENAE Aiken CNP

## 2024-10-18 NOTE — LETTER
Opioid / Opioid Plus Controlled Substance Agreement    This is an agreement between you and your provider about the safe and appropriate use of controlled substance/opioids prescribed by your care team. Controlled substances are medicines that can cause physical and mental dependence (abuse).    There are strict laws about having and using these medicines. We here at [facility] are committing to working with you in your efforts to get better. To support you in this work, we ll help you schedule regular office appointments for medicine refills. If we must cancel or change your appointment for any reason, we ll make sure you have enough medicine to last until your next appointment.     As a Provider, I will:  Listen carefully to your concerns and treat you with respect.   Recommend a treatment plan that I believe is in your best interest. This plan may involve therapies other than opioid pain medication.   Talk with you often about the possible benefits, and the risk of harm of any medicine that we prescribe for you.   Provide a plan on how to taper (discontinue or go off) using this medicine if the decision is made to stop its use.    As a Patient, I understand that opioid(s):   Are a controlled substance prescribed by my care team to help me function or work and manage my condition(s).   Are strong medicines and can cause serious side effects such as:  Drowsiness, which can seriously affect my driving ability  A lower breathing rate, enough to cause death  Harm to my thinking ability   Depression   Abuse of and addiction to this medicine  Need to be taken exactly as prescribed. Combining opioids with certain medicines or chemicals (such as illegal drugs, sedatives, sleeping pills, and benzodiazepines) can be dangerous or even fatal. If I stop opioids suddenly, I may have severe withdrawal symptoms.  Do not work for all types of pain nor for all patients. If they re not helpful, I may be asked to stop  them.    {Benzo / Stimulant (Optional):851394}    The risks, benefits and side effects of these medicine(s) were explained to me. I agree that:  I will take part in other treatments as advised by my care team. This may be psychiatry or counseling, physical therapy, behavioral therapy, group treatment or a referral to a specialist.     I will keep all my appointments. I understand that this is part of the monitoring of opioids. My care team may require an office visit for EVERY opioid/controlled substance refill. If I miss appointments or don t follow instructions, my care team may stop my medicine.    I will take my medicines as prescribed. I will not change the dose or schedule unless my care team tells me to. There will be no refills if I run out early.     I may be asked to come to the clinic and complete a urine drug test or complete a pill count at any time. If I don t give a urine sample or participate in a pill count, the care team may stop my medicine.    I will only receive prescriptions from this clinic for chronic pain. If I am treated by another provider for acute pain issues, I will tell them that I am taking opioid pain medication for chronic pain and that I have a treatment agreement with this provider. I will inform my [facility] care team within one business day if I am given a prescription for any pain medication by another healthcare provider. My [facility] care team can contact other providers and pharmacists about my use of any medicines.    It is up to me to make sure that I don t run out of my medicines on weekends or holidays. If my care team is willing to refill my opioid prescription without a visit, I must request refills only during office hours. Refills may take up to 3 business days to process. I will use one pharmacy to fill all my opioid and other controlled substance prescriptions. I will notify the clinic about any changes to my insurance or medication  availability.    I am responsible for my prescriptions. If the medicine/prescription is lost, stolen or destroyed, it will not be replaced. I also agree not to share controlled substance medicines with anyone.    I am aware I should not use any illegal or recreational drugs. I agree not to drink alcohol unless my care team says I can.       If I enroll in the [place] [facility] program, I will tell my care team prior to my next refill.     I will tell my care team right away if I become pregnant, have a new medical problem treated outside of my regular clinic, or have a change in my medications.    I understand that this medicine can affect my thinking, judgment and reaction time. Alcohol and drugs affect the brain and body, which can affect the safety of my driving. Being under the influence of alcohol or drugs can affect my decision-making, behaviors, personal safety, and the safety of others. Driving while impaired (DWI) can occur if a person is driving, operating, or in physical control of a car, motorcycle, boat, snowmobile, ATV, motorbike, off-road vehicle, or any other motor vehicle ([place] Statute 169A.20). I understand the risk if I choose to drive or operate any vehicle or machinery.    I understand that if I do not follow any of the conditions above, my prescriptions or treatment may be stopped or changed.          Opioids  What You Need to Know    What are opioids?   Opioids are pain medicines that must be prescribed by a doctor. They are also known as narcotics.     Examples are:   morphine (MS Contin, Liyah)  oxycodone (Oxycontin)  oxycodone and acetaminophen (Percocet)  hydrocodone and acetaminophen (Vicodin, Norco)   fentanyl patch (Duragesic)   hydromorphone (Dilaudid)   methadone  codeine (Tylenol #3)     What do opioids do well?   Opioids are best for severe short-term pain such as after a surgery or injury. They may work well for cancer pain. They may help some people with long-lasting  (chronic) pain.     What do opioids NOT do well?   Opioids never get rid of pain entirely, and they don t work well for most patients with chronic pain. Opioids don t reduce swelling, one of the causes of pain.                                    Other ways to manage chronic pain and improve function include:     Treat the health problem that may be causing pain  Anti-inflammation medicines, which reduce swelling and tenderness, such as ibuprofen (Advil, Motrin) or naproxen (Aleve)  Acetaminophen (Tylenol)  Antidepressants and anti-seizure medicines, especially for nerve pain  Topical treatments such as patches or creams  Injections or nerve blocks  Chiropractic or osteopathic treatment  Acupuncture, massage, deep breathing, meditation, visual imagery, aromatherapy  Use heat or ice at the pain site  Physical therapy   Exercise  Stop smoking  Take part in therapy       Risks and side effects     Talk to your doctor before you start or decide to keep taking opioids. Possible side effects include:    Lowering your breathing rate enough to cause death  Overdose, including death, especially if taking higher than prescribed doses  Worse depression symptoms; less pleasure in things you usually enjoy  Feeling tired or sluggish  Slower thoughts or cloudy thinking  Being more sensitive to pain over time; pain is harder to control  Trouble sleeping or restless sleep  Changes in hormone levels (for example, less testosterone)  Changes in sex drive or ability to have sex  Constipation  Unsafe driving  Itching and sweating  Dizziness  Nausea, throwing up and dry mouth    What else should I know about opioids?    Opioids may lead to dependence, tolerance, or addiction.    Dependence means that if you stop or reduce the medicine too quickly, you will have withdrawal symptoms. These include loose poop (diarrhea), jitters, flu-like symptoms, nervousness and tremors. Dependence is not the same as addiction.                      Tolerance means needing higher doses over time to get the same effect. This may increase the chance of serious side effects.    Addiction is when people improperly use a substance that harms their body, their mind or their relations with others. Use of opiates can cause a relapse of addiction if you have a history of drug or alcohol abuse.    People who have used opioids for a long time may have a lower quality of life, worse depression, higher levels of pain and more visits to doctors.    You can overdose on opioids. Take these steps to lower your risk of overdose:    Recognize the signs:  Signs of overdose include decrease or loss of consciousness (blackout), slowed breathing, trouble waking up and blue lips. If someone is worried about overdose, they should call 911.    Talk to your doctor about Narcan (naloxone).   If you are at risk for overdose, you may be given a prescription for Narcan. This medicine very quickly reverses the effects of opioids.   If you overdose, a friend or family member can give you Narcan while waiting for the ambulance. They need to know the signs of overdose and how to give Narcan.     Don't use alcohol or street drugs.   Taking them with opioids can cause death.    Do not take any of these medicines unless your doctor says it s OK. Taking these with opioids can cause death:  Benzodiazepines, such as lorazepam (Ativan), alprazolam (Xanax) or diazepam (Valium)  Muscle relaxers, such as cyclobenzaprine (Flexeril)  Sleeping pills like zolpidem (Ambien)   Other opioids      How to keep you and other people safe while taking opioids:    Never share your opioids with others.  Opioid medicines are regulated by the Drug Enforcement Agency (PATRICK). Selling or sharing medications is a criminal act.    2. Be sure to store opioids in a secure place, locked up if possible. Young children can easily swallow them and overdose.    3. When you are traveling with your medicines, keep them in the  original bottles. If you use a pill box, be sure you also carry a copy of your medicine list from your clinic or pharmacy.    4. Safe disposal of opioids    Most pharmacies have places to get rid of medicine, called disposal kiosks. Medicine disposal options are also available in every [facility]. Search your county and  medication disposal  to find more options. You can find more details at:  https://www.pca.[facility].[email or web address]./living-green/managing-unwanted-medications     I agree that my provider, clinic care team, and pharmacy may work with any city, state or federal law enforcement agency that investigates the misuse, sale, or other diversion of my controlled medicine. I will allow my provider to discuss my care with, or share a copy of, this agreement with any other treating provider, pharmacy or emergency room where I receive care.    I have read this agreement and have asked questions about anything I did not understand.    _______________________________________________________  Patient Signature - Daniella M Shold _____________________                   Date     _______________________________________________________  Provider Signature - JENAE Aiken CNP   _____________________                   Date     _______________________________________________________  Witness Signature (required if provider not present while patient signing)   _____________________                   Date

## 2024-10-18 NOTE — PROGRESS NOTES
"  Assessment & Plan     Lateral epicondylitis of right elbow  Reports worsening symptoms. Has completed one physical therapy appointment and has another today. Patient does repetitive movements due to job making symptoms worst. Because of this, a letter was given to patient to be excused from work for 2 weeks while completing physical therapy. Tylenol #3 provided to patient to use as needed for pain.   - acetaminophen-codeine (TYLENOL #3) 300-30 MG per tablet; Take 1 tablet by mouth every 6 hours as needed for severe pain.    BMI  Estimated body mass index is 42.35 kg/m  as calculated from the following:    Height as of this encounter: 1.708 m (5' 7.25\").    Weight as of this encounter: 123.6 kg (272 lb 6.4 oz).             Deniz Brewer is a 44 year old, presenting for the following health issues:  Elbow Pain      10/18/2024     8:15 AM   Additional Questions   Roomed by alonso kelly         10/18/2024   Forms   Any forms needing to be completed Yes        Via the Health Maintenance questionnaire, the patient has reported the following services have been completed -Mammogram: [facility] 2022-10-01, this information has not been sent to the abstraction team.         Concern -  rt elbow pain  Onset: 1+ months  Description: tendonitis  Intensity: severe  Progression of Symptoms:  worsening  Accompanying Signs & Symptoms: pain, once in a while numbness, tingling in fingers  Previous history of similar problem: yes rt arm carpal tunnel surgery  Precipitating factors:        Worsened by: moving lifting  Alleviating factors:        Improved by: heating pad  Therapies tried and outcome: PT only 1 session, 2nd today/ tylenol and massaging muscles    Just started hand therapy last Friday, was given a hand brace.   On Tuesday symptoms got worst.   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   Once and awhile numbness to right hand  Patient is right handed          Objective    /77 (BP " "Location: Left arm, Patient Position: Sitting, Cuff Size: Adult Large)   Pulse 111   Temp 97.5  F (36.4  C) (Temporal)   Resp 20   Ht 1.708 m (5' 7.25\")   Wt 123.6 kg (272 lb 6.4 oz)   LMP 09/18/2024 (Approximate)   SpO2 95%   BMI 42.35 kg/m    Body mass index is 42.35 kg/m .  Physical Exam   GENERAL: alert and no distress  MS: right wrist in brace limiting mobility. no edema. Tenderness to lateral aspect of right wrist/hand  SKIN: no suspicious lesions or rashes            Signed Electronically by: JENAE Aiken CNP    "

## 2024-10-22 ENCOUNTER — OFFICE VISIT (OUTPATIENT)
Dept: FAMILY MEDICINE | Facility: CLINIC | Age: 44
End: 2024-10-22
Payer: COMMERCIAL

## 2024-10-22 VITALS
TEMPERATURE: 97.8 F | DIASTOLIC BLOOD PRESSURE: 85 MMHG | RESPIRATION RATE: 20 BRPM | HEIGHT: 67 IN | HEART RATE: 106 BPM | SYSTOLIC BLOOD PRESSURE: 124 MMHG | OXYGEN SATURATION: 98 % | WEIGHT: 270.4 LBS | BODY MASS INDEX: 42.44 KG/M2

## 2024-10-22 DIAGNOSIS — G89.29 CHRONIC BILATERAL LOW BACK PAIN WITH BILATERAL SCIATICA: ICD-10-CM

## 2024-10-22 DIAGNOSIS — M54.41 CHRONIC BILATERAL LOW BACK PAIN WITH BILATERAL SCIATICA: ICD-10-CM

## 2024-10-22 DIAGNOSIS — M77.11 LATERAL EPICONDYLITIS OF RIGHT ELBOW: Primary | ICD-10-CM

## 2024-10-22 DIAGNOSIS — N94.6 MENSES PAINFUL: ICD-10-CM

## 2024-10-22 DIAGNOSIS — M54.42 CHRONIC BILATERAL LOW BACK PAIN WITH BILATERAL SCIATICA: ICD-10-CM

## 2024-10-22 DIAGNOSIS — Z12.4 CERVICAL CANCER SCREENING: ICD-10-CM

## 2024-10-22 PROCEDURE — 99214 OFFICE O/P EST MOD 30 MIN: CPT

## 2024-10-22 RX ORDER — PREGABALIN 150 MG/1
CAPSULE ORAL
Qty: 60 CAPSULE | Refills: 3 | Status: SHIPPED | OUTPATIENT
Start: 2024-10-22

## 2024-10-22 NOTE — LETTER
October 22, 2024      Daniella Sexton  [address] [address]        To Whom It May Concern:    Daniella Sexton  was seen on 10/22/24.  Please excuse her  until Monday, November 11th due to injury.        Sincerely,        JENAE Aiken CNP

## 2024-10-22 NOTE — PROGRESS NOTES
"  Assessment & Plan     Lateral epicondylitis of right elbow  Slight improvement. Continues with occupational therapy. Has developed some bisecp tenderness to right arm. Encouraged patient to discuss with OT. Time off work extended for one week. Plan to meet with patient 11/8 to discuss return to work.    Chronic bilateral low back pain with bilateral sciatica  Lyrica refilled.   - pregabalin (LYRICA) 150 MG capsule; TAKE ONE CAPSULE BY MOUTH TWICE A DAY    Menses painful  Has one day where she has extremely painful menses causing her to call out of work. Is unable to take ibuprofen as she already takes diclofenac daily. Would like to discuss this further with Ob/Gyn. Referral placed.   - Ob/Gyn  Referral; Future    Cervical cancer screening  Discussed, patient plans to complete at yearly preventative exam in Feb.     BMI  Estimated body mass index is 42.04 kg/m  as calculated from the following:    Height as of this encounter: 1.708 m (5' 7.25\").    Weight as of this encounter: 122.7 kg (270 lb 6.4 oz).               Deniz Brewer is a 44 year old, presenting for the following health issues:  Follow Up (Right arm/)      10/22/2024     1:37 PM   Additional Questions   Roomed by alonso kelly     Via the Health Maintenance questionnaire, the patient has reported the following services have been completed -[facility]amm[facility]: [facility] 2022-[facility], this information has not been sent to the abstraction team.               Objective    /85 (BP Location: Left arm, Patient Position: Sitting, Cuff Size: Adult Large)   Pulse 106   Temp 97.8  F (36.6  C) (Temporal)   Resp 20   Ht 1.708 m (5' 7.25\")   Wt 122.7 kg (270 lb 6.4 oz)   LMP 10/22/2024 (Exact Date)   SpO2 98%   BMI 42.04 kg/m    Body mass index is 42.04 kg/m .  Physical Exam   GENERAL: alert and no distress  MS: RUE exam shows tenderness to right bicep, right wrist in splint   SKIN: no suspicious lesions or rashes  PSYCH: mentation appears " normal, affect normal/bright            Signed Electronically by: JENAE Aiken CNP

## 2024-10-23 ENCOUNTER — THERAPY VISIT (OUTPATIENT)
Dept: OCCUPATIONAL THERAPY | Facility: CLINIC | Age: 44
End: 2024-10-23
Attending: PEDIATRICS
Payer: COMMERCIAL

## 2024-10-23 DIAGNOSIS — M77.11 LATERAL EPICONDYLITIS OF RIGHT ELBOW: ICD-10-CM

## 2024-10-23 DIAGNOSIS — M25.521 RIGHT ELBOW PAIN: Primary | ICD-10-CM

## 2024-10-23 PROCEDURE — 97140 MANUAL THERAPY 1/> REGIONS: CPT | Mod: GO

## 2024-10-23 PROCEDURE — 97112 NEUROMUSCULAR REEDUCATION: CPT | Mod: GO

## 2024-10-29 ENCOUNTER — TELEPHONE (OUTPATIENT)
Dept: FAMILY MEDICINE | Facility: CLINIC | Age: 44
End: 2024-10-29
Payer: COMMERCIAL

## 2024-10-29 NOTE — TELEPHONE ENCOUNTER
Forms/Letter Request    Type of form/letter: Disability      Is Release of Information needed?: Yes  Was an EMILY obtained?  Yes    Do we have the form/letter: Yes: Patient brought in    Who is the form from? Patient    Where did/will the form come from? Patient or family brought in       When is form/letter needed by:     How would you like the form/letter returned:  and Fax : [employer] [phone]    Patient Notified form requests are processed in 5-7 business days:Yes    Could we send this information to you in [employer] or would you prefer to receive a phone call?:   Patient would prefer a phone call   Okay to leave a detailed message?: Yes at Cell number on file:    Telephone Information:   Mobile [phone]

## 2024-10-30 NOTE — TELEPHONE ENCOUNTER
Faxed form  sent to stat scanning and brought original down to first floor for .  Bess Haynes   [facility]

## 2024-10-31 ENCOUNTER — THERAPY VISIT (OUTPATIENT)
Dept: OCCUPATIONAL THERAPY | Facility: CLINIC | Age: 44
End: 2024-10-31
Payer: COMMERCIAL

## 2024-10-31 DIAGNOSIS — M25.521 RIGHT ELBOW PAIN: Primary | ICD-10-CM

## 2024-10-31 DIAGNOSIS — M77.11 LATERAL EPICONDYLITIS OF RIGHT ELBOW: ICD-10-CM

## 2024-10-31 PROCEDURE — 97112 NEUROMUSCULAR REEDUCATION: CPT | Mod: GO

## 2024-10-31 PROCEDURE — 97140 MANUAL THERAPY 1/> REGIONS: CPT | Mod: GO

## 2024-11-08 ENCOUNTER — OFFICE VISIT (OUTPATIENT)
Dept: FAMILY MEDICINE | Facility: CLINIC | Age: 44
End: 2024-11-08
Payer: COMMERCIAL

## 2024-11-08 VITALS
TEMPERATURE: 96.9 F | SYSTOLIC BLOOD PRESSURE: 115 MMHG | WEIGHT: 273.6 LBS | BODY MASS INDEX: 42.94 KG/M2 | RESPIRATION RATE: 24 BRPM | HEIGHT: 67 IN | DIASTOLIC BLOOD PRESSURE: 82 MMHG | HEART RATE: 97 BPM | OXYGEN SATURATION: 97 %

## 2024-11-08 DIAGNOSIS — M77.11 LATERAL EPICONDYLITIS OF RIGHT ELBOW: Primary | ICD-10-CM

## 2024-11-08 DIAGNOSIS — E66.01 MORBID OBESITY (H): ICD-10-CM

## 2024-11-08 PROCEDURE — 99213 OFFICE O/P EST LOW 20 MIN: CPT

## 2024-11-08 PROCEDURE — G2211 COMPLEX E/M VISIT ADD ON: HCPCS

## 2024-11-08 NOTE — LETTER
November 8, 2024      Daniella Sexton  [address] [address]        To Whom It May Concern:    Daniella Sexton  was seen on 11/8/24.  Please excuse her  until 12/9 due to injury.        Sincerely,        JENAE Aiken CNP

## 2024-11-08 NOTE — PROGRESS NOTES
"  Assessment & Plan     Lateral epicondylitis of right elbow  Pain is not improving. Instructed patient to follow up with sports med provider. Work leave was extended for one month.     Morbid obesity (H)    The longitudinal plan of care for the diagnosis(es)/condition(s) as documented were addressed during this visit. Due to the added complexity in care, I will continue to support Daniella in the subsequent management and with ongoing continuity of care.    BMI  Estimated body mass index is 42.53 kg/m  as calculated from the following:    Height as of this encounter: 1.708 m (5' 7.25\").    Weight as of this encounter: 124.1 kg (273 lb 9.6 oz).             Subjective   Daniella is a 44 year old, presenting for the following health issues:  Arm Problem (Rt arm follow up)      11/8/2024     9:12 AM   Additional Questions   Roomed by alonso kelly         11/8/2024   Forms   Any forms needing to be completed Yes        History of Present Illness       Reason for visit:  Recheck tennis elbow  Symptom onset:  More than a month  Symptoms include:  Pain and cant move elbow  Symptom intensity:  Severe  Symptom progression:  Improving  Had these symptoms before:  Yes  Has tried/received treatment for these symptoms:  Yes  Previous treatment was successful:  No  What makes it worse:  Moving it  What makes it better:  Not moving it and heat She is missing 1 dose(s) of medications per week.     Pain to right shoulder has improve. With movement 10/10 pain, with rest pain 5/10.           Objective    /82 (BP Location: Left arm, Patient Position: Sitting, Cuff Size: Adult Large)   Pulse 97   Temp 96.9  F (36.1  C) (Temporal)   Resp 24   Ht 1.708 m (5' 7.25\")   Wt 124.1 kg (273 lb 9.6 oz)   LMP 10/22/2024 (Exact Date)   SpO2 97%   BMI 42.53 kg/m    Body mass index is 42.53 kg/m .  Physical Exam   GENERAL: alert and no distress  MS: wrist brace to right wrist.   SKIN: no suspicious lesions or rashes            Signed " Electronically by: JENAE Aiken CNP

## 2024-11-10 ENCOUNTER — HEALTH MAINTENANCE LETTER (OUTPATIENT)
Age: 44
End: 2024-11-10

## 2024-11-13 ENCOUNTER — TELEPHONE (OUTPATIENT)
Dept: FAMILY MEDICINE | Facility: CLINIC | Age: 44
End: 2024-11-13
Payer: COMMERCIAL

## 2024-11-13 PROBLEM — M25.562 LEFT KNEE PAIN: Status: RESOLVED | Noted: 2024-05-01 | Resolved: 2024-11-13

## 2024-11-13 NOTE — TELEPHONE ENCOUNTER
Forms/Letter Request    Type of form/letter: Disability      Is Release of Information needed?: No    Do we have the form/letter: Yes: attached    Who is the form from? Patient    Where did/will the form come from? Patient or family brought in       When is form/letter needed by: asap     How would you like the form/letter returned:  [phone] and fax to [phone]    Patient Notified form requests are processed in 5-7 business days:Yes    Could we send this information to you in [employer] or would you prefer to receive a phone call?:   Patient would prefer a phone call   Okay to leave a detailed message?: Yes at Cell number on file:    Telephone Information:   Mobile [phone]

## 2024-11-14 NOTE — TELEPHONE ENCOUNTER
Called patient let her know form has been faxed and a copy is ready for  at the first floor  also sent to stat scanning.  Bess Haynes   Pur[facility] Team

## 2024-11-14 NOTE — PROGRESS NOTES
ASSESSMENT & PLAN    Daniella was seen today for follow up.    Diagnoses and all orders for this visit:    Right elbow pain  -     MR Elbow Right w/o Contrast; Future    Lateral epicondylitis of right elbow  -     MR Elbow Right w/o Contrast; Future        See Patient Instructions  Patient Instructions   Ongoing right elbow pain most consistent with that from lateral epicondylitis (tennis elbow).  However, with slow progress with therapy, sense of a lump/bump lateral elbow, we discussed potential for changing course.  Plan MRI of the right elbow next, further evaluation, confirm lateral epicondylitis and rule out other causes for pain.  From there, considerations may include lateral elbow steroid injection (landmark based with me, versus imaging guided), or possibly referral to discuss with one of my colleagues PRP injection or tenotomy with Tenex.    Advanced imaging is done by appointment. Please call [facility] ([facility]/[place]) [phone] , [facility] ([facility]/[place]) [phone] , or [facility] ([facility]) [phone]  to schedule your MRI.     Some insurance companies may require a prior authorization to be completed which can delay the time until you are able to schedule your appointment.       If you are active on [facility], you may have access to your test results before your provider is able to review the study and advise on next steps.      The clinic will contact you with results either via phone or Soundl.ly. If you have not heard from the clinic within 2-3 days following your MRI, please contact us at [phone] or via [facility].  Alternatively, if interested in further discussion with me about the findings and next steps, feel free to schedule a telephone visit, video visit, or recheck appointment in clinic.    Otherwise, we discussed recheck in about 2-3 weeks, can review MRI and discuss options as well.    If you  have any further questions for your physician or physician s care team you can contact them thru [employer] or by calling [phone].      Dany Martinez DO  [facility]    SUBJECTIVE- Interim History November 14, 2024    Chief Complaint   Patient presents with    Right Elbow - Follow Up       Daniella Sexton is a 44 year old female who is seen in f/u up for    Lateral epicondylitis of right elbow  Right elbow pain. Since last visit on 9/30/24 patient has felt that pain is worse with lifting, gripping. Has been utilizing a larger  for her pens, notes good benefit with this.   Prednisone given at last OV is noted as beneficial, reporting that pain was more tolerable  Hand therapy and was given a custom splint and is noted as slight benefit, notes mostly massage done at hand therapy, and current use of KT tape with custom splint. Currently doing 2 stretches  KT tape is noted as beneficial  Time off from work for 1 month with FP note given on 11/8/24 is reported as beneficial    The patient is seen by themselves.  The patient is Right handed    Orthopedic/Surgical history: NO for right UE  Social History/Occupation: Computer Refurbishing    **  Above information per rooming staff.  Additional history:  Pain primarily lateral elbow.  Overall some improvement.   Notes benefit from taping.  Hand therapy somewhat helpful, but mostly doing some massage, manual tx. Limited stretches.  Some benefit form pen .        REVIEW OF SYSTEMS:  Review of Systems    OBJECTIVE:  LMP 10/22/2024 (Exact Date)        Right elbow:  Tape in place lateral elbow from upper arm to forearm, also transversely around dorsal forearm  Lacking terminal flexion and extension with lateral pain, also pain with forearm rotation  Pain with resisted long finger extension, wrist extension  Tender lateral elbow, lateral epicondyle  With palpation, there is also lump near and just distal to lateral  epicondyle    RADIOLOGY:  None new. See previous notes.        Review of prior external note(s) from - hand therapy

## 2024-11-15 ENCOUNTER — OFFICE VISIT (OUTPATIENT)
Dept: ORTHOPEDICS | Facility: CLINIC | Age: 44
End: 2024-11-15
Payer: COMMERCIAL

## 2024-11-15 DIAGNOSIS — M25.521 RIGHT ELBOW PAIN: Primary | ICD-10-CM

## 2024-11-15 DIAGNOSIS — M77.11 LATERAL EPICONDYLITIS OF RIGHT ELBOW: ICD-10-CM

## 2024-11-15 PROCEDURE — 99213 OFFICE O/P EST LOW 20 MIN: CPT | Performed by: PEDIATRICS

## 2024-11-15 NOTE — PATIENT INSTRUCTIONS
Ongoing right elbow pain most consistent with that from lateral epicondylitis (tennis elbow).  However, with slow progress with therapy, sense of a lump/bump lateral elbow, we discussed potential for changing course.  Plan MRI of the right elbow next, further evaluation, confirm lateral epicondylitis and rule out other causes for pain.  From there, considerations may include lateral elbow steroid injection (landmark based with me, versus imaging guided), or possibly referral to discuss with one of my colleagues PRP injection or tenotomy with Tenex.    Advanced imaging is done by appointment. Please call [facility] ([facility]/[place]) [phone] , [facility] ([facility]/[place]) [phone] , or [facility] ([facility]) [phone]  to schedule your MRI.     Some insurance companies may require a prior authorization to be completed which can delay the time until you are able to schedule your appointment.       If you are active on [employer], you may have access to your test results before your provider is able to review the study and advise on next steps.      The clinic will contact you with results either via phone or To8to. If you have not heard from the clinic within 2-3 days following your MRI, please contact us at [phone] or via [facility].  Alternatively, if interested in further discussion with me about the findings and next steps, feel free to schedule a telephone visit, video visit, or recheck appointment in clinic.    Otherwise, we discussed recheck in about 2-3 weeks, can review MRI and discuss options as well.    If you have any further questions for your physician or physician s care team you can contact them thru [employer] or by calling [phone].

## 2024-11-15 NOTE — LETTER
11/15/2024      Daniella Sexton  [address] [place] [address]      Dear Colleague,    Thank you for referring your patient, Daniella Sexton, to the [facility]. Please see a copy of my visit note below.    ASSESSMENT & PLAN    Daniella was seen today for follow up.    Diagnoses and all orders for this visit:    Right elbow pain  -     MR Elbow Right w/o Contrast; Future    Lateral epicondylitis of right elbow  -     MR Elbow Right w/o Contrast; Future        See Patient Instructions  Patient Instructions   Ongoing right elbow pain most consistent with that from lateral epicondylitis (tennis elbow).  However, with slow progress with therapy, sense of a lump/bump lateral elbow, we discussed potential for changing course.  Plan MRI of the right elbow next, further evaluation, confirm lateral epicondylitis and rule out other causes for pain.  From there, considerations may include lateral elbow steroid injection (landmark based with me, versus imaging guided), or possibly referral to discuss with one of my colleagues PRP injection or tenotomy with Tenex.    Advanced imaging is done by appointment. Please call [facility] ([facility]/[place]) [phone] , [facility] ([facility]/[place]) [phone] , or [facility] ([facility]) [phone]  to schedule your MRI.     Some insurance companies may require a prior authorization to be completed which can delay the time until you are able to schedule your appointment.       If you are active on [facility], you may have access to your test results before your provider is able to review the study and advise on next steps.      The clinic will contact you with results either via phone or tribr. If you have not heard from the clinic within 2-3 days following your MRI, please contact us at [phone] or via [facility].  Alternatively, if interested in further  discussion with me about the findings and next steps, feel free to schedule a telephone visit, video visit, or recheck appointment in clinic.    Otherwise, we discussed recheck in about 2-3 weeks, can review MRI and discuss options as well.    If you have any further questions for your physician or physician s care team you can contact them thru [employer] or by calling [phone].      Dany Martinez DO  [facility]    SUBJECTIVE- Interim History November 14, 2024    Chief Complaint   Patient presents with     Right Elbow - Follow Up       Daniella Sexton is a 44 year old female who is seen in f/u up for    Lateral epicondylitis of right elbow  Right elbow pain. Since last visit on 9/30/24 patient has felt that pain is worse with lifting, gripping. Has been utilizing a larger  for her pens, notes good benefit with this.   Prednisone given at last OV is noted as beneficial, reporting that pain was more tolerable  Hand therapy and was given a custom splint and is noted as slight benefit, notes mostly massage done at hand therapy, and current use of KT tape with custom splint. Currently doing 2 stretches  KT tape is noted as beneficial  Time off from work for 1 month with FP note given on 11/8/24 is reported as beneficial    The patient is seen by themselves.  The patient is Right handed    Orthopedic/Surgical history: NO for right UE  Social History/Occupation: Computer Refurbishing    **  Above information per rooming staff.  Additional history:  Pain primarily lateral elbow.  Overall some improvement.   Notes benefit from taping.  Hand therapy somewhat helpful, but mostly doing some massage, manual tx. Limited stretches.  Some benefit form pen .        REVIEW OF SYSTEMS:  Review of Systems    OBJECTIVE:  LMP 10/22/2024 (Exact Date)        Right elbow:  Tape in place lateral elbow from upper arm to forearm, also transversely around dorsal forearm  Lacking terminal  flexion and extension with lateral pain, also pain with forearm rotation  Pain with resisted long finger extension, wrist extension  Tender lateral elbow, lateral epicondyle  With palpation, there is also lump near and just distal to lateral epicondyle    RADIOLOGY:  None new. See previous notes.        Review of prior external note(s) from - hand therapy           Again, thank you for allowing me to participate in the care of your patient.        Sincerely,        Dany Martinez, DO

## 2024-11-20 ENCOUNTER — THERAPY VISIT (OUTPATIENT)
Dept: OCCUPATIONAL THERAPY | Facility: CLINIC | Age: 44
End: 2024-11-20
Payer: COMMERCIAL

## 2024-11-20 DIAGNOSIS — M77.11 LATERAL EPICONDYLITIS OF RIGHT ELBOW: ICD-10-CM

## 2024-11-20 DIAGNOSIS — M25.521 RIGHT ELBOW PAIN: Primary | ICD-10-CM

## 2024-11-20 PROCEDURE — 97112 NEUROMUSCULAR REEDUCATION: CPT | Mod: GO

## 2024-11-20 PROCEDURE — 97140 MANUAL THERAPY 1/> REGIONS: CPT | Mod: GO

## 2024-11-23 ENCOUNTER — OFFICE VISIT (OUTPATIENT)
Dept: URGENT CARE | Facility: URGENT CARE | Age: 44
End: 2024-11-23
Payer: COMMERCIAL

## 2024-11-23 VITALS
DIASTOLIC BLOOD PRESSURE: 91 MMHG | WEIGHT: 273 LBS | SYSTOLIC BLOOD PRESSURE: 141 MMHG | HEART RATE: 92 BPM | RESPIRATION RATE: 18 BRPM | TEMPERATURE: 98 F | OXYGEN SATURATION: 95 % | BODY MASS INDEX: 42.44 KG/M2

## 2024-11-23 DIAGNOSIS — R22.43 LOCALIZED SWELLING OF BOTH LOWER LEGS: Primary | ICD-10-CM

## 2024-11-23 PROCEDURE — 99213 OFFICE O/P EST LOW 20 MIN: CPT | Performed by: NURSE PRACTITIONER

## 2024-11-23 NOTE — PATIENT INSTRUCTIONS
Elevate legs  Increase water  Compression stockings  Follow up with primary care provider this week for further evaluation    Go to emergency room if you develop chest pain, shortness of breath, warmth, redness

## 2024-11-23 NOTE — LETTER
November 23, 2024      Daniella Sexton  [address] [address]        To Whom It May Concern:    Daniella Sexton  was seen on 11/23/24.  Please excuse her tomorrow, 11/24/24.        Sincerely,        EUGENIA Cardenas

## 2024-11-23 NOTE — PROGRESS NOTES
Assessment & Plan     Localized swelling of both lower legs       No injury, imaging not indicated currently without bony tenderness.     Elevate legs  Increase water  Compression stockings  Follow up with primary care provider this week for further evaluation    Go to emergency room if you develop chest pain, shortness of breath, warmth, redness    Follow-up with PCP if symptoms persist for 5 days, and sooner if symptoms worsen or new symptoms develop.     Discussed red flag symptoms which warrant immediate visit in emergency room    All questions were answered and patient verbalized understanding. AVS reviewed with patient.     Theresa Wheeler, DNP, APRN, CNP 11/23/2024 2:02 PM  [facility] CARE RALPH Brewer is a 44 year old female who presents to clinic today for the following health issues:  Chief Complaint   Patient presents with    Joint Swelling     Onset: yesterday: pt reports bilateral ankle swelling, no hx of this prior, denies pain         11/23/2024     1:38 PM   Additional Questions   Roomed by Maikol Hendrickson RN         11/23/2024   Forms   Any forms needing to be completed Yes          M/S Symptoms    Onset of symptoms was yesterday  Location: bilateral ankles into feet and legs  Context: No known injury  Course of symptoms is worsening.    Severity moderate  Current and Associated symptoms: Swelling  Denies  Pain, Bruising, Warmth, Redness, and Decreased range of motion, shortness of breath, chest pain  Aggravating Factors: nothing  Therapies to improve symptoms include: none  This is the first time this type of problem has occurred for this patient.   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     Problem list, Medication list, Allergies, and Medical history reviewed in EPIC.    ROS:  Review of systems negative except for noted above        Objective    BP (!) 141/91 (BP Location: Left arm, Patient Position:  Sitting, Cuff Size: Adult Large)   Pulse 92   Temp 98  F (36.7  C) (Tympanic)   Resp 18   Wt 123.8 kg (273 lb)   LMP 10/22/2024 (Exact Date)   SpO2 95%   BMI 42.44 kg/m    Physical Exam  Constitutional:       General: She is not in acute distress.     Appearance: She is obese. She is not toxic-appearing or diaphoretic.   Cardiovascular:      Rate and Rhythm: Normal rate and regular rhythm.      Heart sounds: Normal heart sounds.   Pulmonary:      Effort: Pulmonary effort is normal. No respiratory distress.      Breath sounds: Normal breath sounds. No wheezing, rhonchi or rales.   Musculoskeletal:      Right lower leg: No tenderness. Edema present.      Left lower leg: No tenderness. Edema present.      Right ankle: Swelling present. No tenderness. Normal range of motion.      Left ankle: Swelling present. No tenderness. Normal range of motion.      Right foot: Normal range of motion. Swelling present. No bony tenderness.      Left foot: Normal range of motion. Swelling present. No bony tenderness.      Comments: Moderate swelling bilateral lower legs, ankles, feet. No calf tenderness bilaterally    Skin:     General: Skin is warm and dry.      Findings: No bruising or erythema.   Neurological:      Mental Status: She is alert.      Sensory: No sensory deficit.

## 2024-11-25 ENCOUNTER — ANCILLARY PROCEDURE (OUTPATIENT)
Dept: MRI IMAGING | Facility: CLINIC | Age: 44
End: 2024-11-25
Attending: PEDIATRICS
Payer: COMMERCIAL

## 2024-11-25 DIAGNOSIS — M54.16 LUMBAR RADICULOPATHY: ICD-10-CM

## 2024-11-25 DIAGNOSIS — M47.816 SPONDYLOSIS OF LUMBAR REGION WITHOUT MYELOPATHY OR RADICULOPATHY: ICD-10-CM

## 2024-11-25 DIAGNOSIS — M79.18 MYOFASCIAL MUSCLE PAIN: ICD-10-CM

## 2024-11-25 DIAGNOSIS — M25.521 RIGHT ELBOW PAIN: ICD-10-CM

## 2024-11-25 DIAGNOSIS — M77.11 LATERAL EPICONDYLITIS OF RIGHT ELBOW: ICD-10-CM

## 2024-11-25 PROCEDURE — 73221 MRI JOINT UPR EXTREM W/O DYE: CPT | Mod: RT | Performed by: RADIOLOGY

## 2024-11-25 RX ORDER — AMITRIPTYLINE HYDROCHLORIDE 50 MG/1
50 TABLET ORAL AT BEDTIME
Qty: 90 TABLET | Refills: 0 | Status: SHIPPED | OUTPATIENT
Start: 2024-11-25

## 2024-11-26 ENCOUNTER — OFFICE VISIT (OUTPATIENT)
Dept: FAMILY MEDICINE | Facility: CLINIC | Age: 44
End: 2024-11-26
Payer: COMMERCIAL

## 2024-11-26 VITALS
BODY MASS INDEX: 44.07 KG/M2 | OXYGEN SATURATION: 96 % | HEIGHT: 67 IN | SYSTOLIC BLOOD PRESSURE: 116 MMHG | TEMPERATURE: 97.9 F | RESPIRATION RATE: 16 BRPM | HEART RATE: 107 BPM | WEIGHT: 280.8 LBS | DIASTOLIC BLOOD PRESSURE: 77 MMHG

## 2024-11-26 DIAGNOSIS — M79.89 SWELLING OF BOTH LOWER EXTREMITIES: Primary | ICD-10-CM

## 2024-11-26 DIAGNOSIS — M77.11 LATERAL EPICONDYLITIS OF RIGHT ELBOW: ICD-10-CM

## 2024-11-26 PROCEDURE — 99213 OFFICE O/P EST LOW 20 MIN: CPT

## 2024-11-26 PROCEDURE — G2211 COMPLEX E/M VISIT ADD ON: HCPCS

## 2024-11-26 RX ORDER — ACETAMINOPHEN AND CODEINE PHOSPHATE 300; 30 MG/1; MG/1
1 TABLET ORAL EVERY 6 HOURS PRN
Qty: 10 TABLET | Refills: 0 | Status: SHIPPED | OUTPATIENT
Start: 2024-11-26 | End: 2024-11-29

## 2024-11-26 NOTE — PROGRESS NOTES
"  Assessment & Plan     Swelling of both lower extremities  Improving and no previous history. Patient has been inactive due to lateral epicondylitis most likely causing swelling. Low suspicion for cardiac involvement or DVT. Encouraged patient to continue to wear compression stockings and elevated extremities and plan for her to return if symptoms do not improve or become worst.     Lateral epicondylitis of right elbow  Ongoing pain. Followed by sports medicine. Tylenol 3 prescribed for pain control.   - acetaminophen-codeine (TYLENOL #3) 300-30 MG per tablet; Take 1 tablet by mouth every 6 hours as needed for severe pain.    The longitudinal plan of care for the diagnosis(es)/condition(s) as documented were addressed during this visit. Due to the added complexity in care, I will continue to support Daniella in the subsequent management and with ongoing continuity of care.    BMI  Estimated body mass index is 43.65 kg/m  as calculated from the following:    Height as of this encounter: 1.708 m (5' 7.25\").    Weight as of this encounter: 127.4 kg (280 lb 12.8 oz).         Subjective   Daniella is a 44 year old, presenting for the following health issues:  Edema (Swollen bilateral ankles)        11/26/2024    11:08 AM   Additional Questions   Roomed by alonso kelly     History of Present Illness       Reason for visit:  Ankels swollen  Symptom onset:  3-7 days ago  Symptoms include:  Ankels swollen  Symptom intensity:  Mild  Symptom progression:  Improving  Had these symptoms before:  No  What makes it better:  Elevating them She is missing 1 dose(s) of medications per week.  She is not taking prescribed medications regularly due to remembering to take.     ED/ Followup:    Facility:  [facility]  Date of visit: 11/23/24  Reason for visit: swelling of feet and ankles  Current Status: swelling has gone down.           Objective    /77 (BP Location: Left arm, Patient Position: Sitting, Cuff Size: " "Adult Large)   Pulse 107   Temp 97.9  F (36.6  C) (Temporal)   Resp 16   Ht 1.708 m (5' 7.25\")   Wt 127.4 kg (280 lb 12.8 oz)   LMP 11/16/2024 (Within Days)   SpO2 96%   BMI 43.65 kg/m    Body mass index is 43.65 kg/m .  Physical Exam   GENERAL: alert and no distress  CV: regular rates and rhythm, no murmur, click or rub, peripheral pulses strong, and 1+ bilateral lower extremity non pitting edema to feet and ankles    MS: no gross musculoskeletal defects noted, no edema  SKIN: Nickel size skin flaking to left lateral ankle no other suspicious lesions or rashes            Signed Electronically by: JENAE Aiken CNP    "

## 2024-12-02 ENCOUNTER — TELEPHONE (OUTPATIENT)
Dept: FAMILY MEDICINE | Facility: CLINIC | Age: 44
End: 2024-12-02
Payer: COMMERCIAL

## 2024-12-02 NOTE — TELEPHONE ENCOUNTER
Patient Quality Outreach    Patient is due for the following:   Cervical Cancer Screening - PAP Needed    Action(s) Taken:   Schedule a office visit for pap smear    Type of outreach:    Sent [employer] message.    Questions for provider review:    None           Rakesh Hollis MA         03-Jan-2023 22:35

## 2024-12-02 NOTE — PROGRESS NOTES
ASSESSMENT & PLAN    Daniella was seen today for follow up.    Diagnoses and all orders for this visit:    Lateral epicondylitis of right elbow  -     Orthopedic  Referral; Future    Acute pain of right shoulder    Rotator cuff syndrome of right shoulder      She is interested in discussion of additional options for elbow, having done hand therapy, taping/bracing, and modifying activities, along with having MRI. Set up with one of my colleagues for this discussion.    Re: shoulder, discussed PT vs MRI vs injection.  Start with home exercises. If interested in PT, contact clinic.  Defer subacromial steroid injection currently.  See below.  Questions answered. Discussed signs and symptoms that may indicate more serious issues; the patient was instructed to seek appropriate care if noted. Daniella indicates understanding of these issues and agrees with the plan.      See Patient Instructions  Patient Instructions   Right elbow MRI shows lateral elbow tendinopathy, with partial thickness tearing of the extensor tendon.  Continue with comfortable therapy exercises.  We discussed considerations around steroid injection, PRP injection, possible tenotomy with Tenex procedure.  Following discussion, will get you set up with one of my colleagues for discussion of injection vs PRP vs Tenex.    Right shoulder consistent with cuff source, impingement.  Discussed rehab approach next, home exercises reviewed. Contact clinic if PT referral desired.  Future considerations could include subacromial steroid injection, possibly MRI.    Updated letter for work, primarily related to elbow, through end of year.  Monitor shoulder during that time as well, and contact clinic if desiring to change course.    If you have any further questions for your physician or physician s care team you can contact them thru MyChart or by calling [phone].      Dany Martinez [facility]  Pt to ED for c/o RLQ onset this AM. Pt had diffused abdominal pain with N/V 2x yesterday, but was resolved until RLQ recurred this AM. Last PO intake 1800. [facility]    SUBJECTIVE- Interim History December 2, 2024    No chief complaint on file.      Daniella Sexton is a 44 year old female who is seen in f/u up for Data Unavailable. Since last visit on 11/15/24 patient has felt some continued pain in the lateral elbow. Notes better improvement with KT tape. Is still utilizing the custom wrist splint. No interim injury.    The patient is seen by themselves.  The patient is seen by themselves.  The patient is Right handed    **  Above information per rooming staff.  Additional history:  Now experiencing constant right shoulder/upper arm pain.      REVIEW OF SYSTEMS:  Review of Systems    OBJECTIVE:  LMP 11/16/2024 (Within Days)          Right Shoulder exam    ROM:      forward flexion ~135, pain, tightness        abduction ~140, pain end range       internal rotation back pocket, pain in shoulder       external rotation grossly intact, pain end range    Tender: anterior, lateral shoulder    Impingement testing:      positive (+) Kilgore       positive (+) empty can    Skin:      no visible deformities       well perfused       capillary refill brisk    Sensation:      normal sensation over shoulder and upper extremity       RADIOLOGY:  Final results and radiologist's interpretation, available in the [facility] health record.  Images were reviewed with the patient in the office today.  My personal interpretation of the performed imaging: lateral elbow extensor tendinopathy with partial tearing. The hyperintense signal on lateral elbow appears likely vascular.        EXAMINATION: MRI of the right Elbow     CLINICAL INFORMATION: right lateral elbow pain.     COMPARISON: Right elbow radiographs 9/29/2024.     TECHNICAL INFORMATION: MR imaging of the right elbow was performed  using:    3 mm axial proton density and fat-suppressed proton density images    3 mm coronal oblique proton density and fat-suppressed proton  density images    3 mm sagittal oblique proton density and T2-weighted  images     FINDINGS:     Osseous Anatomy and Articular Cartilage:  No substantial degenerative changes of the radiohumeral and  ulnohumeral compartments. Normal signal throughout the bones. No  evidence of fracture. No substantial joint effusion.     Ligaments and Tendons:  The medial collateral ligament and annular ligament are unremarkable.     Marked thickening of the common extensor tendon at the insertion at  the lateral epicondyle with a small amount of adjacent edema. At least  moderate grade partial tearing of the common extensor tendon.      Mild tendinosis of the proximal lateral collateral ligament.     Normal appearance of the common flexor tendon.     Surrounding Soft Tissues:  At the lateral skin, there is a T2 bright soft tissue nodule measuring  approximately 1.1 x 1.0 cm.                                                                      IMPRESSION:.  1. Tendinosis with at least moderate grade partial thickness tearing  of the common extensor tendon.     2. Mild tendinosis of the proximal radial collateral ligament.     3. No marrow signal abnormalities to suggest fracture or marrow  infiltration.     I have personally reviewed the examination and initial interpretation  and I agree with the findings.     JOSR HICKS MD         ================================    Previous right shoulder XR visualized/reviewed:      EXAM: XR SHOULDER RIGHT G/E 3 VIEWS  LOCATION: [facility]  DATE: 9/29/2024     INDICATION:  Right elbow pain, Acute pain of right shoulder  COMPARISON: None.                                                                      IMPRESSION: No fracture or dislocation. No degenerative changes. Small benign calcified granuloma in the right midlung.            30 minutes spent by me on the date of the encounter doing chart review, history and exam, documentation and further activities per the note

## 2024-12-03 ENCOUNTER — OFFICE VISIT (OUTPATIENT)
Dept: ORTHOPEDICS | Facility: CLINIC | Age: 44
End: 2024-12-03
Payer: COMMERCIAL

## 2024-12-03 DIAGNOSIS — M25.511 ACUTE PAIN OF RIGHT SHOULDER: ICD-10-CM

## 2024-12-03 DIAGNOSIS — M77.11 LATERAL EPICONDYLITIS OF RIGHT ELBOW: Primary | ICD-10-CM

## 2024-12-03 DIAGNOSIS — M75.101 ROTATOR CUFF SYNDROME OF RIGHT SHOULDER: ICD-10-CM

## 2024-12-03 PROCEDURE — 99214 OFFICE O/P EST MOD 30 MIN: CPT | Performed by: PEDIATRICS

## 2024-12-03 NOTE — PATIENT INSTRUCTIONS
Right elbow MRI shows lateral elbow tendinopathy, with partial thickness tearing of the extensor tendon.  Continue with comfortable therapy exercises.  We discussed considerations around steroid injection, PRP injection, possible tenotomy with Tenex procedure.  Following discussion, will get you set up with one of my colleagues for discussion of injection vs PRP vs Tenex.    Right shoulder consistent with cuff source, impingement.  Discussed rehab approach next, home exercises reviewed. Contact clinic if PT referral desired.  Future considerations could include subacromial steroid injection, possibly MRI.    Updated letter for work, primarily related to elbow, through end of year.  Monitor shoulder during that time as well, and contact clinic if desiring to change course.    If you have any further questions for your physician or physician s care team you can contact them thru [employer] or by calling [phone].

## 2024-12-03 NOTE — LETTER
December 3, 2024      Daniella Sexton  [address] [address]        To Whom It May Concern,   Daniella was seen in clinic today for recheck of injury. Due to ongoing symptoms and continued treatment, Daniella will remain off work through 12/31/24. Work status will be updated around that time.        Sincerely,          Dany Martinez, DO

## 2024-12-03 NOTE — LETTER
12/3/2024      Daniella Sexton  [address] [place] [address]      Dear Colleague,    Thank you for referring your patient, Daniella Sexton, to the [facility]. Please see a copy of my visit note below.    ASSESSMENT & PLAN    Daniella was seen today for follow up.    Diagnoses and all orders for this visit:    Lateral epicondylitis of right elbow  -     Orthopedic  Referral; Future    Acute pain of right shoulder    Rotator cuff syndrome of right shoulder      She is interested in discussion of additional options for elbow, having done hand therapy, taping/bracing, and modifying activities, along with having MRI. Set up with one of my colleagues for this discussion.    Re: shoulder, discussed PT vs MRI vs injection.  Start with home exercises. If interested in PT, contact clinic.  Defer subacromial steroid injection currently.  See below.  Questions answered. Discussed signs and symptoms that may indicate more serious issues; the patient was instructed to seek appropriate care if noted. Daniella indicates understanding of these issues and agrees with the plan.      See Patient Instructions  Patient Instructions   Right elbow MRI shows lateral elbow tendinopathy, with partial thickness tearing of the extensor tendon.  Continue with comfortable therapy exercises.  We discussed considerations around steroid injection, PRP injection, possible tenotomy with Tenex procedure.  Following discussion, will get you set up with one of my colleagues for discussion of injection vs PRP vs Tenex.    Right shoulder consistent with cuff source, impingement.  Discussed rehab approach next, home exercises reviewed. Contact clinic if PT referral desired.  Future considerations could include subacromial steroid injection, possibly MRI.    Updated letter for work, primarily related to elbow, through end of year.  Monitor shoulder during that time as well, and contact clinic if desiring to  change course.    If you have any further questions for your physician or physician s care team you can contact them thru [employer] or by calling [phone].      Dany Martinez [facility]    SUBJECTIVE- Interim History December 2, 2024    No chief complaint on file.      Daniella Sexton is a 44 year old female who is seen in f/u up for Data Unavailable. Since last visit on 11/15/24 patient has felt some continued pain in the lateral elbow. Notes better improvement with KT tape. Is still utilizing the custom wrist splint. No interim injury.    The patient is seen by themselves.  The patient is seen by themselves.  The patient is Right handed    **  Above information per rooming staff.  Additional history:  Now experiencing constant right shoulder/upper arm pain.      REVIEW OF SYSTEMS:  Review of Systems    OBJECTIVE:  LMP 11/16/2024 (Within Days)          Right Shoulder exam    ROM:      forward flexion ~135, pain, tightness        abduction ~140, pain end range       internal rotation back pocket, pain in shoulder       external rotation grossly intact, pain end range    Tender: anterior, lateral shoulder    Impingement testing:      positive (+) Kilgore       positive (+) empty can    Skin:      no visible deformities       well perfused       capillary refill brisk    Sensation:      normal sensation over shoulder and upper extremity       RADIOLOGY:  Final results and radiologist's interpretation, available in the [facility] health record.  Images were reviewed with the patient in the office today.  My personal interpretation of the performed imaging: lateral elbow extensor tendinopathy with partial tearing. The hyperintense signal on lateral elbow appears likely vascular.        EXAMINATION: MRI of the right Elbow     CLINICAL INFORMATION: right lateral elbow pain.     COMPARISON: Right elbow radiographs 9/29/2024.     TECHNICAL INFORMATION: MR imaging of the right  elbow was performed  using:    3 mm axial proton density and fat-suppressed proton density images    3 mm coronal oblique proton density and fat-suppressed proton  density images    3 mm sagittal oblique proton density and T2-weighted images     FINDINGS:     Osseous Anatomy and Articular Cartilage:  No substantial degenerative changes of the radiohumeral and  ulnohumeral compartments. Normal signal throughout the bones. No  evidence of fracture. No substantial joint effusion.     Ligaments and Tendons:  The medial collateral ligament and annular ligament are unremarkable.     Marked thickening of the common extensor tendon at the insertion at  the lateral epicondyle with a small amount of adjacent edema. At least  moderate grade partial tearing of the common extensor tendon.      Mild tendinosis of the proximal lateral collateral ligament.     Normal appearance of the common flexor tendon.     Surrounding Soft Tissues:  At the lateral skin, there is a T2 bright soft tissue nodule measuring  approximately 1.1 x 1.0 cm.                                                                      IMPRESSION:.  1. Tendinosis with at least moderate grade partial thickness tearing  of the common extensor tendon.     2. Mild tendinosis of the proximal radial collateral ligament.     3. No marrow signal abnormalities to suggest fracture or marrow  infiltration.     I have personally reviewed the examination and initial interpretation  and I agree with the findings.     JOSR HICKS MD         ================================    Previous right shoulder XR visualized/reviewed:      EXAM: XR SHOULDER RIGHT G/E 3 VIEWS  LOCATION: [facility]  DATE: 9/29/2024     INDICATION:  Right elbow pain, Acute pain of right shoulder  COMPARISON: None.                                                                      IMPRESSION: No fracture or dislocation. No degenerative changes. Small benign calcified granuloma  in the right midlung.            30 minutes spent by me on the date of the encounter doing chart review, history and exam, documentation and further activities per the note           Again, thank you for allowing me to participate in the care of your patient.        Sincerely,        Dany Martinez, DO

## 2024-12-04 ENCOUNTER — THERAPY VISIT (OUTPATIENT)
Dept: OCCUPATIONAL THERAPY | Facility: CLINIC | Age: 44
End: 2024-12-04
Payer: COMMERCIAL

## 2024-12-04 ENCOUNTER — PATIENT OUTREACH (OUTPATIENT)
Dept: CARE COORDINATION | Facility: CLINIC | Age: 44
End: 2024-12-04

## 2024-12-04 DIAGNOSIS — M25.521 RIGHT ELBOW PAIN: Primary | ICD-10-CM

## 2024-12-04 DIAGNOSIS — M77.11 LATERAL EPICONDYLITIS OF RIGHT ELBOW: ICD-10-CM

## 2024-12-04 PROCEDURE — 97035 APP MDLTY 1+ULTRASOUND EA 15: CPT | Mod: GO

## 2024-12-04 PROCEDURE — 97140 MANUAL THERAPY 1/> REGIONS: CPT | Mod: GO

## 2024-12-09 ENCOUNTER — TELEPHONE (OUTPATIENT)
Dept: ORTHOPEDICS | Facility: CLINIC | Age: 44
End: 2024-12-09
Payer: COMMERCIAL

## 2024-12-09 NOTE — TELEPHONE ENCOUNTER
Other: Patient has TENEX procedure scheduled with Dr. Chapman and wants to know if she needs a  afterward?     Could we send this information to you in [email or web address] or would you prefer to receive a phone call?:   Patient would prefer a phone call   Okay to leave a detailed message?: Yes at Home number on file [phone] (home)

## 2024-12-10 ENCOUNTER — TELEPHONE (OUTPATIENT)
Dept: ORTHOPEDICS | Facility: CLINIC | Age: 44
End: 2024-12-10
Payer: COMMERCIAL

## 2024-12-10 NOTE — TELEPHONE ENCOUNTER
Forms in provider basket for short term disability.  Signed and FAXed to short term disability with OV notes and letters.  Called and spoke with patient, advising of forms being FAXed and that forms are available to .  Patient stated that forms will be picked up tomorrow during her injection/procedure evaluation visit with Dr. Chapman.  Forms are in Dr. Chapman's provider basket for  in the provider office by his desk.  Cy Diallo, JACQUE, ATC

## 2024-12-10 NOTE — TELEPHONE ENCOUNTER
Spoke to patient discussed expectations for her visit on 12/11/2024.  The TENEX procedure will NOT be completed at this visit, scheduled for consultation & possible lateral epicondyle steroid injection.  Patient may continue with ice vs heat as needed following steroid injection for comfort.  She will NOT need a .    Rajeev Up ATC

## 2024-12-11 ENCOUNTER — OFFICE VISIT (OUTPATIENT)
Dept: ORTHOPEDICS | Facility: CLINIC | Age: 44
End: 2024-12-11
Payer: COMMERCIAL

## 2024-12-11 VITALS
BODY MASS INDEX: 43.95 KG/M2 | WEIGHT: 280 LBS | SYSTOLIC BLOOD PRESSURE: 116 MMHG | HEIGHT: 67 IN | DIASTOLIC BLOOD PRESSURE: 82 MMHG

## 2024-12-11 DIAGNOSIS — M25.521 CHRONIC ELBOW PAIN, RIGHT: ICD-10-CM

## 2024-12-11 DIAGNOSIS — S56.511A PARTIAL TEAR OF COMMON EXTENSOR TENDON OF RIGHT ELBOW: ICD-10-CM

## 2024-12-11 DIAGNOSIS — M77.11 LATERAL EPICONDYLITIS OF RIGHT ELBOW: Primary | ICD-10-CM

## 2024-12-11 DIAGNOSIS — G89.29 CHRONIC ELBOW PAIN, RIGHT: ICD-10-CM

## 2024-12-11 PROCEDURE — 99214 OFFICE O/P EST MOD 30 MIN: CPT | Performed by: FAMILY MEDICINE

## 2024-12-11 NOTE — LETTER
2024      Daniella Sexton  [address]  [place] [address]      Dear Colleague,    Thank you for referring your patient, aDniella Sexton, to the [facility]. Please see a copy of my visit note below.    Daniella Sexton  :  1980  DOS: 2024  MRN: [id number]    Sports Medicine Clinic Visit    PCP: Marielena Douglas    Daniella Sexton is a 44 year old Right hand dominant female who is seen in consultation at the request of  Dany Martinez D.O. presenting with right elbow pain for discussion of Tenex procedure vs steroid injection.    Injury: Gradual onset of pain over the past 4+ months that initially started with gripping/grasping & lifting objects at work.  Pain located over right lateral elbow with radiation to wrist extensors.  Additional Features:  Positive: swelling and weakness.  Symptoms are better with Ice, Other medications: Prednisone, and Rest.  Symptoms are worse with: gripping/grasping, lifting, carrying objects.  Other evaluation and/or treatments so far consists of: Ice, Heat, Ibuprofen, Rest, wrist brace, compression brace, hand therapy, splint.  Recent imaging completed: MRI completed 24.  Prior History of related problems: none    Social History: currently employed as computer repair/refurbishing     Review of Systems  Musculoskeletal: as above  Remainder of review of systems is negative including constitutional, CV, pulmonary, GI, Skin and Neurologic except as noted in HPI or medical history.    Past Medical History:   Diagnosis Date     Alcohol abuse      Anxiety 2014     Arthritis      C. difficile colitis      Chronic bilateral low back pain with bilateral sciatica 2018     Chronic pansinusitis 2019     Duodenal ulcer 1995     Fatty liver 2020     History of alcohol abuse 2014     History of methamphetamine abuse (H) 2014     Lung nodule 2020    Currently managed with follow up  "imaging by [employer] Results [facility].        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)      Migraines 04/18/2014     Mild intermittent asthma without complication 01/02/2019     Obesity (BMI 35.0-39.9) with comorbidity (H) 05/08/2019     Pulmonary emphysema, unspecified emphysema type (H) 07/08/2022     Pure hypercholesterolemia 08/27/2018     Seasonal allergic rhinitis 04/18/2014     Past Surgical History:   Procedure Laterality Date     BACK SURGERY  2005    L 3-4, L 4-5     LITHOTRIPSY  2016     OPTICAL TRACKING SYSTEM ENDOSCOPIC SINUS SURGERY Bilateral 1/10/2019    Procedure: BILATERAL IMAGE GUIDED ENDOSCOPIC SINUS SURGERY, BILATERAL TURBINATE REDUCTION;  Surgeon: Maikol Miguel MD;  Location: [facility] OR     RELEASE CARPAL TUNNEL Left      TONSILLECTOMY       TUBAL LIGATION       Family History   Problem Relation Age of Onset     No Known Problems Mother      Unknown/Adopted Father      Heart Disease No family hx of      Diabetes No family hx of      Cancer No family hx of      Colon Cancer No family hx of        Objective  /82   Ht 1.708 m (5' 7.25\")   Wt 127 kg (280 lb)   LMP 11/16/2024 (Within Days)   BMI 43.53 kg/m      General: healthy, alert and in no distress    HEENT: no scleral icterus or conjunctival erythema   Skin: no suspicious lesions or rash. No jaundice.   CV: regular rhythm by palpation, 2+ distal pulses, no pedal edema    Resp: normal respiratory effort without conversational dyspnea   Psych: normal mood and affect    Gait: nonantalgic, appropriate coordination and balance   Neuro: normal light touch sensory exam of the extremities. Motor strength as noted below       Right Elbow exam  Inspection: no swelling  Tender: lateral epicondyle and common extensor tendon  Non-tender: medial epicondyle, common flexor tendon, flexor muscle of forearm, supracondylar notch, olecranon bursa, distal bicep tendon and radial head/neck  Range of Motion: all normal  Strength: elbow strength " full and pain with resisted wrist extension and supination  Special tests: normal stability, normal valgus stress, normal varus stress, ulnar Tinel's negative, no pain with resisted middle finger extension, no pain with resisted wrist flexion:       Radiology:  Results for orders placed or performed in visit on 11/25/24   MR Elbow Right w/o Contrast    Narrative    EXAMINATION: MRI of the right Elbow    CLINICAL INFORMATION: right lateral elbow pain.    COMPARISON: Right elbow radiographs 9/29/2024.    TECHNICAL INFORMATION: MR imaging of the right elbow was performed  using:    3 mm axial proton density and fat-suppressed proton density images    3 mm coronal oblique proton density and fat-suppressed proton  density images    3 mm sagittal oblique proton density and T2-weighted images    FINDINGS:    Osseous Anatomy and Articular Cartilage:  No substantial degenerative changes of the radiohumeral and  ulnohumeral compartments. Normal signal throughout the bones. No  evidence of fracture. No substantial joint effusion.    Ligaments and Tendons:  The medial collateral ligament and annular ligament are unremarkable.    Marked thickening of the common extensor tendon at the insertion at  the lateral epicondyle with a small amount of adjacent edema. At least  moderate grade partial tearing of the common extensor tendon.     Mild tendinosis of the proximal lateral collateral ligament.    Normal appearance of the common flexor tendon.    Surrounding Soft Tissues:  At the lateral skin, there is a T2 bright soft tissue nodule measuring  approximately 1.1 x 1.0 cm.      Impression    IMPRESSION:.  1. Tendinosis with at least moderate grade partial thickness tearing  of the common extensor tendon.    2. Mild tendinosis of the proximal radial collateral ligament.    3. No marrow signal abnormalities to suggest fracture or marrow  infiltration.    I have personally reviewed the examination and initial interpretation  and I  agree with the findings.    JOSR HICKS MD         SYSTEM ID:  [id number]       Assessment:  1. Lateral epicondylitis of right elbow    2. Partial tear of common extensor tendon of right elbow    3. Chronic elbow pain, right        Plan:  Discussed the assessment with the patient.  Follow up: in about one month for US guided percutaneous tenotomy  Chronic lateral epicondylitis with partial tearing of the tendon, reviewed in detail  Has completed PT, faithful HEP, bracing, rest, activity modification, medications without relief  Reviewed options for CSI vs US tenotomy vs PRP options, relative +/- and risks/goals of each  After discussion, she would like to pursue the US guided percutaneous tenotomy option, porder placed today and will proceed with scheduling  Home handouts provided and supportive care reviewed  All questions were answered today  Contact us with additional questions or concerns  Signs and sx of concern reviewed      Dany Chapman DO, CAQ  Sports Medicine Physician  [facility]      Time spent in chart review, one-on-one evaluation, discussion with patient regarding: nature of problem, clinical course, prior treatments, therapeutic options, shared-decision making, potential procedures and referrals, and charting related to the visit: 34 minutes.  If applicable, time does not include time spent performing any procedure.    Disclaimer: This note consists of symbols derived from keyboarding, dictation and/or voice recognition software. As a result, there may be errors in the script that have gone undetected. Please consider this when interpreting information found in this chart.      Again, thank you for allowing me to participate in the care of your patient.        Sincerely,        Dany Chapman DO

## 2024-12-11 NOTE — PROGRESS NOTES
Daniella Sexton  :  1980  DOS: 2024  MRN: [id number]    Sports Medicine Clinic Visit    PCP: Marielena Douglas    Daniella Sexton is a 44 year old Right hand dominant female who is seen in consultation at the request of  Dany Martinez D.O. presenting with right elbow pain for discussion of Tenex procedure vs steroid injection.    Injury: Gradual onset of pain over the past 4+ months that initially started with gripping/grasping & lifting objects at work.  Pain located over right lateral elbow with radiation to wrist extensors.  Additional Features:  Positive: swelling and weakness.  Symptoms are better with Ice, Other medications: Prednisone, and Rest.  Symptoms are worse with: gripping/grasping, lifting, carrying objects.  Other evaluation and/or treatments so far consists of: Ice, Heat, Ibuprofen, Rest, wrist brace, compression brace, hand therapy, splint.  Recent imaging completed: MRI completed 24.  Prior History of related problems: none    Social History: currently employed as computer repair/refurbishing     Review of Systems  Musculoskeletal: as above  Remainder of review of systems is negative including constitutional, CV, pulmonary, GI, Skin and Neurologic except as noted in HPI or medical history.    Past Medical History:   Diagnosis Date    Alcohol abuse     Anxiety 2014    Arthritis     C. difficile colitis     Chronic bilateral low back pain with bilateral sciatica 2018    Chronic pansinusitis 2019    Duodenal ulcer 1995    Fatty liver 2020    History of alcohol abuse 2014    History of methamphetamine abuse (H) 2014    Lung nodule 2020    Currently managed with follow up imaging by [facility] Results Team.       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)     Migraines 2014    Mild intermittent asthma without complication 2019    Obesity (BMI 35.0-39.9) with comorbidity (H) 2019    Pulmonary  "emphysema, unspecified emphysema type (H) 07/08/2022    Pure hypercholesterolemia 08/27/2018    Seasonal allergic rhinitis 04/18/2014     Past Surgical History:   Procedure Laterality Date    BACK SURGERY  2005    L 3-4, L 4-5    LITHOTRIPSY  2016    OPTICAL TRACKING SYSTEM ENDOSCOPIC SINUS SURGERY Bilateral 1/10/2019    Procedure: BILATERAL IMAGE GUIDED ENDOSCOPIC SINUS SURGERY, BILATERAL TURBINATE REDUCTION;  Surgeon: Maikol Miguel MD;  Location: [facility] OR    RELEASE CARPAL TUNNEL Left     TONSILLECTOMY      TUBAL LIGATION       Family History   Problem Relation Age of Onset    No Known Problems Mother     Unknown/Adopted Father     Heart Disease No family hx of     Diabetes No family hx of     Cancer No family hx of     Colon Cancer No family hx of        Objective  /82   Ht 1.708 m (5' 7.25\")   Wt 127 kg (280 lb)   LMP 11/16/2024 (Within Days)   BMI 43.53 kg/m      General: healthy, alert and in no distress    HEENT: no scleral icterus or conjunctival erythema   Skin: no suspicious lesions or rash. No jaundice.   CV: regular rhythm by palpation, 2+ distal pulses, no pedal edema    Resp: normal respiratory effort without conversational dyspnea   Psych: normal mood and affect    Gait: nonantalgic, appropriate coordination and balance   Neuro: normal light touch sensory exam of the extremities. Motor strength as noted below       Right Elbow exam  Inspection: no swelling  Tender: lateral epicondyle and common extensor tendon  Non-tender: medial epicondyle, common flexor tendon, flexor muscle of forearm, supracondylar notch, olecranon bursa, distal bicep tendon and radial head/neck  Range of Motion: all normal  Strength: elbow strength full and pain with resisted wrist extension and supination  Special tests: normal stability, normal valgus stress, normal varus stress, ulnar Tinel's negative, no pain with resisted middle finger extension, no pain with resisted wrist flexion:       Radiology:  Results " for orders placed or performed in visit on 11/25/24   MR Elbow Right w/o Contrast    Narrative    EXAMINATION: MRI of the right Elbow    CLINICAL INFORMATION: right lateral elbow pain.    COMPARISON: Right elbow radiographs 9/29/2024.    TECHNICAL INFORMATION: MR imaging of the right elbow was performed  using:    3 mm axial proton density and fat-suppressed proton density images    3 mm coronal oblique proton density and fat-suppressed proton  density images    3 mm sagittal oblique proton density and T2-weighted images    FINDINGS:    Osseous Anatomy and Articular Cartilage:  No substantial degenerative changes of the radiohumeral and  ulnohumeral compartments. Normal signal throughout the bones. No  evidence of fracture. No substantial joint effusion.    Ligaments and Tendons:  The medial collateral ligament and annular ligament are unremarkable.    Marked thickening of the common extensor tendon at the insertion at  the lateral epicondyle with a small amount of adjacent edema. At least  moderate grade partial tearing of the common extensor tendon.     Mild tendinosis of the proximal lateral collateral ligament.    Normal appearance of the common flexor tendon.    Surrounding Soft Tissues:  At the lateral skin, there is a T2 bright soft tissue nodule measuring  approximately 1.1 x 1.0 cm.      Impression    IMPRESSION:.  1. Tendinosis with at least moderate grade partial thickness tearing  of the common extensor tendon.    2. Mild tendinosis of the proximal radial collateral ligament.    3. No marrow signal abnormalities to suggest fracture or marrow  infiltration.    I have personally reviewed the examination and initial interpretation  and I agree with the findings.    JOSR HICKS MD         SYSTEM ID:  [id number]       Assessment:  1. Lateral epicondylitis of right elbow    2. Partial tear of common extensor tendon of right elbow    3. Chronic elbow pain, right        Plan:  Discussed the assessment with the  patient.  Follow up: in about one month for US guided percutaneous tenotomy  Chronic lateral epicondylitis with partial tearing of the tendon, reviewed in detail  Has completed PT, faithful HEP, bracing, rest, activity modification, medications without relief  Reviewed options for CSI vs US tenotomy vs PRP options, relative +/- and risks/goals of each  After discussion, she would like to pursue the US guided percutaneous tenotomy option, porder placed today and will proceed with scheduling  Home handouts provided and supportive care reviewed  All questions were answered today  Contact us with additional questions or concerns  Signs and sx of concern reviewed      Dany Chapman DO, CANASRIN  Sports Medicine Physician  [facility]      Time spent in chart review, one-on-one evaluation, discussion with patient regarding: nature of problem, clinical course, prior treatments, therapeutic options, shared-decision making, potential procedures and referrals, and charting related to the visit: 34 minutes.  If applicable, time does not include time spent performing any procedure.    Disclaimer: This note consists of symbols derived from keyboarding, dictation and/or voice recognition software. As a result, there may be errors in the script that have gone undetected. Please consider this when interpreting information found in this chart.

## 2024-12-17 ENCOUNTER — ANCILLARY PROCEDURE (OUTPATIENT)
Dept: MAMMOGRAPHY | Facility: CLINIC | Age: 44
End: 2024-12-17
Attending: PHYSICIAN ASSISTANT
Payer: COMMERCIAL

## 2024-12-17 DIAGNOSIS — Z12.31 VISIT FOR SCREENING MAMMOGRAM: ICD-10-CM

## 2024-12-17 PROCEDURE — 77063 BREAST TOMOSYNTHESIS BI: CPT | Mod: TC | Performed by: RADIOLOGY

## 2024-12-17 PROCEDURE — 77067 SCR MAMMO BI INCL CAD: CPT | Mod: TC | Performed by: RADIOLOGY

## 2024-12-18 NOTE — PROGRESS NOTES
ASSESSMENT & PLAN    Daniella was seen today for follow up.    Diagnoses and all orders for this visit:    Lateral epicondylitis of right elbow    Partial tear of common extensor tendon of right elbow    Chronic elbow pain, right    Rotator cuff syndrome of right shoulder      In discussing with Dr Chapman's extender, appears procedure (percutaneous tenotomy) scheduled for 1/9/25. Plan to proceed with that, also note is authorized per the note from 12/11/24 in referrals tab.  Provided letter re: work restrictions, and completed work form for insurance.  See below.  Questions answered. Discussed signs and symptoms that may indicate more serious issues; the patient was instructed to seek appropriate care if noted. Daniella indicates understanding of these issues and agrees with the plan.        See Patient Instructions  Patient Instructions   Appears the procedure with Tenex has been authorized. Hopefully can proceed with that next month.  Discussed work restrictions. Provided letter and form, with restrictions through time of procedure, at which point would defer to Dr Chapman for restrictions after anticipated procedure.  Try to keep working on therapy exercises for the elbow in the meantime.  Regarding the right shoulder, continue working on comfortable home exercises. For nighttime pain, we discussed trying icing rather than heat, elevating head of bed or extra pillow(s); future considerations could include MRI, subacromial steroid injection (though prefer to avoid more medicine prior to anticipated procedure).  Will leave follow-up with me open ended, with anticipation that any work restrictions related to the procedure with Tenex will come from Dr Chapman.    If you have any further questions for your physician or physician s care team you can contact them thru MyChart or by calling [phone].      Dany Martinez [facility]    SUBJECTIVE- Interim History December 24,  "2024    No chief complaint on file.      Daniella Sexton is a 44 year old female who is seen in f/u up for lateral epicondylitis of right elbow. Since last visit on 12/11/24 patient has persistent pain.    Patient saw Dr. Chapman on 12/11/24 where Tenex was discussed. She is scheduled for the Tenex procedure with Dr. Chapman on 1/9/25. She was informed that her insurance may not cover the Tenex procedure and she is unsure if she should have the procedure because of this. She is considering PRP as the alternative option.    The patient would like to discuss returning to work with restrictions today.    The patient is seen by themselves.  The patient is Right handed  Orthopedic/Surgical history: NO for right UE  Social History/Occupation: Computer Refurbishing    **  Above information per rooming staff.  Additional history:  Has also been working on shoulder exercises using band only, weights are uncomfortable for elbow.  Also some nighttime pain right shoulder; we discussed elevating head of bed, icing rather than heat.        REVIEW OF SYSTEMS:  Review of Systems    OBJECTIVE:  Ht 1.708 m (5' 7.25\")   Wt 127 kg (280 lb)   LMP 11/16/2024 (Within Days)   BMI 43.53 kg/m           RADIOLOGY:  None new. See previous notes re: MRI.          25 minutes spent by me on the date of the encounter doing chart review, history and exam, documentation and further activities per the note       "

## 2024-12-24 ENCOUNTER — OFFICE VISIT (OUTPATIENT)
Dept: ORTHOPEDICS | Facility: CLINIC | Age: 44
End: 2024-12-24
Payer: COMMERCIAL

## 2024-12-24 VITALS — WEIGHT: 280 LBS | BODY MASS INDEX: 43.95 KG/M2 | HEIGHT: 67 IN

## 2024-12-24 DIAGNOSIS — G89.29 CHRONIC ELBOW PAIN, RIGHT: ICD-10-CM

## 2024-12-24 DIAGNOSIS — M25.521 CHRONIC ELBOW PAIN, RIGHT: ICD-10-CM

## 2024-12-24 DIAGNOSIS — S56.511A PARTIAL TEAR OF COMMON EXTENSOR TENDON OF RIGHT ELBOW: ICD-10-CM

## 2024-12-24 DIAGNOSIS — M77.11 LATERAL EPICONDYLITIS OF RIGHT ELBOW: Primary | ICD-10-CM

## 2024-12-24 DIAGNOSIS — M75.101 ROTATOR CUFF SYNDROME OF RIGHT SHOULDER: ICD-10-CM

## 2024-12-24 PROCEDURE — 99213 OFFICE O/P EST LOW 20 MIN: CPT | Performed by: PEDIATRICS

## 2024-12-24 NOTE — LETTER
December 24, 2024      Daniella Sexton  [address] [address]        To Whom It May Concern:    Daniella Sexton was seen in our clinic. She may return to work with the following: limited to light duty on next scheduled work date.  Right upper extremity:  No independent lift/carry/push/pull with right upper extremity.  Keep work close to body.  May use right hand for light duty activities including keyboard, mouse, paperwork.  May use right upper extremity to assist left if comfortable.    Restrictions in place through anticipated upcoming procedure for the right upper extremity, which may be in January. At that time, anticipate there will be update on work restrictions.      Sincerely,            Dany Martinez

## 2024-12-24 NOTE — LETTER
12/24/2024      Daniella Sexton  [address] [place] [address]      Dear Colleague,    Thank you for referring your patient, Daniella Sexton, to the [facility]. Please see a copy of my visit note below.    ASSESSMENT & PLAN    Daniella was seen today for follow up.    Diagnoses and all orders for this visit:    Lateral epicondylitis of right elbow    Partial tear of common extensor tendon of right elbow    Chronic elbow pain, right    Rotator cuff syndrome of right shoulder      In discussing with Dr Chapman's extender, appears procedure (percutaneous tenotomy) scheduled for 1/9/25. Plan to proceed with that, also note is authorized per the note from 12/11/24 in referrals tab.  Provided letter re: work restrictions, and completed work form for insurance.  See below.  Questions answered. Discussed signs and symptoms that may indicate more serious issues; the patient was instructed to seek appropriate care if noted. Daniella indicates understanding of these issues and agrees with the plan.        See Patient Instructions  Patient Instructions   Appears the procedure with Tenex has been authorized. Hopefully can proceed with that next month.  Discussed work restrictions. Provided letter and form, with restrictions through time of procedure, at which point would defer to Dr Chapman for restrictions after anticipated procedure.  Try to keep working on therapy exercises for the elbow in the meantime.  Regarding the right shoulder, continue working on comfortable home exercises. For nighttime pain, we discussed trying icing rather than heat, elevating head of bed or extra pillow(s); future considerations could include MRI, subacromial steroid injection (though prefer to avoid more medicine prior to anticipated procedure).  Will leave follow-up with me open ended, with anticipation that any work restrictions related to the procedure with Tenex will come from Dr Chapman.    If you have any further  "questions for your physician or physician s care team you can contact them thru MyChart or by calling [phone].      Dany Martinez DO  [facility]    SUBJECTIVE- Interim History December 24, 2024    No chief complaint on file.      Daniella Sexton is a 44 year old female who is seen in f/u up for lateral epicondylitis of right elbow. Since last visit on 12/11/24 patient has persistent pain.    Patient saw Dr. Chapman on 12/11/24 where Tenex was discussed. She is scheduled for the Tenex procedure with Dr. Chapman on 1/9/25. She was informed that her insurance may not cover the Tenex procedure and she is unsure if she should have the procedure because of this. She is considering PRP as the alternative option.    The patient would like to discuss returning to work with restrictions today.    The patient is seen by themselves.  The patient is Right handed  Orthopedic/Surgical history: NO for right UE  Social History/Occupation: Computer Refurbishing    **  Above information per rooming staff.  Additional history:  Has also been working on shoulder exercises using band only, weights are uncomfortable for elbow.  Also some nighttime pain right shoulder; we discussed elevating head of bed, icing rather than heat.        REVIEW OF SYSTEMS:  Review of Systems    OBJECTIVE:  Ht 1.708 m (5' 7.25\")   Wt 127 kg (280 lb)   LMP 11/16/2024 (Within Days)   BMI 43.53 kg/m           RADIOLOGY:  None new. See previous notes re: MRI.          25 minutes spent by me on the date of the encounter doing chart review, history and exam, documentation and further activities per the note           Again, thank you for allowing me to participate in the care of your patient.        Sincerely,        Dany Martinez DO    Electronically signed"

## 2025-01-03 NOTE — PATIENT INSTRUCTIONS
Medication: Metformin  mg BID passed protocol.   Last office visit date: 10/4/2024  Last OV noted:   \"Rec switch metformin IR to XR to decreases GI side effect of metformin IR\"  Next appointment scheduled?: Yes 6/5/2025  Number of refills given: 1     [facility]   Medial Branch Block Discharge Instructions      Your procedure was performed by:   Dr. Colby Moss    Medications used include:  Lidocaine  Bupivicaine      You will need to complete the Pain Scale Log form and return it to us as soon as possible.  Once we have received the form, we will review it and call you to determine the next steps.     The form can be faxed to [phone] or mailed to:   [facility]   [address]   [place] [address]      You may resume your regular medications    If you were holding your blood thinning medication, please restart taking it: N/A    You may resume your regular activities    Be cautious with walking as numbness and/or weakness in the lower extremities may occur for up to 6-8 hours due to the effects of the anesthetic.    Avoid driving for 6 hours. The local anesthetic could slow your reflexes.     You may shower, however no swimming or tub baths or hot tubs for 24 hours following your procedure.    Your pain will return after the numbing medications have worn off.  You may use your current pain medications as needed.    Unless you have been directed to avoid the use of anti-inflammatory medications (NSAIDS), you may use medications such as ibuprofen, Aleve or Tylenol for pain control if needed.  Some people find it helpful to alternate ibuprofen and Tylenol every 3 hours for a couple of days.    You may use ice packs 10-15 minutes three to four times a day at the injection site for comfort.     Do not use heat to painful areas for 6 to 8 hours. This will give the local anesthetic time to wear off and prevent you from accidentally burning your skin.     If you experience any of the following, call the Pain Clinic during work hours (Monday through Friday 8 am-4:30 pm) at [phone] or the Provider Line after hours at [phone]:  -Fever over 100 degree F  -Swelling, bleeding, redness, drainage,  warmth at the injection site  -Progressive weakness or numbness in your legs or arms  -If lumbar, call if you have a loss of bowel or bladder function  -If cervical, call if you have any unusual headache that is not relieved by Tylenol  -Unusual new onset of pain that is not improving

## 2025-01-09 ENCOUNTER — TELEPHONE (OUTPATIENT)
Dept: ORTHOPEDICS | Facility: CLINIC | Age: 45
End: 2025-01-09
Payer: COMMERCIAL

## 2025-01-09 DIAGNOSIS — G89.18 PAIN FOLLOWING SURGERY OR PROCEDURE: Primary | ICD-10-CM

## 2025-01-09 RX ORDER — HYDROCODONE BITARTRATE AND ACETAMINOPHEN 5; 325 MG/1; MG/1
.5-1 TABLET ORAL EVERY 6 HOURS PRN
Qty: 10 TABLET | Refills: 0 | Status: SHIPPED | OUTPATIENT
Start: 2025-01-09 | End: 2025-01-12

## 2025-01-09 NOTE — LETTER
January 9, 2025      Daniella CHADWICK Showestley  [address] [place] [id number]        To Whom It May Concern,     Daniella is currently under my care following her right elbow tenotomy procedure that was completed on 01/09/25.  She will have the following restrictions for the next two weeks.    No lifting/carrying > 1lb with the right arm.  Ok to use right arm for light activity as tolerated.  Patient be allowed to modify/eliminate any activity that causes pain in her right arm.    Patient will follow up with me in clinic in ~ 2 weeks.  Updated restrictions will be provided at that time.    Dany Barrientos DO, KEKE  Sports Medicine Physician  [facility]      Electronically signed

## 2025-01-15 NOTE — ADDENDUM NOTE
Addended by: NUPUR MOHAMUD on: 3/1/2022 03:59 PM     Modules accepted: Orders    
-pt with history of diabetes type 1, on insulin pump  -saw outpt endocrinologist today as pt. felt weak, found to have hyperglycemia to 400's  -received 8U insulin at appt and was advised to come to ED for eval  -insulin pump now removed in ED, glucose improving to 200's.  -endocrine emailed for consult, f/u recs  -NPO  -pt also ?allergic to lantus, unclear if lantus was administered at outpt office or other insulin formulation  -12U levemir ordered qHS in interim
-pt with history of diabetes type 1, on insulin pump  -saw outpt endocrinologist today as pt. felt weak, found to have hyperglycemia to 400's  -received novolog bolus at appt and was advised to come to ED for eval as glucose did not improve  -insulin pump now removed in ED, glucose improving to 200's.  -endocrine emailed for consult, f/u recs  -NPO  -pt also ?allergic to lantus, unclear if lantus was administered at outpt office or other insulin formulation  -12U levemir ordered qHS in interim
leg/knee/feet, knees, legs/foot

## 2025-01-21 ENCOUNTER — PATIENT OUTREACH (OUTPATIENT)
Dept: CARE COORDINATION | Facility: CLINIC | Age: 45
End: 2025-01-21
Payer: COMMERCIAL

## 2025-01-22 ENCOUNTER — THERAPY VISIT (OUTPATIENT)
Dept: OCCUPATIONAL THERAPY | Facility: CLINIC | Age: 45
End: 2025-01-22
Payer: COMMERCIAL

## 2025-01-22 ENCOUNTER — OFFICE VISIT (OUTPATIENT)
Dept: ORTHOPEDICS | Facility: CLINIC | Age: 45
End: 2025-01-22
Payer: COMMERCIAL

## 2025-01-22 DIAGNOSIS — M77.11 LATERAL EPICONDYLITIS OF RIGHT ELBOW: ICD-10-CM

## 2025-01-22 DIAGNOSIS — G89.29 CHRONIC ELBOW PAIN, RIGHT: Primary | ICD-10-CM

## 2025-01-22 DIAGNOSIS — M25.521 CHRONIC ELBOW PAIN, RIGHT: Primary | ICD-10-CM

## 2025-01-22 DIAGNOSIS — S56.511A PARTIAL TEAR OF COMMON EXTENSOR TENDON OF RIGHT ELBOW: ICD-10-CM

## 2025-01-22 DIAGNOSIS — M25.521 RIGHT ELBOW PAIN: Primary | ICD-10-CM

## 2025-01-22 PROCEDURE — 97110 THERAPEUTIC EXERCISES: CPT | Mod: GO

## 2025-01-22 NOTE — LETTER
January 22, 2025       Daniella Sexton  [address] [place] [id number]     To Whom It May Concern,      Daniella is currently under my care following her right elbow tenotomy procedure that was completed on 01/09/25.  She will have the following restrictions for the next four weeks.     No lifting/carrying > 5lb with the right arm for the next 2 weeks  No lift/carrying > 10lb with the right arm for the following two weeks if pain has not increased by that time  Ok to use right arm for light activity as tolerated.  Patient should be allowed to modify/eliminate any activity that causes pain in her right arm over the next 4 weeks.     Patient will follow up with me in clinic in ~ 4 weeks.  Updated restrictions will be provided at that time, or sooner if needed.       Dany Barrientos DO, CANASRIN  Sports Medicine Physician  [facility]

## 2025-01-22 NOTE — PROGRESS NOTES
Daniella Sexton  :  1980  DOS: 25  MRN: [id number]    Sports Medicine Clinic Visit    PCP: Marielena Douglas    Daniella Sexton is a 44 year old Right hand dominant female who is seen in consultation at the request of  Dany Martinez D.O. presenting with right elbow pain for discussion of Tenex procedure vs steroid injection.    Injury: Gradual onset of pain over the past 4+ months that initially started with gripping/grasping & lifting objects at work.  Pain located over right lateral elbow with radiation to wrist extensors.  Additional Features:  Positive: swelling and weakness.  Symptoms are better with Ice, Other medications: Prednisone, and Rest.  Symptoms are worse with: gripping/grasping, lifting, carrying objects.  Other evaluation and/or treatments so far consists of: Ice, Heat, Ibuprofen, Rest, wrist brace, compression brace, hand therapy, splint.  Recent imaging completed: MRI completed 24.  Prior History of related problems: none    Social History: currently employed as computer repair/refurbishing     Interim History - 2025  Patient presents today in follow up from right elbow percutaneous tenotomy completed on 2025.  She is noting minimal elbow pain at this time, mild joint stiffness with elbow flexion.  She has noted that her right anterior lateral shoulder pain has been improving following her elbow procedure with activity modification.        Review of Systems  Musculoskeletal: as above  Remainder of review of systems is negative including constitutional, CV, pulmonary, GI, Skin and Neurologic except as noted in HPI or medical history.    Past Medical History:   Diagnosis Date    Alcohol abuse     Anxiety 2014    Arthritis     C. difficile colitis     Chronic bilateral low back pain with bilateral sciatica 2018    Chronic pansinusitis 2019    Duodenal ulcer 1995    Fatty liver 2020    History of alcohol abuse 2014     History of methamphetamine abuse (H) 04/18/2014    Lung nodule 12/17/2020    Currently managed with follow up imaging by [facility].       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)     Migraines 04/18/2014    Mild intermittent asthma without complication 01/02/2019    Obesity (BMI 35.0-39.9) with comorbidity (H) 05/08/2019    Pulmonary emphysema, unspecified emphysema type (H) 07/08/2022    Pure hypercholesterolemia 08/27/2018    Seasonal allergic rhinitis 04/18/2014     Past Surgical History:   Procedure Laterality Date    BACK SURGERY  2005    L 3-4, L 4-5    LITHOTRIPSY  2016    OPTICAL TRACKING SYSTEM ENDOSCOPIC SINUS SURGERY Bilateral 1/10/2019    Procedure: BILATERAL IMAGE GUIDED ENDOSCOPIC SINUS SURGERY, BILATERAL TURBINATE REDUCTION;  Surgeon: Maikol Miguel MD;  Location: [facility] OR    RELEASE CARPAL TUNNEL Left     TONSILLECTOMY      TUBAL LIGATION       Family History   Problem Relation Age of Onset    No Known Problems Mother     Unknown/Adopted Father     Heart Disease No family hx of     Diabetes No family hx of     Cancer No family hx of     Colon Cancer No family hx of        Objective  LMP 12/20/2024 (Within Days)     General: healthy, alert and in no distress    HEENT: no scleral icterus or conjunctival erythema   Skin: no suspicious lesions or rash. No jaundice.   CV: regular rhythm by palpation, 2+ distal pulses, no pedal edema    Resp: normal respiratory effort without conversational dyspnea   Psych: normal mood and affect    Gait: nonantalgic, appropriate coordination and balance   Neuro: normal light touch sensory exam of the extremities. Motor strength as noted below     Right Elbow exam  Inspection: no swelling  Tender: lateral epicondyle and common extensor tendon, minimal  Non-tender: medial epicondyle, common flexor tendon, flexor muscle of forearm, supracondylar notch, olecranon bursa, distal bicep tendon and radial head/neck  Range of Motion: all normal, very  mild pain with terminal supination  Strength: elbow strength full and pain with resisted wrist extension and supination  Special tests: normal stability, normal valgus stress, normal varus stress, ulnar Tinel's negative, no pain with resisted middle finger extension, no pain with resisted wrist flexion:       Radiology:  Results for orders placed or performed in visit on 11/25/24   MR Elbow Right w/o Contrast    Narrative    EXAMINATION: MRI of the right Elbow    CLINICAL INFORMATION: right lateral elbow pain.    COMPARISON: Right elbow radiographs 9/29/2024.    TECHNICAL INFORMATION: MR imaging of the right elbow was performed  using:    3 mm axial proton density and fat-suppressed proton density images    3 mm coronal oblique proton density and fat-suppressed proton  density images    3 mm sagittal oblique proton density and T2-weighted images    FINDINGS:    Osseous Anatomy and Articular Cartilage:  No substantial degenerative changes of the radiohumeral and  ulnohumeral compartments. Normal signal throughout the bones. No  evidence of fracture. No substantial joint effusion.    Ligaments and Tendons:  The medial collateral ligament and annular ligament are unremarkable.    Marked thickening of the common extensor tendon at the insertion at  the lateral epicondyle with a small amount of adjacent edema. At least  moderate grade partial tearing of the common extensor tendon.     Mild tendinosis of the proximal lateral collateral ligament.    Normal appearance of the common flexor tendon.    Surrounding Soft Tissues:  At the lateral skin, there is a T2 bright soft tissue nodule measuring  approximately 1.1 x 1.0 cm.      Impression    IMPRESSION:.  1. Tendinosis with at least moderate grade partial thickness tearing  of the common extensor tendon.    2. Mild tendinosis of the proximal radial collateral ligament.    3. No marrow signal abnormalities to suggest fracture or marrow  infiltration.    I have personally  reviewed the examination and initial interpretation  and I agree with the findings.    JOSR HICKS MD         SYSTEM ID:  [id number]       Assessment:  1. Chronic elbow pain, right    2. Lateral epicondylitis of right elbow    3. Partial tear of common extensor tendon of right elbow        Plan:  Discussed the assessment with the patient.  Follow up: 4 weeks  No charge for visit today, under global period for procedure  Chronic lateral epicondylitis with partial tearing of the tendon, reviewed in detail  Had completed PT, faithful HEP, bracing, rest, activity modification, medications without relief  Now 2 weeks s/p US guided percutaneous tenotomy, sx substantially improved, almost no pain  Continue OT/HEP  New work letter provided, lessened restrictions which will continue progressively over the next 4 weeks  Follow up at 6 wks s/p procedure and if continuing to do well will lift all restrictions at that time  Supportive care reviewed  All questions were answered today  Contact us with additional questions or concerns  Signs and sx of concern reviewed      Dany Chapman DO, KEKE  Sports Medicine Physician  [facility]      Disclaimer: This note consists of symbols derived from keyboarding, dictation and/or voice recognition software. As a result, there may be errors in the script that have gone undetected. Please consider this when interpreting information found in this chart.

## 2025-01-22 NOTE — LETTER
2025      Daniella Sexton  [address]  [place] [address]      Dear Colleague,    Thank you for referring your patient, Daniella Sexton, to the [facility]. Please see a copy of my visit note below.    Daniella Sexton  :  1980  DOS: 25  MRN: [id number]    Sports Medicine Clinic Visit    PCP: Marielena Douglas    Daniella Sexton is a 44 year old Right hand dominant female who is seen in consultation at the request of  Dany Martinez D.O. presenting with right elbow pain for discussion of Tenex procedure vs steroid injection.    Injury: Gradual onset of pain over the past 4+ months that initially started with gripping/grasping & lifting objects at work.  Pain located over right lateral elbow with radiation to wrist extensors.  Additional Features:  Positive: swelling and weakness.  Symptoms are better with Ice, Other medications: Prednisone, and Rest.  Symptoms are worse with: gripping/grasping, lifting, carrying objects.  Other evaluation and/or treatments so far consists of: Ice, Heat, Ibuprofen, Rest, wrist brace, compression brace, hand therapy, splint.  Recent imaging completed: MRI completed 24.  Prior History of related problems: none    Social History: currently employed as computer repair/refurbishing     Interim History - 2025  Patient presents today in follow up from right elbow percutaneous tenotomy completed on 2025.  She is noting minimal elbow pain at this time, mild joint stiffness with elbow flexion.  She has noted that her right anterior lateral shoulder pain has been improving following her elbow procedure with activity modification.        Review of Systems  Musculoskeletal: as above  Remainder of review of systems is negative including constitutional, CV, pulmonary, GI, Skin and Neurologic except as noted in HPI or medical history.    Past Medical History:   Diagnosis Date     Alcohol abuse      Anxiety  04/18/2014     Arthritis      C. difficile colitis      Chronic bilateral low back pain with bilateral sciatica 07/18/2018     Chronic pansinusitis 01/17/2019     Duodenal ulcer 03/01/1995     Fatty liver 12/16/2020     History of alcohol abuse 04/18/2014     History of methamphetamine abuse (H) 04/18/2014     Lung nodule 12/17/2020    Currently [facility] [place] [facility].        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)      Migraines 04/18/2014     Mild intermittent asthma without complication 01/02/2019     Obesity (BMI 35.0-39.9) with comorbidity (H) 05/08/2019     Pulmonary emphysema, unspecified emphysema type (H) 07/08/2022     Pure hypercholesterolemia 08/27/2018     Seasonal allergic rhinitis 04/18/2014     Past Surgical History:   Procedure Laterality Date     BACK SURGERY  2005    L 3-4, L 4-5     LITHOTRIPSY  2016     OPTICAL TRACKING SYSTEM ENDOSCOPIC SINUS SURGERY Bilateral 1/10/2019    Procedure: BILATERAL IMAGE GUIDED ENDOSCOPIC SINUS SURGERY, BILATERAL TURBINATE REDUCTION;  Surgeon: Maikol Miguel MD;  Location: [facility] OR     RELEASE CARPAL TUNNEL Left      TONSILLECTOMY       TUBAL LIGATION       Family History   Problem Relation Age of Onset     No Known Problems Mother      Unknown/Adopted Father      Heart Disease No family hx of      Diabetes No family hx of      Cancer No family hx of      Colon Cancer No family hx of        Objective  LMP 12/20/2024 (Within Days)     General: healthy, alert and in no distress    HEENT: no scleral icterus or conjunctival erythema   Skin: no suspicious lesions or rash. No jaundice.   CV: regular rhythm by palpation, 2+ distal pulses, no pedal edema    Resp: normal respiratory effort without conversational dyspnea   Psych: normal mood and affect    Gait: nonantalgic, appropriate coordination and balance   Neuro: normal light touch sensory exam of the extremities. Motor strength as noted below     Right Elbow  exam  Inspection: no swelling  Tender: lateral epicondyle and common extensor tendon, minimal  Non-tender: medial epicondyle, common flexor tendon, flexor muscle of forearm, supracondylar notch, olecranon bursa, distal bicep tendon and radial head/neck  Range of Motion: all normal, very mild pain with terminal supination  Strength: elbow strength full and pain with resisted wrist extension and supination  Special tests: normal stability, normal valgus stress, normal varus stress, ulnar Tinel's negative, no pain with resisted middle finger extension, no pain with resisted wrist flexion:       Radiology:  Results for orders placed or performed in visit on 11/25/24   MR Elbow Right w/o Contrast    Narrative    EXAMINATION: MRI of the right Elbow    CLINICAL INFORMATION: right lateral elbow pain.    COMPARISON: Right elbow radiographs 9/29/2024.    TECHNICAL INFORMATION: MR imaging of the right elbow was performed  using:    3 mm axial proton density and fat-suppressed proton density images    3 mm coronal oblique proton density and fat-suppressed proton  density images    3 mm sagittal oblique proton density and T2-weighted images    FINDINGS:    Osseous Anatomy and Articular Cartilage:  No substantial degenerative changes of the radiohumeral and  ulnohumeral compartments. Normal signal throughout the bones. No  evidence of fracture. No substantial joint effusion.    Ligaments and Tendons:  The medial collateral ligament and annular ligament are unremarkable.    Marked thickening of the common extensor tendon at the insertion at  the lateral epicondyle with a small amount of adjacent edema. At least  moderate grade partial tearing of the common extensor tendon.     Mild tendinosis of the proximal lateral collateral ligament.    Normal appearance of the common flexor tendon.    Surrounding Soft Tissues:  At the lateral skin, there is a T2 bright soft tissue nodule measuring  approximately 1.1 x 1.0 cm.      Impression     IMPRESSION:.  1. Tendinosis with at least moderate grade partial thickness tearing  of the common extensor tendon.    2. Mild tendinosis of the proximal radial collateral ligament.    3. No marrow signal abnormalities to suggest fracture or marrow  infiltration.    I have personally reviewed the examination and initial interpretation  and I agree with the findings.    JOSR HICKS MD         SYSTEM ID:  [id number]       Assessment:  1. Chronic elbow pain, right    2. Lateral epicondylitis of right elbow    3. Partial tear of common extensor tendon of right elbow        Plan:  Discussed the assessment with the patient.  Follow up: 4 weeks  No charge for visit today, under global period for procedure  Chronic lateral epicondylitis with partial tearing of the tendon, reviewed in detail  Had completed PT, faithful HEP, bracing, rest, activity modification, medications without relief  Now 2 weeks s/p US guided percutaneous tenotomy, sx substantially improved, almost no pain  Continue OT/HEP  New work letter provided, lessened restrictions which will continue progressively over the next 4 weeks  Follow up at 6 wks s/p procedure and if continuing to do well will lift all restrictions at that time  Supportive care reviewed  All questions were answered today  Contact us with additional questions or concerns  Signs and sx of concern reviewed      Dany Chapman DO, CAQ  Sports Medicine Physician  [facility]      Disclaimer: This note consists of symbols derived from keyboarding, dictation and/or voice recognition software. As a result, there may be errors in the script that have gone undetected. Please consider this when interpreting information found in this chart.      Again, thank you for allowing me to participate in the care of your patient.        Sincerely,        Dany Chapman DO    Electronically signed

## 2025-01-25 DIAGNOSIS — J30.2 SEASONAL ALLERGIC RHINITIS, UNSPECIFIED TRIGGER: ICD-10-CM

## 2025-01-29 RX ORDER — AZELASTINE 1 MG/ML
1 SPRAY, METERED NASAL 2 TIMES DAILY PRN
Qty: 30 ML | Refills: 8 | Status: SHIPPED | OUTPATIENT
Start: 2025-01-29 | End: 2026-01-29

## 2025-02-04 ENCOUNTER — PATIENT OUTREACH (OUTPATIENT)
Dept: CARE COORDINATION | Facility: CLINIC | Age: 45
End: 2025-02-04

## 2025-02-06 ENCOUNTER — MYC MEDICAL ADVICE (OUTPATIENT)
Dept: FAMILY MEDICINE | Facility: CLINIC | Age: 45
End: 2025-02-06
Payer: COMMERCIAL

## 2025-02-06 ENCOUNTER — VIRTUAL VISIT (OUTPATIENT)
Dept: FAMILY MEDICINE | Facility: CLINIC | Age: 45
End: 2025-02-06
Payer: COMMERCIAL

## 2025-02-06 DIAGNOSIS — J20.9 COPD WITH ACUTE BRONCHITIS (H): ICD-10-CM

## 2025-02-06 DIAGNOSIS — K52.9 ACUTE GASTROENTERITIS: Primary | ICD-10-CM

## 2025-02-06 DIAGNOSIS — J44.0 COPD WITH ACUTE BRONCHITIS (H): ICD-10-CM

## 2025-02-06 DIAGNOSIS — E66.01 MORBID OBESITY (H): ICD-10-CM

## 2025-02-06 NOTE — LETTER
2025    Daniella Sexton   1980        To Whom it May Concern;    Please excuse Daniella Sexton from work 25 through 25 due to illness.       Sincerely,        Roni Mobley MD

## 2025-02-06 NOTE — PROGRESS NOTES
"Daniella is a 44 year old who is being evaluated via a billable video visit.    How would you like to obtain your AVS? MyChart  If the video visit is dropped, the invitation should be resent by: Text to cell phone: [phone]  Will anyone else be joining your video visit? No      Assessment & Plan       ICD-10-CM    1. Acute gastroenteritis  K52.9       2. COPD with acute bronchitis (H)  J44.0     J20.9       3. Morbid obesity (H)  E66.01           COPD - well controlled with current therapy  Letter written for work    The longitudinal plan of care for the diagnosis(es)/condition(s) as documented were addressed during this visit. Due to the added complexity in care, I will continue to support Daniella in the subsequent management and with ongoing continuity of care.     BMI  Estimated body mass index is 44.18 kg/m  as calculated from the following:    Height as of 12/24/24: 1.708 m (5' 7.25\").    Weight as of 1/10/25: 128.9 kg (284 lb 3.2 oz).   Weight management plan: Discussed healthy diet and exercise guidelines      There are no Patient Instructions on file for this visit.    Subjective   Daniella is a 44 year old, presenting for the following health issues:  Letter for School/Work (Out of work all week due to illness)        2/6/2025     5:16 PM   Additional Questions   Roomed by Beth   Accompanied by none     History of Present Illness       Reason for visit:  Flu  Symptom onset:  3-7 days ago  Symptoms include:  Diarrehea, vomiting, chills, fever, stomach pain  back and rib pain from vomiting  Symptom intensity:  Moderate  Symptom progression:  Improving  Had these symptoms before:  No  What makes it worse:  Na  What makes it better:  Rest She is missing 1 dose(s) of medications per week.  She is not taking prescribed medications regularly due to remembering to take.     Sunday noon - Monday 11am  Vomiting, diarrhea  Hurts to cough and sneeze since then  Diarrhea went away yesterday  Still has a headache with " coughing and sneezing            Review of Systems  Constitutional, HEENT, cardiovascular, pulmonary, gi and gu systems are negative, except as otherwise noted.      Objective           Vitals:  No vitals were obtained today due to virtual visit.    Physical Exam   GENERAL: alert and no distress  EYES: Eyes grossly normal to inspection.  No discharge or erythema, or obvious scleral/conjunctival abnormalities.  RESP: No audible wheeze, cough, or visible cyanosis.    SKIN: Visible skin clear. No significant rash, abnormal pigmentation or lesions.  NEURO: Cranial nerves grossly intact.  Mentation and speech appropriate for age.  PSYCH: Appropriate affect, tone, and pace of words          Video-Visit Details    Type of service:  Video Visit   Originating Location (pt. Location): Home    Distant Location (provider location):  On-site  Platform used for Video Visit: Yesy  Signed Electronically by: Roni Mobley MD

## 2025-02-17 ENCOUNTER — TELEPHONE (OUTPATIENT)
Dept: DERMATOLOGY | Facility: CLINIC | Age: 45
End: 2025-02-17
Payer: COMMERCIAL

## 2025-02-17 NOTE — TELEPHONE ENCOUNTER
Please initiate a prior authorization    Thank you,  Lay Adkins, Pharmacy Technician  [facility]

## 2025-02-17 NOTE — RESULT ENCOUNTER NOTE
A (SYSTEM DELIVERY EVOLUT FX 34 MM) delivery catheter was removed. Deatasia Brewer  Your ultrasound shows fatty liver.  It does not show a tumor or cancer.  I recommend follow up with gastroenterologist as soon as possible.  Someone should be reaching out to schedule in the next couple days.   I also recommend scheduling a lab only appointment to recheck your liver function in 2 months.  Please call or MyChart my office with any questions or concerns.   Bobbi Joshi, PAC

## 2025-02-18 SDOH — HEALTH STABILITY: PHYSICAL HEALTH: ON AVERAGE, HOW MANY DAYS PER WEEK DO YOU ENGAGE IN MODERATE TO STRENUOUS EXERCISE (LIKE A BRISK WALK)?: 1 DAY

## 2025-02-18 SDOH — HEALTH STABILITY: PHYSICAL HEALTH: ON AVERAGE, HOW MANY MINUTES DO YOU ENGAGE IN EXERCISE AT THIS LEVEL?: 10 MIN

## 2025-02-18 ASSESSMENT — SOCIAL DETERMINANTS OF HEALTH (SDOH): HOW OFTEN DO YOU GET TOGETHER WITH FRIENDS OR RELATIVES?: ONCE A WEEK

## 2025-02-19 ENCOUNTER — OFFICE VISIT (OUTPATIENT)
Dept: ORTHOPEDICS | Facility: CLINIC | Age: 45
End: 2025-02-19
Payer: COMMERCIAL

## 2025-02-19 ENCOUNTER — OFFICE VISIT (OUTPATIENT)
Dept: FAMILY MEDICINE | Facility: CLINIC | Age: 45
End: 2025-02-19
Payer: COMMERCIAL

## 2025-02-19 VITALS
WEIGHT: 281.2 LBS | DIASTOLIC BLOOD PRESSURE: 85 MMHG | SYSTOLIC BLOOD PRESSURE: 129 MMHG | TEMPERATURE: 97.1 F | HEART RATE: 99 BPM | OXYGEN SATURATION: 99 % | BODY MASS INDEX: 44.13 KG/M2 | RESPIRATION RATE: 20 BRPM | HEIGHT: 67 IN

## 2025-02-19 DIAGNOSIS — G89.29 CHRONIC ELBOW PAIN, RIGHT: ICD-10-CM

## 2025-02-19 DIAGNOSIS — Z12.4 SCREENING FOR CERVICAL CANCER: ICD-10-CM

## 2025-02-19 DIAGNOSIS — S56.511A PARTIAL TEAR OF COMMON EXTENSOR TENDON OF RIGHT ELBOW: ICD-10-CM

## 2025-02-19 DIAGNOSIS — Z11.3 SCREENING EXAMINATION FOR STI: ICD-10-CM

## 2025-02-19 DIAGNOSIS — M25.511 ACUTE PAIN OF RIGHT SHOULDER: ICD-10-CM

## 2025-02-19 DIAGNOSIS — M79.18 MYOFASCIAL MUSCLE PAIN: ICD-10-CM

## 2025-02-19 DIAGNOSIS — G89.29 CHRONIC BILATERAL LOW BACK PAIN WITH BILATERAL SCIATICA: ICD-10-CM

## 2025-02-19 DIAGNOSIS — M54.16 LUMBAR RADICULOPATHY: ICD-10-CM

## 2025-02-19 DIAGNOSIS — M47.816 SPONDYLOSIS OF LUMBAR REGION WITHOUT MYELOPATHY OR RADICULOPATHY: ICD-10-CM

## 2025-02-19 DIAGNOSIS — J30.2 SEASONAL ALLERGIC RHINITIS, UNSPECIFIED TRIGGER: ICD-10-CM

## 2025-02-19 DIAGNOSIS — M77.11 LATERAL EPICONDYLITIS OF RIGHT ELBOW: Primary | ICD-10-CM

## 2025-02-19 DIAGNOSIS — F15.11 HISTORY OF METHAMPHETAMINE ABUSE (H): ICD-10-CM

## 2025-02-19 DIAGNOSIS — M54.42 CHRONIC BILATERAL LOW BACK PAIN WITH BILATERAL SCIATICA: ICD-10-CM

## 2025-02-19 DIAGNOSIS — E66.01 MORBID OBESITY (H): ICD-10-CM

## 2025-02-19 DIAGNOSIS — J20.9 COPD WITH ACUTE BRONCHITIS (H): ICD-10-CM

## 2025-02-19 DIAGNOSIS — M75.100 ROTATOR CUFF SYNDROME, UNSPECIFIED LATERALITY: ICD-10-CM

## 2025-02-19 DIAGNOSIS — R91.1 LUNG NODULE: ICD-10-CM

## 2025-02-19 DIAGNOSIS — J44.0 COPD WITH ACUTE BRONCHITIS (H): ICD-10-CM

## 2025-02-19 DIAGNOSIS — Z13.29 SCREENING FOR THYROID DISORDER: ICD-10-CM

## 2025-02-19 DIAGNOSIS — J32.9 SINUSITIS, UNSPECIFIED CHRONICITY, UNSPECIFIED LOCATION: ICD-10-CM

## 2025-02-19 DIAGNOSIS — Z13.1 SCREENING FOR DIABETES MELLITUS: ICD-10-CM

## 2025-02-19 DIAGNOSIS — E78.00 PURE HYPERCHOLESTEROLEMIA: ICD-10-CM

## 2025-02-19 DIAGNOSIS — J43.9 PULMONARY EMPHYSEMA, UNSPECIFIED EMPHYSEMA TYPE (H): ICD-10-CM

## 2025-02-19 DIAGNOSIS — Z00.00 VISIT FOR PREVENTIVE HEALTH EXAMINATION: Primary | ICD-10-CM

## 2025-02-19 DIAGNOSIS — Z00.00 ENCOUNTER FOR SCREENING AND PREVENTATIVE CARE: ICD-10-CM

## 2025-02-19 DIAGNOSIS — M54.41 CHRONIC BILATERAL LOW BACK PAIN WITH BILATERAL SCIATICA: ICD-10-CM

## 2025-02-19 DIAGNOSIS — M25.521 CHRONIC ELBOW PAIN, RIGHT: ICD-10-CM

## 2025-02-19 DIAGNOSIS — G43.909 MIGRAINE WITHOUT STATUS MIGRAINOSUS, NOT INTRACTABLE, UNSPECIFIED MIGRAINE TYPE: ICD-10-CM

## 2025-02-19 LAB
ALBUMIN SERPL BCG-MCNC: 4.4 G/DL (ref 3.5–5.2)
ALP SERPL-CCNC: 121 U/L (ref 40–150)
ALT SERPL W P-5'-P-CCNC: 39 U/L (ref 0–50)
ANION GAP SERPL CALCULATED.3IONS-SCNC: 10 MMOL/L (ref 7–15)
AST SERPL W P-5'-P-CCNC: 39 U/L (ref 0–45)
BASOPHILS # BLD AUTO: 0.1 10E3/UL (ref 0–0.2)
BASOPHILS NFR BLD AUTO: 1 %
BILIRUB SERPL-MCNC: 0.3 MG/DL
BUN SERPL-MCNC: 12.4 MG/DL (ref 6–20)
C TRACH DNA SPEC QL NAA+PROBE: NEGATIVE
CALCIUM SERPL-MCNC: 9.8 MG/DL (ref 8.8–10.4)
CHLORIDE SERPL-SCNC: 104 MMOL/L (ref 98–107)
CHOLEST SERPL-MCNC: 266 MG/DL
CREAT SERPL-MCNC: 0.66 MG/DL (ref 0.51–0.95)
EGFRCR SERPLBLD CKD-EPI 2021: >90 ML/MIN/1.73M2
EOSINOPHIL # BLD AUTO: 0.4 10E3/UL (ref 0–0.7)
EOSINOPHIL NFR BLD AUTO: 3 %
ERYTHROCYTE [DISTWIDTH] IN BLOOD BY AUTOMATED COUNT: 13.1 % (ref 10–15)
EST. AVERAGE GLUCOSE BLD GHB EST-MCNC: 123 MG/DL
FASTING STATUS PATIENT QL REPORTED: YES
FASTING STATUS PATIENT QL REPORTED: YES
GLUCOSE SERPL-MCNC: 107 MG/DL (ref 70–99)
HBA1C MFR BLD: 5.9 % (ref 0–5.6)
HCO3 SERPL-SCNC: 26 MMOL/L (ref 22–29)
HCT VFR BLD AUTO: 41 % (ref 35–47)
HCV AB SERPL QL IA: NONREACTIVE
HDLC SERPL-MCNC: 56 MG/DL
HGB BLD-MCNC: 13.4 G/DL (ref 11.7–15.7)
HIV 1+2 AB+HIV1 P24 AG SERPL QL IA: NONREACTIVE
IMM GRANULOCYTES # BLD: 0 10E3/UL
IMM GRANULOCYTES NFR BLD: 0 %
LDLC SERPL CALC-MCNC: 177 MG/DL
LYMPHOCYTES # BLD AUTO: 3.7 10E3/UL (ref 0.8–5.3)
LYMPHOCYTES NFR BLD AUTO: 29 %
MCH RBC QN AUTO: 29.6 PG (ref 26.5–33)
MCHC RBC AUTO-ENTMCNC: 32.7 G/DL (ref 31.5–36.5)
MCV RBC AUTO: 91 FL (ref 78–100)
MONOCYTES # BLD AUTO: 0.8 10E3/UL (ref 0–1.3)
MONOCYTES NFR BLD AUTO: 6 %
N GONORRHOEA DNA SPEC QL NAA+PROBE: NEGATIVE
NEUTROPHILS # BLD AUTO: 7.6 10E3/UL (ref 1.6–8.3)
NEUTROPHILS NFR BLD AUTO: 61 %
NONHDLC SERPL-MCNC: 210 MG/DL
PLATELET # BLD AUTO: 288 10E3/UL (ref 150–450)
POTASSIUM SERPL-SCNC: 4.8 MMOL/L (ref 3.4–5.3)
PROT SERPL-MCNC: 7.4 G/DL (ref 6.4–8.3)
RBC # BLD AUTO: 4.52 10E6/UL (ref 3.8–5.2)
SODIUM SERPL-SCNC: 140 MMOL/L (ref 135–145)
SPECIMEN TYPE: NORMAL
SPECIMEN TYPE: NORMAL
T PALLIDUM AB SER QL: NONREACTIVE
TRIGL SERPL-MCNC: 165 MG/DL
TSH SERPL DL<=0.005 MIU/L-ACNC: 1.8 UIU/ML (ref 0.3–4.2)
WBC # BLD AUTO: 12.6 10E3/UL (ref 4–11)

## 2025-02-19 PROCEDURE — 84443 ASSAY THYROID STIM HORMONE: CPT

## 2025-02-19 PROCEDURE — 80061 LIPID PANEL: CPT

## 2025-02-19 PROCEDURE — 83036 HEMOGLOBIN GLYCOSYLATED A1C: CPT

## 2025-02-19 PROCEDURE — 87591 N.GONORRHOEAE DNA AMP PROB: CPT

## 2025-02-19 PROCEDURE — 87389 HIV-1 AG W/HIV-1&-2 AB AG IA: CPT

## 2025-02-19 PROCEDURE — 85025 COMPLETE CBC W/AUTO DIFF WBC: CPT

## 2025-02-19 PROCEDURE — 87624 HPV HI-RISK TYP POOLED RSLT: CPT

## 2025-02-19 PROCEDURE — 99396 PREV VISIT EST AGE 40-64: CPT

## 2025-02-19 PROCEDURE — 36415 COLL VENOUS BLD VENIPUNCTURE: CPT

## 2025-02-19 PROCEDURE — 86803 HEPATITIS C AB TEST: CPT

## 2025-02-19 PROCEDURE — 20611 DRAIN/INJ JOINT/BURSA W/US: CPT | Mod: RT | Performed by: FAMILY MEDICINE

## 2025-02-19 PROCEDURE — 80053 COMPREHEN METABOLIC PANEL: CPT

## 2025-02-19 PROCEDURE — 86780 TREPONEMA PALLIDUM: CPT

## 2025-02-19 PROCEDURE — 99024 POSTOP FOLLOW-UP VISIT: CPT | Performed by: FAMILY MEDICINE

## 2025-02-19 PROCEDURE — 99214 OFFICE O/P EST MOD 30 MIN: CPT | Mod: 25 | Performed by: FAMILY MEDICINE

## 2025-02-19 PROCEDURE — G2211 COMPLEX E/M VISIT ADD ON: HCPCS

## 2025-02-19 PROCEDURE — 87491 CHLMYD TRACH DNA AMP PROBE: CPT

## 2025-02-19 PROCEDURE — 99214 OFFICE O/P EST MOD 30 MIN: CPT | Mod: 25

## 2025-02-19 RX ORDER — FLUTICASONE PROPIONATE AND SALMETEROL 100; 50 UG/1; UG/1
1 POWDER RESPIRATORY (INHALATION) EVERY 12 HOURS
Qty: 60 EACH | Refills: 1 | Status: CANCELLED | OUTPATIENT
Start: 2025-02-19

## 2025-02-19 RX ORDER — BACLOFEN 10 MG/1
10 TABLET ORAL 2 TIMES DAILY
Qty: 300 TABLET | Refills: 1 | Status: SHIPPED | OUTPATIENT
Start: 2025-02-19

## 2025-02-19 RX ORDER — LIDOCAINE HYDROCHLORIDE 10 MG/ML
2 INJECTION, SOLUTION INFILTRATION; PERINEURAL
Status: COMPLETED | OUTPATIENT
Start: 2025-02-19 | End: 2025-02-19

## 2025-02-19 RX ORDER — MOMETASONE FUROATE MONOHYDRATE 50 UG/1
2 SPRAY, METERED NASAL AT BEDTIME
Qty: 17 G | Refills: 5 | Status: SHIPPED | OUTPATIENT
Start: 2025-02-19

## 2025-02-19 RX ORDER — FLUTICASONE PROPIONATE AND SALMETEROL 250; 50 UG/1; UG/1
1 POWDER RESPIRATORY (INHALATION) EVERY 12 HOURS
Qty: 60 EACH | Refills: 1 | Status: SHIPPED | OUTPATIENT
Start: 2025-02-19

## 2025-02-19 RX ORDER — AMITRIPTYLINE HYDROCHLORIDE 50 MG/1
50 TABLET ORAL AT BEDTIME
Qty: 90 TABLET | Refills: 3 | Status: SHIPPED | OUTPATIENT
Start: 2025-02-19

## 2025-02-19 RX ORDER — PREGABALIN 150 MG/1
CAPSULE ORAL
Qty: 60 CAPSULE | Refills: 3 | Status: SHIPPED | OUTPATIENT
Start: 2025-02-19

## 2025-02-19 RX ORDER — ALBUTEROL SULFATE 90 UG/1
2 INHALANT RESPIRATORY (INHALATION) EVERY 4 HOURS PRN
Qty: 18 G | Refills: 0 | Status: SHIPPED | OUTPATIENT
Start: 2025-02-19

## 2025-02-19 RX ORDER — FEXOFENADINE HCL 180 MG/1
180 TABLET ORAL DAILY
Qty: 90 TABLET | Refills: 3 | Status: SHIPPED | OUTPATIENT
Start: 2025-02-19

## 2025-02-19 RX ORDER — RIZATRIPTAN BENZOATE 10 MG/1
10 TABLET ORAL
Qty: 10 TABLET | Refills: 3 | Status: SHIPPED | OUTPATIENT
Start: 2025-02-19

## 2025-02-19 RX ORDER — TRIAMCINOLONE ACETONIDE 40 MG/ML
40 INJECTION, SUSPENSION INTRA-ARTICULAR; INTRAMUSCULAR
Status: COMPLETED | OUTPATIENT
Start: 2025-02-19 | End: 2025-02-19

## 2025-02-19 RX ORDER — DOXYCYCLINE 100 MG/1
100 CAPSULE ORAL 2 TIMES DAILY
Qty: 28 CAPSULE | Refills: 0 | Status: SHIPPED | OUTPATIENT
Start: 2025-02-19 | End: 2025-03-05

## 2025-02-19 RX ADMIN — TRIAMCINOLONE ACETONIDE 40 MG: 40 INJECTION, SUSPENSION INTRA-ARTICULAR; INTRAMUSCULAR at 16:30

## 2025-02-19 RX ADMIN — LIDOCAINE HYDROCHLORIDE 2 ML: 10 INJECTION, SOLUTION INFILTRATION; PERINEURAL at 16:30

## 2025-02-19 NOTE — TELEPHONE ENCOUNTER
Retail Pharmacy Prior Authorization Team   Phone: [phone]    PA Initiation    Medication: MOMETASONE FUROATE 50 MCG/ACT NA SUSP  Insurance Company: [employer] Non-Specialty PA's - Phone [phone] Fax [phone]  Pharmacy Filling the Rx: [facility] [place] - [address]  Filling Pharmacy Phone: [phone]  Filling Pharmacy Fax:    Start Date: 2/19/2025        Note: Due to record-high volumes, our turn-around time is taking longer than usual . We are currently 4  business days behind in the pools.   We are working diligently to submit all requests in a timely manner and in the order they are received. Please only flag TRUE URGENT requests as high priority to the pool at this time.   If you have questions on status of PA's,  please send a note/message in the active PA encounter and send back to the [facility] PA pool [[phone]].    If you have questions about the turn-around time or about our process, please reach out to our supervisor Griselda Sargent.   Thank you!   RPPA (Retail Pharmacy Prior Authorization) team

## 2025-02-19 NOTE — LETTER
2025      Daniella Sexton  [address]  [place] [address]      Dear Colleague,    Thank you for referring your patient, Daniella Sexton, to the [facility]. Please see a copy of my visit note below.    Daniella Sexton  :  1980  DOS: 25  MRN: [id number]    Sports Medicine Clinic Visit    PCP: Marielena Douglas    Daniella Sexton is a 44 year old Right hand dominant female who is seen in consultation at the request of  Dany Martinez D.O. presenting with right elbow pain for discussion of Tenex procedure vs steroid injection.    Injury: Gradual onset of pain over the past 4+ months that initially started with gripping/grasping & lifting objects at work.  Pain located over right lateral elbow with radiation to wrist extensors.  Additional Features:  Positive: swelling and weakness.  Symptoms are better with Ice, Other medications: Prednisone, and Rest.  Symptoms are worse with: gripping/grasping, lifting, carrying objects.  Other evaluation and/or treatments so far consists of: Ice, Heat, Ibuprofen, Rest, wrist brace, compression brace, hand therapy, splint.  Recent imaging completed: MRI completed 24.  Prior History of related problems: none    Social History: currently employed as computer repair/refurbishing     Interim History - 2025  Patient presents today in follow up from right elbow percutaneous tenotomy completed on 2025.  She is noting minimal elbow pain at this time, mild joint stiffness with elbow flexion.  She has noted that her right anterior lateral shoulder pain has been improving following her elbow procedure with activity modification.      Interim History - 2025  Since last visit on 2025 patient has waxing & waning aching right elbow pain, overall pain is still improved compared to prior to her procedure.  No interim injury.       Patient is also being seen to right anterior lateral shoulder pain  over the past ~ 3 - 6 months.  Pain is worse with lifting, reaching overhead, & lying on right shoulder.  Currently treating with OTC pain medications & activity modification.  Patient would like to discuss steroid injection today.  Right shoulder XR completed on 9/29/2024.      Review of Systems  Musculoskeletal: as above  Remainder of review of systems is negative including constitutional, CV, pulmonary, GI, Skin and Neurologic except as noted in HPI or medical history.    Past Medical History:   Diagnosis Date     Alcohol abuse      Anxiety 04/18/2014     Arthritis      C. difficile colitis      Chronic bilateral low back pain with bilateral sciatica 07/18/2018     Chronic pansinusitis 01/17/2019     Depressive disorder 1999     Duodenal ulcer 03/01/1995     Fatty liver 12/16/2020     History of alcohol abuse 04/18/2014     History of methamphetamine abuse (H) 04/18/2014     Lung nodule 12/17/2020    Currently managed with follow up imaging by [facility].        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)      Migraines 04/18/2014     Mild intermittent asthma without complication 01/02/2019     Obesity (BMI 35.0-39.9) with comorbidity (H) 05/08/2019     Pulmonary emphysema, unspecified emphysema type (H) 07/08/2022     Pure hypercholesterolemia 08/27/2018     Seasonal allergic rhinitis 04/18/2014     Past Surgical History:   Procedure Laterality Date     ABDOMEN SURGERY  2007    tubeal     BACK SURGERY  2005    L 3-4, L 4-5     LITHOTRIPSY  2016     OPTICAL TRACKING SYSTEM ENDOSCOPIC SINUS SURGERY Bilateral 01/10/2019    Procedure: BILATERAL IMAGE GUIDED ENDOSCOPIC SINUS SURGERY, BILATERAL TURBINATE REDUCTION;  Surgeon: Maikol Miguel MD;  Location: [facility] OR     RELEASE CARPAL TUNNEL Left      TONSILLECTOMY       TUBAL LIGATION       Family History   Problem Relation Age of Onset     No Known Problems Mother      Unknown/Adopted Father      Heart Disease No family hx of      Diabetes No  family hx of      Cancer No family hx of      Colon Cancer No family hx of        Objective  LMP 12/20/2024 (Within Days)     General: healthy, alert and in no distress    HEENT: no scleral icterus or conjunctival erythema   Skin: no suspicious lesions or rash. No jaundice.   CV: regular rhythm by palpation, 2+ distal pulses, no pedal edema    Resp: normal respiratory effort without conversational dyspnea   Psych: normal mood and affect    Gait: nonantalgic, appropriate coordination and balance   Neuro: normal light touch sensory exam of the extremities. Motor strength as noted below     Right Elbow exam  Inspection: no swelling  Tender: lateral epicondyle and common extensor tendon, resolved, mild pain with palpation of the extensor/supinator compartment musculature, most focal ECRB  Non-tender: medial epicondyle, common flexor tendon, flexor muscle of forearm, supracondylar notch, olecranon bursa, distal bicep tendon and radial head/neck  Range of Motion: all normal, very mild pain with terminal supination  Strength: elbow strength full and pain with resisted wrist extension and supination  Special tests: normal stability, normal valgus stress, normal varus stress, ulnar Tinel's negative, no pain with resisted middle finger extension, no pain with resisted wrist flexion:     Bilateral Shoulder exam    ROM:        Full active and passive ROM with flexion, extension, abduction, internal and external rotation.       asymmetric scapular motion on R       Painful terminal flexion and abduction, mildly in ER, R>>L    Tender:        subacromial space R>L       Lateral deltoid R>L    Non Tender:       remainder of shoulder       sternoclavicular joint       acromioclavicular joint       posterior shoulder       periscapular region    Strength:        abduction 5-/5       internal rotation 5/5       external rotation 5-/5       adduction 5/5    Impingement testing:        positive (+) Neer       positive (+) Magui        positive (+) empty can       neg (-) crossover       neg (-) O'goldie       Very mildly painful crank    Stability testing:       neg (-) relocation       neg (-) anterior glide       neg (-) sulcus sign    Skin:       no visible deformities       well perfused       capillary refill brisk    Sensation:        normal sensation over shoulder and upper extremity       Radiology:  Narrative & Impression   EXAM: XR SHOULDER RIGHT G/E 3 VIEWS  LOCATION: [facility]  DATE: 9/29/2024     INDICATION:  Right elbow pain, Acute pain of right shoulder  COMPARISON: None.                                                                      IMPRESSION: No fracture or dislocation. No degenerative changes. Small benign calcified granuloma in the right midlung.     Large Joint Injection/Arthocentesis: R subacromial bursa    Date/Time: 2/19/2025 4:30 PM    Performed by: Dany Chapman DO  Authorized by: Dany Chapman DO    Indications:  Pain  Needle Size:  22 G  Guidance: ultrasound    Approach:  Lateral  Location:  Shoulder      Site:  R subacromial bursa  Medications:  40 mg triamcinolone 40 MG/ML; 2 mL lidocaine 1 %  Outcome:  Tolerated well, no immediate complications  Procedure discussed: discussed risks, benefits, and alternatives    Consent Given by:  Patient  Timeout: timeout called immediately prior to procedure    Prep: patient was prepped and draped in usual sterile fashion     Ultrasound images of procedure were permanently stored.       Assessment:  1. Lateral epicondylitis of right elbow    2. Chronic elbow pain, right    3. Partial tear of common extensor tendon of right elbow    4. Acute pain of right shoulder    5. Rotator cuff syndrome, unspecified laterality        Plan:  Discussed the assessment with the patient.  Follow up: 3 weeks  Chronic lateral epicondylitis with partial tearing of the tendon, reviewed in detail  Had completed PT, faithful HEP, bracing, rest,  activity modification, medications without relief  Now 6 weeks s/p US guided percutaneous tenotomy, sx substantially improved, almost no pain  Continue OT/HEP exercises, including stretching and massage, some ongoing muscle soreness  Also with ongoing R>L shoulder pain, consistent with RTC tendinitis  Oral Tylenol and topical Voltaren gel reviewed as safe OTC options, reviewed safe dosing strategies  PT options and activity modification strategies reviewed  After review of relative risks, goals and limitations, Us guided CSI to the right subacromial space performed today  Could consider on the left if needed in the future, but reviewed goal to minimize this where possible  Expectations and goals of CSI reviewed  Often 2-3 days for steroid effect, and can take up to two weeks for maximum effect  We discussed modified progressive pain-free activity as tolerated  Do not overuse in first two weeks if feeling better due to concern for vulnerability while steroid is working  Supportive care reviewed  All questions were answered today  Contact us with additional questions or concerns  Signs and sx of concern reviewed      Dany Chapman DO, CAQ  Sports Medicine Physician  [facility]      Disclaimer: This note consists of symbols derived from keyboarding, dictation and/or voice recognition software. As a result, there may be errors in the script that have gone undetected. Please consider this when interpreting information found in this chart.      Again, thank you for allowing me to participate in the care of your patient.        Sincerely,        Dany Chapman DO    Electronically signed

## 2025-02-19 NOTE — RESULT ENCOUNTER NOTE
Hi Autumn,     It was nice seeing you again today.      Your blood sugar is elevated putting you in the prediabetic range. Continue to work on nutrition and increasing exercise.     Your cholesterol levels are also elevated. Please work on improving your nutrition and increasing exercise to help improve these levels. We will recheck them in one year.     Your white blood cell count is just slightly elevated. The antibiotic should be helpful at decreasing this.     All other labs are normal/negative.      Feel free to contact me via [employer] or call the clinic at [phone].     Sincerely,  Marielena Douglas DNP, FNP-C    The 10-year ASCVD risk score (Ly MADDOX, et al., 2019) is: 4.3%

## 2025-02-19 NOTE — PROGRESS NOTES
Daniella Sexton  :  1980  DOS: 25  MRN: [id number]    Sports Medicine Clinic Visit    PCP: Marielena Douglas    Daniella Sexton is a 44 year old Right hand dominant female who is seen in consultation at the request of  Dany Martinez D.O. presenting with right elbow pain for discussion of Tenex procedure vs steroid injection.    Injury: Gradual onset of pain over the past 4+ months that initially started with gripping/grasping & lifting objects at work.  Pain located over right lateral elbow with radiation to wrist extensors.  Additional Features:  Positive: swelling and weakness.  Symptoms are better with Ice, Other medications: Prednisone, and Rest.  Symptoms are worse with: gripping/grasping, lifting, carrying objects.  Other evaluation and/or treatments so far consists of: Ice, Heat, Ibuprofen, Rest, wrist brace, compression brace, hand therapy, splint.  Recent imaging completed: MRI completed 24.  Prior History of related problems: none    Social History: currently employed as computer repair/refurbishing     Interim History - 2025  Patient presents today in follow up from right elbow percutaneous tenotomy completed on 2025.  She is noting minimal elbow pain at this time, mild joint stiffness with elbow flexion.  She has noted that her right anterior lateral shoulder pain has been improving following her elbow procedure with activity modification.      Interim History - 2025  Since last visit on 2025 patient has waxing & waning aching right elbow pain, overall pain is still improved compared to prior to her procedure.  No interim injury.       Patient is also being seen to right anterior lateral shoulder pain over the past ~ 3 - 6 months.  Pain is worse with lifting, reaching overhead, & lying on right shoulder.  Currently treating with OTC pain medications & activity modification.  Patient would like to discuss steroid injection today.  Right shoulder  XR completed on 9/29/2024.      Review of Systems  Musculoskeletal: as above  Remainder of review of systems is negative including constitutional, CV, pulmonary, GI, Skin and Neurologic except as noted in HPI or medical history.    Past Medical History:   Diagnosis Date    Alcohol abuse     Anxiety 04/18/2014    Arthritis     C. difficile colitis     Chronic bilateral low back pain with bilateral sciatica 07/18/2018    Chronic pansinusitis 01/17/2019    Depressive disorder 1999    Duodenal ulcer 03/01/1995    Fatty liver 12/16/2020    History of alcohol abuse 04/18/2014    History of methamphetamine abuse (H) 04/18/2014    Lung nodule 12/17/2020    Currently managed with follow up imaging by [facility].       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)     Migraines 04/18/2014    Mild intermittent asthma without complication 01/02/2019    Obesity (BMI 35.0-39.9) with comorbidity (H) 05/08/2019    Pulmonary emphysema, unspecified emphysema type (H) 07/08/2022    Pure hypercholesterolemia 08/27/2018    Seasonal allergic rhinitis 04/18/2014     Past Surgical History:   Procedure Laterality Date    ABDOMEN SURGERY  2007    tubeal    BACK SURGERY  2005    L 3-4, L 4-5    LITHOTRIPSY  2016    OPTICAL TRACKING SYSTEM ENDOSCOPIC SINUS SURGERY Bilateral 01/10/2019    Procedure: BILATERAL IMAGE GUIDED ENDOSCOPIC SINUS SURGERY, BILATERAL TURBINATE REDUCTION;  Surgeon: Maikol Miguel MD;  Location: [facility] OR    RELEASE CARPAL TUNNEL Left     TONSILLECTOMY      TUBAL LIGATION       Family History   Problem Relation Age of Onset    No Known Problems Mother     Unknown/Adopted Father     Heart Disease No family hx of     Diabetes No family hx of     Cancer No family hx of     Colon Cancer No family hx of        Objective  LMP 12/20/2024 (Within Days)     General: healthy, alert and in no distress    HEENT: no scleral icterus or conjunctival erythema   Skin: no suspicious lesions or rash. No jaundice.   CV:  regular rhythm by palpation, 2+ distal pulses, no pedal edema    Resp: normal respiratory effort without conversational dyspnea   Psych: normal mood and affect    Gait: nonantalgic, appropriate coordination and balance   Neuro: normal light touch sensory exam of the extremities. Motor strength as noted below     Right Elbow exam  Inspection: no swelling  Tender: lateral epicondyle and common extensor tendon, resolved, mild pain with palpation of the extensor/supinator compartment musculature, most focal ECRB  Non-tender: medial epicondyle, common flexor tendon, flexor muscle of forearm, supracondylar notch, olecranon bursa, distal bicep tendon and radial head/neck  Range of Motion: all normal, very mild pain with terminal supination  Strength: elbow strength full and pain with resisted wrist extension and supination  Special tests: normal stability, normal valgus stress, normal varus stress, ulnar Tinel's negative, no pain with resisted middle finger extension, no pain with resisted wrist flexion:     Bilateral Shoulder exam    ROM:        Full active and passive ROM with flexion, extension, abduction, internal and external rotation.       asymmetric scapular motion on R       Painful terminal flexion and abduction, mildly in ER, R>>L    Tender:        subacromial space R>L       Lateral deltoid R>L    Non Tender:       remainder of shoulder       sternoclavicular joint       acromioclavicular joint       posterior shoulder       periscapular region    Strength:        abduction 5-/5       internal rotation 5/5       external rotation 5-/5       adduction 5/5    Impingement testing:        positive (+) Neer       positive (+) Kilgore       positive (+) empty can       neg (-) crossover       neg (-) O'goldie       Very mildly painful crank    Stability testing:       neg (-) relocation       neg (-) anterior glide       neg (-) sulcus sign    Skin:       no visible deformities       well perfused       capillary  refill brisk    Sensation:        normal sensation over shoulder and upper extremity       Radiology:  Narrative & Impression   EXAM: XR SHOULDER RIGHT G/E 3 VIEWS  LOCATION: [facility]  DATE: 9/29/2024     INDICATION:  Right elbow pain, Acute pain of right shoulder  COMPARISON: None.                                                                      IMPRESSION: No fracture or dislocation. No degenerative changes. Small benign calcified granuloma in the right midlung.     Large Joint Injection/Arthocentesis: R subacromial bursa    Date/Time: 2/19/2025 4:30 PM    Performed by: Dany Chapman DO  Authorized by: Dany Chapman DO    Indications:  Pain  Needle Size:  22 G  Guidance: ultrasound    Approach:  Lateral  Location:  Shoulder      Site:  R subacromial bursa  Medications:  40 mg triamcinolone 40 MG/ML; 2 mL lidocaine 1 %  Outcome:  Tolerated well, no immediate complications  Procedure discussed: discussed risks, benefits, and alternatives    Consent Given by:  Patient  Timeout: timeout called immediately prior to procedure    Prep: patient was prepped and draped in usual sterile fashion     Ultrasound images of procedure were permanently stored.       Assessment:  1. Lateral epicondylitis of right elbow    2. Chronic elbow pain, right    3. Partial tear of common extensor tendon of right elbow    4. Acute pain of right shoulder    5. Rotator cuff syndrome, unspecified laterality        Plan:  Discussed the assessment with the patient.  Follow up: 3 weeks  Chronic lateral epicondylitis with partial tearing of the tendon, reviewed in detail  Had completed PT, faithful HEP, bracing, rest, activity modification, medications without relief  Now 6 weeks s/p US guided percutaneous tenotomy, sx substantially improved, almost no pain  Continue OT/HEP exercises, including stretching and massage, some ongoing muscle soreness  Also with ongoing R>L shoulder pain, consistent  with RTC tendinitis  Oral Tylenol and topical Voltaren gel reviewed as safe OTC options, reviewed safe dosing strategies  PT options and activity modification strategies reviewed  After review of relative risks, goals and limitations, Us guided CSI to the right subacromial space performed today  Could consider on the left if needed in the future, but reviewed goal to minimize this where possible  Expectations and goals of CSI reviewed  Often 2-3 days for steroid effect, and can take up to two weeks for maximum effect  We discussed modified progressive pain-free activity as tolerated  Do not overuse in first two weeks if feeling better due to concern for vulnerability while steroid is working  Supportive care reviewed  All questions were answered today  Contact us with additional questions or concerns  Signs and sx of concern reviewed      Dany Chapman DO, KEKE  Sports Medicine Physician  [facility]      Disclaimer: This note consists of symbols derived from keyboarding, dictation and/or voice recognition software. As a result, there may be errors in the script that have gone undetected. Please consider this when interpreting information found in this chart.

## 2025-02-19 NOTE — PROGRESS NOTES
Preventive Care Visit  [facility] CNP, Nurse Practitioner Primary Care  Feb 19, 2025      Assessment & Plan     Visit for preventive health examination    Encounter for screening and preventative care  Fasting   - Comprehensive metabolic panel (BMP + Alb, Alk Phos, ALT, AST, Total. Bili, TP); Future  - Comprehensive metabolic panel (BMP + Alb, Alk Phos, ALT, AST, Total. Bili, TP)    Screening examination for STI  - Treponema Abs w Reflex to RPR and Titer; Future  - HIV Antigen Antibody Combo Cascade; Future  - Hepatitis C Screen Reflex to HCV RNA Quant and Genotype; Future  - CBC with platelets and differential; Future  - CHLAMYDIA TRACHOMATIS PCR  - NEISSERIA GONORRHOEA PCR  - HIV Antigen Antibody Combo Cascade  - Treponema Abs w Reflex to RPR and Titer  - Hepatitis C Screen Reflex to HCV RNA Quant and Genotype  - CBC with platelets and differential    Screening for cervical cancer  Collected  - HPV and Gynecologic Cytology Panel - Recommended Age 30 - 65 Years    Screening for diabetes mellitus  - Hemoglobin A1c; Future  - Hemoglobin A1c    Screening for thyroid disorder  - TSH with free T4 reflex; Future  - TSH with free T4 reflex    Morbid obesity (H)  Patient would benefit from weight management medication as some of her pain could be due to being over weight. Discussed nutrition and exercise recommendations. Wegovy prescribed. Discussed side effects to medications. If medication is not covered, plan for patient to follow up to discuss other options.   - semaglutide-weight management (WEGOVY) 0.25 MG/0.5ML pen; Inject 0.5 mLs (0.25 mg) subcutaneously once a week.    History of methamphetamine abuse (H)  Has not used in a number of years.     Spondylosis of lumbar region without myelopathy or radiculopathy  Stable, amitriptyline refilled.   - amitriptyline (ELAVIL) 50 MG tablet; Take 1 tablet (50 mg) by mouth at bedtime.    Myofascial muscle pain  Stable,  amitriptyline refilled.   - amitriptyline (ELAVIL) 50 MG tablet; Take 1 tablet (50 mg) by mouth at bedtime.    Lumbar radiculopathy  Stable, amitriptyline refilled.   - amitriptyline (ELAVIL) 50 MG tablet; Take 1 tablet (50 mg) by mouth at bedtime.    Chronic bilateral low back pain with bilateral sciatica  Stable, Baclofen and Lyrica refilled.   - baclofen (LIORESAL) 10 MG tablet; Take 1 tablet (10 mg) by mouth 2 times daily.  - pregabalin (LYRICA) 150 MG capsule; TAKE ONE CAPSULE BY MOUTH TWICE A DAY    Pulmonary emphysema, unspecified emphysema type (H)  Plan to increase Advair to 250-50 as patient reports some worsening symptoms of COPD/emphysema.   - CT Chest w/o Contrast; Future  - fluticasone-salmeterol (ADVAIR) 250-50 MCG/ACT inhaler; Inhale 1 puff into the lungs every 12 hours.    COPD with acute bronchitis (H)  See above.   - albuterol (PROAIR HFA/PROVENTIL HFA/VENTOLIN HFA) 108 (90 Base) MCG/ACT inhaler; Inhale 2 puffs into the lungs every 4 hours as needed for wheezing, cough or shortness of breath. Use with spacer  - CT Chest w/o Contrast; Future  - fluticasone-salmeterol (ADVAIR) 250-50 MCG/ACT inhaler; Inhale 1 puff into the lungs every 12 hours.    Lung nodule  Found on chart review. Last CT completed in 2022. Plan for patient to complete follow up CT along with evaluation of worsening COPD symptoms.   - CT Chest w/o Contrast; Future    Seasonal allergic rhinitis, unspecified trigger  Stable, refilled.   - mometasone (NASONEX) 50 MCG/ACT nasal spray; Spray 2 sprays into both nostrils at bedtime.  - fexofenadine (ALLEGRA) 180 MG tablet; Take 1 tablet (180 mg) by mouth daily.    Migraine without status migrainosus, not intractable, unspecified migraine type  Stable, refilled.   - rizatriptan (MAXALT) 10 MG tablet; Take 1 tablet (10 mg) by mouth at onset of headache for migraine.    Pure hypercholesterolemia  Is not on a statin. Lipids ordered   - Lipid panel reflex to direct LDL Fasting; Future  -  Lipid panel reflex to direct LDL Fasting    Sinusitis, unspecified chronicity, unspecified location  History of sinus infections. Worsening symptoms starting in January. Abrahan prescribed and plan for patient to follow up with Dr. Miguel.   - doxycycline hyclate (VIBRAMYCIN) 100 MG capsule; Take 1 capsule (100 mg) by mouth 2 times daily for 14 days.    The longitudinal plan of care for the diagnosis(es)/condition(s) as documented were addressed during this visit. Due to the added complexity in care, I will continue to support Daniella in the subsequent management and with ongoing continuity of care.    Counseling  Appropriate preventive services were addressed with this patient via screening, questionnaire, or discussion as appropriate for fall prevention, nutrition, physical activity, Tobacco-use cessation, social engagement, weight loss and cognition.  Checklist reviewing preventive services available has been given to the patient.  Reviewed patient's diet, addressing concerns and/or questions.   She is at risk for lack of exercise and has been provided with information to increase physical activity for the benefit of her well-being.   She is at risk for psychosocial distress and has been provided with information to reduce risk.   The patient reports drinking more than 3 alcoholic drinks per day and/or more than 7 drhnks per week. The patient was counseled and given information about possible harmful effects of excessive alcohol intake.    Subjective   Daniella is a 44 year old, presenting for the following:  Physical (fasting), Sinus Problem, and Weight Loss (Help with weight loss)        2/19/2025     6:51 AM   Additional Questions   Roomed by alonso kelly        Sinus Problem       Pt would like to discuss sinuses.  She states she feels like something is wrong.  She sticks a q-tip up her nose and gets clear mucus all of the time.  She states she was sick twice in January and just hasn't cleared up completely.    Sinuses  dry and goopy  Clear or white discharge  Got worst around the beginning of the year when she was sick   Has a history of this but reports it is getting worst  Has seen Dr. Miguel previously for this.     She would like to discuss weight loss help.  Believes pains could be cause from increased weight     Health Care Directive  Patient does not have a Health Care Directive: Discussed advance care planning with patient; however, patient declined at this time.      2/18/2025   General Health   How would you rate your overall physical health? (!) FAIR   Feel stress (tense, anxious, or unable to sleep) To some extent   (!) STRESS CONCERN      2/18/2025   Nutrition   Three or more servings of calcium each day? (!) NO   Diet: Regular (no restrictions)   How many servings of fruit and vegetables per day? (!) 2-3   How many sweetened beverages each day? 0-1         2/18/2025   Exercise   Days per week of moderate/strenous exercise 1 day   Average minutes spent exercising at this level 10 min   (!) EXERCISE CONCERN      2/18/2025   Social Factors   Frequency of gathering with friends or relatives Once a week   Worry food won't last until get money to buy more No   Food not last or not have enough money for food? No   Do you have housing? (Housing is defined as stable permanent housing and does not include staying ouside in a car, in a tent, in an abandoned building, in an overnight shelter, or couch-surfing.) Yes   Are you worried about losing your housing? No   Lack of transportation? No   Unable to get utilities (heat,electricity)? No         2/18/2025   Dental   Dentist two times every year? Yes         2/20/2024   TB Screening   Were you born outside of the [place]? (!) YES       Today's PHQ-2 Score:       2/18/2025    11:32 AM   PHQ-2 ( 1999 [employer])   Q1: Little interest or pleasure in doing things 1   Q2: Feeling down, depressed or hopeless 1   PHQ-2 Score 2    Q1: Little interest or pleasure in doing things Several days   Q2:  Feeling down, depressed or hopeless Several days   PHQ-2 Score 2       Patient-reported   Patient has no concerns with mood       2/18/2025   Substance Use   Alcohol more than 3/day or more than 7/wk Yes   How often do you have a drink containing alcohol 2 to 3 times a week   How many alcohol drinks on typical day 3 or 4   How often do you have 5+ drinks at one occasion Less than monthly   Audit 2/3 Score 2   How often not able to stop drinking once started Never   How often failed to do what normally expected Never   How often needed first drink in am after a heavy drinking session Never   How often feeling of guilt or remorse after drinking Never   How often unable to remember what happened the night before Never   Have you or someone else been injured because of your drinking Yes, but not in the last year   Has anyone been concerned or suggested you cut down on drinking Yes, but not in the last year   TOTAL SCORE - AUDIT 9   Do you use any other substances recreationally? (!) CANNABIS PRODUCTS        Multiple values from one day are sorted in reverse-chronological order     Social History     Tobacco Use    Smoking status: Every Day     Current packs/day: 0.50     Average packs/day: 0.5 packs/day for 31.1 years (15.6 ttl pk-yrs)     Types: Cigarettes     Start date: 1/1/1994     Passive exposure: Current    Smokeless tobacco: Never    Tobacco comments:     5 cigarettes/day   Vaping Use    Vaping status: Every Day    Substances: THC    Passive vaping exposure: Yes   Substance Use Topics    Alcohol use: Yes     Comment: 3-4 beers a night     Drug use: Yes     Types: Marijuana           12/17/2024   LAST FHS-7 RESULTS   1st degree relative breast or ovarian cancer No    No   Any relative bilateral breast cancer No   Any male have breast cancer No   Any ONE woman have BOTH breast AND ovarian cancer No   Any woman with breast cancer before 50yrs No   2 or more relatives with breast AND/OR ovarian cancer No   2 or more  "relatives with breast AND/OR bowel cancer No       Multiple values from one day are sorted in reverse-chronological order     Mammogram Screening - Mammogram every 1-2 years updated in Health Maintenance based on mutual decision making        2/18/2025   STI Screening   New sexual partner(s) since last STI/HIV test? (!) YES      History of abnormal Pap smear: No - age 30-64 HPV with reflex Pap every 5 years recommended        Latest Ref Rng & Units 5/8/2019     4:21 PM 5/8/2019     4:15 PM 4/14/2015    12:00 AM   PAP / HPV   PAP (Historical)  NIL      HPV 16 DNA NEG^Negative  Negative     HPV 18 DNA NEG^Negative  Negative     Other HR HPV NEG^Negative  Negative     PAP-ABSTRACT    See Scanned Document           This result is from an external source.     ASCVD Risk   The 10-year ASCVD risk score (Ly MADDOX, et al., 2019) is: 3.7%    Values used to calculate the score:      Age: 44 years      Sex: Female      Is Non- : No      Diabetic: No      Tobacco smoker: Yes      Systolic Blood Pressure: 129 mmHg      Is BP treated: No      HDL Cholesterol: 64 mg/dL      Total Cholesterol: 277 mg/dL       Reviewed and updated as needed this visit by Provider   Tobacco  Allergies  Meds  Problems  Med Hx  Surg Hx  Fam Hx                     Objective    Exam  /85 (BP Location: Right arm, Patient Position: Sitting, Cuff Size: Adult Large)   Pulse 99   Temp 97.1  F (36.2  C) (Temporal)   Resp 20   Ht 1.695 m (5' 6.75\")   Wt 127.6 kg (281 lb 3.2 oz)   LMP 02/10/2025 (Within Days)   SpO2 99%   BMI 44.37 kg/m     Estimated body mass index is 44.37 kg/m  as calculated from the following:    Height as of this encounter: 1.695 m (5' 6.75\").    Weight as of this encounter: 127.6 kg (281 lb 3.2 oz).    Physical Exam  GENERAL: alert and no distress  EYES: Eyes grossly normal to inspection, PERRL and conjunctivae and sclerae normal  HENT: ear canals and TM's normal, nose and mouth without " ulcers or lesions  NECK: no adenopathy, no asymmetry, masses, or scars  RESP: lungs clear to auscultation - no rales, rhonchi or wheezes  CV: regular rate and rhythm, normal S1 S2, no S3 or S4, no murmur, click or rub, no peripheral edema  ABDOMEN: soft, tenderness throughout, and bowel sounds normal   (female): normal female external genitalia, normal urethral meatus, normal vaginal mucosa  MS: no gross musculoskeletal defects noted, no edema  SKIN: no suspicious lesions or rashes  NEURO: Normal strength and tone, mentation intact and speech normal  PSYCH: mentation appears normal, affect normal/bright    Signed Electronically by: JENAE Aiken CNP

## 2025-02-19 NOTE — PATIENT INSTRUCTIONS
Weight management:    Nutrition:  1.2 to 1.5 g/kg of protein daily  Space protein every 3-4 hours  Supplement with whey protein enriched with leucine and vitamin D  Reduce/minimize processed foods  Adequate fruit and veggies   Consider high quality vitamin   Use food trackers like Lose It, myfitness pal or My Net Diary to help see food patterns     Physical activity:  Resistance training and aerobic activity to help prevent muscle loss     Prevent Nausea:  Eat small frequent meals (every 3-4 hours)  Eat breakfast  Avoid high fat//high sugar foods  Acupressure bands  [place]/peppermint tea

## 2025-02-20 ENCOUNTER — TELEPHONE (OUTPATIENT)
Dept: FAMILY MEDICINE | Facility: CLINIC | Age: 45
End: 2025-02-20
Payer: COMMERCIAL

## 2025-02-20 LAB
HPV HR 12 DNA CVX QL NAA+PROBE: NEGATIVE
HPV16 DNA CVX QL NAA+PROBE: NEGATIVE
HPV18 DNA CVX QL NAA+PROBE: NEGATIVE
HUMAN PAPILLOMA VIRUS FINAL DIAGNOSIS: NORMAL

## 2025-02-20 NOTE — TELEPHONE ENCOUNTER
Please initiate a prior authorization    Thank you,  Lay Adkins, Pharmacy Technician  [facility]

## 2025-02-20 NOTE — TELEPHONE ENCOUNTER
Retail Pharmacy Prior Authorization Team   Phone: [phone]    Prior Authorization Approval    Medication: MOMETASONE FUROATE 50 MCG/ACT NA SUSP  Authorization Effective Date: 1/19/2025  Authorization Expiration Date: 2/18/2026  Reference #: [id number]    Insurance Company: [employer]-Specialty PA's - Phone [phone] Fax [phone]  Which Pharmacy is filling the prescription: [facility]  [address] [place] [address]  Pharmacy Notified: YES  Patient Notified: **Instructed pharmacy to notify patient when script is ready to /ship.**

## 2025-02-24 ENCOUNTER — MYC MEDICAL ADVICE (OUTPATIENT)
Dept: FAMILY MEDICINE | Facility: CLINIC | Age: 45
End: 2025-02-24
Payer: COMMERCIAL

## 2025-02-24 LAB
BKR AP ASSOCIATED HPV REPORT: NORMAL
BKR LAB AP GYN ADEQUACY: NORMAL
BKR LAB AP GYN INTERPRETATION: NORMAL
BKR LAB AP LMP: NORMAL
BKR LAB AP PREVIOUS ABNORMAL: NORMAL
PATH REPORT.COMMENTS IMP SPEC: NORMAL
PATH REPORT.COMMENTS IMP SPEC: NORMAL
PATH REPORT.RELEVANT HX SPEC: NORMAL

## 2025-02-24 NOTE — TELEPHONE ENCOUNTER
PRIOR AUTHORIZATION DENIED    Medication: Wegovy    Denial Date: 2/24/2025    Denial Rational:  Per insurance, medication is excluded from patient's benefit plan and will not be covered. Patient is able to use [employer], [employer] or another discount card to help with the cost of the medication. An appeal is NOT available on excluded medications. Please follow up with the patient and close encounter.   Thank You.             Appeal Information:  N/A

## 2025-02-25 ENCOUNTER — THERAPY VISIT (OUTPATIENT)
Dept: OCCUPATIONAL THERAPY | Facility: CLINIC | Age: 45
End: 2025-02-25
Payer: COMMERCIAL

## 2025-02-25 DIAGNOSIS — M77.11 LATERAL EPICONDYLITIS OF RIGHT ELBOW: ICD-10-CM

## 2025-02-25 DIAGNOSIS — M25.521 RIGHT ELBOW PAIN: Primary | ICD-10-CM

## 2025-02-25 PROCEDURE — 97140 MANUAL THERAPY 1/> REGIONS: CPT | Mod: GO

## 2025-02-27 ENCOUNTER — ANCILLARY PROCEDURE (OUTPATIENT)
Dept: CT IMAGING | Facility: CLINIC | Age: 45
End: 2025-02-27
Payer: COMMERCIAL

## 2025-02-27 DIAGNOSIS — J43.9 PULMONARY EMPHYSEMA, UNSPECIFIED EMPHYSEMA TYPE (H): ICD-10-CM

## 2025-02-27 DIAGNOSIS — J20.9 COPD WITH ACUTE BRONCHITIS (H): ICD-10-CM

## 2025-02-27 DIAGNOSIS — R91.1 LUNG NODULE: ICD-10-CM

## 2025-02-27 DIAGNOSIS — J44.0 COPD WITH ACUTE BRONCHITIS (H): ICD-10-CM

## 2025-02-27 PROCEDURE — 71250 CT THORAX DX C-: CPT | Mod: TC | Performed by: RADIOLOGY

## 2025-03-04 ENCOUNTER — VIRTUAL VISIT (OUTPATIENT)
Dept: FAMILY MEDICINE | Facility: CLINIC | Age: 45
End: 2025-03-04
Payer: COMMERCIAL

## 2025-03-04 ENCOUNTER — MYC MEDICAL ADVICE (OUTPATIENT)
Dept: FAMILY MEDICINE | Facility: CLINIC | Age: 45
End: 2025-03-04

## 2025-03-04 ENCOUNTER — OFFICE VISIT (OUTPATIENT)
Dept: ORTHOPEDICS | Facility: CLINIC | Age: 45
End: 2025-03-04
Payer: COMMERCIAL

## 2025-03-04 DIAGNOSIS — M75.101 ROTATOR CUFF SYNDROME OF RIGHT SHOULDER: ICD-10-CM

## 2025-03-04 DIAGNOSIS — N95.1 MENOPAUSAL SYMPTOMS: ICD-10-CM

## 2025-03-04 DIAGNOSIS — M25.511 ACUTE PAIN OF RIGHT SHOULDER: ICD-10-CM

## 2025-03-04 DIAGNOSIS — M25.521 RIGHT ELBOW PAIN: ICD-10-CM

## 2025-03-04 DIAGNOSIS — E66.01 MORBID OBESITY (H): Primary | ICD-10-CM

## 2025-03-04 DIAGNOSIS — M77.01 MEDIAL EPICONDYLITIS OF ELBOW, RIGHT: Primary | ICD-10-CM

## 2025-03-04 PROCEDURE — 99213 OFFICE O/P EST LOW 20 MIN: CPT | Mod: 24 | Performed by: FAMILY MEDICINE

## 2025-03-04 PROCEDURE — 99024 POSTOP FOLLOW-UP VISIT: CPT | Performed by: FAMILY MEDICINE

## 2025-03-04 PROCEDURE — 98016 BRIEF COMUNICAJ TECH-BSD SVC: CPT

## 2025-03-04 RX ORDER — TOPIRAMATE 25 MG/1
TABLET, FILM COATED ORAL
Qty: 67 TABLET | Refills: 0 | Status: SHIPPED | OUTPATIENT
Start: 2025-03-04 | End: 2025-04-10

## 2025-03-04 RX ORDER — PREDNISONE 20 MG/1
40 TABLET ORAL DAILY
Qty: 10 TABLET | Refills: 0 | Status: SHIPPED | OUTPATIENT
Start: 2025-03-04

## 2025-03-04 NOTE — TELEPHONE ENCOUNTER
Spoke to provider and she states that pt does NOT need to be fasting.  Rakesh Hollis MA on 3/4/2025 at 5:52 PM

## 2025-03-04 NOTE — PROGRESS NOTES
Daniella is a 44 year old who is being evaluated via a billable video visit.    How would you like to obtain your AVS? MyChart  If the video visit is dropped, the invitation should be resent by: Text to cell phone: [phone]  Will anyone else be joining your video visit? No      Assessment & Plan     Obesity (BMI 35.0-39.9) with comorbidity (H)  Wegovy is not covered. Discussed various weight management options and will try topamax. Plan for patient to follow up in one month.   - topiramate (TOPAMAX) 25 MG tablet; Take 1 tablet (25 mg) by mouth daily for 7 days, THEN 2 tablets (50 mg) daily.    Menopausal symptoms  Concerned that menopausal symptoms are exacerbating muscle aches. Gets periods regularly but has experienced hot flashes. LH and FSH ordered for further evaluation of menopause.   - Luteinizing Hormone; Future  - Follicle stimulating hormone; Future    The longitudinal plan of care for the diagnosis(es)/condition(s) as documented were addressed during this visit. Due to the added complexity in care, I will continue to support Daniella in the subsequent management and with ongoing continuity of care.    Subjective   Daniella is a 44 year old, presenting for the following health issues:  Recheck Medication (Weight loss meds)      3/4/2025     5:21 PM   Additional Questions   Roomed by alonso kelly     History of Present Illness       Reason for visit:  Weight loss medds She is missing 1 dose(s) of medications per week.  She is not taking prescribed medications regularly due to remembering to take.        Wegovy not covered through insurance.  Pt reports she will be starting prednisone tomorrow  Has taken wellbutrin in the past and did not like medication  Gets periods every month   Has been experiencing hot flashes a couple of times a day         Objective           Vitals:  No vitals were obtained today due to virtual visit.    Physical Exam   RESP: No audible wheeze, or cough  PSYCH: Appropriate tone, and pace of  words    Unable to complete video visit due to sound issues. Telephone visit completed and 9 minutes long       Video-Visit Details    Type of service:  Video Visit   Originating Location (pt. Location): Home    Distant Location (provider location):  On-site  Platform used for Video Visit: Telephone  Signed Electronically by: JENAE Aiken CNP

## 2025-03-04 NOTE — LETTER
3/4/2025      Daniella Sexton  [address]  [place] [address]      Dear Colleague,    Thank you for referring your patient, Daniella Sexton, to the [facility] [place]. Please see a copy of my visit note below.    Daniella Sexton  :  1980  DOS: 25  MRN: [id number]    Sports Medicine Clinic Visit    PCP: Marielena Douglas    Daniella Sexton is a 44 year old Right hand dominant female who is seen in consultation at the request of  Dany Martinez D.O. presenting with right elbow pain for discussion of Tenex procedure vs steroid injection.    Injury: Gradual onset of pain over the past 4+ months that initially started with gripping/grasping & lifting objects at work.  Pain located over right lateral elbow with radiation to wrist extensors.  Additional Features:  Positive: swelling and weakness.  Symptoms are better with Ice, Other medications: Prednisone, and Rest.  Symptoms are worse with: gripping/grasping, lifting, carrying objects.  Other evaluation and/or treatments so far consists of: Ice, Heat, Ibuprofen, Rest, wrist brace, compression brace, hand therapy, splint.  Recent imaging completed: MRI completed 24.  Prior History of related problems: none    Social History: currently employed as computer repair/refurbishing     Interim History - 2025  Patient presents today in follow up from right elbow percutaneous tenotomy completed on 2025.  She is noting minimal elbow pain at this time, mild joint stiffness with elbow flexion.  She has noted that her right anterior lateral shoulder pain has been improving following her elbow procedure with activity modification.      Interim History - 2025  Since last visit on 2025 patient has waxing & waning aching right elbow pain, overall pain is still improved compared to prior to her procedure.  No interim injury.       Patient is also being seen to right anterior lateral shoulder pain  over the past ~ 3 - 6 months.  Pain is worse with lifting, reaching overhead, & lying on right shoulder.  Currently treating with OTC pain medications & activity modification.  Patient would like to discuss steroid injection today.  Right shoulder XR completed on 9/29/2024.    Interim History - March 4, 2025  Since last visit on 2/19/2025 patient has moderate-severe posterior medial pain over the past ~ 5 days after lying with arms above head for several minutes, while completing a CT scan.  Patient notes that pain is worse with terminal extension & lifting.  Wearing compression sleeve with only mild relief.  No interim injury.       Review of Systems  Musculoskeletal: as above  Remainder of review of systems is negative including constitutional, CV, pulmonary, GI, Skin and Neurologic except as noted in HPI or medical history.    Past Medical History:   Diagnosis Date     Alcohol abuse      Anxiety 04/18/2014     Arthritis      C. difficile colitis      Chronic bilateral low back pain with bilateral sciatica 07/18/2018     Chronic pansinusitis 01/17/2019     Depressive disorder 1999     Duodenal ulcer 03/01/1995     Fatty liver 12/16/2020     History of alcohol abuse 04/18/2014     History of methamphetamine abuse (H) 04/18/2014     Lung nodule 12/17/2020    Currently managed with follow up imaging by [facility].        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)      Migraines 04/18/2014     Mild intermittent asthma without complication 01/02/2019     Obesity (BMI 35.0-39.9) with comorbidity (H) 05/08/2019     Pulmonary emphysema, unspecified emphysema type (H) 07/08/2022     Pure hypercholesterolemia 08/27/2018     Seasonal allergic rhinitis 04/18/2014     Past Surgical History:   Procedure Laterality Date     ABDOMEN SURGERY  2007    tubeal     BACK SURGERY  2005    L 3-4, L 4-5     LITHOTRIPSY  2016     OPTICAL TRACKING SYSTEM ENDOSCOPIC SINUS SURGERY Bilateral 01/10/2019    Procedure:  BILATERAL IMAGE GUIDED ENDOSCOPIC SINUS SURGERY, BILATERAL TURBINATE REDUCTION;  Surgeon: Maikol Miguel MD;  Location: [facility] OR     RELEASE CARPAL TUNNEL Left      TONSILLECTOMY       TUBAL LIGATION       Family History   Problem Relation Age of Onset     No Known Problems Mother      Unknown/Adopted Father      Heart Disease No family hx of      Diabetes No family hx of      Cancer No family hx of      Colon Cancer No family hx of        Objective  LMP 02/10/2025 (Within Days)     General: healthy, alert and in no distress    HEENT: no scleral icterus or conjunctival erythema   Skin: no suspicious lesions or rash. No jaundice.   CV: regular rhythm by palpation, 2+ distal pulses, no pedal edema    Resp: normal respiratory effort without conversational dyspnea   Psych: normal mood and affect    Gait: nonantalgic, appropriate coordination and balance   Neuro: normal light touch sensory exam of the extremities. Motor strength as noted below     Right Elbow exam  Inspection: no swelling  Tender: medial epicondyle and common flexor tendon, moderate pain with palpation of the flexor/pronator compartment musculature  Non-tender: lateral epicondyle, supracondylar notch, olecranon bursa, distal bicep tendon and radial head/neck  Range of Motion: all normal, very mild pain with terminal supination  Strength: elbow strength full and pain with resisted wrist flexion and pronation  Special tests: normal stability, normal valgus stress, normal varus stress, ulnar Tinel's negative, no pain with resisted middle finger extension, no pain with resisted wrist flexion:     Bilateral Shoulder exam    ROM:        Full active and passive ROM with flexion, extension, abduction, internal and external rotation.       asymmetric scapular motion on R       Painful terminal flexion and abduction, mildly in ER    Tender:        subacromial space mild b/l       Lateral deltoid mild b/l    Non Tender:       remainder of shoulder        sternoclavicular joint       acromioclavicular joint       posterior shoulder       periscapular region    Strength:        abduction 5-/5       internal rotation 5/5       external rotation 5-/5       adduction 5/5    Impingement testing:       positive (+) Kilgore mild R>L       positive (+) empty can mild right       neg (-) crossover       neg (-) O'goldie       Very mildly painful crank R    Stability testing:       neg (-) relocation       neg (-) anterior glide       neg (-) sulcus sign    Skin:       no visible deformities       well perfused       capillary refill brisk    Sensation:        normal sensation over shoulder and upper extremity       Radiology:  Narrative & Impression   EXAM: XR SHOULDER RIGHT G/E 3 VIEWS  LOCATION: [facility]  DATE: 9/29/2024     INDICATION:  Right elbow pain, Acute pain of right shoulder  COMPARISON: None.                                                                      IMPRESSION: No fracture or dislocation. No degenerative changes. Small benign calcified granuloma in the right midlung.       Assessment:  1. Medial epicondylitis of elbow, right    2. Right elbow pain    3. Acute pain of right shoulder    4. Rotator cuff syndrome of right shoulder        Plan:  Discussed the assessment with the patient.  Follow up: 3 weeks if not improved, will obtain update next week at her follow up from recent right elbow procedure  Continue OT/HEP exercises, including stretching and massage, some ongoing muscle soreness  Today having acute medial elbow pain, cannot completely r/o cervical involvement but most clinically consistent with flare of medial epicondylitis  Bracing and activity modification reviewed, compression options reviewed  Could consider one-time CSI, but reviewed that we prefer to avoid where possible for this issue and generally only offered once  Too soon for consideration of US guided percutaneous tenotomy, which has been successful for  right lateral epicondylitis  Also with ongoing R>L shoulder pain, consistent with RTC tendinitis  Oral Tylenol and topical Voltaren gel reviewed as safe OTC options, reviewed safe dosing strategies  PT options and activity modification strategies reviewed  After review of relative risks, goals and limitations, oral steroid Rx sent to pharmacy, reviewed limited diagnostic value but overall therapeutic goals  XR images independently visualized and reviewed with patient today in clinic  We discussed modified progressive pain-free activity as tolerated  Do not overuse in first two weeks if feeling better due to concern for vulnerability while steroid is working  Supportive care reviewed  All questions were answered today  Contact us with additional questions or concerns  Signs and sx of concern reviewed      Dany Chapman DO, KEKE  Sports Medicine Physician  [facility]      Disclaimer: This note consists of symbols derived from keyboarding, dictation and/or voice recognition software. As a result, there may be errors in the script that have gone undetected. Please consider this when interpreting information found in this chart.      Again, thank you for allowing me to participate in the care of your patient.        Sincerely,        Dany Chapman DO    Electronically signed

## 2025-03-04 NOTE — PROGRESS NOTES
Daniella Sexton  :  1980  DOS: 25  MRN: [id number]    Sports Medicine Clinic Visit    PCP: Marielena Douglas    Daniella Sexton is a 44 year old Right hand dominant female who is seen in consultation at the request of  Dany Martinez D.O. presenting with right elbow pain for discussion of Tenex procedure vs steroid injection.    Injury: Gradual onset of pain over the past 4+ months that initially started with gripping/grasping & lifting objects at work.  Pain located over right lateral elbow with radiation to wrist extensors.  Additional Features:  Positive: swelling and weakness.  Symptoms are better with Ice, Other medications: Prednisone, and Rest.  Symptoms are worse with: gripping/grasping, lifting, carrying objects.  Other evaluation and/or treatments so far consists of: Ice, Heat, Ibuprofen, Rest, wrist brace, compression brace, hand therapy, splint.  Recent imaging completed: MRI completed 24.  Prior History of related problems: none    Social History: currently employed as computer repair/refurbishing     Interim History - 2025  Patient presents today in follow up from right elbow percutaneous tenotomy completed on 2025.  She is noting minimal elbow pain at this time, mild joint stiffness with elbow flexion.  She has noted that her right anterior lateral shoulder pain has been improving following her elbow procedure with activity modification.      Interim History - 2025  Since last visit on 2025 patient has waxing & waning aching right elbow pain, overall pain is still improved compared to prior to her procedure.  No interim injury.       Patient is also being seen to right anterior lateral shoulder pain over the past ~ 3 - 6 months.  Pain is worse with lifting, reaching overhead, & lying on right shoulder.  Currently treating with OTC pain medications & activity modification.  Patient would like to discuss steroid injection today.  Right shoulder  XR completed on 9/29/2024.    Interim History - March 4, 2025  Since last visit on 2/19/2025 patient has moderate-severe posterior medial pain over the past ~ 5 days after lying with arms above head for several minutes, while completing a CT scan.  Patient notes that pain is worse with terminal extension & lifting.  Wearing compression sleeve with only mild relief.  No interim injury.       Review of Systems  Musculoskeletal: as above  Remainder of review of systems is negative including constitutional, CV, pulmonary, GI, Skin and Neurologic except as noted in HPI or medical history.    Past Medical History:   Diagnosis Date    Alcohol abuse     Anxiety 04/18/2014    Arthritis     C. difficile colitis     Chronic bilateral low back pain with bilateral sciatica 07/18/2018    Chronic pansinusitis 01/17/2019    Depressive disorder 1999    Duodenal ulcer 03/01/1995    Fatty liver 12/16/2020    History of alcohol abuse 04/18/2014    History of methamphetamine abuse (H) 04/18/2014    Lung nodule 12/17/2020    Currently managed with follow up imaging by [facility].       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)     Migraines 04/18/2014    Mild intermittent asthma without complication 01/02/2019    Obesity (BMI 35.0-39.9) with comorbidity (H) 05/08/2019    Pulmonary emphysema, unspecified emphysema type (H) 07/08/2022    Pure hypercholesterolemia 08/27/2018    Seasonal allergic rhinitis 04/18/2014     Past Surgical History:   Procedure Laterality Date    ABDOMEN SURGERY  2007    tubeal    BACK SURGERY  2005    L 3-4, L 4-5    LITHOTRIPSY  2016    OPTICAL TRACKING SYSTEM ENDOSCOPIC SINUS SURGERY Bilateral 01/10/2019    Procedure: BILATERAL IMAGE GUIDED ENDOSCOPIC SINUS SURGERY, BILATERAL TURBINATE REDUCTION;  Surgeon: Maikol Miguel MD;  Location: [facility] OR    RELEASE CARPAL TUNNEL Left     TONSILLECTOMY      TUBAL LIGATION       Family History   Problem Relation Age of Onset    No Known Problems  Mother     Unknown/Adopted Father     Heart Disease No family hx of     Diabetes No family hx of     Cancer No family hx of     Colon Cancer No family hx of        Objective  LMP 02/10/2025 (Within Days)     General: healthy, alert and in no distress    HEENT: no scleral icterus or conjunctival erythema   Skin: no suspicious lesions or rash. No jaundice.   CV: regular rhythm by palpation, 2+ distal pulses, no pedal edema    Resp: normal respiratory effort without conversational dyspnea   Psych: normal mood and affect    Gait: nonantalgic, appropriate coordination and balance   Neuro: normal light touch sensory exam of the extremities. Motor strength as noted below     Right Elbow exam  Inspection: no swelling  Tender: medial epicondyle and common flexor tendon, moderate pain with palpation of the flexor/pronator compartment musculature  Non-tender: lateral epicondyle, supracondylar notch, olecranon bursa, distal bicep tendon and radial head/neck  Range of Motion: all normal, very mild pain with terminal supination  Strength: elbow strength full and pain with resisted wrist flexion and pronation  Special tests: normal stability, normal valgus stress, normal varus stress, ulnar Tinel's negative, no pain with resisted middle finger extension, no pain with resisted wrist flexion:     Bilateral Shoulder exam    ROM:        Full active and passive ROM with flexion, extension, abduction, internal and external rotation.       asymmetric scapular motion on R       Painful terminal flexion and abduction, mildly in ER    Tender:        subacromial space mild b/l       Lateral deltoid mild b/l    Non Tender:       remainder of shoulder       sternoclavicular joint       acromioclavicular joint       posterior shoulder       periscapular region    Strength:        abduction 5-/5       internal rotation 5/5       external rotation 5-/5       adduction 5/5    Impingement testing:       positive (+) Kilgore mild R>L       positive  (+) empty can mild right       neg (-) crossover       neg (-) O'goldie       Very mildly painful crank R    Stability testing:       neg (-) relocation       neg (-) anterior glide       neg (-) sulcus sign    Skin:       no visible deformities       well perfused       capillary refill brisk    Sensation:        normal sensation over shoulder and upper extremity       Radiology:  Narrative & Impression   EXAM: XR SHOULDER RIGHT G/E 3 VIEWS  LOCATION: [facility]  DATE: 9/29/2024     INDICATION:  Right elbow pain, Acute pain of right shoulder  COMPARISON: None.                                                                      IMPRESSION: No fracture or dislocation. No degenerative changes. Small benign calcified granuloma in the right midlung.       Assessment:  1. Medial epicondylitis of elbow, right    2. Right elbow pain    3. Acute pain of right shoulder    4. Rotator cuff syndrome of right shoulder        Plan:  Discussed the assessment with the patient.  Follow up: 3 weeks if not improved, will obtain update next week at her follow up from recent right elbow procedure  Continue OT/HEP exercises, including stretching and massage, some ongoing muscle soreness  Today having acute medial elbow pain, cannot completely r/o cervical involvement but most clinically consistent with flare of medial epicondylitis  Bracing and activity modification reviewed, compression options reviewed  Could consider one-time CSI, but reviewed that we prefer to avoid where possible for this issue and generally only offered once  Too soon for consideration of US guided percutaneous tenotomy, which has been successful for right lateral epicondylitis  Also with ongoing R>L shoulder pain, consistent with RTC tendinitis  Oral Tylenol and topical Voltaren gel reviewed as safe OTC options, reviewed safe dosing strategies  PT options and activity modification strategies reviewed  After review of relative risks,  goals and limitations, oral steroid Rx sent to pharmacy, reviewed limited diagnostic value but overall therapeutic goals  XR images independently visualized and reviewed with patient today in clinic  We discussed modified progressive pain-free activity as tolerated  Do not overuse in first two weeks if feeling better due to concern for vulnerability while steroid is working  Supportive care reviewed  All questions were answered today  Contact us with additional questions or concerns  Signs and sx of concern reviewed      Dany Chapman DO, KEKE  Sports Medicine Physician  [facility]      Disclaimer: This note consists of symbols derived from keyboarding, dictation and/or voice recognition software. As a result, there may be errors in the script that have gone undetected. Please consider this when interpreting information found in this chart.

## 2025-03-12 ENCOUNTER — OFFICE VISIT (OUTPATIENT)
Dept: ORTHOPEDICS | Facility: CLINIC | Age: 45
End: 2025-03-12
Payer: COMMERCIAL

## 2025-03-12 ENCOUNTER — ANCILLARY PROCEDURE (OUTPATIENT)
Dept: GENERAL RADIOLOGY | Facility: CLINIC | Age: 45
End: 2025-03-12
Attending: FAMILY MEDICINE
Payer: COMMERCIAL

## 2025-03-12 DIAGNOSIS — M77.01 MEDIAL EPICONDYLITIS OF ELBOW, RIGHT: ICD-10-CM

## 2025-03-12 DIAGNOSIS — M25.512 PAIN IN JOINT OF LEFT SHOULDER: ICD-10-CM

## 2025-03-12 DIAGNOSIS — M25.521 RIGHT ELBOW PAIN: ICD-10-CM

## 2025-03-12 DIAGNOSIS — M75.82 ROTATOR CUFF TENDINITIS, LEFT: ICD-10-CM

## 2025-03-12 DIAGNOSIS — M25.512 PAIN IN JOINT OF LEFT SHOULDER: Primary | ICD-10-CM

## 2025-03-12 PROCEDURE — 20611 DRAIN/INJ JOINT/BURSA W/US: CPT | Mod: LT | Performed by: FAMILY MEDICINE

## 2025-03-12 PROCEDURE — 73030 X-RAY EXAM OF SHOULDER: CPT | Mod: TC | Performed by: RADIOLOGY

## 2025-03-12 PROCEDURE — 99024 POSTOP FOLLOW-UP VISIT: CPT | Performed by: FAMILY MEDICINE

## 2025-03-12 PROCEDURE — 99213 OFFICE O/P EST LOW 20 MIN: CPT | Mod: 25 | Performed by: FAMILY MEDICINE

## 2025-03-12 RX ADMIN — ROPIVACAINE HYDROCHLORIDE 3 ML: 5 INJECTION, SOLUTION EPIDURAL; INFILTRATION; PERINEURAL at 16:43

## 2025-03-12 RX ADMIN — TRIAMCINOLONE ACETONIDE 40 MG: 40 INJECTION, SUSPENSION INTRA-ARTICULAR; INTRAMUSCULAR at 16:43

## 2025-03-12 NOTE — PROGRESS NOTES
Daniella Sexton  :  1980  DOS: 25  MRN: [id number]    Sports Medicine Clinic Visit    PCP: Marielena Douglas    Daniella Sexton is a 44 year old Right hand dominant female who is seen in follow up for her right elbow pain.    Interim History - 2025  Since last visit on 3/4/2025 patient has worsening left shoulder pain.  Prednisone did help while she was taking, but once cycle was completed effects wore off. 0/10 pain right now - worsens with lifting, and at night when she is trying to sleep. Still using ice, tylenol and heat on the shoulder.       Review of Systems  Musculoskeletal: as above  Remainder of review of systems is negative including constitutional, CV, pulmonary, GI, Skin and Neurologic except as noted in HPI or medical history.    Past Medical History:   Diagnosis Date    Alcohol abuse     Anxiety 2014    Arthritis     C. difficile colitis     Chronic bilateral low back pain with bilateral sciatica 2018    Chronic pansinusitis 2019    Depressive disorder 1999    Duodenal ulcer 1995    Fatty liver 2020    History of alcohol abuse 2014    History of methamphetamine abuse (H) 2014    Lung nodule 2020    Currently [facility]       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)     Migraines 2014    Mild intermittent asthma without complication 2019    Obesity (BMI 35.0-39.9) with comorbidity (H) 2019    Pulmonary emphysema, unspecified emphysema type (H) 2022    Pure hypercholesterolemia 2018    Seasonal allergic rhinitis 2014     Past Surgical History:   Procedure Laterality Date    ABDOMEN SURGERY  2007    tubeal    BACK SURGERY  2005    L 3-4, L 4-5    LITHOTRIPSY  2016    OPTICAL TRACKING SYSTEM ENDOSCOPIC SINUS SURGERY Bilateral 01/10/2019    Procedure: BILATERAL IMAGE GUIDED ENDOSCOPIC SINUS SURGERY, BILATERAL TURBINATE REDUCTION;  Surgeon:  Maikol Miguel MD;  Location: [facility] OR    RELEASE CARPAL TUNNEL Left     TONSILLECTOMY      TUBAL LIGATION       Family History   Problem Relation Age of Onset    No Known Problems Mother     Unknown/Adopted Father     Heart Disease No family hx of     Diabetes No family hx of     Cancer No family hx of     Colon Cancer No family hx of        Objective  LMP 02/10/2025 (Within Days)     General: healthy, alert and in no distress    HEENT: no scleral icterus or conjunctival erythema   Skin: no suspicious lesions or rash. No jaundice.   CV: regular rhythm by palpation, 2+ distal pulses, no pedal edema    Resp: normal respiratory effort without conversational dyspnea   Psych: normal mood and affect    Gait: nonantalgic, appropriate coordination and balance   Neuro: normal light touch sensory exam of the extremities. Motor strength as noted below     Right Elbow exam  Inspection: no swelling  Tender: medial epicondyle and common flexor tendon, moderate pain with palpation of the flexor/pronator compartment musculature  Non-tender: lateral epicondyle, supracondylar notch, olecranon bursa, distal bicep tendon and radial head/neck  Range of Motion: all normal, very mild pain with terminal supination  Strength: elbow strength full and pain with resisted wrist flexion and pronation  Special tests: normal stability, normal valgus stress, normal varus stress, ulnar Tinel's negative, no pain with resisted middle finger extension, no pain with resisted wrist flexion:     Bilateral Shoulder exam    ROM:        Full active and passive ROM with flexion, extension, abduction, internal and external rotation.       asymmetric scapular motion on R       Painful terminal flexion and abduction, mildly in ER    Tender:        subacromial space mild b/l       Lateral deltoid mild b/l    Non Tender:       remainder of shoulder       sternoclavicular joint       acromioclavicular joint       posterior shoulder       periscapular  region    Strength:        abduction 5-/5       internal rotation 5/5       external rotation 5-/5       adduction 5/5    Impingement testing:       positive (+) Kilgore mild R>L       positive (+) empty can mild right       neg (-) crossover       neg (-) O'goldie       Very mildly painful crank R    Stability testing:       neg (-) relocation       neg (-) anterior glide       neg (-) sulcus sign    Skin:       no visible deformities       well perfused       capillary refill brisk    Sensation:        normal sensation over shoulder and upper extremity       Radiology:  Narrative & Impression   EXAM: XR SHOULDER RIGHT G/E 3 VIEWS  LOCATION: [facility]  DATE: 9/29/2024     INDICATION:  Right elbow pain, Acute pain of right shoulder  COMPARISON: None.                                                                      IMPRESSION: No fracture or dislocation. No degenerative changes. Small benign calcified granuloma in the right midlung.     Large Joint Injection/Arthocentesis: L subacromial bursa    Date/Time: 3/12/2025 4:43 PM    Performed by: Dany Chapman DO  Authorized by: Dany Chapman DO    Indications:  Pain  Needle Size:  22 G  Guidance: ultrasound    Approach:  Lateral  Location:  Shoulder      Site:  L subacromial bursa  Medications:  40 mg triamcinolone 40 MG/ML; 3 mL ROPivacaine 5 MG/ML  Outcome:  Tolerated well, no immediate complications  Procedure discussed: discussed risks, benefits, and alternatives    Consent Given by:  Patient  Timeout: timeout called immediately prior to procedure    Prep: patient was prepped and draped in usual sterile fashion     Ultrasound images of procedure were permanently stored.       Assessment:  No diagnosis found.      Plan:  Discussed the assessment with the patient.  Follow up: 3 weeks if not improved, will obtain update next week at her follow up from recent right elbow procedure  Continue OT/HEP exercises, including  stretching and massage, some ongoing muscle soreness  Today having acute medial elbow pain, cannot completely r/o cervical involvement but most clinically consistent with flare of medial epicondylitis  Bracing and activity modification reviewed, compression options reviewed  Could consider one-time CSI, but reviewed that we prefer to avoid where possible for this issue and generally only offered once  Too soon for consideration of US guided percutaneous tenotomy, which has been successful for right lateral epicondylitis  Also with ongoing R>L shoulder pain, consistent with RTC tendinitis  Oral Tylenol and topical Voltaren gel reviewed as safe OTC options, reviewed safe dosing strategies  PT options and activity modification strategies reviewed  After review of relative risks, goals and limitations, oral steroid Rx sent to pharmacy, reviewed limited diagnostic value but overall therapeutic goals  XR images independently visualized and reviewed with patient today in clinic  We discussed modified progressive pain-free activity as tolerated  Do not overuse in first two weeks if feeling better due to concern for vulnerability while steroid is working  Supportive care reviewed  All questions were answered today  Contact us with additional questions or concerns  Signs and sx of concern reviewed      Dany Chapman DO, CANASRIN  Sports Medicine Physician  [facility]      Disclaimer: This note consists of symbols derived from keyboarding, dictation and/or voice recognition software. As a result, there may be errors in the script that have gone undetected. Please consider this when interpreting information found in this chart.

## 2025-03-12 NOTE — LETTER
from keyboarding, dictation and/or voice recognition software. As a result, there may be errors in the script that have gone undetected. Please consider this when interpreting information found in this chart.      Again, thank you for allowing me to participate in the care of your patient.        Sincerely,        Dany Chapman, DO    Electronically signed

## 2025-03-12 NOTE — Clinical Note
3/12/2025      Daniella Sexton  [address] [place] [address]      Dear Colleague,    Thank you for referring your patient, Daniella Sexton, to the [facility]. Please see a copy of my visit note below.    Daniella Sexton  :  1980  DOS: 25  MRN: [id number]    Sports Medicine Clinic Visit    PCP: Marielena Douglas    Daniella Sexton is a 44 year old Right hand dominant female who is seen in follow up for her right elbow pain.    Interim History - 2025  Since last visit on 3/4/2025 patient has worsening left shoulder pain.  Prednisone did help while she was taking, but once cycle was completed effects wore off. 0/10 pain right now - worsens with lifting, and at night when she is trying to sleep. Still using ice, tylenol and heat on the shoulder.       Review of Systems  Musculoskeletal: as above  Remainder of review of systems is negative including constitutional, CV, pulmonary, GI, Skin and Neurologic except as noted in HPI or medical history.    Past Medical History:   Diagnosis Date    Alcohol abuse     Anxiety 2014    Arthritis     C. difficile colitis     Chronic bilateral low back pain with bilateral sciatica 2018    Chronic pansinusitis 2019    Depressive disorder 1999    Duodenal ulcer 1995    Fatty liver 2020    History of alcohol abuse 2014    History of methamphetamine abuse (H) 2014    Lung nodule 2020    [facility]       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)     Migraines 2014    Mild intermittent asthma without complication 2019    Obesity (BMI 35.0-39.9) with comorbidity (H) 2019    Pulmonary emphysema, unspecified emphysema type (H) 2022    Pure hypercholesterolemia 2018    Seasonal allergic rhinitis 2014     Past Surgical History:   Procedure Laterality Date    ABDOMEN SURGERY       tubeal    BACK SURGERY  2005    L 3-4, L 4-5    LITHOTRIPSY  2016    OPTICAL TRACKING SYSTEM ENDOSCOPIC SINUS SURGERY Bilateral 01/10/2019    Procedure: BILATERAL IMAGE GUIDED ENDOSCOPIC SINUS SURGERY, BILATERAL TURBINATE REDUCTION;  Surgeon: Maikol Miguel MD;  Location: [facility] OR    RELEASE CARPAL TUNNEL Left     TONSILLECTOMY      TUBAL LIGATION       Family History   Problem Relation Age of Onset    No Known Problems Mother     Unknown/Adopted Father     Heart Disease No family hx of     Diabetes No family hx of     Cancer No family hx of     Colon Cancer No family hx of        Objective  LMP 02/10/2025 (Within Days)     General: healthy, alert and in no distress    HEENT: no scleral icterus or conjunctival erythema   Skin: no suspicious lesions or rash. No jaundice.   CV: regular rhythm by palpation, 2+ distal pulses, no pedal edema    Resp: normal respiratory effort without conversational dyspnea   Psych: normal mood and affect    Gait: nonantalgic, appropriate coordination and balance   Neuro: normal light touch sensory exam of the extremities. Motor strength as noted below     Right Elbow exam  Inspection: no swelling  Tender: medial epicondyle and common flexor tendon, moderate pain with palpation of the flexor/pronator compartment musculature  Non-tender: lateral epicondyle, supracondylar notch, olecranon bursa, distal bicep tendon and radial head/neck  Range of Motion: all normal, very mild pain with terminal supination  Strength: elbow strength full and pain with resisted wrist flexion and pronation  Special tests: normal stability, normal valgus stress, normal varus stress, ulnar Tinel's negative, no pain with resisted middle finger extension, no pain with resisted wrist flexion:     Bilateral Shoulder exam    ROM:        Full active and passive ROM with flexion, extension, abduction, internal and external rotation.       asymmetric scapular motion on R       Painful terminal flexion and abduction,  mildly in ER    Tender:        subacromial space mild b/l       Lateral deltoid mild b/l    Non Tender:       remainder of shoulder       sternoclavicular joint       acromioclavicular joint       posterior shoulder       periscapular region    Strength:        abduction 5-/5       internal rotation 5/5       external rotation 5-/5       adduction 5/5    Impingement testing:       positive (+) Kilgore mild R>L       positive (+) empty can mild right       neg (-) crossover       neg (-) O'goldie       Very mildly painful crank R    Stability testing:       neg (-) relocation       neg (-) anterior glide       neg (-) sulcus sign    Skin:       no visible deformities       well perfused       capillary refill brisk    Sensation:        normal sensation over shoulder and upper extremity       Radiology:  Narrative & Impression   EXAM: XR SHOULDER RIGHT G/E 3 VIEWS  LOCATION: [facility]  DATE: 9/29/2024     INDICATION:  Right elbow pain, Acute pain of right shoulder  COMPARISON: None.                                                                      IMPRESSION: No fracture or dislocation. No degenerative changes. Small benign calcified granuloma in the right midlung.       Assessment:  No diagnosis found.      Plan:  Discussed the assessment with the patient.  Follow up: 3 weeks if not improved, will obtain update next week at her follow up from recent right elbow procedure  Continue OT/HEP exercises, including stretching and massage, some ongoing muscle soreness  Today having acute medial elbow pain, cannot completely r/o cervical involvement but most clinically consistent with flare of medial epicondylitis  Bracing and activity modification reviewed, compression options reviewed  Could consider one-time CSI, but reviewed that we prefer to avoid where possible for this issue and generally only offered once  Too soon for consideration of US guided percutaneous tenotomy, which has been  successful for right lateral epicondylitis  Also with ongoing R>L shoulder pain, consistent with RTC tendinitis  Oral Tylenol and topical Voltaren gel reviewed as safe OTC options, reviewed safe dosing strategies  PT options and activity modification strategies reviewed  After review of relative risks, goals and limitations, oral steroid Rx sent to pharmacy, reviewed limited diagnostic value but overall therapeutic goals  XR images independently visualized and reviewed with patient today in clinic  We discussed modified progressive pain-free activity as tolerated  Do not overuse in first two weeks if feeling better due to concern for vulnerability while steroid is working  Supportive care reviewed  All questions were answered today  Contact us with additional questions or concerns  Signs and sx of concern reviewed      Dany Chapman DO, KEKE  Sports Medicine Physician  [facility]      Disclaimer: This note consists of symbols derived from keyboarding, dictation and/or voice recognition software. As a result, there may be errors in the script that have gone undetected. Please consider this when interpreting information found in this chart.      Again, thank you for allowing me to participate in the care of your patient.        Sincerely,        Dany Chapman DO    Electronically signed

## 2025-03-17 RX ORDER — TRIAMCINOLONE ACETONIDE 40 MG/ML
40 INJECTION, SUSPENSION INTRA-ARTICULAR; INTRAMUSCULAR
Status: COMPLETED | OUTPATIENT
Start: 2025-03-12 | End: 2025-03-12

## 2025-03-17 RX ORDER — ROPIVACAINE HYDROCHLORIDE 5 MG/ML
3 INJECTION, SOLUTION EPIDURAL; INFILTRATION; PERINEURAL
Status: COMPLETED | OUTPATIENT
Start: 2025-03-12 | End: 2025-03-12

## 2025-03-21 NOTE — TELEPHONE ENCOUNTER
[address] [place] [address] [facility] [address]   3/26/2025    Teri Kaplan is a 37 y.o. female who presents today for her medical conditions and/or complaints as noted below.    Teri Kaplan is scheduled today for Follow-up        HPI:     History of Present Illness  The patient is here today for a 3-month follow-up. Blood pressure is well controlled. With her recent symptoms of lightheadedness and heart palpitations despite using compression stockings and increasing electrolyte drinks, she has seen cardiology and undergone tilt table testing. No evidence of bradycardia or pause was identified. She was noted to have vasovagal syndrome with vasodepressor response with syncope and started on midodrine 2.5 mg 3 times a day. Patient noting RX was never sent.  Will send today based     Her mental health continues to fluctuate, but she notes a significant improvement with the use of Zoloft. She is currently under the care of [facility] for her mental health needs, with whom she has monthly consultations. She is on sertraline 100 mg.    She is currently on omeprazole, which she finds highly effective    She is awaiting contact regarding her CPAP order, which was placed on 02/20/2025.  Order reviewed-- initially sent via interpreting provider.  Will resend and patient has contact numbers to follow up with [facility].     Supplemental Information  Patient underwent hemorrhoidectomy.    MEDICATIONS  Current: Midodrine, sertraline, omeprazole.      Vitals:    03/21/25 0940   BP: 123/88   BP Site: Left Upper Arm   Patient Position: Sitting   BP Cuff Size: Medium Adult   Pulse: 76   SpO2: 100%   Weight: 77.4 kg (170 lb 9.6 oz)   Height: 1.626 m (5' 4\")      Past Medical History:   Diagnosis Date    Headache(784.0)     Mild sleep apnea 3/11/2025    Moderate episode of recurrent major depressive disorder (HCC) 06/20/2023    Teeth grinding     sleep study scheduled 12/20/24    Under care of team       Called patient:    Sinus infection started end of august   Patient has been on antibiotics twice and it does not help.   On and off fevers  Patient is just getting over cold   No COVID test completed  Mold and dust allergies- patient takes zyrtec and flonase once daily   Drainage from nose is yellow and thick   Patient has facial pain and ear pain   No cracking or popping in ears  Hearing is muffled and plugged every once and awhile  Sleeping with humidifier at night   Patient has been taking mucinex to try to help   Has had headache for a month now  Pharmacy naomy at [place]     Scheduled patient 11/7/22 first available  Forwarding to provider to review and advise    Gail HUSSEIN RN, [facility] 10/26/22 10:39 AM

## 2025-03-25 ENCOUNTER — OFFICE VISIT (OUTPATIENT)
Dept: UROLOGY | Facility: CLINIC | Age: 45
End: 2025-03-25
Attending: UROLOGY
Payer: COMMERCIAL

## 2025-03-25 ENCOUNTER — ANCILLARY PROCEDURE (OUTPATIENT)
Dept: GENERAL RADIOLOGY | Facility: CLINIC | Age: 45
End: 2025-03-25
Attending: UROLOGY
Payer: COMMERCIAL

## 2025-03-25 VITALS
HEIGHT: 67 IN | SYSTOLIC BLOOD PRESSURE: 124 MMHG | RESPIRATION RATE: 16 BRPM | DIASTOLIC BLOOD PRESSURE: 86 MMHG | WEIGHT: 279 LBS | BODY MASS INDEX: 43.79 KG/M2 | OXYGEN SATURATION: 100 % | HEART RATE: 91 BPM

## 2025-03-25 DIAGNOSIS — N20.0 KIDNEY STONE: ICD-10-CM

## 2025-03-25 PROCEDURE — 3074F SYST BP LT 130 MM HG: CPT | Performed by: UROLOGY

## 2025-03-25 PROCEDURE — 1126F AMNT PAIN NOTED NONE PRSNT: CPT | Performed by: UROLOGY

## 2025-03-25 PROCEDURE — 3079F DIAST BP 80-89 MM HG: CPT | Performed by: UROLOGY

## 2025-03-25 PROCEDURE — 74018 RADEX ABDOMEN 1 VIEW: CPT | Mod: TC | Performed by: RADIOLOGY

## 2025-03-25 PROCEDURE — 99213 OFFICE O/P EST LOW 20 MIN: CPT | Performed by: UROLOGY

## 2025-03-25 ASSESSMENT — PAIN SCALES - GENERAL: PAINLEVEL_OUTOF10: NO PAIN (0)

## 2025-03-25 NOTE — PROGRESS NOTES
S: Patient is a pleasant 44-year-old female who was requested to be seen by Marielena Waller for a consultation with regard to patient's left kidney stone.  Patient has a history of kidney stones in the past with previous surgical treatments.  Recent CT scan showed a 6 mm stone in the left kidney.  Patient denies any flank pain or hematuria.  CT scan of the abdomen pelvis in 2023 showed multiple small bilateral stones.  Current Outpatient Medications   Medication Sig Dispense Refill    albuterol (PROAIR HFA/PROVENTIL HFA/VENTOLIN HFA) 108 (90 Base) MCG/ACT inhaler Inhale 2 puffs into the lungs every 4 hours as needed for wheezing, cough or shortness of breath. Use with spacer 18 g 0    amitriptyline (ELAVIL) 50 MG tablet Take 1 tablet (50 mg) by mouth at bedtime. 90 tablet 3    azelastine (ASTELIN) 0.1 % nasal spray SPRAY 1 SPRAY INTO BOTH NOSTRILS 2 TIMES DAILY AS NEEDED FOR RHINITIS (AS ADDITIONAL TTX IF NECESSARY) 30 mL 8    baclofen (LIORESAL) 10 MG tablet Take 1 tablet (10 mg) by mouth 2 times daily. 300 tablet 1    budesonide (PULMICORT) 0.5 MG/2ML neb solution Add one ampule into [facility] sinus rinse bottle with saline mixture and irrigate nose daily 60 mL 11    diclofenac (VOLTAREN) 75 MG EC tablet TAKE 1 TABLET (75 MG) BY MOUTH 2 TIMES DAILY 60 tablet 11    fexofenadine (ALLEGRA) 180 MG tablet Take 1 tablet (180 mg) by mouth daily. 90 tablet 3    fluticasone-salmeterol (ADVAIR) 250-50 MCG/ACT inhaler Inhale 1 puff into the lungs every 12 hours. 60 each 1    ipratropium - albuterol 0.5 mg/2.5 mg/3 mL (DUONEB) 0.5-2.5 (3) MG/3ML neb solution Take 1 vial (3 mLs) by nebulization every 6 hours as needed for shortness of breath or wheezing 90 mL 1    mometasone (NASONEX) 50 MCG/ACT nasal spray Spray 2 sprays into both nostrils at bedtime. 17 g 5    predniSONE (DELTASONE) 20 MG tablet Take 2 tablets (40 mg) by mouth daily. 10 tablet 0    pregabalin (LYRICA) 150 MG capsule TAKE ONE CAPSULE BY MOUTH TWICE A DAY 60  capsule 3    rizatriptan (MAXALT) 10 MG tablet Take 1 tablet (10 mg) by mouth at onset of headache for migraine. 10 tablet 3    topiramate (TOPAMAX) 25 MG tablet Take 1 tablet (25 mg) by mouth daily for 7 days, THEN 2 tablets (50 mg) daily. 67 tablet 0    magic mouthwash suspension, diphenhydrAMINE, lidocaine, aluminum-magnesium & simethicone, (FIRST-MOUTHWASH BLM) compounding kit Swish and swallow 5-10 mLs in mouth every 6 hours as needed for mouth sores. (Patient not taking: Reported on 10/22/2024) 119 mL 0     Allergies   Allergen Reactions    Amoxicillin Anaphylaxis    Morphine Other (See Comments) and Nausea and Vomiting    Penicillins Unknown     Past Medical History:   Diagnosis Date    Alcohol abuse     Anxiety 04/18/2014    Arthritis     C. difficile colitis     Chronic bilateral low back pain with bilateral sciatica 07/18/2018    Chronic pansinusitis 01/17/2019    Depressive disorder 1999    Duodenal ulcer 03/01/1995    Fatty liver 12/16/2020    History of alcohol abuse 04/18/2014    History of methamphetamine abuse (H) 04/18/2014    Lung nodule 12/17/2020    Currently managed with follow up imaging by [facility].       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)     Migraines 04/18/2014    Mild intermittent asthma without complication 01/02/2019    Obesity (BMI 35.0-39.9) with comorbidity (H) 05/08/2019    Pulmonary emphysema, unspecified emphysema type (H) 07/08/2022    Pure hypercholesterolemia 08/27/2018    Seasonal allergic rhinitis 04/18/2014     Past Surgical History:   Procedure Laterality Date    ABDOMEN SURGERY  2007    tubeal    BACK SURGERY  2005    L 3-4, L 4-5    LITHOTRIPSY  2016    OPTICAL TRACKING SYSTEM ENDOSCOPIC SINUS SURGERY Bilateral 01/10/2019    Procedure: BILATERAL IMAGE GUIDED ENDOSCOPIC SINUS SURGERY, BILATERAL TURBINATE REDUCTION;  Surgeon: Maikol Miguel MD;  Location: [facility] OR    RELEASE CARPAL TUNNEL Left     TONSILLECTOMY      TUBAL LIGATION         Family History   Problem Relation Age of Onset    No Known Problems Mother     Unknown/Adopted Father     Heart Disease No family hx of     Diabetes No family hx of     Cancer No family hx of     Colon Cancer No family hx of      Social History     Socioeconomic History    Marital status:      Spouse name: None    Number of children: None    Years of education: None    Highest education level: None   Tobacco Use    Smoking status: Every Day     Current packs/day: 0.50     Average packs/day: 0.5 packs/day for 31.2 years (15.6 ttl pk-yrs)     Types: Cigarettes     Start date: 1/1/1994     Passive exposure: Current    Smokeless tobacco: Never    Tobacco comments:     5 cigarettes/day   Vaping Use    Vaping status: Every Day    Substances: THC    Passive vaping exposure: Yes   Substance and Sexual Activity    Alcohol use: Yes     Comment: 3-4 beers a night     Drug use: Yes     Types: Marijuana    Sexual activity: Yes     Partners: Male     Birth control/protection: Other   Other Topics Concern    Parent/sibling w/ CABG, MI or angioplasty before 65F 55M? Yes     Social Drivers of Health     Financial Resource Strain: Low Risk  (2/18/2025)    Financial Resource Strain     Within the past 12 months, have you or your family members you live with been unable to get utilities (heat, electricity) when it was really needed?: No   Food Insecurity: Low Risk  (2/18/2025)    Food Insecurity     Within the past 12 months, did you worry that your food would run out before you got money to buy more?: No     Within the past 12 months, did the food you bought just not last and you didn t have money to get more?: No   Transportation Needs: Low Risk  (2/18/2025)    Transportation Needs     Within the past 12 months, has lack of transportation kept you from medical appointments, getting your medicines, non-medical meetings or appointments, work, or from getting things that you need?: No   Physical Activity: Insufficiently Active  "(2/18/2025)    Exercise Vital Sign     Days of Exercise per Week: 1 day     Minutes of Exercise per Session: 10 min   Stress: Stress Concern Present (2/18/2025)    [place] [employer] - Occupational Stress Questionnaire     Feeling of Stress : To some extent   Social Connections: Unknown (2/18/2025)    Social Connection and Isolation Panel [NHANES]     Frequency of Social Gatherings with Friends and Family: Once a week   Interpersonal Safety: Low Risk  (2/19/2025)    Interpersonal Safety     Do you feel physically and emotionally safe where you currently live?: Yes     Within the past 12 months, have you been hit, slapped, kicked or otherwise physically hurt by someone?: No     Within the past 12 months, have you been humiliated or emotionally abused in other ways by your partner or ex-partner?: No   Housing Stability: Low Risk  (2/18/2025)    Housing Stability     Do you have housing? : Yes     Are you worried about losing your housing?: No       REVIEW OF SYSTEMS  =================  C: NEGATIVE for fever, chills, change in weight  I: NEGATIVE for worrisome rashes, moles or lesions  E/M: NEGATIVE for ear, mouth and throat problems  R: NEGATIVE for significant cough or SHORTNESS OF BREATH  CV:  NEGATIVE for chest pain, palpitations or peripheral edema  GI: NEGATIVE for nausea, abdominal pain, heartburn, or change in bowel habits  NEURO: NEGATIVE numbness/weakness  : see HPI  PSYCH: NEGATIVE depression/anxiety  LYmph: no new enlarged lymph nodes  Ortho: no new trauma/movements      Physical Exam:  /86   Pulse 91   Resp 16   Ht 1.695 m (5' 6.75\")   Wt 126.6 kg (279 lb)   LMP 02/10/2025 (Within Days)   SpO2 100%   BMI 44.03 kg/m     Patient is pleasant, in no acute distress, good general condition.  GENERAL: alert and no distress  EYES: Eyes grossly normal to inspection.  No discharge or erythema, or obvious scleral/conjunctival abnormalities.  RESP: No audible wheeze, cough, or " visible cyanosis.    SKIN: Visible skin clear. No significant rash, abnormal pigmentation or lesions.  NEURO: Cranial nerves grossly intact.  Mentation and speech appropriate for age.  PSYCH: Appropriate affect, tone, and pace of words     CT scan reviewed.    KUB reviewed by me:  small bilateral renal stones    Assessment/Plan:      Renal stones: Schedule patient for 24-hour urine test collection for metabolic evaluation.

## 2025-03-25 NOTE — PATIENT INSTRUCTIONS
Instructions for 24 hour urine collection   The first morning urine (on the day of collection) MUST BE DISARDED. Write the time of this void on this slip.   Collect all urine voids for the next 24 hours.   The final collection should be the first morning void on the second day. Write the time of collection on this slip  It is expected that you keep your specimen container in the refrigerator. It must not freeze. Keep containers out of children's reach.    Starting time: ______________ A.M.    Ending time:________________ A.M.  Date: __________________________  Name: _________________________     Account : ____________________   Ordering Doctor: ________________________________________________  Tests to be done: ________________________________________________

## 2025-04-02 ENCOUNTER — LAB (OUTPATIENT)
Dept: LAB | Facility: CLINIC | Age: 45
End: 2025-04-02
Payer: COMMERCIAL

## 2025-04-02 DIAGNOSIS — N95.1 MENOPAUSAL SYMPTOMS: ICD-10-CM

## 2025-04-02 LAB
AMMONIA 24H UR-SRATE: 36 MMOL/24 H (ref 15–56)
BRUSHITE CRY 24H SATFR UR: 0.48 DG
BSA: ABNORMAL 1.73M(2)
CALCIUM 24H UR-MRATE: 315 MG/24 H
CAOX 24H ENGDIFF UR: 1.04 DG
CHLORIDE 24H UR-SRATE: 144 MMOL/24 H (ref 34–286)
CITRATE 24H UR-MRATE: 602 MG/24 H (ref 321–1191)
COLLECT DURATION TIME UR: 24 H
CREAT 24H UR-MRATE: 1750 MG/24 H (ref 603–1783)
FSH SERPL IRP2-ACNC: 2.9 MIU/ML
HYDROXYAPATITE 24H ENGDIFF UR: 5.83 DG
LH SERPL-ACNC: 5.5 MIU/ML
MAGNESIUM 24H UR-MRATE: 140 MG/24 H (ref 51–269)
OSMOLALITY 24H UR: 270 MOSM/KG (ref 150–1150)
OXALATE 24H UR-MRATE: 22 MG/24 H (ref 9.7–40.5)
OXALATE 24H UR-SRATE: 0.25 MMOL/24 H (ref 0.11–0.46)
PH 24H UR: 6.6 [PH] (ref 4.5–8)
PHOSPHATE 24H UR-MRATE: 770 MG/24 H (ref 226–1797)
POTASSIUM 24H UR-SRATE: 39 MMOL/24 H (ref 16–105)
PROTEIN CATABOLIC RATE 24H UR-MRATE: 84 G/24 H (ref 56–125)
SODIUM 24H UR-SRATE: 175 MMOL/24 H (ref 22–328)
SPECIMEN VOL 24H UR: 3500 ML
SULFATE 24H UR-SRATE: 14 MMOL/24 H (ref 7–47)
URATE 24H UR-MRATE: 875 MG/24 H (ref 250–750)
URATE CRY 24H SATFR UR: -3.15 DG
URINALYSIS SPECIALIST REVIEW: ABNORMAL
UUN 24H UR-MRATE: 9.4 G/24 H (ref 7–42)

## 2025-04-02 PROCEDURE — 36415 COLL VENOUS BLD VENIPUNCTURE: CPT

## 2025-04-02 PROCEDURE — 83001 ASSAY OF GONADOTROPIN (FSH): CPT

## 2025-04-02 PROCEDURE — 83002 ASSAY OF GONADOTROPIN (LH): CPT

## 2025-04-08 ENCOUNTER — VIRTUAL VISIT (OUTPATIENT)
Dept: UROLOGY | Facility: CLINIC | Age: 45
End: 2025-04-08
Payer: COMMERCIAL

## 2025-04-08 VITALS — BODY MASS INDEX: 43.39 KG/M2 | WEIGHT: 275 LBS

## 2025-04-08 DIAGNOSIS — N20.0 KIDNEY STONE: Primary | ICD-10-CM

## 2025-04-08 PROCEDURE — 1126F AMNT PAIN NOTED NONE PRSNT: CPT | Performed by: UROLOGY

## 2025-04-08 PROCEDURE — 98005 SYNCH AUDIO-VIDEO EST LOW 20: CPT | Performed by: UROLOGY

## 2025-04-08 RX ORDER — HYDROCHLOROTHIAZIDE 12.5 MG/1
12.5 TABLET ORAL DAILY
Qty: 90 TABLET | Refills: 3 | Status: SHIPPED | OUTPATIENT
Start: 2025-04-08

## 2025-04-08 RX ORDER — POTASSIUM CITRATE 1080 MG/1
10 TABLET, EXTENDED RELEASE ORAL
Qty: 180 TABLET | Refills: 3 | Status: SHIPPED | OUTPATIENT
Start: 2025-04-08

## 2025-04-08 ASSESSMENT — PAIN SCALES - GENERAL: PAINLEVEL_OUTOF10: NO PAIN (0)

## 2025-04-08 NOTE — PROGRESS NOTES
Daniella is a 44 year old who is being evaluated via a billable video visit.    How would you like to obtain your AVS? MyChart  If the video visit is dropped, the invitation should be resent by: Text to cell phone: [phone]  Will anyone else be joining your video visit? No          Subjective   Daniella is a 44 year old, presenting for the following health issues:  Follow Up    HPI      Follow up for renal stone.  Recent tests showed high calcium and uric acid level.      Review of Systems  Constitutional, HEENT, cardiovascular, pulmonary, gi and gu systems are negative, except as otherwise noted.      Objective    Vitals - Patient Reported  Pain Score: No Pain (0)        Physical Exam   GENERAL: alert and no distress  EYES: Eyes grossly normal to inspection.  No discharge or erythema, or obvious scleral/conjunctival abnormalities.  RESP: No audible wheeze, cough, or visible cyanosis.    SKIN: Visible skin clear. No significant rash, abnormal pigmentation or lesions.  NEURO: Cranial nerves grossly intact.  Mentation and speech appropriate for age.  PSYCH: Appropriate affect, tone, and pace of words    Lab on 04/02/2025   Component Date Value Ref Range Status    Luteinizing Hormone 04/02/2025 5.5  mIU/mL Final    FEMALE:  Age  0 - 6 mo:  <0.1-8.2 mIU/mL  6 mo - 11 years: <0.1-1.3 mIU/mL   11 - 14 years: <0.1-10 mIU/mL   14 - 19 years: 0.4-25 mIU/mL     19 years and older:   Follicular Phase: 2.4-12.6 mIU/mL  Ovulation Phase: 14.0-95.6 mIU/mL  Luteal Phase: 1.0-11.4  mIU/mL  Postmenopausal: 7.7-58.5 mIU/mL      FSH 04/02/2025 2.9  mIU/mL Final    19 years and older:   Follicular phase: 3.5-12.5 mIU/mL   Ovulation phase: 4.7-21.5 mIU/mL   Luteal phase: 1.7-7.7 mIU/mL   Postmenopause: 25.8-134.8 mIU/mL        Renal stone:  high uric acid and calcium level                        Discussed low protein diet                       Start hydrochlorothiazide/urocit K                       Recheck urorisks in six months                        Check K and uric acid level in 2 weeks.      Video-Visit Details    Type of service:  Video Visit   Originating Location (pt. Location): Home    Distant Location (provider location):  On-site  Platform used for Video Visit: Chika  Signed Electronically by: Roni Garza MD

## 2025-04-10 ENCOUNTER — MYC MEDICAL ADVICE (OUTPATIENT)
Dept: FAMILY MEDICINE | Facility: CLINIC | Age: 45
End: 2025-04-10
Payer: COMMERCIAL

## 2025-04-10 DIAGNOSIS — J43.9 PULMONARY EMPHYSEMA, UNSPECIFIED EMPHYSEMA TYPE (H): ICD-10-CM

## 2025-04-10 DIAGNOSIS — J44.0 COPD WITH ACUTE BRONCHITIS (H): ICD-10-CM

## 2025-04-10 DIAGNOSIS — J20.9 COPD WITH ACUTE BRONCHITIS (H): ICD-10-CM

## 2025-04-10 DIAGNOSIS — E66.01 MORBID OBESITY (H): ICD-10-CM

## 2025-04-10 RX ORDER — FLUTICASONE PROPIONATE AND SALMETEROL 250; 50 UG/1; UG/1
POWDER RESPIRATORY (INHALATION)
Qty: 3 EACH | Refills: 1 | Status: SHIPPED | OUTPATIENT
Start: 2025-04-10

## 2025-04-10 RX ORDER — TOPIRAMATE 25 MG/1
TABLET, FILM COATED ORAL
OUTPATIENT
Start: 2025-04-10 | End: 2025-05-17

## 2025-04-10 NOTE — TELEPHONE ENCOUNTER
Routing Digistrive message to PCP    Pt discontinued Topamax medication per recommendation by Urology provider. RN unable to fine documentation in Urology OV note on recommendation from 4/8 visit with pt.     Aby Vargas RN

## 2025-04-10 NOTE — TELEPHONE ENCOUNTER
Hibernater message sent to patient to confirm current Topamax dose.    Kelsey Mi RN BSN  [facility]

## 2025-04-14 NOTE — TELEPHONE ENCOUNTER
Patients with history of kidney stones should stop topiramate so I recommend that patient continues to hold topiramate. If patient would like to discuss other options for weight management please let me know.

## 2025-06-05 ENCOUNTER — OFFICE VISIT (OUTPATIENT)
Dept: ORTHOPEDICS | Facility: CLINIC | Age: 45
End: 2025-06-05

## 2025-06-05 DIAGNOSIS — M25.511 ACUTE PAIN OF RIGHT SHOULDER: Primary | ICD-10-CM

## 2025-06-05 DIAGNOSIS — M75.82 ROTATOR CUFF TENDINITIS, LEFT: ICD-10-CM

## 2025-06-05 DIAGNOSIS — M75.101 ROTATOR CUFF SYNDROME OF RIGHT SHOULDER: ICD-10-CM

## 2025-06-05 RX ORDER — TRIAMCINOLONE ACETONIDE 40 MG/ML
40 INJECTION, SUSPENSION INTRA-ARTICULAR; INTRAMUSCULAR
Status: COMPLETED | OUTPATIENT
Start: 2025-06-05 | End: 2025-06-05

## 2025-06-05 RX ORDER — ROPIVACAINE HYDROCHLORIDE 5 MG/ML
3 INJECTION, SOLUTION EPIDURAL; INFILTRATION; PERINEURAL
Status: COMPLETED | OUTPATIENT
Start: 2025-06-05 | End: 2025-06-05

## 2025-06-05 RX ADMIN — ROPIVACAINE HYDROCHLORIDE 3 ML: 5 INJECTION, SOLUTION EPIDURAL; INFILTRATION; PERINEURAL at 11:42

## 2025-06-05 RX ADMIN — TRIAMCINOLONE ACETONIDE 40 MG: 40 INJECTION, SUSPENSION INTRA-ARTICULAR; INTRAMUSCULAR at 11:42

## 2025-06-05 NOTE — PROGRESS NOTES
Daniella Sexton  :  1980  DOS: 25  MRN: [id number]    Sports Medicine Clinic Visit    PCP: Marielena Douglas    Daniella Sexton is a 44 year old Right hand dominant female who is seen in follow up for bilateral shoulder pain, right>>>left.    Interim History - 2025  Since last visit on 3/12/2025 patient has moderate-severe bilateral shoulder pain, right>>>left over the past 4 weeks on the right, left over past 3 - 5 days.  Right shoulder subacromial injection completed on 2025 provided relief for ~ 3 months.  Left shoulder subacromial injection completed 3/12/2025 provided relief for ~ 10 weeks.  Pain is worse with lying on either shoulder & reaching overhead.  No new injury in the interim.    Review of Systems  Musculoskeletal: as above  Remainder of review of systems is negative including constitutional, CV, pulmonary, GI, Skin and Neurologic except as noted in HPI or medical history.    Past Medical History:   Diagnosis Date    Alcohol abuse     Anxiety 2014    Arthritis     C. difficile colitis     Chronic bilateral low back pain with bilateral sciatica 2018    Chronic pansinusitis 2019    Depressive disorder 1999    Duodenal ulcer 1995    Fatty liver 2020    History of alcohol abuse 2014    History of methamphetamine abuse (H) 2014    Lung nodule 2020    Currently managed with follow up imaging by [facility].       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)     Migraines 2014    Mild intermittent asthma without complication 2019    Obesity (BMI 35.0-39.9) with comorbidity (H) 2019    Pulmonary emphysema, unspecified emphysema type (H) 2022    Pure hypercholesterolemia 2018    Seasonal allergic rhinitis 2014     Past Surgical History:   Procedure Laterality Date    ABDOMEN SURGERY      tubeal    BACK SURGERY  2005    L 3-4, L 4-5    LITHOTRIPSY      OPTICAL TRACKING SYSTEM  ENDOSCOPIC SINUS SURGERY Bilateral 01/10/2019    Procedure: BILATERAL IMAGE GUIDED ENDOSCOPIC SINUS SURGERY, BILATERAL TURBINATE REDUCTION;  Surgeon: Maikol Miguel MD;  Location: [facility] OR    RELEASE CARPAL TUNNEL Left     TONSILLECTOMY      TUBAL LIGATION       Family History   Problem Relation Age of Onset    No Known Problems Mother     Unknown/Adopted Father     Heart Disease No family hx of     Diabetes No family hx of     Cancer No family hx of     Colon Cancer No family hx of        Objective  There were no vitals taken for this visit.    General: healthy, alert and in no distress    HEENT: no scleral icterus or conjunctival erythema   Skin: no suspicious lesions or rash. No jaundice.   CV: regular rhythm by palpation, 2+ distal pulses, no pedal edema    Resp: normal respiratory effort without conversational dyspnea   Psych: normal mood and affect    Gait: nonantalgic, appropriate coordination and balance   Neuro: normal light touch sensory exam of the extremities. Motor strength as noted below     Bilateral Shoulder exam    ROM:        Full active and passive ROM with flexion, extension, abduction, internal and external rotation, painful terminal ROM R>>L       asymmetric scapular motion on R       Painful terminal flexion and abduction, mildly in ER    Tender:        subacromial space mild left, focal right       Lateral deltoid mild b/l    Non Tender:       remainder of shoulder       sternoclavicular joint       acromioclavicular joint       posterior shoulder       periscapular region    Strength:        abduction 5-/5       internal rotation 5/5       external rotation 5-/5       adduction 5/5    Impingement testing:       positive (+) [place] mild R>L       positive (+) empty can right       neg (-) crossover       neg (-) [place]goldie       Neg crank    Stability testing:       neg (-) relocation       neg (-) anterior glide       neg (-) sulcus sign    Skin:       no visible deformities       well  perfused       capillary refill brisk    Sensation:        normal sensation over shoulder and upper extremity       Radiology:  Narrative & Impression   EXAM: XR SHOULDER RIGHT G/E 3 VIEWS  LOCATION: [facility]  DATE: 9/29/2024     INDICATION:  Right elbow pain, Acute pain of right shoulder  COMPARISON: None.                                                                      IMPRESSION: No fracture or dislocation. No degenerative changes. Small benign calcified granuloma in the right midlung.     Large Joint Injection/Arthocentesis    Date/Time: 6/5/2025 11:42 AM    Performed by: Dany Chapman DO  Authorized by: Dany Chapman DO    Indications:  Pain  Guidance: ultrasound        Medications:  40 mg triamcinolone 40 MG/ML; 3 mL ROPivacaine 5 MG/ML  Outcome:  Tolerated well, no immediate complications  Procedure discussed: discussed risks, benefits, and alternatives    Consent Given by:  Patient  Timeout: timeout called immediately prior to procedure    Prep: patient was prepped and draped in usual sterile fashion     Ultrasound images of procedure were permanently stored.       Assessment:  1. Acute pain of right shoulder    2. Rotator cuff syndrome of right shoulder    3. Rotator cuff tendinitis, left          Plan:  Discussed the assessment with the patient.  Follow up: 3 weeks if not improved  Continue OT/HEP exercises, including stretching and massage, some ongoing muscle soreness  Today having acute right shoulder pain and starting to have increasing left shoulder pain as well, still consistent with RTC tendinitis  CSI was helpful for both shoulders in the past, pain/exam similar today  Bracing and activity modification reviewed, compression options reviewed  Oral Tylenol and topical Voltaren gel reviewed as safe OTC options, reviewed safe dosing strategies  PT options and activity modification strategies reviewed  After review of relative risks, goals and  limitations, repeat US guided CSI of the right subacromial space performed today  Prior XR images independently visualized and reviewed with patient today in clinic  Can offer return to formal PT, declined for now and she has limited insurance coverage currently  Consider repeat left shoulder injection if pain worsens  Also reviewed the option for advanced imaging in the future based on short term progress, deferred for now  Expectations and goals of CSI reviewed  Often 2-3 days for steroid effect, and can take up to two weeks for maximum effect  We discussed modified progressive pain-free activity as tolerated  Do not overuse in first two weeks if feeling better due to concern for vulnerability while steroid is working  Supportive care reviewed  All questions were answered today  Contact us with additional questions or concerns  Signs and sx of concern reviewed      Dany Chapman DO, KEKE  Sports Medicine Physician  [facility]      Disclaimer: This note consists of symbols derived from keyboarding, dictation and/or voice recognition software. As a result, there may be errors in the script that have gone undetected. Please consider this when interpreting information found in this chart.

## 2025-06-05 NOTE — LETTER
June 5, 2025      Daniella was seen in my office today.  She has an orthopedic issue and should be considered medically excused for any time missed from work over the past week, and any time missed over the next 2 weeks if needed.  Updated recommendations will be provided only if needed.      Dany Barrientos DO, KEKE  Sports Medicine Physician  [facility]          Electronically signed

## 2025-06-05 NOTE — LETTER
2025      Daniella Sexton  [id number]  [place] [address]      Dear Colleague,    Thank you for referring your patient, Daniella Sexton, to the [facility] [place]. Please see a copy of my visit note below.    Daniella Sexton  :  1980  DOS: 25  MRN: [id number]    Sports Medicine Clinic Visit    PCP: Marielena Douglas    Daniella Sexton is a 44 year old Right hand dominant female who is seen in follow up for bilateral shoulder pain, right>>>left.    Interim History - 2025  Since last visit on 3/12/2025 patient has moderate-severe bilateral shoulder pain, right>>>left over the past 4 weeks on the right, left over past 3 - 5 days.  Right shoulder subacromial injection completed on 2025 provided relief for ~ 3 months.  Left shoulder subacromial injection completed 3/12/2025 provided relief for ~ 10 weeks.  Pain is worse with lying on either shoulder & reaching overhead.  No new injury in the interim.    Review of Systems  Musculoskeletal: as above  Remainder of review of systems is negative including constitutional, CV, pulmonary, GI, Skin and Neurologic except as noted in HPI or medical history.    Past Medical History:   Diagnosis Date     Alcohol abuse      Anxiety 2014     Arthritis      C. difficile colitis      Chronic bilateral low back pain with bilateral sciatica 2018     Chronic pansinusitis 2019     Depressive disorder 1999     Duodenal ulcer 1995     Fatty liver 2020     History of alcohol abuse 2014     History of methamphetamine abuse (H) 2014     Lung nodule 2020    Currently managed with follow up imaging by [facility].        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)      Migraines 2014     Mild intermittent asthma without complication 2019     Obesity (BMI 35.0-39.9) with comorbidity (H) 2019     Pulmonary emphysema, unspecified emphysema type (H)  07/08/2022     Pure hypercholesterolemia 08/27/2018     Seasonal allergic rhinitis 04/18/2014     Past Surgical History:   Procedure Laterality Date     ABDOMEN SURGERY  2007    tubeal     BACK SURGERY  2005    L 3-4, L 4-5     LITHOTRIPSY  2016     OPTICAL TRACKING SYSTEM ENDOSCOPIC SINUS SURGERY Bilateral 01/10/2019    Procedure: BILATERAL IMAGE GUIDED ENDOSCOPIC SINUS SURGERY, BILATERAL TURBINATE REDUCTION;  Surgeon: Maikol Miguel MD;  Location: [facility] OR     RELEASE CARPAL TUNNEL Left      TONSILLECTOMY       TUBAL LIGATION       Family History   Problem Relation Age of Onset     No Known Problems Mother      Unknown/Adopted Father      Heart Disease No family hx of      Diabetes No family hx of      Cancer No family hx of      Colon Cancer No family hx of        Objective  There were no vitals taken for this visit.    General: healthy, alert and in no distress    HEENT: no scleral icterus or conjunctival erythema   Skin: no suspicious lesions or rash. No jaundice.   CV: regular rhythm by palpation, 2+ distal pulses, no pedal edema    Resp: normal respiratory effort without conversational dyspnea   Psych: normal mood and affect    Gait: nonantalgic, appropriate coordination and balance   Neuro: normal light touch sensory exam of the extremities. Motor strength as noted below     Bilateral Shoulder exam    ROM:        Full active and passive ROM with flexion, extension, abduction, internal and external rotation, painful terminal ROM R>>L       asymmetric scapular motion on R       Painful terminal flexion and abduction, mildly in ER    Tender:        subacromial space mild left, focal right       Lateral deltoid mild b/l    Non Tender:       remainder of shoulder       sternoclavicular joint       acromioclavicular joint       posterior shoulder       periscapular region    Strength:        abduction 5-/5       internal rotation 5/5       external rotation 5-/5       adduction 5/5    Impingement testing:        positive (+) [place] mild R>L       positive (+) empty can right       neg (-) crossover       neg (-) [place]goldie       Neg crank    Stability testing:       neg (-) relocation       neg (-) anterior glide       neg (-) sulcus sign    Skin:       no visible deformities       well perfused       capillary refill brisk    Sensation:        normal sensation over shoulder and upper extremity       Radiology:  Narrative & Impression   EXAM: XR SHOULDER RIGHT G/E 3 VIEWS  LOCATION: [facility]  DATE: 9/29/2024     INDICATION:  Right elbow pain, Acute pain of right shoulder  COMPARISON: None.                                                                      IMPRESSION: No fracture or dislocation. No degenerative changes. Small benign calcified granuloma in the right midlung.     Large Joint Injection/Arthocentesis    Date/Time: 6/5/2025 11:42 AM    Performed by: Dany Chapman DO  Authorized by: Dany Chapman DO    Indications:  Pain  Guidance: ultrasound        Medications:  40 mg triamcinolone 40 MG/ML; 3 mL ROPivacaine 5 MG/ML  Outcome:  Tolerated well, no immediate complications  Procedure discussed: discussed risks, benefits, and alternatives    Consent Given by:  Patient  Timeout: timeout called immediately prior to procedure    Prep: patient was prepped and draped in usual sterile fashion     Ultrasound images of procedure were permanently stored.       Assessment:  1. Acute pain of right shoulder    2. Rotator cuff syndrome of right shoulder    3. Rotator cuff tendinitis, left          Plan:  Discussed the assessment with the patient.  Follow up: 3 weeks if not improved  Continue OT/HEP exercises, including stretching and massage, some ongoing muscle soreness  Today having acute right shoulder pain and starting to have increasing left shoulder pain as well, still consistent with RTC tendinitis  CSI was helpful for both shoulders in the past, pain/exam similar  today  Bracing and activity modification reviewed, compression options reviewed  Oral Tylenol and topical Voltaren gel reviewed as safe OTC options, reviewed safe dosing strategies  PT options and activity modification strategies reviewed  After review of relative risks, goals and limitations, repeat US guided CSI of the right subacromial space performed today  Prior XR images independently visualized and reviewed with patient today in clinic  Can offer return to formal PT, declined for now and she has limited insurance coverage currently  Consider repeat left shoulder injection if pain worsens  Also reviewed the option for advanced imaging in the future based on short term progress, deferred for now  Expectations and goals of CSI reviewed  Often 2-3 days for steroid effect, and can take up to two weeks for maximum effect  We discussed modified progressive pain-free activity as tolerated  Do not overuse in first two weeks if feeling better due to concern for vulnerability while steroid is working  Supportive care reviewed  All questions were answered today  Contact us with additional questions or concerns  Signs and sx of concern reviewed      Dany Chapman DO, CAQ  Sports Medicine Physician  [facility]      Disclaimer: This note consists of symbols derived from keyboarding, dictation and/or voice recognition software. As a result, there may be errors in the script that have gone undetected. Please consider this when interpreting information found in this chart.    Again, thank you for allowing me to participate in the care of your patient.        Sincerely,        Dany Chapman DO    Electronically signed

## 2025-08-01 ENCOUNTER — MYC REFILL (OUTPATIENT)
Dept: UROLOGY | Facility: CLINIC | Age: 45
End: 2025-08-01

## 2025-08-01 DIAGNOSIS — N20.0 KIDNEY STONE: ICD-10-CM

## 2025-08-04 RX ORDER — HYDROCHLOROTHIAZIDE 12.5 MG/1
12.5 TABLET ORAL DAILY
Qty: 90 TABLET | Refills: 3 | OUTPATIENT
Start: 2025-08-04

## 2025-08-04 RX ORDER — POTASSIUM CITRATE 1080 MG/1
10 TABLET, EXTENDED RELEASE ORAL
Qty: 180 TABLET | Refills: 3 | OUTPATIENT
Start: 2025-08-04

## 2025-08-12 ENCOUNTER — ANCILLARY PROCEDURE (OUTPATIENT)
Dept: MRI IMAGING | Facility: CLINIC | Age: 45
End: 2025-08-12
Attending: FAMILY MEDICINE

## 2025-08-12 DIAGNOSIS — G89.29 CHRONIC ELBOW PAIN, RIGHT: ICD-10-CM

## 2025-08-12 DIAGNOSIS — M25.521 CHRONIC ELBOW PAIN, RIGHT: ICD-10-CM

## 2025-08-12 DIAGNOSIS — S56.511A PARTIAL TEAR OF COMMON EXTENSOR TENDON OF RIGHT ELBOW: ICD-10-CM

## 2025-08-12 DIAGNOSIS — M77.11 LATERAL EPICONDYLITIS OF RIGHT ELBOW: ICD-10-CM

## 2025-08-12 DIAGNOSIS — M25.521 RIGHT ELBOW PAIN: ICD-10-CM

## 2025-08-12 PROCEDURE — 73221 MRI JOINT UPR EXTREM W/O DYE: CPT | Mod: RT | Performed by: RADIOLOGY

## 2025-08-22 DIAGNOSIS — M54.42 CHRONIC BILATERAL LOW BACK PAIN WITH BILATERAL SCIATICA: ICD-10-CM

## 2025-08-22 DIAGNOSIS — G89.29 CHRONIC BILATERAL LOW BACK PAIN WITH BILATERAL SCIATICA: ICD-10-CM

## 2025-08-22 DIAGNOSIS — M54.41 CHRONIC BILATERAL LOW BACK PAIN WITH BILATERAL SCIATICA: ICD-10-CM

## 2025-08-25 RX ORDER — PREGABALIN 150 MG/1
150 CAPSULE ORAL 2 TIMES DAILY
Qty: 60 CAPSULE | Refills: 2 | Status: SHIPPED | OUTPATIENT
Start: 2025-08-25

## (undated) DEVICE — TRACKER PATIENT NON-INVASIVE AXIEM 9734887XOM

## (undated) DEVICE — SU PROLENE 2-0 SH 30" 8833H

## (undated) DEVICE — ENDO SHEATH STORZ SHARPSITE ENDOSCRUB 0DEG 4MM 1912000

## (undated) DEVICE — NDL SPINAL 25GA 3.5" QUINCKE 405180

## (undated) DEVICE — NDL 19GA 1.5"

## (undated) DEVICE — BLADE TURBINATE INFERIOR 2MM ROTATE  1882040HR

## (undated) DEVICE — DECANTER TRANSFER DEVICE 2008S

## (undated) DEVICE — SPONGE COTTONOID 1/2X3" 80-1407

## (undated) DEVICE — GLOVE PROTEXIS W/NEU-THERA 7.5  2D73TE75

## (undated) DEVICE — DRAPE SPLIT EENT 76X124" 3X28" 9447

## (undated) DEVICE — PACK MINOR SBA15MIFSE

## (undated) DEVICE — SOL NACL 0.9% INJ 1000ML BAG 07983-09

## (undated) DEVICE — SUCTION CANISTER MEDIVAC LINER 1500ML W/LID 65651-515

## (undated) DEVICE — DRSG NASOPORE FRAG FIRM 8CM 5400-020-008

## (undated) DEVICE — TUBING SUCTION 10'X3/16" N510

## (undated) DEVICE — TRACKER ENT OTS INSTRUMENT FUSION 9733533

## (undated) DEVICE — TRACKER ENT OTS PATIENT FUSION 9733534

## (undated) DEVICE — SOL WATER IRRIG 1000ML BOTTLE 07139-09

## (undated) DEVICE — NDL DENTAL 27GA LONG 8881400058

## (undated) DEVICE — MASK CONE SHAPED 00152

## (undated) DEVICE — SPECIMEN CONTAINER 4OZ

## (undated) DEVICE — BLADE SINUS RAD12 CVD 4MM FUSION ROTATE W/TRACKING

## (undated) RX ORDER — GABAPENTIN 300 MG/1
CAPSULE ORAL
Status: DISPENSED
Start: 2019-01-10

## (undated) RX ORDER — FENTANYL CITRATE 50 UG/ML
INJECTION, SOLUTION INTRAMUSCULAR; INTRAVENOUS
Status: DISPENSED
Start: 2019-01-10

## (undated) RX ORDER — LIDOCAINE HYDROCHLORIDE 40 MG/ML
SOLUTION TOPICAL
Status: DISPENSED
Start: 2019-01-10

## (undated) RX ORDER — ACETAMINOPHEN 325 MG/1
TABLET ORAL
Status: DISPENSED
Start: 2019-01-10

## (undated) RX ORDER — CLINDAMYCIN PHOSPHATE 150 MG/ML
INJECTION, SOLUTION INTRAVENOUS
Status: DISPENSED
Start: 2019-01-10

## (undated) RX ORDER — ONDANSETRON 2 MG/ML
INJECTION INTRAMUSCULAR; INTRAVENOUS
Status: DISPENSED
Start: 2019-01-10

## (undated) RX ORDER — DEXAMETHASONE SODIUM PHOSPHATE 4 MG/ML
INJECTION, SOLUTION INTRA-ARTICULAR; INTRALESIONAL; INTRAMUSCULAR; INTRAVENOUS; SOFT TISSUE
Status: DISPENSED
Start: 2019-01-10